# Patient Record
Sex: FEMALE | Race: WHITE | NOT HISPANIC OR LATINO | Employment: OTHER | URBAN - METROPOLITAN AREA
[De-identification: names, ages, dates, MRNs, and addresses within clinical notes are randomized per-mention and may not be internally consistent; named-entity substitution may affect disease eponyms.]

---

## 2017-02-09 ENCOUNTER — LAB CONVERSION - ENCOUNTER (OUTPATIENT)
Dept: OTHER | Facility: OTHER | Age: 77
End: 2017-02-09

## 2017-02-09 LAB — HBA1C MFR BLD HPLC: 6.5 % OF TOTAL HGB

## 2017-02-14 ENCOUNTER — GENERIC CONVERSION - ENCOUNTER (OUTPATIENT)
Dept: OTHER | Facility: OTHER | Age: 77
End: 2017-02-14

## 2017-02-15 ENCOUNTER — ALLSCRIPTS OFFICE VISIT (OUTPATIENT)
Dept: OTHER | Facility: OTHER | Age: 77
End: 2017-02-15

## 2017-02-15 DIAGNOSIS — E11.9 TYPE 2 DIABETES MELLITUS WITHOUT COMPLICATIONS (HCC): ICD-10-CM

## 2017-03-28 ENCOUNTER — ALLSCRIPTS OFFICE VISIT (OUTPATIENT)
Dept: OTHER | Facility: OTHER | Age: 77
End: 2017-03-28

## 2017-04-18 ENCOUNTER — GENERIC CONVERSION - ENCOUNTER (OUTPATIENT)
Dept: OTHER | Facility: OTHER | Age: 77
End: 2017-04-18

## 2017-04-19 ENCOUNTER — ALLSCRIPTS OFFICE VISIT (OUTPATIENT)
Dept: OTHER | Facility: OTHER | Age: 77
End: 2017-04-19

## 2017-04-19 ENCOUNTER — HOSPITAL ENCOUNTER (OUTPATIENT)
Dept: RADIOLOGY | Facility: HOSPITAL | Age: 77
Discharge: HOME | End: 2017-04-19
Payer: MEDICARE

## 2017-04-19 DIAGNOSIS — Z78.0 ASYMPTOMATIC MENOPAUSAL STATE: ICD-10-CM

## 2017-04-19 DIAGNOSIS — J20.8 ACUTE BRONCHITIS DUE TO OTHER SPECIFIED ORGANISMS: ICD-10-CM

## 2017-04-19 DIAGNOSIS — Z12.31 ENCOUNTER FOR SCREENING MAMMOGRAM FOR MALIGNANT NEOPLASM OF BREAST: ICD-10-CM

## 2017-04-19 PROCEDURE — 71020 HB CHEST X-RAY 2VW FRONTAL&LATL: CPT

## 2017-05-03 ENCOUNTER — GENERIC CONVERSION - ENCOUNTER (OUTPATIENT)
Dept: OTHER | Facility: OTHER | Age: 77
End: 2017-05-03

## 2017-05-24 ENCOUNTER — GENERIC CONVERSION - ENCOUNTER (OUTPATIENT)
Dept: OTHER | Facility: OTHER | Age: 77
End: 2017-05-24

## 2017-05-25 ENCOUNTER — GENERIC CONVERSION - ENCOUNTER (OUTPATIENT)
Dept: OTHER | Facility: OTHER | Age: 77
End: 2017-05-25

## 2017-06-09 ENCOUNTER — LAB CONVERSION - ENCOUNTER (OUTPATIENT)
Dept: OTHER | Facility: OTHER | Age: 77
End: 2017-06-09

## 2017-06-09 LAB
A/G RATIO (HISTORICAL): 1.1 (CALC) (ref 1–2.5)
ALBUMIN SERPL BCP-MCNC: 4 G/DL (ref 3.6–5.1)
ALP SERPL-CCNC: 80 U/L (ref 33–130)
ALT SERPL W P-5'-P-CCNC: 13 U/L (ref 6–29)
AST SERPL W P-5'-P-CCNC: 14 U/L (ref 10–35)
BILIRUB SERPL-MCNC: 0.4 MG/DL (ref 0.2–1.2)
BUN SERPL-MCNC: 19 MG/DL (ref 7–25)
BUN/CREA RATIO (HISTORICAL): 18 (CALC) (ref 6–22)
CALCIUM SERPL-MCNC: 9.4 MG/DL (ref 8.6–10.4)
CHLORIDE SERPL-SCNC: 98 MMOL/L (ref 98–110)
CHOLEST SERPL-MCNC: 147 MG/DL (ref 125–200)
CHOLEST/HDLC SERPL: 3.5 (CALC)
CO2 SERPL-SCNC: 27 MMOL/L (ref 20–31)
CREAT SERPL-MCNC: 1.05 MG/DL (ref 0.6–0.93)
EGFR AFRICAN AMERICAN (HISTORICAL): 61 ML/MIN/1.73M2
EGFR-AMERICAN CALC (HISTORICAL): 52 ML/MIN/1.73M2
GAMMA GLOBULIN (HISTORICAL): 3.6 G/DL (CALC) (ref 1.9–3.7)
GLUCOSE (HISTORICAL): 94 MG/DL (ref 65–99)
HBA1C MFR BLD HPLC: 6.3 % OF TOTAL HGB
HDLC SERPL-MCNC: 42 MG/DL
LDL CHOLESTEROL (HISTORICAL): 56 MG/DL (CALC)
NON-HDL-CHOL (CHOL-HDL) (HISTORICAL): 105 MG/DL (CALC)
POTASSIUM SERPL-SCNC: 4.6 MMOL/L (ref 3.5–5.3)
SODIUM SERPL-SCNC: 135 MMOL/L (ref 135–146)
TOTAL PROTEIN (HISTORICAL): 7.6 G/DL (ref 6.1–8.1)
TRIGL SERPL-MCNC: 246 MG/DL
TSH SERPL DL<=0.05 MIU/L-ACNC: 2.85 MIU/L (ref 0.4–4.5)

## 2017-06-14 ENCOUNTER — ALLSCRIPTS OFFICE VISIT (OUTPATIENT)
Dept: OTHER | Facility: OTHER | Age: 77
End: 2017-06-14

## 2017-06-14 DIAGNOSIS — E11.9 TYPE 2 DIABETES MELLITUS WITHOUT COMPLICATIONS (HCC): ICD-10-CM

## 2017-06-15 ENCOUNTER — HOSPITAL ENCOUNTER (OUTPATIENT)
Dept: BONE DENSITY | Facility: MEDICAL CENTER | Age: 77
Discharge: HOME | End: 2017-06-15
Payer: MEDICARE

## 2017-06-15 ENCOUNTER — HOSPITAL ENCOUNTER (OUTPATIENT)
Dept: MAMMOGRAPHY | Facility: MEDICAL CENTER | Age: 77
Discharge: HOME | End: 2017-06-15
Payer: MEDICARE

## 2017-06-15 DIAGNOSIS — Z78.0 ASYMPTOMATIC MENOPAUSAL STATE: ICD-10-CM

## 2017-06-15 DIAGNOSIS — Z12.31 ENCOUNTER FOR SCREENING MAMMOGRAM FOR MALIGNANT NEOPLASM OF BREAST: ICD-10-CM

## 2017-06-15 PROCEDURE — 77080 DXA BONE DENSITY AXIAL: CPT

## 2017-06-15 PROCEDURE — G0202 SCR MAMMO BI INCL CAD: HCPCS

## 2017-06-21 ENCOUNTER — GENERIC CONVERSION - ENCOUNTER (OUTPATIENT)
Dept: OTHER | Facility: OTHER | Age: 77
End: 2017-06-21

## 2017-07-29 ENCOUNTER — OFFICE VISIT (OUTPATIENT)
Dept: URGENT CARE | Facility: MEDICAL CENTER | Age: 77
End: 2017-07-29
Payer: MEDICARE

## 2017-07-29 ENCOUNTER — APPOINTMENT (OUTPATIENT)
Dept: RADIOLOGY | Facility: MEDICAL CENTER | Age: 77
End: 2017-07-29
Payer: MEDICARE

## 2017-07-29 DIAGNOSIS — R05.9 COUGH: ICD-10-CM

## 2017-07-29 PROCEDURE — 99213 OFFICE O/P EST LOW 20 MIN: CPT

## 2017-07-29 PROCEDURE — 71020 HB CHEST X-RAY 2VW FRONTAL&LATL: CPT

## 2017-07-29 PROCEDURE — G0463 HOSPITAL OUTPT CLINIC VISIT: HCPCS

## 2017-08-04 ENCOUNTER — ALLSCRIPTS OFFICE VISIT (OUTPATIENT)
Dept: OTHER | Facility: OTHER | Age: 77
End: 2017-08-04

## 2017-09-26 ENCOUNTER — GENERIC CONVERSION - ENCOUNTER (OUTPATIENT)
Dept: OTHER | Facility: OTHER | Age: 77
End: 2017-09-26

## 2017-10-13 ENCOUNTER — LAB CONVERSION - ENCOUNTER (OUTPATIENT)
Dept: OTHER | Facility: OTHER | Age: 77
End: 2017-10-13

## 2017-10-13 LAB
BUN SERPL-MCNC: 17 MG/DL (ref 7–25)
BUN/CREA RATIO (HISTORICAL): 15 (CALC) (ref 6–22)
CALCIUM SERPL-MCNC: 9.4 MG/DL (ref 8.6–10.4)
CHLORIDE SERPL-SCNC: 100 MMOL/L (ref 98–110)
CO2 SERPL-SCNC: 26 MMOL/L (ref 20–31)
CREAT SERPL-MCNC: 1.17 MG/DL (ref 0.6–0.93)
EGFR AFRICAN AMERICAN (HISTORICAL): 53 ML/MIN/1.73M2
EGFR-AMERICAN CALC (HISTORICAL): 46 ML/MIN/1.73M2
GLUCOSE (HISTORICAL): 101 MG/DL (ref 65–99)
HBA1C MFR BLD HPLC: 6.4 % OF TOTAL HGB
POTASSIUM SERPL-SCNC: 4.5 MMOL/L (ref 3.5–5.3)
SODIUM SERPL-SCNC: 136 MMOL/L (ref 135–146)

## 2017-10-16 ENCOUNTER — ALLSCRIPTS OFFICE VISIT (OUTPATIENT)
Dept: OTHER | Facility: OTHER | Age: 77
End: 2017-10-16

## 2017-10-16 DIAGNOSIS — E11.9 TYPE 2 DIABETES MELLITUS WITHOUT COMPLICATIONS (HCC): ICD-10-CM

## 2018-01-10 NOTE — RESULT NOTES
Verified Results  * MAMMO SCREENING BILATERAL W CAD [id number] 09:14AM Jc Souza     Test Name Result Flag Reference   MAMMO SCREENING BILATERAL W CAD (Report)     Patient History:   Patient is postmenopausal    Family history of breast cancer in maternal grandmother under age   48  Benign WBUS breast guidance of the right breast, May 13, 2009  Patient has never smoked  Patient's BMI is 34 8  Reason for exam: screening (asymptomatic)  Mammo Screening Bilateral W CAD: May 23, 2016 - Check In #:    [de-identified]   Bilateral CC and MLO view(s) were taken  Technologist: KATHERINE Lockwood (R)(M)   Prior study comparison: May 21, 2015, bilateral AMA dig scrn mamm   w/CAD performed at [facility]  May    20, 2014, bilateral WB digitl bilat zuleyka, performed at [facility]  May 13, 2013, bilateral digital    screening mammogram, performed at [place] [address]  May 10, 2012, bilateral digital screening    mammogram, performed at [place] [address]  May 9, 2011, bilateral digital screening mammogram,    performed at [place] [address]  May 3,    2010, bilateral digital screening mammogram, performed at [place] [address]  There are scattered fibroglandular densities  No dominant soft tissue mass, architectural distortion or    suspicious calcifications are noted  The skin and nipple    contours are within normal limits  No evidence of malignancy  No significant changes   when compared with prior studies  ASSESSMENT: BiRad:1 - Negative     Recommendation:   Routine screening mammogram of both breasts in 1 year  A    reminder letter will be sent  Analyzed by CAD     8-10% of cancers will be missed on mammography  Management of a    palpable abnormality must be based on clinical grounds   Patients   will be notified of their results via letter from our facility  Accredited by [employer] and FDA  Transcription Location: KATHERINE Mckeon 98: [id number]     Risk Value(s):   Tyrer-Cuzick 10 Year: 7 344%, Tyrer-Cuzick Lifetime: 8 948%,    Myriad Table: 2 6%, FAMILIA 5 Year: 1 9%, NCI Lifetime: 4 6%   Signed by:    Michael Campuzano MD   5/23/16

## 2018-01-11 NOTE — RESULT NOTES
Verified Results  DXA FOLLOW UP (NO CHARGE) [id number] 10:11AM eLann Slaughter Order Number: [id number]    - Patient Instructions: To schedule this appointment, please contact Central Scheduling at [phone]  Test Name Result Flag Reference   DXA FOLLOW UP (NO CHARGE) (Report)     CENTRAL DXA SCAN     CLINICAL HISTORY:  70year old post-menopausal  female risk factors include estrogen deficiency  Osteopenia  Treatment with Fosamax 2001 until 2003     TECHNIQUE: Bone densitometry was performed using a [employer]'s W bone densitometer  Regions of interest appear properly placed  There are no obvious fractures or other confounding variables which could limit the study  Degenerative or    osteoarthritic changes appear to be present on the spine image falsely altering the true bone density  COMPARISON: Follow-up     RESULTS:    LUMBAR SPINE: L1-L4:   BMD 1 175 gm/cm2   T-score above normal, +1 24% higher than in 2014 and 34% higher than 1994, statistically significant but likely related to the changes noted on the spine image  Z-score +3 5     LEFT TOTAL HIP:   BMD 0 768 gm/cm2   T-score below normal,-1 4   Z-score 0 3     LEFT FEMORAL NECK:   BMD 0 543 gm/cm2   T-score below normal,osteoporosis ,-2 8 and 2% less than 2014 and unchanged from [facility]  Z-score -0 7     The forearm BMD is 0 597 and the T score is below normal, -1 6 and 3% less than 2014 and 2% less than 2012  The Z score is 0 9  A 25-hydroxy vitamin D level, an intact PTH, and a comprehensive metabolic panel are suggested in view of the femoral neck osteoporosis  Treatment is a consideration perhaps with Prolia or intravenous Reclast        IMPRESSION:   1  Based on the [facility] classification, this study identifies a diagnosis of osteoporosis, notably at the femoral neck area and the patient is considered at elevated risk for fracture         2  A daily intake of calcium of at least 1200 mg and vitamin D, 800-1000 IU, as well as weight bearing and muscle strengthening exercise, fall prevention and avoidance of tobacco and excessive alcohol intake as basic preventive measures are recommended  3  Repeat DXA scan on the same equipment in 18-24 months as clinically indicated  The 10 year risk of hip fracture is 19%, with the 10 year risk of major osteoporotic fracture being 30%, as calculated by the [facility] fracture risk assessment tool (FRAX)  The current NOF guidelines recommend treating patients with FRAX 10 year risk score    of >3% for hip fracture and >20% for major osteoporotic fracture  Fracture risks exceed treatment thresholds  WHO CLASSIFICATION:   Normal (a T-score of -1 0 or higher)   Low bone mineral density (a T-score of less than -1 0 but higher than -2 5)   Osteoporosis (a T-score of -2 5 or less)   Severe osteoporosis (a T-score of -2 5 or less with a fragility fracture)      Thank you for allowing us the opportunity to participate in your patient care  The expanded DEXA report will no longer be arriving in your mail  If you desire to view the full report please contact [address] [place] [address] or access the PACS system  Workstation performed: [id number]     Signed by:   Shubham Ibrahim MD   6/16/17     * DXA BONE DENSITY SPINE HIP AND PELVIS [id number] 09:17AM [facility] Order Number: [id number]    - Patient Instructions: To schedule this appointment, please contact Central Scheduling at [phone]  Test Name Result Flag Reference   DXA BONE DENSITY SPINE HIP AND PELVIS (Report)     CENTRAL DXA SCAN     CLINICAL HISTORY:  70year old post-menopausal  female risk factors include estrogen deficiency  Osteopenia  Treatment with Fosamax 2001 until 2003     TECHNIQUE: Bone densitometry was performed using a [employer]'s W bone densitometer  Regions of interest appear properly placed  There are no obvious fractures or other confounding variables which could limit the study  Degenerative or    osteoarthritic changes appear to be present on the spine image falsely altering the true bone density  COMPARISON: Follow-up     RESULTS:    LUMBAR SPINE: L1-L4:   BMD 1 175 gm/cm2   T-score above normal, +1 24% higher than in 2014 and 34% higher than 1994, statistically significant but likely related to the changes noted on the spine image  Z-score +3 5     LEFT TOTAL HIP:   BMD 0 768 gm/cm2   T-score below normal,-1 4   Z-score 0 3     LEFT FEMORAL NECK:   BMD 0 543 gm/cm2   T-score below normal,osteoporosis ,-2 8 and 2% less than 2014 and unchanged from [facility]  Z-score -0 7     The forearm BMD is 0 597 and the T score is below normal, -1 6 and 3% less than 2014 and 2% less than 2012  The Z score is 0 9  A 25-hydroxy vitamin D level, an intact PTH, and a comprehensive metabolic panel are suggested in view of the femoral neck osteoporosis  Treatment is a consideration perhaps with Prolia or intravenous Reclast        IMPRESSION:   1  Based on the [facility] classification, this study identifies a diagnosis of osteoporosis, notably at the femoral neck area and the patient is considered at elevated risk for fracture  2  A daily intake of calcium of at least 1200 mg and vitamin D, 800-1000 IU, as well as weight bearing and muscle strengthening exercise, fall prevention and avoidance of tobacco and excessive alcohol intake as basic preventive measures are recommended  3  Repeat DXA scan on the same equipment in 18-24 months as clinically indicated  The 10 year risk of hip fracture is 19%, with the 10 year risk of major osteoporotic fracture being 30%, as calculated by the [facility] fracture risk assessment tool (FRAX)  The current NOF guidelines recommend treating patients with FRAX 10 year risk score    of >3% for hip fracture and >20% for major osteoporotic fracture  Fracture risks exceed treatment thresholds       WHO CLASSIFICATION:   Normal (a T-score of -1 0 or higher)   Low bone mineral density (a T-score of less than -1 0 but higher than -2 5)   Osteoporosis (a T-score of -2 5 or less)   Severe osteoporosis (a T-score of -2 5 or less with a fragility fracture)      Thank you for allowing us the opportunity to participate in your patient care  The expanded DEXA report will no longer be arriving in your mail  If you desire to view the full report please contact [address] [place] [address] or access the PACS system  Workstation performed: [id number]     Signed by:   Gideon Francisco MD   6/16/17     * MAMMO SCREENING BILATERAL W CAD [id number] 09:15AM Brittjonasne Stephanie Order Number: [id number]    - Patient Instructions: To schedule this appointment, please contact Central Scheduling at [phone]  Do not wear any perfume, powder, lotion or deodorant on breast or underarm area  Please bring your doctors order, referral (if needed) and insurance information with you on the day of the test  Failure to bring this information may result in this test being rescheduled  Arrive 15 minutes prior to your appointment time to register  On the day of your test, please bring any prior mammogram or breast studies with you that were not performed at a [facility]  Failure to bring prior exams may result in your test needing to be rescheduled  Test Name Result Flag Reference   MAMMO SCREENING BILATERAL W CAD (Report)     Patient History:   Patient is postmenopausal    Family history of breast cancer under age 48 in maternal    grandmother, unknown cancer at age 76 in mother  Benign WBUS breast guidance of the right breast, May 13, 2009  Patient has never smoked  Patient's BMI is 34 8  Reason for exam: screening, asymptomatic  Mammo Screening Bilateral W CAD: Ayesha 15, 2017 - Check In #:    [de-identified]   Bilateral CC and MLO view(s) were taken       Technologist: KATHERINE Harrington (KATHERINE)(M)   Prior study comparison: May 23, 2016, mammo screening bilateral W   CAD, performed at [place] [address]  May    21, 2015, bilateral AMA dig scrn mamm w/CAD, performed at [place] [address]  May 20, 2014, bilateral WB    digitl bilat zuleyka, performed at [address] [place] [address]  May 13, 2013, bilateral digital screening mammogram performed    at [address] [place] [address]  May 10, 2012,    bilateral digital screening mammogram performed at [address] [place] [address]  There are scattered fibroglandular densities  The parenchymal pattern appears stable  No dominant soft tissue    mass or suspicious calcifications are noted  Radiopaque post core   biopsy clips are seen in the right breast from prior benign    biopsies  Stable nodular densities are seen in the left breast     The skin and nipple contours are within normal limits  No mammographic evidence of malignancy  No    significant changes when compared with prior studies  ACR BI-RADSï¾® Assessments: BiRad:2 - Benign     Recommendation:   Routine screening mammogram in 1 year  A reminder letter will be   scheduled  Analyzed by CAD     8-10% of cancers will be missed on mammography  Management of a    palpable abnormality must be based on clinical grounds  Patients   will be notified of their results via letter from our facility  Accredited by [employer] and FDA       Transcription Location:  Mike 98: [id number]     Risk Value(s):   Tyrer-Cuzick 10 Year: 3 600%, Tyrer-Cuzick Lifetime: 4 000%,    Myriad Table: 2 6%, FAMILIA 5 Year: 2 4%, NCI Lifetime: 5 5%   Signed by:   Michele Swenson MD   6/15/17

## 2018-01-11 NOTE — RESULT NOTES
Verified Results  * XR HIP 2+ VIEW LEFT 17Jun2016 01:14PM [facility] Order Number: [id number]     Test Name Result Flag Reference   * XR HIP/PELV 2-3 VWS LEFT (Report)     LEFT HIP     INDICATION: Left hip pain for 3 weeks  COMPARISON: None     VIEWS: AP pelvis and 2 coned down views; 3 images     FINDINGS:     There is no fracture or dislocation  Hip joint appears symmetric when compared to contralateral side with mild symmetric joint space narrowing  There are advanced degenerative changes seen within the left SI joint  Visualized low back also shows degenerative changes at L4-L5 and L5-S1    levels  No lytic or blastic lesions are seen  Soft tissues are unremarkable  IMPRESSION:     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

## 2018-01-11 NOTE — PROGRESS NOTES
Assessment    1  GERD (gastroesophageal reflux disease) (530 81) (K21 9)   2  Hyperlipidemia (272 4) (E78 5)   3  Hypothyroidism (244 9) (E03 9)   4  Type 2 diabetes mellitus (250 00) (E11 9)   5  Encounter for preventive health examination (V70 0) (Z00 00)    Plan   Health Maintenance    · We encourage all of our patients to exercise regularly  30 minutes of exercise or physical  activity five or more days a week is recommended for children and adults ;  Status:Complete;   Done: 04RCA8975 03:55PM  Type 2 diabetes mellitus    · (1) COMPREHENSIVE METABOLIC PANEL; Status:Active; Requested for:16Oct2017;    · (1) HEMOGLOBIN A1C; Status:Active; Requested for:16Oct2017;    · (1) LIPID PANEL, FASTING; Status:Active; Requested for:16Oct2017;     Fluzone High-Dose 0 5 ML Intramuscular Suspension Prefilled Syringe; INJECT 0 5  ML Intramuscular; Dose: 0 5; Route: Intramuscular; Site: Right Deltoid; Done: 93QYP3923 10:03AM; Status: Complete - Retrospective By Protocol Authorization;  Ordered  For: Flu vaccine need; Ordered By:Monica Mora; Effective Date:16Oct2017; Administered by: Charlie Cruz: 10/16/2017 10:03:00 AM; Last Updated By: Charlie Cruz; 10/16/2017 2:15:37 PM     Discussion/Summary  Impression: Initial Annual Wellness Visit  Cardiovascular screening and counseling: screening is current, counseling was given on maintaining a healthy diet and counseling was given on ways to improve exercise tolerance  Diabetes screening and counseling: screening not indicated  Colorectal cancer screening and counseling: screening is current  Breast cancer screening and counseling: screening is current  Cervical cancer screening and counseling: screening not indicated  Osteoporosis screening and counseling: screening not indicated  Abdominal aortic aneurysm screening and counseling: screening not indicated  Glaucoma screening and counseling: screening is current and aug 2017 Dr Shawn Christianson     HIV screening and counseling: screening not indicated  Immunizations: influenza vaccine is up to date this year, the lifetime pneumococcal vaccine has been completed, hepatitis B vaccination series is not indicated at this time due to the patient's low risk of roosevelt the disease, Zostavax vaccination up to date and the patient declines the Tdap vaccine  Advance Directive Planning: complete and up to date  Patient Discussion: plan discussed with the patient, follow-up visit needed in one year  Chief Complaint  pt is here for AWV      History of Present Illness  Welcome to Medicare and Wellness Visits: The patient is being seen for the initial annual wellness visit  Medicare Screening and Risk Factors   Once per lifetime medicare screening tests: AAA screening [place] has not yet been done  Medicare Screening Tests Risk Questions   Abdominal aortic aneurysm risk assessment: none indicated  Osteoporosis risk assessment: , female gender and over 48years of age  HIV risk assessment: none indicated  Drug and Alcohol Use: The patient has never smoked cigarettes  The patient reports never drinking alcohol  Alcohol concern:   The patient has no concerns about alcohol abuse  She has never used illicit drugs  Diet and Physical Activity: Current diet includes well balanced meals, 2 servings of fruit per day, 1 servings of vegetables per day, 1 servings of meat per day, 1 servings of whole grains per day, 1 servings of simple carbohydrates per day, 2 servings of dairy products per day, 2 cups of coffee per day and 1 cups of tea per day  She exercises infrequently  Exercise: walking  Mood Disorder and Cognitive Impairment Screening: PHQ-9 Depression Scale   Depression screening  no significant symptoms  Functional Ability/Level of Safety: Hearing is normal bilaterally  She does not use a hearing aid   The patient is currently able to drive with limitations, but able to do activities of daily living without limitations and able to do instrumental activities of daily living without limitations  Advance Directives: Advance directives: living will, durable power of  for health care directives and advance directives  Co-Managers and Medical Equipment/Suppliers: See Patient Care Team      Patient Care Team    Care Team Member Role Specialty Office Number   Tapan Perez MD  Obstetrics/Gynecology [phone]   Shelly Baca Specialist Orthopedic Surgery [phone]   Yesi Summers MD Specialist Orthopedic Surgery [phone]     Active Problems    1  Acute bronchitis (466 0) (J20 9)   2  Acute bronchitis due to other specified organisms (466 0) (J20 8)   3  Acute URI (465 9) (J06 9)   4  Bursitis of left hip (726 5) (M70 72)   5  Colon cancer screening (V76 51) (Z12 11)   6  Cough (786 2) (R05)   7  Encounter for screening mammogram for malignant neoplasm of breast (V76 12)   (Z12 31)   8  Flu vaccine need (V04 81) (Z23)   9  GERD (gastroesophageal reflux disease) (530 81) (K21 9)   10  Gout (274 9) (M10 9)   11  Greater trochanteric bursitis of left hip (726 5) (M70 62)   12  Hip pain, left (719 45) (M25 552)   13  History of ovarian cyst (V13 29) (Z87 42)   14  Hyperlipidemia (272 4) (E78 5)   15  Hypothyroidism (244 9) (E03 9)   16  History of Inflamed skin tag (701 9,686 9) (L91 8)   17  Knee pain, right (719 46) (M25 561)   18  Lower abdominal pain (789 09) (R10 30)   19  Need for Tdap vaccination (V06 1) (Z23)   20  History of Pelvic pain in female (625 9) (R10 2)   21  Post menopausal problems (V49 81) (Z78 0)   22  Primary osteoarthritis of right knee (715 16) (M17 11)   23  Screening for genitourinary condition (V81 6) (Z13 89)   24  Situational anxiety (300 09) (F41 8)   25   Type 2 diabetes mellitus (250 00) (E11 9)    Past Medical History    · History of  2 (V22 2) (Z33 1)   · Denied: History of alcohol abuse   · Denied: History of mental disorder   · History of ovarian cyst (V13 29) (Z87 42)   · Denied: History of substance abuse   · History of Inflamed skin tag (701 9,686 9) (L91 8)   · History of Pelvic pain in female (625 9) (R10 2)   · History of Postoperative visit (V58 49) (Z48 89)   · History of Urinary frequency (788 41) (R35 0)    Surgical History    · History of Breast Surgery   · History of Cataract Surgery   · History of Oophorectomy    Family History  Mother    · Family history of malignant neoplasm (V16 9) (Z80 9)  Son    · Family history of paranoid schizophrenia (V17 0) (Z81 8)   · FHx: mental illness (V17 0) (Z81 8)  Maternal Grandmother    · Family history of malignant neoplasm of breast (V16 3) (Z80 3)  Family History    · Denied: Family history of alcohol abuse   · Family history of malignant neoplasm (V16 9) (Z80 9)   · Denied: Family history of substance abuse    Social History    · Active advance directive (V49 89) (Z78 9)   · Always uses seat belt   · House   · Lives with spouse   ·    · Never smoker   · No alcohol use   · Patient has living will (V49 89) (Z78 9)   · Power of  in existence   · Primary language is English   · Lutheran   · Retired   · Supportive and safe   · Two children    Current Meds   1  Aspirin 81 MG TABS; Take 1 tablet daily Recorded   2  Benzonatate 100 MG Oral Capsule; TAKE 1 CAPSULE 3 TIMES DAILY AS NEEDED; Therapy: 41FSI9062 to (Evaluate:76Toa6753)  Requested for: 95SLY5601; Last   Rx:54Abk5487 Ordered   3  BL Calcium 600 + D TABS; Take as directed Recorded   4  BL Glucosamine-Chondroitin TABS; Take as directed Recorded   5  Colace 100 MG Oral Capsule; 1 tablet every other day as needed; Therapy: (Recorded:79Qip5689) to Recorded   6  [employer] Fish Oil 1200 MG Oral Capsule; Take as directed Recorded   7  Daily Multiple Vitamin TABS; Take as directed Recorded   8  Levothyroxine Sodium 50 MCG Oral Tablet; take one tablet by mouth every day;    Therapy: 43CGM2969 to (Brenna Collado)  Requested for: 17OHH9836; Last Rx: 26VML3309 Ordered   9  Lisinopril-Hydrochlorothiazide 10-12 5 MG Oral Tablet; Take 1 tablet daily  Requested   for: 65MVB0164; Last YX:62VMV1749 Ordered   10  Omeprazole 20 MG Oral Capsule Delayed Release; TAKE ONE CAPSULE BY MOUTH    EVERY DAY; Therapy: 30Mrj6291 to (Marta White)  Requested for: 74Adi0912; Last    Rx:72Rsj1656 Ordered   11  PredniSONE 10 MG Oral Tablet; TAKE 1 TABLET EVERY 12 HOURS DAILY; Therapy: 83SGT3357 to (Evaluate:31Htn0141)  Requested for: 72Fqs7990; Last    Rx:23Yeb9474 Ordered   12  Simvastatin 40 MG Oral Tablet; TAKE 1 TABLET DAILY; Therapy: 08PJB6793 to (Evaluate:22Vmu4449)  Requested for: 73ICV8963; Last    Rx:61Zvu7024 Ordered    Allergies    1  No Known Drug Allergies    Immunizations   ** Printed in Appendix #1 below  Vitals  Signs    Heart Rate: 88  Systolic: 294  Diastolic: 70  Height: 4 ft 9 in  Weight: 164 lb   BMI Calculated: 35 49  BSA Calculated: 1 65    Results/Data  Falls Risk Assessment (Dx Z13 89 Screen for Neurologic Disorder) 2017 10:33AM User, Ahs     Test Name Result Flag Reference   Falls Risk      No falls in the past year     PHQ-2 Adult Depression Screening 71RNG2699 10:32AM User, [facility]     Test Name Result Flag Reference   PHQ-2 Adult Depression Score 0     Over the last two weeks, how often have you been bothered by any of the following problems?   Little interest or pleasure in doing things: Not at all - 0  Feeling down, depressed, or hopeless: Not at all - 0   PHQ-2 Adult Depression Screening Negative         Future Appointments    Date/Time Provider Specialty Site   2018 09:45 AM Gabi Menjivar DO Family Medicine [address] PRACTICE     Signatures   Electronically signed by : Michelle Peña DO; Oct 16 2017  3:55PM EST                       (Author)    Appendix #1     Patient: Evelyn Rico ; : 1940; MRN: [id number]      1 2 3 4 5    Influenza  2011 24-Sep-2012 12-Sep-2014 19-Oct-2015 17-Oct-2016    Pneumo Other May 2010        Tdap  Temporarily Deferred: Pt requests deferral, INS DOESN'T COVER, As of: 16Jun2016, Defer for 3 Years        Zoster  16-Jan-2008

## 2018-01-11 NOTE — RESULT NOTES
Verified Results  * XR CHEST PA & LATERAL 19Apr2017 01:52PM Nino Almeida Order Number: [id number]     Test Name Result Flag Reference   XR CHEST PA & LATERAL (Report)     CHEST 2 View     INDICATION: Productive cough and shortness of breath for 4 days  COMPARISON: 5/21/2015     VIEWS: PA and lateral projections; 3 images     FINDINGS:        Cardiomediastinal silhouette appears unremarkable  The lungs are clear  No pneumothorax or pleural effusion  Visualized osseous structures appear within normal limits for the patient's age  IMPRESSION:     No active pulmonary disease  Workstation performed: [id number]     Signed by:    Cherylene Maiers, MD   4/21/17

## 2018-01-12 VITALS
SYSTOLIC BLOOD PRESSURE: 122 MMHG | BODY MASS INDEX: 34.43 KG/M2 | TEMPERATURE: 98.9 F | HEIGHT: 58 IN | WEIGHT: 164 LBS | DIASTOLIC BLOOD PRESSURE: 60 MMHG

## 2018-01-12 VITALS
TEMPERATURE: 99.5 F | HEART RATE: 88 BPM | SYSTOLIC BLOOD PRESSURE: 110 MMHG | HEIGHT: 58 IN | BODY MASS INDEX: 34.22 KG/M2 | WEIGHT: 163 LBS | DIASTOLIC BLOOD PRESSURE: 72 MMHG

## 2018-01-13 VITALS
RESPIRATION RATE: 18 BRPM | DIASTOLIC BLOOD PRESSURE: 70 MMHG | HEART RATE: 84 BPM | HEIGHT: 58 IN | BODY MASS INDEX: 34.55 KG/M2 | WEIGHT: 164.6 LBS | SYSTOLIC BLOOD PRESSURE: 110 MMHG

## 2018-01-14 ENCOUNTER — OFFICE VISIT (OUTPATIENT)
Dept: URGENT CARE | Facility: MEDICAL CENTER | Age: 78
End: 2018-01-14
Payer: MEDICARE

## 2018-01-14 ENCOUNTER — APPOINTMENT (OUTPATIENT)
Dept: RADIOLOGY | Facility: MEDICAL CENTER | Age: 78
End: 2018-01-14
Payer: MEDICARE

## 2018-01-14 VITALS
BODY MASS INDEX: 35.38 KG/M2 | WEIGHT: 164 LBS | HEART RATE: 88 BPM | SYSTOLIC BLOOD PRESSURE: 112 MMHG | DIASTOLIC BLOOD PRESSURE: 70 MMHG | HEIGHT: 57 IN

## 2018-01-14 VITALS
WEIGHT: 165 LBS | BODY MASS INDEX: 35.6 KG/M2 | RESPIRATION RATE: 18 BRPM | HEIGHT: 57 IN | DIASTOLIC BLOOD PRESSURE: 78 MMHG | SYSTOLIC BLOOD PRESSURE: 118 MMHG | OXYGEN SATURATION: 98 % | TEMPERATURE: 99.7 F | HEART RATE: 100 BPM

## 2018-01-14 VITALS
HEART RATE: 88 BPM | HEIGHT: 58 IN | BODY MASS INDEX: 34.43 KG/M2 | WEIGHT: 164 LBS | SYSTOLIC BLOOD PRESSURE: 110 MMHG | DIASTOLIC BLOOD PRESSURE: 70 MMHG

## 2018-01-14 DIAGNOSIS — R05.9 COUGH: ICD-10-CM

## 2018-01-14 PROCEDURE — 99213 OFFICE O/P EST LOW 20 MIN: CPT

## 2018-01-14 PROCEDURE — G0463 HOSPITAL OUTPT CLINIC VISIT: HCPCS

## 2018-01-14 PROCEDURE — 71046 X-RAY EXAM CHEST 2 VIEWS: CPT

## 2018-01-18 NOTE — RESULT NOTES
Verified Results  MRI PELVIS BONY WO CONTRAST [id number] 11:23AM Keyonna Phi     Test Name Result Flag Reference   MRI PELVIS BONY WO CONTRAST (Report)     MRI BONY PELVIS     INDICATION: Left hip pain  Difficulty walking and bearing weight on the left side  COMPARISON: Left hip plain films from 6/17/2016, pelvic MR from 11/25/2014     TECHNIQUE: MR sequences were obtained of the pelvis to evaluate for sacral and coccygeal pathology  Localizers, sagittal STIR or sagittal T2 fat sat, axial T1, axial T2 fat sat, coronal T1, coronal STIR were obtained  Axial and coronal images were    obliqued relative to the sacrum and coccyx  1 5 Kassidy unit  Gadolinium was not used  FINDINGS:     SUBCUTANEOUS TISSUES: Normal     SI JOINTS AND SYMPHYSIS PUBIS:  There are sacral ala insufficiency fractures bilaterally , which are chronic since they are in retrospect visible on the 11/25/2014 pelvic MR, and there is no significant marrow edema currently  (Series 4 images 7    through 9 ) There are also chronic, bilateral iliac insufficiency fractures adjacent to the sacroiliac joints  Symphysis pubis is unremarkable  VISUALIZED LUMBAR SPINE: Please see concurrent lumbar spine MRI report for evaluation of the lumbar spine  OVERALL MARROW SIGNAL: There are no additional bony abnormalities  MUSCULATURE: Unremarkable  PELVIC SOFT TISSUES:  Normal        IMPRESSION:     There are chronic bilateral sacral ala insufficiency fractures, and also insufficiencies fractures of the iliac bones adjacent to the sacroiliac joints  There is no evidence of acute fracture         Workstation performed: [id number]     Signed by:   Vel Shaffer MD   7/1/16     * MRI LUMBAR SPINE WO CONTRAST [id number] 11:23AM Keyonna Phi     Test Name Result Flag Reference   MRI LUMBAR SPINE [address] (Report)     MRI LUMBAR SPINE WITHOUT CONTRAST     INDICATION: Left hip pain, difficulty walking and bearing weight on left foot, one month     COMPARISON: None  TECHNIQUE: Sagittal T1, sagittal T2, sagittal inversion recovery, axial T1 and axial T2, coronal T2       IMAGE QUALITY: Diagnostic     FINDINGS:     ALIGNMENT: Prominence of lumbar lordosis  Leftward translation of L4, rightward translation of the L2-L3 level  MARROW SIGNAL: Normal marrow signal is identified within the visualized bony structures  No discrete marrow lesion  DISTAL CORD AND CONUS: Normal size and signal within the distal cord and conus  The conus ends at the T12-L1 level  PARASPINAL SOFT TISSUES: Paraspinal soft tissues are unremarkable  SACRUM: Bilateral lateral sacral alar fractures are noted without edema, consistent with chronic fracture  LOWER THORACIC DISC SPACES: Normal disc height and signal  No disc herniation, canal stenosis or foraminal narrowing  LUMBAR DISC SPACES:        L1-L2: Normal      L2-L3: Circumferential bulge, moderate bilateral facet arthrosis, marginal osteophytes, asymmetric to the right  L3-L4: Minor degenerative anterolisthesis  Circumferential bulging of the discs  L4-L5: Moderate facet arthrosis, circumferential bulge  Asymmetric loss of disc height to the right  L5-S1: Bilateral facet arthrosis  Disc extends asymmetrically to the right foramen however, L5 root exits without compression  IMPRESSION:     Multifocal spondylosis and osteoarthritis without focal root compression  Chronic bilateral sacral alar fractures  Workstation performed: [id number]     Signed by:    Wayne Bowen MD   7/1/16

## 2018-01-19 NOTE — PROGRESS NOTES
Assessment   1  History of acute bronchitis (V12 69) (Z87 09)   2  History of acute bronchitis (V12 69) (Z87 09)   3  Acute bronchitis (466 0) (J20 9)    Plan   Acute bronchitis    · Doxycycline Hyclate 100 MG Oral Capsule; TAKE 1 CAPSULE EVERY 12 HOURS    UNTIL GONE   · PredniSONE 50 MG Oral Tablet; TAKE 1 TABLET DAILY AS DIRECTED  Cough    · * XR CHEST PA & LATERAL; Status:Resulted - Requires Verification,Retrospective By    Protocol Authorization;   Done: [id number] 11:37AM    Discussion/Summary   Discussion Summary:    1  Bronchitis x-rays reviewed by myself shows no acute abnormality  If the radiology read differs, I will call you doxycycline as directed with food prednisone as directed with food fluids up with PCP within 5 days if no improvement   to emergency room with worsening symptoms, fever, chest pain, shortness of breath or any signs of distress  Medication Side Effects Reviewed: Possible side effects of new medications were reviewed with the patient/guardian today  Understands and agrees with treatment plan: The treatment plan was reviewed with the patient/guardian  The patient/guardian understands and agrees with the treatment plan      Chief Complaint   1  Cough  Chief Complaint Free Text Note Form: Pt c/o an occasionally productive cough x3d, also states she is tired all the time  Denies any other symptoms  Tried mucinex, cough med w codeine without relief  History of Present Illness   HPI: The patient presents today for an evaluation of a cough that started on Thursday night  The cough is wet however she is not bringing up any mucous  The patient has tried taking mucinex-DM and promethazine with codeine with some relief  The patient denies any sick contacts  Patient states that she has had bronchitis previously and this is similar  Hospital Based Practices Required Assessment:      Pain Assessment      the patient states they do not have pain  Abuse And Domestic Violence Screen   Domestic violence screen not done today  Reason DV Screen not done:  in room       Depression And Suicide Screen  Suicide screen not done today  -- Reason suicide screen not done:  in room  Prefered Language is  english  Primary Language is  english  Review of Systems   Focused-Female:      Constitutional: no fever-- and-- no chills  ENT: no earache,-- no sore throat-- and-- no nasal discharge  Cardiovascular: no chest pain  Respiratory: cough, but-- no shortness of breath  Gastrointestinal: no abdominal pain  Neurological: no headache  ROS Reviewed:    ROS reviewed  Active Problems   1  Acute bronchitis due to other specified organisms (466 0) (J20 8)   2  Acute URI (465 9) (J06 9)   3  Bursitis of left hip (726 5) (M70 72)   4  Colon cancer screening (V76 51) (Z12 11)   5  Cough (786 2) (R05)   6  Encounter for screening mammogram for malignant neoplasm of breast (V76 12)     (Z12 31)   7  Flu vaccine need (V04 81) (Z23)   8  GERD (gastroesophageal reflux disease) (530 81) (K21 9)   9  Gout (274 9) (M10 9)   10  Greater trochanteric bursitis of left hip (726 5) (M70 62)   11  Hip pain, left (719 45) (M25 552)   12  History of ovarian cyst (V13 29) (Z87 42)   13  Hyperlipidemia (272 4) (E78 5)   14  Hypothyroidism (244 9) (E03 9)   15  History of Inflamed skin tag (701 9,686 9) (L91 8)   16  Knee pain, right (719 46) (M25 561)   17  Lower abdominal pain (789 09) (R10 30)   18  Need for Tdap vaccination (V06 1) (Z23)   19  History of Pelvic pain in female (625 9) (R10 2)   20  Post menopausal problems (V49 81) (Z78 0)   21  Primary osteoarthritis of right knee (715 16) (M17 11)   22  Screening for genitourinary condition (V81 6) (Z13 89)   23  Situational anxiety (300 09) (F41 8)   24  Type 2 diabetes mellitus (250 00) (E11 9)    Past Medical History   1  History of  2 (V22 2) (Z33 1)   2  History of acute bronchitis (V12 69) (Z87 09)   3  History of acute bronchitis (V12 69) (Z87 09)   4  Denied: History of alcohol abuse   5  Denied: History of mental disorder   6  History of ovarian cyst (V13 29) (Z87 42)   7  Denied: History of substance abuse   8  History of Inflamed skin tag (701 9,686 9) (L91 8)   9  History of Pelvic pain in female (625 9) (R10 2)   10  History of Postoperative visit (V58 49) (Z48 89)   11  History of Urinary frequency (788 41) (R35 0)  Active Problems And Past Medical History Reviewed: The active problems and past medical history were reviewed and updated today  Family History   Mother    1  Family history of malignant neoplasm (V16 9) (Z80 9)  Son    2  Family history of paranoid schizophrenia (V17 0) (Z81 8)   3  FHx: mental illness (V17 0) (Z81 8)  Maternal Grandmother    4  Family history of malignant neoplasm of breast (V16 3) (Z80 3)  Family History    5  Denied: Family history of alcohol abuse   6  Family history of malignant neoplasm (V16 9) (Z80 9)   7  Denied: Family history of substance abuse  Family History Reviewed: The family history was reviewed and updated today  Social History    · Active advance directive (V49 89) (Z78 9)   · Always uses seat belt   · House   · Lives with spouse   ·    · Never smoker   · No alcohol use   · Patient has living will (V49 89) (Z78 9)   · Power of  in existence   · Primary language is English   · Holiness   · Retired   · Supportive and safe   · Two children  Social History Reviewed: The social history was reviewed and updated today  The social history was reviewed and is unchanged  Surgical History   1  History of Breast Surgery   2  History of Cataract Surgery   3  History of Oophorectomy  Surgical History Reviewed: The surgical history was reviewed and updated today  Current Meds    1  Aspirin 81 MG TABS; Take 1 tablet daily Recorded   2  BL Calcium 600 + D TABS; Take as directed Recorded   3   BL Glucosamine-Chondroitin TABS; Take as directed Recorded   4  Colace 100 MG Oral Capsule; 1 tablet every other day as needed; Therapy: (Recorded:44Nqh7811) to Recorded   5  [facility] Fish Oil 1200 MG Oral Capsule; Take as directed Recorded   6  Daily Multiple Vitamin TABS; Take as directed Recorded   7  Levothyroxine Sodium 50 MCG Oral Tablet; take one tablet by mouth every day; Therapy: 10TBA9316 to ([facility])  Requested for: 74RQE2035; Last     Rx:84Rlj7461 Ordered   8  Lisinopril-Hydrochlorothiazide 10-12 5 MG Oral Tablet; Take 1 tablet daily  Requested for:     19OTA4371; Last EN:03MHO6221 Ordered   9  Omeprazole 20 MG Oral Capsule Delayed Release; TAKE ONE CAPSULE BY MOUTH     EVERY DAY; Therapy: 30Jll6027 to ([facility])  Requested for: 20Ifp8893; Last     Rx:46Zte7617 Ordered   10  Simvastatin 40 MG Oral Tablet; TAKE 1 TABLET DAILY; Therapy: 17TOH2361 to (Evaluate:91Gur5022)  Requested for: 34AXE5042; Last      Rx:23Fjm0001 Ordered  Medication List Reviewed: The medication list was reviewed and updated today  Allergies   1  No Known Drug Allergies    Vitals   Signs   Recorded: 23YNK1251 11:10AM   Temperature: 99 9 F, Tympanic  Heart Rate: 100  Respiration: 20  Systolic: 136, RUE, Sitting  Diastolic: 74, RUE, Sitting  O2 Saturation: 97  Pain Scale: 0    Physical Exam        Constitutional      General appearance: No acute distress, well appearing and well nourished  Eyes      Conjunctiva and lids: No swelling, erythema or discharge  Pupils and irises: Equal, round and reactive to light  Ears, Nose, Mouth, and Throat      External inspection of ears and nose: Normal        Otoscopic examination: Tympanic membranes translucent with normal light reflex  Canals patent without erythema  Nasal mucosa, septum, and turbinates: Normal without edema or erythema  Oropharynx: Normal with no erythema, edema, exudate or lesions         Pulmonary      Respiratory effort: No increased work of breathing or signs of respiratory distress  Auscultation of lungs: Abnormal   rhonchi over both midlung fields  Cardiovascular      Auscultation of heart: Normal rate and rhythm, normal S1 and S2, without murmurs  Lymphatic      Palpation of lymph nodes in neck: No lymphadenopathy  Musculoskeletal      Gait and station: Normal        Skin      Skin and subcutaneous tissue: Normal without rashes or lesions  Results/Data   * XR CHEST PA & LATERAL 15GUE0427 11:37AM [facility] Order Number: [id number]      Performing Comments: room 5      Test Name Result Flag Reference   XR CHEST PA & LATERAL (Report)     CHEST 2 View           INDICATION: Trauma cough  COMPARISON: 7/29/2017           VIEWS: PA and lateral projections; 2 images           FINDINGS:              Cardiomediastinal silhouette appears unremarkable  The lungs are clear  No pneumothorax or pleural effusion  Visualized osseous structures appear stable with kyphosis of the thoracic spine  IMPRESSION:           No active pulmonary disease  Workstation performed: [id number]           Signed by:       Cornelio Patten MD      1/14/18                                Future Appointments      Date/Time Provider Specialty Site   02/19/2018 09:45 AM Karissa Mclean DO Family Medicine [place] [address] FAMILY PRACTICE     Signatures    Electronically signed by : Mellisa Spencer, [facility]; Jan 14 2018  6:44PM EST                       (Author)     Electronically signed by : KEYLA Vaughn ; Jan 18 2018  3:24PM EST                       (Co-author)

## 2018-01-23 VITALS
HEART RATE: 100 BPM | DIASTOLIC BLOOD PRESSURE: 74 MMHG | TEMPERATURE: 99.9 F | OXYGEN SATURATION: 97 % | RESPIRATION RATE: 20 BRPM | SYSTOLIC BLOOD PRESSURE: 122 MMHG

## 2018-02-16 LAB
ALBUMIN SERPL-MCNC: 4 G/DL (ref 3.6–5.1)
ALBUMIN/GLOB SERPL: 1.1 (CALC) (ref 1–2.5)
ALP SERPL-CCNC: 87 U/L (ref 33–130)
ALT SERPL-CCNC: 14 U/L (ref 6–29)
AST SERPL-CCNC: 17 U/L (ref 10–35)
BILIRUB SERPL-MCNC: 0.4 MG/DL (ref 0.2–1.2)
BUN SERPL-MCNC: 19 MG/DL (ref 7–25)
BUN/CREAT SERPL: 17 (CALC) (ref 6–22)
CALCIUM SERPL-MCNC: 9.4 MG/DL (ref 8.6–10.4)
CHLORIDE SERPL-SCNC: 99 MMOL/L (ref 98–110)
CHOLEST SERPL-MCNC: 140 MG/DL
CHOLEST/HDLC SERPL: 3 (CALC)
CO2 SERPL-SCNC: 27 MMOL/L (ref 20–31)
CREAT SERPL-MCNC: 1.1 MG/DL (ref 0.6–0.93)
GLOBULIN SER CALC-MCNC: 3.6 G/DL (CALC) (ref 1.9–3.7)
GLUCOSE SERPL-MCNC: 106 MG/DL (ref 65–99)
HBA1C MFR BLD: 6.7 % OF TOTAL HGB
HDLC SERPL-MCNC: 47 MG/DL
LDLC SERPL CALC-MCNC: 68 MG/DL (CALC)
NONHDLC SERPL-MCNC: 93 MG/DL (CALC)
POTASSIUM SERPL-SCNC: 4.3 MMOL/L (ref 3.5–5.3)
PROT SERPL-MCNC: 7.6 G/DL (ref 6.1–8.1)
SL AMB EGFR AFRICAN AMERICAN: 57 ML/MIN/1.73M2
SL AMB EGFR NON AFRICAN AMERICAN: 49 ML/MIN/1.73M2
SODIUM SERPL-SCNC: 136 MMOL/L (ref 135–146)
TRIGL SERPL-MCNC: 175 MG/DL

## 2018-02-19 ENCOUNTER — OFFICE VISIT (OUTPATIENT)
Dept: FAMILY MEDICINE CLINIC | Facility: CLINIC | Age: 78
End: 2018-02-19
Payer: MEDICARE

## 2018-02-19 ENCOUNTER — TELEPHONE (OUTPATIENT)
Dept: FAMILY MEDICINE CLINIC | Facility: CLINIC | Age: 78
End: 2018-02-19

## 2018-02-19 VITALS
DIASTOLIC BLOOD PRESSURE: 80 MMHG | HEART RATE: 110 BPM | SYSTOLIC BLOOD PRESSURE: 120 MMHG | BODY MASS INDEX: 34.91 KG/M2 | WEIGHT: 161.8 LBS | HEIGHT: 57 IN | TEMPERATURE: 98.4 F | RESPIRATION RATE: 18 BRPM

## 2018-02-19 DIAGNOSIS — E03.9 ACQUIRED HYPOTHYROIDISM: ICD-10-CM

## 2018-02-19 DIAGNOSIS — N39.498 OTHER URINARY INCONTINENCE: ICD-10-CM

## 2018-02-19 DIAGNOSIS — E11.9 TYPE 2 DIABETES MELLITUS WITHOUT COMPLICATION, WITHOUT LONG-TERM CURRENT USE OF INSULIN (HCC): Primary | ICD-10-CM

## 2018-02-19 PROCEDURE — 99214 OFFICE O/P EST MOD 30 MIN: CPT | Performed by: FAMILY MEDICINE

## 2018-02-19 RX ORDER — SIMVASTATIN 40 MG
1 TABLET ORAL EVERY EVENING
COMMUNITY
Start: 2016-09-20 | End: 2018-09-20 | Stop reason: SDUPTHER

## 2018-02-19 RX ORDER — DOCUSATE SODIUM 100 MG/1
CAPSULE, LIQUID FILLED ORAL
COMMUNITY

## 2018-02-19 RX ORDER — NICOTINE POLACRILEX 4 MG/1
1 GUM, CHEWING ORAL DAILY
COMMUNITY
Start: 2014-09-15 | End: 2018-10-17 | Stop reason: SDUPTHER

## 2018-02-19 RX ORDER — B-COMPLEX WITH VITAMIN C
1 TABLET ORAL EVERY OTHER DAY
COMMUNITY

## 2018-02-19 RX ORDER — LISINOPRIL AND HYDROCHLOROTHIAZIDE 12.5; 1 MG/1; MG/1
1 TABLET ORAL DAILY
COMMUNITY
End: 2018-05-06 | Stop reason: HOSPADM

## 2018-02-19 RX ORDER — MINOXIDIL 2 %
1 SOLUTION, NON-ORAL TOPICAL 2 TIMES DAILY
COMMUNITY

## 2018-02-19 RX ORDER — LEVOTHYROXINE SODIUM 0.05 MG/1
50 TABLET ORAL DAILY
COMMUNITY
Start: 2017-10-06 | End: 2018-10-05 | Stop reason: SDUPTHER

## 2018-02-19 NOTE — PROGRESS NOTES
Assessment/Plan:    No problem-specific Assessment & Plan notes found for this encounter  Diagnoses and all orders for this visit:    Type 2 diabetes mellitus without complication, without long-term current use of insulin (HCC)  -     Microalbumin / creatinine urine ratio  -     Comprehensive metabolic panel; Future  -     Hemoglobin A1c; Future  -     Lipid panel; Future    Acquired hypothyroidism  -     TSH, 3rd generation; Future    Other orders  -     aspirin 81 MG tablet; Take 1 tablet by mouth daily  -     Calcium Carbonate-Vitamin D 600-200 MG-UNIT TABS; Take by mouth  -     docusate sodium (COLACE) 100 mg capsule; Take by mouth  -     Omega-3 Fatty Acids ([employer] FISH OIL) 1200 MG CAPS; Take by mouth  -     Glucosamine-Chondroitin 250-200 MG TABS; Take by mouth  -     Multiple Vitamins-Iron (DAILY MULTIPLE VITAMIN/IRON) TABS; Take by mouth  -     levothyroxine 50 mcg tablet; Take 1 tablet by mouth daily  -     lisinopril-hydrochlorothiazide (PRINZIDE,ZESTORETIC) 10-12 5 MG per tablet; Take 1 tablet by mouth daily  -     Omeprazole 20 MG TBEC; Take 1 capsule by mouth daily  -     simvastatin (ZOCOR) 40 mg tablet; Take 1 tablet by mouth daily          Subjective:      Patient ID: Andrew Herrera is a 78 y o  female  HPI    The following portions of the patient's history were reviewed and updated as appropriate: allergies, current medications, past family history, past medical history, past social history, past surgical history and problem list     Review of Systems      Objective:      /80 (BP Location: Left arm, Patient Position: Sitting, Cuff Size: Adult)   Pulse (!) 110   Temp 98 4 °F (36 9 °C) (Temporal)   Resp 18   Ht 4' 9" (1 448 m)   Wt 73 4 kg (161 lb 12 8 oz)   Breastfeeding? No   BMI 35 01 kg/m²          Physical Exam   Musculoskeletal:        Feet:              Patient's shoes and socks removed  Right Foot/Ankle   Right Foot Inspection      Sensory       Monofilament testing: diminished      Left Foot/Ankle  Left Foot Inspection                                           Sensory       Monofilament: diminished

## 2018-02-19 NOTE — PROGRESS NOTES
Assessment/Plan:    Type 2 diabetes mellitus ([facility] [address] )  Sugars reviewed and well controlled  Continue current meds  And try to get more exercise    Hyperlipidemia  Well controled       Diagnoses and all orders for this visit:    Type 2 diabetes mellitus without complication, without long-term current use of insulin (HCC)  -     Microalbumin / creatinine urine ratio  -     Comprehensive metabolic panel; Future  -     Hemoglobin A1c; Future  -     Lipid panel; Future    Acquired hypothyroidism  -     TSH, 3rd generation; Future    Other urinary incontinence  -     Ambulatory referral to Urogynecology; Future    Other orders  -     aspirin 81 MG tablet; Take 1 tablet by mouth daily  -     Calcium Carbonate-Vitamin D 600-200 MG-UNIT TABS; Take by mouth  -     docusate sodium (COLACE) 100 mg capsule; Take by mouth  -     Omega-3 Fatty Acids ([employer]S FISH OIL) 1200 MG CAPS; Take by mouth  -     Glucosamine-Chondroitin 250-200 MG TABS; Take by mouth  -     Multiple Vitamins-Iron (DAILY MULTIPLE VITAMIN/IRON) TABS; Take by mouth  -     levothyroxine 50 mcg tablet; Take 1 tablet by mouth daily  -     lisinopril-hydrochlorothiazide (PRINZIDE,ZESTORETIC) 10-12 5 MG per tablet; Take 1 tablet by mouth daily  -     Omeprazole 20 MG TBEC; Take 1 capsule by mouth daily  -     simvastatin (ZOCOR) 40 mg tablet; Take 1 tablet by mouth daily          Subjective:      Patient ID: Chantale Wyman is a 78 y o  female  HPI    The following portions of the patient's history were reviewed and updated as appropriate: current medications, past family history, past medical history, past social history, past surgical history and problem list     Review of Systems   Genitourinary:        Betty Givens incontinence and bladder dropping   Musculoskeletal: Positive for arthralgias and myalgias  All other systems reviewed and are negative          Objective:      /80 (BP Location: Left arm, Patient Position: Sitting, Cuff Size: Adult)   Pulse (!) 110 Temp 98 4 °F (36 9 °C) (Temporal)   Resp 18   Ht 4' 9" (1 448 m)   Wt 73 4 kg (161 lb 12 8 oz)   Breastfeeding? No   BMI 35 01 kg/m²          Physical Exam   Constitutional: She is oriented to person, place, and time  She appears well-developed and well-nourished  HENT:   Head: Normocephalic  Eyes: EOM are normal  Pupils are equal, round, and reactive to light  Neck: No thyromegaly present  Cardiovascular: Normal rate and regular rhythm  No murmur heard  Pulses:       Dorsalis pedis pulses are 1+ on the right side, and 1+ on the left side  Posterior tibial pulses are 1+ on the right side, and 1+ on the left side  Pulmonary/Chest: Effort normal and breath sounds normal  No respiratory distress  She has no wheezes  She has no rales  Abdominal: She exhibits no distension  Musculoskeletal: She exhibits no edema  Feet:   Right Foot:   Skin Integrity: Negative for ulcer, skin breakdown, erythema, warmth, callus or dry skin  Left Foot:   Skin Integrity: Negative for ulcer, skin breakdown, erythema, warmth, callus or dry skin  Lymphadenopathy:     She has no cervical adenopathy  Neurological: She is alert and oriented to person, place, and time  Skin: Skin is warm  Psychiatric: She has a normal mood and affect  Her behavior is normal              Patient's shoes and socks removed  Right Foot/Ankle   Right Foot Inspection  Skin Exam: skin normal and skin intact no dry skin, no warmth, no callus, no erythema, no maceration, no abnormal color, no pre-ulcer, no ulcer and no callus                          Toe Exam: ROM and strength within normal limitsno swelling  Sensory   Vibration: intact  Proprioception: intact   Monofilament testing: diminished  Vascular  Capillary refills: < 3 seconds  The right DP pulse is 1+  The right PT pulse is 1+       Left Foot/Ankle  Left Foot Inspection  Skin Exam: skin normal and skin intactno dry skin, no warmth, no erythema, no maceration, normal color, no pre-ulcer, no ulcer and no callus                         Toe Exam: ROM and strength within normal limits                   Sensory   Vibration: intact  Proprioception: intact  Monofilament: diminished  Vascular  Capillary refills: < 3 seconds  The left DP pulse is 1+  The left PT pulse is 1+  Assign Risk Category:  ; ;        Risk: 0        Physical Exam   Musculoskeletal:        Feet:                 Patient's shoes and socks removed  Right Foot/Ankle   Right Foot Inspection        Sensory         Monofilament testing: diminished        Left Foot/Ankle  Left Foot Inspection                                             Sensory         Monofilament: diminished

## 2018-04-10 ENCOUNTER — OFFICE VISIT (OUTPATIENT)
Dept: FAMILY MEDICINE CLINIC | Facility: CLINIC | Age: 78
End: 2018-04-10
Payer: MEDICARE

## 2018-04-10 VITALS
HEART RATE: 82 BPM | WEIGHT: 164 LBS | BODY MASS INDEX: 35.38 KG/M2 | HEIGHT: 57 IN | TEMPERATURE: 98.1 F | RESPIRATION RATE: 16 BRPM | SYSTOLIC BLOOD PRESSURE: 120 MMHG | DIASTOLIC BLOOD PRESSURE: 70 MMHG

## 2018-04-10 DIAGNOSIS — J32.9 OTHER SINUSITIS, UNSPECIFIED CHRONICITY: Primary | ICD-10-CM

## 2018-04-10 PROCEDURE — 99214 OFFICE O/P EST MOD 30 MIN: CPT | Performed by: FAMILY MEDICINE

## 2018-04-10 RX ORDER — AZITHROMYCIN 250 MG/1
TABLET, FILM COATED ORAL
Qty: 6 TABLET | Refills: 0 | Status: SHIPPED | OUTPATIENT
Start: 2018-04-10 | End: 2018-04-14

## 2018-04-10 NOTE — PROGRESS NOTES
Assessment/Plan:    No problem-specific Assessment & Plan notes found for this encounter  Diagnoses and all orders for this visit:    Other sinusitis, unspecified chronicity  -     azithromycin (ZITHROMAX) 250 mg tablet; Take 2 tablets today then 1 tablet daily x 4 days          Subjective:      Patient ID: Kwabena Perera is a 78 y o  female  Throat hurts since last night had sick exposure      Sore Throat          The following portions of the patient's history were reviewed and updated as appropriate: allergies, current medications, past family history, past medical history, past social history, past surgical history and problem list     Review of Systems   HENT: Positive for sore throat  Objective:      /70 (BP Location: Left arm, Patient Position: Sitting, Cuff Size: Standard)   Pulse 82   Temp 98 1 °F (36 7 °C) (Temporal)   Resp 16   Ht 4' 9" (1 448 m)   Wt 74 4 kg (164 lb)   BMI 35 49 kg/m²          Physical Exam   Constitutional: She is oriented to person, place, and time  She appears well-developed and well-nourished  HENT:   Head: Normocephalic  Eyes: EOM are normal  Pupils are equal, round, and reactive to light  Neck: No thyromegaly present  Cardiovascular: Normal rate and regular rhythm  No murmur heard  Pulmonary/Chest: Effort normal and breath sounds normal  No respiratory distress  She has no wheezes  She has no rales  Abdominal: She exhibits no distension  Musculoskeletal: She exhibits no edema  Lymphadenopathy:     She has no cervical adenopathy  Neurological: She is alert and oriented to person, place, and time  Skin: Skin is warm  Psychiatric: She has a normal mood and affect   Her behavior is normal

## 2018-05-02 ENCOUNTER — TELEPHONE (OUTPATIENT)
Dept: FAMILY MEDICINE CLINIC | Facility: CLINIC | Age: 78
End: 2018-05-02

## 2018-05-02 ENCOUNTER — APPOINTMENT (EMERGENCY)
Dept: CT IMAGING | Facility: HOSPITAL | Age: 78
DRG: 394 | End: 2018-05-02
Payer: MEDICARE

## 2018-05-02 ENCOUNTER — HOSPITAL ENCOUNTER (INPATIENT)
Facility: HOSPITAL | Age: 78
LOS: 4 days | Discharge: HOME | DRG: 394 | End: 2018-05-06
Attending: EMERGENCY MEDICINE | Admitting: INTERNAL MEDICINE
Payer: MEDICARE

## 2018-05-02 DIAGNOSIS — K62.5 RECTAL BLEEDING: ICD-10-CM

## 2018-05-02 DIAGNOSIS — K52.9 COLITIS: Primary | ICD-10-CM

## 2018-05-02 DIAGNOSIS — N28.9 RENAL INSUFFICIENCY: ICD-10-CM

## 2018-05-02 DIAGNOSIS — K86.2 PANCREATIC CYST: ICD-10-CM

## 2018-05-02 PROBLEM — E86.0 DEHYDRATION: Status: ACTIVE | Noted: 2018-05-02

## 2018-05-02 PROBLEM — N17.9 ACUTE KIDNEY INJURY (HCC): Status: ACTIVE | Noted: 2018-05-02

## 2018-05-02 PROBLEM — R10.9 ABDOMINAL PAIN: Status: ACTIVE | Noted: 2018-05-02

## 2018-05-02 LAB
ABO GROUP BLD: NORMAL
ALBUMIN SERPL BCP-MCNC: 3.4 G/DL (ref 3.5–5)
ALP SERPL-CCNC: 82 U/L (ref 46–116)
ALT SERPL W P-5'-P-CCNC: 22 U/L (ref 12–78)
ANION GAP SERPL CALCULATED.3IONS-SCNC: 12 MMOL/L (ref 4–13)
APTT PPP: 32 SECONDS (ref 23–35)
AST SERPL W P-5'-P-CCNC: 20 U/L (ref 5–45)
BASOPHILS # BLD AUTO: 0.02 THOUSANDS/ΜL (ref 0–0.1)
BASOPHILS NFR BLD AUTO: 0 % (ref 0–1)
BILIRUB SERPL-MCNC: 0.41 MG/DL (ref 0.2–1)
BLD GP AB SCN SERPL QL: NEGATIVE
BUN SERPL-MCNC: 21 MG/DL (ref 5–25)
CALCIUM SERPL-MCNC: 9.2 MG/DL (ref 8.3–10.1)
CHLORIDE SERPL-SCNC: 99 MMOL/L (ref 100–108)
CO2 SERPL-SCNC: 25 MMOL/L (ref 21–32)
CREAT SERPL-MCNC: 1.32 MG/DL (ref 0.6–1.3)
EOSINOPHIL # BLD AUTO: 0.01 THOUSAND/ΜL (ref 0–0.61)
EOSINOPHIL NFR BLD AUTO: 0 % (ref 0–6)
ERYTHROCYTE [DISTWIDTH] IN BLOOD BY AUTOMATED COUNT: 15.2 % (ref 11.6–15.1)
EST. AVERAGE GLUCOSE BLD GHB EST-MCNC: 148 MG/DL
GFR SERPL CREATININE-BSD FRML MDRD: 39 ML/MIN/1.73SQ M
GLUCOSE SERPL-MCNC: 114 MG/DL (ref 65–140)
GLUCOSE SERPL-MCNC: 125 MG/DL (ref 65–140)
GLUCOSE SERPL-MCNC: 128 MG/DL (ref 65–140)
HBA1C MFR BLD: 6.8 % (ref 4.2–6.3)
HCT VFR BLD AUTO: 36.7 % (ref 34.8–46.1)
HGB BLD-MCNC: 12.4 G/DL (ref 11.5–15.4)
INR PPP: 1.02 (ref 0.86–1.16)
LACTATE SERPL-SCNC: 1 MMOL/L (ref 0.5–2)
LIPASE SERPL-CCNC: 72 U/L (ref 73–393)
LYMPHOCYTES # BLD AUTO: 2.42 THOUSANDS/ΜL (ref 0.6–4.47)
LYMPHOCYTES NFR BLD AUTO: 13 % (ref 14–44)
MCH RBC QN AUTO: 28.6 PG (ref 26.8–34.3)
MCHC RBC AUTO-ENTMCNC: 33.8 G/DL (ref 31.4–37.4)
MCV RBC AUTO: 85 FL (ref 82–98)
MONOCYTES # BLD AUTO: 1.08 THOUSAND/ΜL (ref 0.17–1.22)
MONOCYTES NFR BLD AUTO: 6 % (ref 4–12)
NEUTROPHILS # BLD AUTO: 14.48 THOUSANDS/ΜL (ref 1.85–7.62)
NEUTS SEG NFR BLD AUTO: 81 % (ref 43–75)
NRBC BLD AUTO-RTO: 0 /100 WBCS
PLATELET # BLD AUTO: 349 THOUSANDS/UL (ref 149–390)
PMV BLD AUTO: 10 FL (ref 8.9–12.7)
POTASSIUM SERPL-SCNC: 3.6 MMOL/L (ref 3.5–5.3)
PROT SERPL-MCNC: 8.6 G/DL (ref 6.4–8.2)
PROTHROMBIN TIME: 13.4 SECONDS (ref 12.1–14.4)
RBC # BLD AUTO: 4.33 MILLION/UL (ref 3.81–5.12)
RH BLD: POSITIVE
SODIUM SERPL-SCNC: 136 MMOL/L (ref 136–145)
SPECIMEN EXPIRATION DATE: NORMAL
TSH SERPL DL<=0.05 MIU/L-ACNC: 1.23 UIU/ML (ref 0.36–3.74)
WBC # BLD AUTO: 18.01 THOUSAND/UL (ref 4.31–10.16)

## 2018-05-02 PROCEDURE — 96361 HYDRATE IV INFUSION ADD-ON: CPT

## 2018-05-02 PROCEDURE — 83690 ASSAY OF LIPASE: CPT | Performed by: EMERGENCY MEDICINE

## 2018-05-02 PROCEDURE — 85025 COMPLETE CBC W/AUTO DIFF WBC: CPT | Performed by: EMERGENCY MEDICINE

## 2018-05-02 PROCEDURE — 82948 REAGENT STRIP/BLOOD GLUCOSE: CPT

## 2018-05-02 PROCEDURE — 84443 ASSAY THYROID STIM HORMONE: CPT | Performed by: INTERNAL MEDICINE

## 2018-05-02 PROCEDURE — 87493 C DIFF AMPLIFIED PROBE: CPT | Performed by: PHYSICIAN ASSISTANT

## 2018-05-02 PROCEDURE — 80053 COMPREHEN METABOLIC PANEL: CPT | Performed by: EMERGENCY MEDICINE

## 2018-05-02 PROCEDURE — 99285 EMERGENCY DEPT VISIT HI MDM: CPT

## 2018-05-02 PROCEDURE — 96360 HYDRATION IV INFUSION INIT: CPT

## 2018-05-02 PROCEDURE — 82272 OCCULT BLD FECES 1-3 TESTS: CPT

## 2018-05-02 PROCEDURE — 86900 BLOOD TYPING SEROLOGIC ABO: CPT | Performed by: EMERGENCY MEDICINE

## 2018-05-02 PROCEDURE — 83036 HEMOGLOBIN GLYCOSYLATED A1C: CPT | Performed by: INTERNAL MEDICINE

## 2018-05-02 PROCEDURE — 86850 RBC ANTIBODY SCREEN: CPT | Performed by: EMERGENCY MEDICINE

## 2018-05-02 PROCEDURE — 85730 THROMBOPLASTIN TIME PARTIAL: CPT | Performed by: EMERGENCY MEDICINE

## 2018-05-02 PROCEDURE — 36415 COLL VENOUS BLD VENIPUNCTURE: CPT | Performed by: EMERGENCY MEDICINE

## 2018-05-02 PROCEDURE — 83605 ASSAY OF LACTIC ACID: CPT | Performed by: PHYSICIAN ASSISTANT

## 2018-05-02 PROCEDURE — 86901 BLOOD TYPING SEROLOGIC RH(D): CPT | Performed by: EMERGENCY MEDICINE

## 2018-05-02 PROCEDURE — 74177 CT ABD & PELVIS W/CONTRAST: CPT

## 2018-05-02 PROCEDURE — 85610 PROTHROMBIN TIME: CPT | Performed by: EMERGENCY MEDICINE

## 2018-05-02 PROCEDURE — 99222 1ST HOSP IP/OBS MODERATE 55: CPT | Performed by: INTERNAL MEDICINE

## 2018-05-02 RX ORDER — PRAVASTATIN SODIUM 40 MG
40 TABLET ORAL
Status: DISCONTINUED | OUTPATIENT
Start: 2018-05-02 | End: 2018-05-06 | Stop reason: HOSPADM

## 2018-05-02 RX ORDER — ONDANSETRON 2 MG/ML
4 INJECTION INTRAMUSCULAR; INTRAVENOUS EVERY 6 HOURS PRN
Status: DISCONTINUED | OUTPATIENT
Start: 2018-05-02 | End: 2018-05-06 | Stop reason: HOSPADM

## 2018-05-02 RX ORDER — SODIUM PHOSPHATE,MONO-DIBASIC 19G-7G/118
500 ENEMA (ML) RECTAL DAILY
Status: DISCONTINUED | OUTPATIENT
Start: 2018-05-02 | End: 2018-05-06 | Stop reason: HOSPADM

## 2018-05-02 RX ORDER — ACETAMINOPHEN 325 MG/1
650 TABLET ORAL EVERY 6 HOURS PRN
Status: DISCONTINUED | OUTPATIENT
Start: 2018-05-02 | End: 2018-05-06 | Stop reason: HOSPADM

## 2018-05-02 RX ORDER — PANTOPRAZOLE SODIUM 40 MG/1
40 TABLET, DELAYED RELEASE ORAL
Status: DISCONTINUED | OUTPATIENT
Start: 2018-05-03 | End: 2018-05-06 | Stop reason: HOSPADM

## 2018-05-02 RX ORDER — MORPHINE SULFATE 2 MG/ML
2 INJECTION, SOLUTION INTRAMUSCULAR; INTRAVENOUS EVERY 6 HOURS PRN
Status: DISCONTINUED | OUTPATIENT
Start: 2018-05-02 | End: 2018-05-06 | Stop reason: HOSPADM

## 2018-05-02 RX ORDER — SODIUM CHLORIDE 9 MG/ML
100 INJECTION, SOLUTION INTRAVENOUS CONTINUOUS
Status: DISCONTINUED | OUTPATIENT
Start: 2018-05-02 | End: 2018-05-02

## 2018-05-02 RX ORDER — LEVOTHYROXINE SODIUM 0.05 MG/1
50 TABLET ORAL
Status: DISCONTINUED | OUTPATIENT
Start: 2018-05-03 | End: 2018-05-06 | Stop reason: HOSPADM

## 2018-05-02 RX ORDER — SODIUM CHLORIDE 9 MG/ML
50 INJECTION, SOLUTION INTRAVENOUS CONTINUOUS
Status: DISCONTINUED | OUTPATIENT
Start: 2018-05-02 | End: 2018-05-05

## 2018-05-02 RX ADMIN — Medication 500 MG: at 17:15

## 2018-05-02 RX ADMIN — SODIUM CHLORIDE 100 ML/HR: 0.9 INJECTION, SOLUTION INTRAVENOUS at 12:17

## 2018-05-02 RX ADMIN — SODIUM CHLORIDE 1000 ML: 0.9 INJECTION, SOLUTION INTRAVENOUS at 12:16

## 2018-05-02 RX ADMIN — SODIUM CHLORIDE 125 ML/HR: 0.9 INJECTION, SOLUTION INTRAVENOUS at 23:56

## 2018-05-02 RX ADMIN — IODIXANOL 80 ML: 320 INJECTION, SOLUTION INTRAVASCULAR at 13:39

## 2018-05-02 RX ADMIN — SODIUM CHLORIDE 125 ML/HR: 0.9 INJECTION, SOLUTION INTRAVENOUS at 16:00

## 2018-05-02 RX ADMIN — PRAVASTATIN SODIUM 40 MG: 40 TABLET ORAL at 17:16

## 2018-05-02 NOTE — SEPSIS NOTE
Sepsis Note   Ruth Dudley 78 y o  female MRN: [id number]  Unit/Bed#: ED 28 Encounter: [id number]            Initial Sepsis Screening     [address] Name 05/02/18 1235                Is the patient's history suggestive of a new or worsening infection? No  -CS        Suspected source of infection  --        Are two or more of the following signs & symptoms of infection both present and new to the patient?  --        Indicate SIRS criteria  --        If the answer is yes to both questions, suspicion of sepsis is present  --        If severe sepsis is present AND tissue hypoperfusion perists in the hour after fluid resuscitation or lactate > 4, the patient meets criteria for SEPTIC SHOCK  --        Are any of the following organ dysfunction criteria present within 6 hours of suspected infection and SIRS criteria that are NOT considered to be chronic conditions?   --        Organ dysfunction  --        Date of presentation of severe sepsis  --        Time of presentation of severe sepsis  --        Tissue hypoperfusion persists in the hour after crystalloid fluid administration, evidenced, by either:  --        Was hypotension present within one hour of the conclusion of crystalloid fluid administration?  --        Date of presentation of septic shock  --        Time of presentation of septic shock  --          User Key  (r) = Recorded By, (t) = Taken By, (c) = Cosigned By    [address] Name Provider Type    CS Marianela Reyes MD Physician

## 2018-05-02 NOTE — ED PROVIDER NOTES
History  Chief Complaint   Patient presents with    Abdominal Pain     Diarrhea and cramping since after dinner last night  During the night was noting red blood with BMs  Describes as small amount  History provided by:  Patient   used: No    Abdominal Pain   Pain location:  LLQ and suprapubic  Pain quality: aching and cramping    Pain radiates to:  Does not radiate  Pain severity:  Moderate  Onset quality:  Sudden  Duration:  1 day  Timing:  Intermittent  Progression:  Waxing and waning  Chronicity:  New  Context: not previous surgeries, not suspicious food intake and not trauma    Relieved by:  Nothing  Worsened by:  Nothing  Ineffective treatments:  None tried  Associated symptoms: anorexia, diarrhea, hematochezia, nausea and vomiting    Associated symptoms: no chest pain, no chills, no constipation, no cough, no dysuria, no fever, no shortness of breath and no sore throat     Patient had 4 episodes of grossly bloody stool at home after the 1st episode of soft stool  She continues with lower abdominal cramping  She is not on blood thinners but does take some aspirin  Prior to Admission Medications   Prescriptions Last Dose Informant Patient Reported? Taking?    Calcium Carbonate-Vitamin D 600-200 MG-UNIT TABS   Yes Yes   Sig: Take 1 tablet by mouth daily     Glucosamine-Chondroitin 250-200 MG TABS   Yes Yes   Sig: Take 1 tablet by mouth daily     Multiple Vitamins-Iron (DAILY MULTIPLE VITAMIN/IRON) TABS   Yes Yes   Sig: Take 1 tablet by mouth daily     Omega-3 Fatty Acids ([employer] FISH OIL) 1200 MG CAPS   Yes Yes   Sig: Take 1 capsule by mouth daily     Omeprazole 20 MG TBEC   Yes Yes   Sig: Take 1 capsule by mouth daily   aspirin 81 MG tablet   Yes Yes   Sig: Take 1 tablet by mouth daily   docusate sodium (COLACE) 100 mg capsule   Yes Yes   Sig: Take by mouth   levothyroxine 50 mcg tablet   Yes Yes   Sig: Take 1 tablet by mouth daily   lisinopril-hydrochlorothiazide (PRINZIDE,ZESTORETIC) 10-12 5 MG per tablet   Yes Yes   Sig: Take 1 tablet by mouth daily   simvastatin (ZOCOR) 40 mg tablet   Yes Yes   Sig: Take 1 tablet by mouth daily      Facility-Administered Medications: None       Past Medical History:   Diagnosis Date    Ovarian cyst        Past Surgical History:   Procedure Laterality Date    BREAST SURGERY      CATARACT EXTRACTION      OOPHORECTOMY Bilateral        Family History   Problem Relation Age of Onset    Cancer Mother     Breast cancer Maternal Grandmother     Mental illness Son     Schizophrenia Son     Cancer Family      I have reviewed and agree with the history as documented  Social History   Substance Use Topics    Smoking status: Never Smoker    Smokeless tobacco: Never Used    Alcohol use No        Review of Systems   Constitutional: Positive for appetite change  Negative for chills and fever  HENT: Negative for congestion and sore throat  Respiratory: Negative for cough, chest tightness and shortness of breath  Cardiovascular: Negative for chest pain and leg swelling  Gastrointestinal: Positive for abdominal pain, anorexia, blood in stool, diarrhea, hematochezia, nausea and vomiting  Negative for constipation  Genitourinary: Negative for difficulty urinating, dysuria and flank pain  Musculoskeletal: Negative for neck pain  Skin: Negative for rash  Neurological: Negative for weakness and headaches  All other systems reviewed and are negative        Physical Exam  ED Triage Vitals   Temperature Pulse Respirations Blood Pressure SpO2   05/02/18 1137 05/02/18 1137 05/02/18 1137 05/02/18 1137 05/02/18 1137   98 3 °F (36 8 °C) (!) 120 16 144/93 96 %      Temp Source Heart Rate Source Patient Position - Orthostatic VS BP Location FiO2 (%)   05/02/18 1137 -- 05/02/18 1340 05/02/18 1340 --   Oral  Lying Right arm       Pain Score       05/02/18 1137       4           Orthostatic Vital Signs  Vitals:    05/02/18 1137 05/02/18 1217 05/02/18 1340   BP: 144/93  144/70   Pulse: (!) 120 103 99   Patient Position - Orthostatic VS:   Lying       Physical Exam   Constitutional: She appears well-developed and well-nourished  She is cooperative  Non-toxic appearance  She does not have a sickly appearance  She does not appear ill  No distress  HENT:   Head: Normocephalic and atraumatic  Right Ear: Hearing normal  No drainage or swelling  Left Ear: Hearing and tympanic membrane normal  No drainage or swelling  Mouth/Throat: Mucous membranes are dry  Eyes: Conjunctivae and lids are normal  Right eye exhibits no discharge  Left eye exhibits no discharge  Neck: Trachea normal and normal range of motion  No JVD present  Cardiovascular: Normal rate, regular rhythm, normal heart sounds, intact distal pulses and normal pulses  Exam reveals no gallop and no friction rub  No murmur heard  Pulmonary/Chest: Effort normal  No stridor  No respiratory distress  She has no wheezes  She has no rales  Abdominal: Soft  Normal appearance  She exhibits distension  She exhibits no ascites and no mass  There is no hepatosplenomegaly  There is tenderness in the right lower quadrant, suprapubic area and left lower quadrant  There is no rebound, no guarding and no CVA tenderness  Genitourinary: Rectal exam shows guaiac positive stool  Genitourinary Comments: + gross blood on rectal    Musculoskeletal: Normal range of motion  She exhibits no edema  Lymphadenopathy:        Right: No inguinal adenopathy present  Left: No inguinal adenopathy present  Neurological: She is alert  She has normal strength  She exhibits normal muscle tone  Gait normal  GCS eye subscore is 4  GCS verbal subscore is 5  GCS motor subscore is 6  Skin: Skin is warm, dry and intact  No rash noted  She is not diaphoretic  No pallor  Psychiatric: She has a normal mood and affect   Her speech is normal  Cognition and memory are normal    Nursing note and vitals reviewed  ED Medications  Medications   sodium chloride 0 9 % infusion (100 mL/hr Intravenous New Bag 5/2/18 1217)   sodium chloride 0 9 % bolus 1,000 mL (0 mL Intravenous Stopped 5/2/18 1340)   iodixanol (VISIPAQUE) 320 MG/ML injection 80 mL (80 mL Intravenous Given 5/2/18 1339)       Diagnostic Studies  Results Reviewed     Procedure Component Value Units Date/Time    Comprehensive metabolic panel [[id number]]  (Abnormal) Collected:  05/02/18 1216    Lab Status:  Final result Specimen:  Blood from Arm, Left Updated:  05/02/18 1254     Sodium 136 mmol/L      Potassium 3 6 mmol/L      Chloride 99 (L) mmol/L      CO2 25 mmol/L      Anion Gap 12 mmol/L      BUN 21 mg/dL      Creatinine 1 32 (H) mg/dL      Glucose 128 mg/dL      Calcium 9 2 mg/dL      AST 20 U/L      ALT 22 U/L      Alkaline Phosphatase 82 U/L      Total Protein 8 6 (H) g/dL      Albumin 3 4 (L) g/dL      Total Bilirubin 0 41 mg/dL      eGFR 39 ml/min/1 73sq m     Narrative:         National Kidney Disease Education Program recommendations are as follows:  GFR calculation is accurate only with a steady state creatinine  Chronic Kidney disease less than 60 ml/min/1 73 sq  meters  Kidney failure less than 15 ml/min/1 73 sq  meters      Lipase [[id number]]  (Abnormal) Collected:  05/02/18 1216    Lab Status:  Final result Specimen:  Blood from Arm, Left Updated:  05/02/18 1254     Lipase 72 (L) u/L     Protime-INR [[id number]]  (Normal) Collected:  05/02/18 1216    Lab Status:  Final result Specimen:  Blood from Arm, Left Updated:  05/02/18 1238     Protime 13 4 seconds      INR 1 02    APTT [[id number]]  (Normal) Collected:  05/02/18 1216    Lab Status:  Final result Specimen:  Blood from Arm, Left Updated:  05/02/18 1238     PTT 32 seconds     CBC and differential [[id number]]  (Abnormal) Collected:  05/02/18 1216    Lab Status:  Final result Specimen:  Blood from Arm, Left Updated:  05/02/18 1223     WBC 18 01 (H) Thousand/uL      RBC 4 33 Million/uL Hemoglobin 12 4 g/dL      Hematocrit 36 7 %      MCV 85 fL      MCH 28 6 pg      MCHC 33 8 g/dL      RDW 15 2 (H) %      MPV 10 0 fL      Platelets 548 Thousands/uL      nRBC 0 /100 WBCs      Neutrophils Relative 81 (H) %      Lymphocytes Relative 13 (L) %      Monocytes Relative 6 %      Eosinophils Relative 0 %      Basophils Relative 0 %      Neutrophils Absolute 14 48 (H) Thousands/µL      Lymphocytes Absolute 2 42 Thousands/µL      Monocytes Absolute 1 08 Thousand/µL      Eosinophils Absolute 0 01 Thousand/µL      Basophils Absolute 0 02 Thousands/µL                  CT abdomen pelvis with contrast   Final Result by Avtar Enciso MD (05/02 4206)      1  Diffuse thickening of the descending colon with stranding stranding consistent with colitis  2   Cyst in the pancreatic head measuring greater than 2 cm  This is adjacent to the common bile duct and could represent a choledochocyst versus a true pancreatic cyst such as IPMN  For simple cyst(s) 2 cm or recommend gastroenterology and/or surgical    oncology consult  EUS is likely now warranted  3   Gas in urinary bladder  Correlation with recent instrumentation recommended  Workstation performed: [id number]                    Procedures  Procedures       Phone Contacts  ED Phone Contact    ED Course      patient was informed of the CT results as well as the blood work  I discussed the case with Internal Medicine regarding admission  Initial Sepsis Screening     Row Name 05/02/18 1023                Is the patient's history suggestive of a new or worsening infection?  No  -CS        Suspected source of infection  --        Are two or more of the following signs & symptoms of infection both present and new to the patient?  --        Indicate SIRS criteria  --        If the answer is yes to both questions, suspicion of sepsis is present  --        If severe sepsis is present AND tissue hypoperfusion perists in the hour after fluid resuscitation or lactate > 4, the patient meets criteria for SEPTIC SHOCK  --        Are any of the following organ dysfunction criteria present within 6 hours of suspected infection and SIRS criteria that are NOT considered to be chronic conditions? --        Organ dysfunction  --        Date of presentation of severe sepsis  --        Time of presentation of severe sepsis  --        Tissue hypoperfusion persists in the hour after crystalloid fluid administration, evidenced, by either:  --        Was hypotension present within one hour of the conclusion of crystalloid fluid administration?  --        Date of presentation of septic shock  --        Time of presentation of septic shock  --          User Key  (r) = Recorded By, (t) = Taken By, (c) = Cosigned By    [address] Name Provider Type    Teresa Priest MD Physician                  MDM  Number of Diagnoses or Management Options  Diagnosis management comments: Patient with lower abdominal discomfort and rectal bleeding, multiple episodes  Will need admission to the hospital given her age and comorbidities  White blood cell count was elevated at 056[address]  Heart rate was also elevated at 120, does not have a fever  With fluids her heart rate has come down to a more reasonable level  She is not on any anticoagulation  CT scan pending         Amount and/or Complexity of Data Reviewed  Clinical lab tests: reviewed and ordered  Tests in the radiology section of CPT®: ordered and reviewed  Discuss the patient with other providers: yes    Patient Progress  Patient progress: stable    CritCare Time    Disposition  Final diagnoses:   Colitis   Rectal bleeding   Renal insufficiency   Pancreatic cyst     Time reflects when diagnosis was documented in both MDM as applicable and the Disposition within this note     Time User Action Codes Description Comment    5/2/2018  2:27 PM Keisha Walsh [K52 9] Colitis     5/2/2018  2:27 PM Lilli Weston Taran Add [K62 5] Rectal bleeding     5/2/2018  2:44 PM Nica Lea Add [N28 9] Renal insufficiency     5/2/2018  2:46 PM Nica eLa Add [K86 2] Pancreatic cyst       ED Disposition     ED Disposition Condition Comment    Admit  Case was discussed with Oli Alvarez and the patient's admission status was agreed to be Admission Status: inpatient status to the service of Dr Oli Alvarez   Follow-up Information    None       Patient's Medications   Discharge Prescriptions    No medications on file     No discharge procedures on file      ED Provider  Electronically Signed by           Jada Hyde MD  05/02/18 8694

## 2018-05-02 NOTE — H&P
History and Physical - [facility]    Patient Information: Mabel Baires 78 y o  female MRN: [id number]  Unit/Bed#: [facility] [address] 227-01 Encounter: [id number]  Admitting Physician: Gurmeet Diego MD  PCP: Dasha Forman DO  Date of Admission:  05/02/18    Assessment/Plan:    Hospital Problem List:     Principal Problem:    Rectal bleeding  Active Problems:    Hyperlipidemia    Hypothyroidism    Type 2 diabetes mellitus ([facility] [address] )    Colitis    Abdominal pain    Dehydration    Acute kidney injury ([facility] [address] )      Plan for the Primary Problem(s):    1  Colitis  Ischemic versus infectious colitis  From the looks of it it appears to be more ischemic in nature  We will obtain repeat lactic acid  Continue with supportive measures :   IV hydration  Pain control  Nothing by mouth for now  Gastroenterology evaluation for ischemic colitis and pancreatic cyst for possible EUS    #2 acute kidney injury  IV hydration initiated  Hold hydrochlorothiazide hold lisinopril    #3DM Type II:    Begin no concentrated carbohydrate diet  Cover with Aspart SSI as needed   Will order HgbA1C to assess status of recent glycemic control  Well initiate home medications once med rec is completed and confirmed by pharmacy  #4 dehydration  Aggressive IV hydration with normal saline    #5SIRS due to colitis, likely ischemic evidenced by leukocytosis (wbc 18 01; 14 29) tachycardia (> 103> 99> 103) treated with GI work-up, NSS bolus and gtt  VTE Prophylaxis: Heparin  / sequential compression device   Code Status: Full code  POLST: There is no POLST form on file for this patient (pre-hospital)    Anticipated Length of Stay:  Patient will be admitted on an Inpatient basis with an anticipated length of stay of  Greater than 2 midnights  Justification for Hospital Stay: Ischemic colitis acute kidney injury    Total Time for Visit, including Counseling / Coordination of Care: 45 minutes    Greater than 50% of this total time spent on direct patient counseling and coordination of care  Chief Complaint:   Abdominal pain bloody stools ×2 days    History of Present Illness:    Gabriel Estrada is a 78 y o  female who presents with Chief complaint of nausea vomiting, abdominal pain ×2 days  Patient has previous history of pancreatic cyst hypertension up of edema currently on hydrocortisone and lisinopril  She reports generalized abdominal pain primarily on the left side started approximately 1 or 2 days ago  Pain is ongoing she reports multiple other salts of nausea vomiting, "worried due to blood in stool "no previous history of GI bleed as reported although patient does not history of GERD currently on omeprazole  She is remained without stable no lactic acid done so for however treatment for ischemic colitis has been initiated  Patient reports no recent exposure to ill contacts no recent travel does report decreased appetite and anorexia no fever no chills reported  No other symptoms reported, she is remained with a likely stable, CT abdomen reveals colitis hemoglobin 12 g/dL  Review of Systems:    Review of Systems  As per HPI  Past Medical and Surgical History:     Past Medical History:   Diagnosis Date    Ovarian cyst        Past Surgical History:   Procedure Laterality Date    BREAST SURGERY      CATARACT EXTRACTION      OOPHORECTOMY Bilateral        Meds/Allergies:    Prior to Admission medications    Medication Sig Start Date End Date Taking?  Authorizing Provider   aspirin 81 MG tablet Take 1 tablet by mouth daily   Yes Historical Provider, MD   Calcium Carbonate-Vitamin D 600-200 MG-UNIT TABS Take 1 tablet by mouth daily     Yes Historical Provider, MD   docusate sodium (COLACE) 100 mg capsule Take by mouth   Yes Historical Provider, MD   Glucosamine-Chondroitin 250-200 MG TABS Take 1 tablet by mouth daily     Yes Historical Provider, MD   levothyroxine 50 mcg tablet Take 1 tablet by mouth daily 10/6/17  Yes Historical Provider, MD   lisinopril-hydrochlorothiazide (PRINZIDE,ZESTORETIC) 10-12 5 MG per tablet Take 1 tablet by mouth daily   Yes Historical Provider, MD   Multiple Vitamins-Iron (DAILY MULTIPLE VITAMIN/IRON) TABS Take 1 tablet by mouth daily     Yes Historical Provider, MD   Omega-3 Fatty Acids ([employer] FISH OIL) 1200 MG CAPS Take 1 capsule by mouth daily     Yes Historical Provider, MD   Omeprazole 20 MG TBEC Take 1 capsule by mouth daily 9/15/14  Yes Historical Provider, MD   simvastatin (ZOCOR) 40 mg tablet Take 1 tablet by mouth daily 9/20/16  Yes Historical Provider, MD     I have reviewed home medications with patient personally  Allergies: No Known Allergies    Social History:     Marital Status: /Civil Union     History   Alcohol Use No     History   Smoking Status    Never Smoker   Smokeless Tobacco    Never Used     History   Drug Use No       Family History:    non-contributory    Physical Exam:     Vitals:   Blood Pressure: 157/77 (05/02/18 1557)  Pulse: 101 (05/02/18 1557)  Temperature: 98 9 °F (37 2 °C) (05/02/18 1557)  Temp Source: Temporal (05/02/18 1557)  Respirations: 20 (05/02/18 1557)  Weight - Scale: 72 6 kg (160 lb) (05/02/18 1137)  SpO2: 94 % (05/02/18 1557)    Physical Exam    GENERAL APPEARANCE: WD/WN in NAD pleasant  SKIN: no rash, Decreased skin turgor   HEENT: NC/AT, PERRLA (B), moist MM, no epistaxis  NECK: Supple, no JVD    LUNGS: CTA (B) mildly prolonged expiratory phase,   no use of accessory muscles  HEART:          S1S 2, RRR  , PMI is not displaced  ABDOMEN: Soft, Tender on palpation throughout, hyperactive bowel sounds no guarding no rebound tenderness, nondistended, +BS  Rectal exam:  EXTREMITIES: no edema   PERIPHERAL VASCULAR: palpable pulses   NEURO:  AAO x 3, CN 2-12: non focal  MUSCLE STRENGHT: 5/5 (B), SENSATION: nonfocal  DTR: ++, CEREBELLAR: non focal  Additional Data:     Lab Results: I have personally reviewed pertinent reports          Results from last 7 days  Lab Units 05/02/18  1216   WBC Thousand/uL 18 01*   HEMOGLOBIN g/dL 12 4   HEMATOCRIT % 36 7   PLATELETS Thousands/uL 349   NEUTROS PCT % 81*   LYMPHS PCT % 13*   MONOS PCT % 6   EOS PCT % 0       Results from last 7 days  Lab Units 05/02/18  1216   SODIUM mmol/L 136   POTASSIUM mmol/L 3 6   CHLORIDE mmol/L 99*   CO2 mmol/L 25   BUN mg/dL 21   CREATININE mg/dL 1 32*   CALCIUM mg/dL 9 2   TOTAL PROTEIN g/dL 8 6*   BILIRUBIN TOTAL mg/dL 0 41   ALK PHOS U/L 82   ALT U/L 22   AST U/L 20   GLUCOSE RANDOM mg/dL 128       Results from last 7 days  Lab Units 05/02/18  1216   INR  1 02       Imaging: I have personally reviewed pertinent reports  Ct Abdomen Pelvis With Contrast    Result Date: 5/2/2018  Narrative: CT ABDOMEN AND PELVIS WITH IV CONTRAST INDICATION:   lower abd pain and bleeding  COMPARISON: None  TECHNIQUE:  CT examination of the abdomen and pelvis was performed  Axial, sagittal, and coronal 2D reformatted images were created from the source data and submitted for interpretation  Radiation dose length product (DLP) for this visit:  933 mGy-cm   This examination, like all CT scans performed in the [facility], was performed utilizing techniques to minimize radiation dose exposure, including the use of iterative reconstruction and automated exposure control  IV Contrast:  80 mL of iodixanol (VISIPAQUE)  Because of borderline renal function, standard department hydration protocol was recommended to minimize risk of contrast induced nephropathy  Enteric Contrast:  Enteric contrast was not administered  FINDINGS: ABDOMEN LOWER CHEST:  There is a small hiatal hernia  LIVER/BILIARY TREE:  There are hypodense lesions which likely represent cysts  There is a cystic lesion measuring 2 2 cm in the pancreatic head  GALLBLADDER:  No calcified gallstones  No pericholecystic inflammatory change  SPLEEN:  Unremarkable  PANCREAS:  Unremarkable  ADRENAL GLANDS:  Unremarkable  KIDNEYS/URETERS:  Unremarkable  No hydronephrosis  STOMACH AND BOWEL:  There is thickening of the descending colon with surrounding stranding consistent with colitis  APPENDIX:  No findings to suggest appendicitis  ABDOMINOPELVIC CAVITY:  No ascites or free intraperitoneal air  No lymphadenopathy  VESSELS:  Unremarkable for patient's age  PELVIS REPRODUCTIVE ORGANS:  Unremarkable for patient's age  URINARY BLADDER:  There is gas in the urinary bladder  ABDOMINAL WALL/INGUINAL REGIONS:  Unremarkable  OSSEOUS STRUCTURES:  No acute fracture or destructive osseous lesion  There are bilateral sacral insufficiency fractures as seen on prior MRI     Impression: 1  Diffuse thickening of the descending colon with stranding stranding consistent with colitis  2   Cyst in the pancreatic head measuring greater than 2 cm  This is adjacent to the common bile duct and could represent a choledochocyst versus a true pancreatic cyst such as IPMN  For simple cyst(s) 2 cm or recommend gastroenterology and/or surgical oncology consult  EUS is likely now warranted  3   Gas in urinary bladder  Correlation with recent instrumentation recommended  Workstation performed: [id number]       EKG, Pathology, and Other Studies Reviewed on Admission:   · EKG: Noncontributory    Allscripts / Epic Records Reviewed: No     ** Please Note: This note has been constructed using a voice recognition system   **

## 2018-05-02 NOTE — TELEPHONE ENCOUNTER
Patient had an appt with sathya don today  Patient stats she has rectal bleeding, weak, vomiting, sweating, chills, stomach cramping, and bright red blood since last night  Patient temp: 99 5 and weight at 160 0lbs  We sent patient to ER

## 2018-05-02 NOTE — CONSULTS
Consultation - [facility]  Saw Patinorosamary 78 y o  female MRN: [id number]  Unit/Bed#: [facility] [address] 227-01 Encounter: [id number]        Consults    ASSESSMENT/ PLAN:    74-year-old female pmh HTN, HLD, DM II who was admitted for abdominal pain, diarrhea and vomiting x 1 day  1  Colitis: reports abdominal pain, nausea, vomiting and hematochezia starting last night  CT revealed diffuse thickening of the descending colon with stranding consistent with colitis  She has taken abx in march for bronchitis  This could be secondary to ischemic colitis vs infectious   -monitor H&H  -transfuse as necessary   -check c diff and stool studies   -check lactic acid  -will need outpatient colonoscopy for further evaluation   -if large drop in Hgb, would consider inpatient colonoscopy       2  Pancreatic Cyst: CT revealed cyst in the pancreatic head measuring greater than 2 cm  This is adjacent to the common bile duct and could represent a choledochocyst versus a true pancreatic cyst such as IPMN  Will need EUS as outpatient  Fortunately her LFTs are wnl  -check CA 19-9  -will need EUS with FNA for further evaluation, this can most likely be done as outpatient     Reason for Consult / Principal Problem: colitis    HPI: Octavia Fuentes is a 79 yo female with past medical history of hypertension, hyperlipidemia, diabetes type 2, uterine prolapse who is being evaluated by pelvic reconstructive surgery was admitted for abdominal pain, nausea and vomiting and diarrhea with bright red blood per rectum  She states that she was in her normal state of health while eating dinner last night around 5 o'clock  She then experience nausea with vomiting x2 and multiple episodes of diarrhea  She states that throughout the night she noticed blood clots in her stool, which brought her to the emergency room  She admits that she had 3 episodes since presented to the emergency room with episodes of bright red blood    She does admit to recent antibiotic use in March for bronchitis as well as 1 dose of antibiotics on Monday after procedure performed by pelvic reconstructive surgeon  She had EGD with dilation and normal colonoscopy per patient may 2017  REVIEW OF SYSTEMS:     CONSTITUTIONAL: +chills  Good appetite, and no recent weight loss  HEENT: No earache or tinnitus  Denies hearing loss or visual disturbances  CARDIOVASCULAR: No chest pain or palpitations  RESPIRATORY: Denies any cough, hemoptysis, shortness of breath or dyspnea on exertion  GASTROINTESTINAL: As noted in the History of Present Illness  GENITOURINARY: No problems with urination  Denies any hematuria or dysuria  NEUROLOGIC: No dizziness or vertigo, denies headaches  MUSCULOSKELETAL: Denies any muscle or joint pain  SKIN: Denies skin rashes or itching  ENDOCRINE: Denies excessive thirst  Denies intolerance to heat or cold  PSYCHOSOCIAL: Denies depression or anxiety  Denies any recent memory loss         Historical Information   Past Medical History:   Diagnosis Date    Ovarian cyst      Past Surgical History:   Procedure Laterality Date    BREAST SURGERY      CATARACT EXTRACTION      OOPHORECTOMY Bilateral      Social History   History   Alcohol Use No     History   Drug Use No     History   Smoking Status    Never Smoker   Smokeless Tobacco    Never Used     Family History   Problem Relation Age of Onset    Cancer Mother     Breast cancer Maternal Grandmother     Mental illness Son     Schizophrenia Son     Cancer Family        Meds/Allergies     Prescriptions Prior to Admission   Medication    aspirin 81 MG tablet    Calcium Carbonate-Vitamin D 600-200 MG-UNIT TABS    docusate sodium (COLACE) 100 mg capsule    Glucosamine-Chondroitin 250-200 MG TABS    levothyroxine 50 mcg tablet    lisinopril-hydrochlorothiazide (PRINZIDE,ZESTORETIC) 10-12 5 MG per tablet    Multiple Vitamins-Iron (DAILY MULTIPLE VITAMIN/IRON) TABS    Omega-3 Fatty Acids ([employer] FISH OIL) 1200 MG CAPS    Omeprazole 20 MG TBEC    simvastatin (ZOCOR) 40 mg tablet     Current Facility-Administered Medications   Medication Dose Route Frequency    acetaminophen (TYLENOL) tablet 650 mg  650 mg Oral Q6H PRN    DAILY MULTIPLE VITAMIN/IRON TABS 1 tablet  1 tablet Oral Daily    glucosamine sulfate capsule 500 mg  500 mg Oral Daily    insulin lispro (HumaLOG) 100 units/mL subcutaneous injection 1-5 Units  1-5 Units Subcutaneous TID AC    [START ON 5/3/2018] levothyroxine tablet 50 mcg  50 mcg Oral Early Morning    morphine injection 2 mg  2 mg Intravenous Q6H PRN    ondansetron (ZOFRAN) injection 4 mg  4 mg Intravenous Q6H PRN    [START ON 5/3/2018] pantoprazole (PROTONIX) EC tablet 40 mg  40 mg Oral Daily Before Breakfast    pravastatin (PRAVACHOL) tablet 40 mg  40 mg Oral Daily With Dinner    sodium chloride 0 9 % infusion  125 mL/hr Intravenous Continuous       No Known Allergies        Objective     Blood pressure 157/77, pulse 101, temperature 98 9 °F (37 2 °C), temperature source Temporal, resp  rate 20, weight 72 6 kg (160 lb), SpO2 94 %, not currently breastfeeding  Intake/Output Summary (Last 24 hours) at 05/02/18 1622  Last data filed at 05/02/18 1545   Gross per 24 hour   Intake          1346 67 ml   Output                0 ml   Net          1346 67 ml         PHYSICAL EXAM:      General Appearance:   A&Ox3, cooperative, no distress, appears stated age    HEENT:   Normocephalic, atraumatic      Neck:  Supple, symmetrical, trachea midline   Lungs:   Clear to auscultation bilaterally; no rales, rhonchi or wheezing    Heart[de-identified]   S1 and S2 normal; regular rate and rhythm; no murmur, rub, or gallop     Abdomen:   Soft, mild lower abdominal cramping, non-distended; normal bowel sounds; no masses, no organomegaly    Genitalia:   Deferred    Rectal:   Deferred    Extremities:  No cyanosis, clubbing or edema        Skin:  Skin color, texture, turgor normal, no rashes or lesions  Lab Results:   Admission on 05/02/2018   Component Date Value    WBC 05/02/2018 18 01*    RBC 05/02/2018 4 33     Hemoglobin 05/02/2018 12 4     Hematocrit 05/02/2018 36 7     MCV 05/02/2018 85     MCH 05/02/2018 28 6     MCHC 05/02/2018 33 8     RDW 05/02/2018 15 2*    MPV 05/02/2018 10 0     Platelets 71/73/3998 349     nRBC 05/02/2018 0     Neutrophils Relative 05/02/2018 81*    Lymphocytes Relative 05/02/2018 13*    Monocytes Relative 05/02/2018 6     Eosinophils Relative 05/02/2018 0     Basophils Relative 05/02/2018 0     Neutrophils Absolute 05/02/2018 14 48*    Lymphocytes Absolute 05/02/2018 2 42     Monocytes Absolute 05/02/2018 1 08     Eosinophils Absolute 05/02/2018 0 01     Basophils Absolute 05/02/2018 0 02     Protime 05/02/2018 13 4     INR 05/02/2018 1 02     PTT 05/02/2018 32     ABO Grouping 05/02/2018 O     Rh Factor 05/02/2018 Positive     Antibody Screen 05/02/2018 Negative     Specimen Expiration Date 05/02/2018 [id number]     Sodium 05/02/2018 136     Potassium 05/02/2018 3 6     Chloride 05/02/2018 99*    CO2 05/02/2018 25     Anion Gap 05/02/2018 12     BUN 05/02/2018 21     Creatinine 05/02/2018 1 32*    Glucose 05/02/2018 128     Calcium 05/02/2018 9 2     AST 05/02/2018 20     ALT 05/02/2018 22     Alkaline Phosphatase 05/02/2018 82     Total Protein 05/02/2018 8 6*    Albumin 05/02/2018 3 4*    Total Bilirubin 05/02/2018 0 41     eGFR 05/02/2018 39     Lipase 05/02/2018 72*       Imaging Studies: I have personally reviewed pertinent imaging studies  Ct Abdomen Pelvis With Contrast    Result Date: 5/2/2018  Impression: 1  Diffuse thickening of the descending colon with stranding stranding consistent with colitis  2   Cyst in the pancreatic head measuring greater than 2 cm  This is adjacent to the common bile duct and could represent a choledochocyst versus a true pancreatic cyst such as IPMN   For simple cyst(s) 2 cm or recommend gastroenterology and/or surgical oncology consult  EUS is likely now warranted  3   Gas in urinary bladder  Correlation with recent instrumentation recommended  This patient was seen and examined by Dr Kwaku Pace  All givens medical decisions were made by Dr Kwaku Pace  Thank you for allowing us to participate in the care of this patient  We will follow up closely with you

## 2018-05-03 LAB
ALBUMIN SERPL BCP-MCNC: 2.4 G/DL (ref 3.5–5)
ALP SERPL-CCNC: 58 U/L (ref 46–116)
ALT SERPL W P-5'-P-CCNC: 16 U/L (ref 12–78)
ANION GAP SERPL CALCULATED.3IONS-SCNC: 9 MMOL/L (ref 4–13)
AST SERPL W P-5'-P-CCNC: 15 U/L (ref 5–45)
BILIRUB SERPL-MCNC: 0.34 MG/DL (ref 0.2–1)
BUN SERPL-MCNC: 11 MG/DL (ref 5–25)
C DIFF TOX GENS STL QL NAA+PROBE: NORMAL
CALCIUM SERPL-MCNC: 7.6 MG/DL (ref 8.3–10.1)
CHLORIDE SERPL-SCNC: 106 MMOL/L (ref 100–108)
CO2 SERPL-SCNC: 24 MMOL/L (ref 21–32)
CREAT SERPL-MCNC: 0.96 MG/DL (ref 0.6–1.3)
ERYTHROCYTE [DISTWIDTH] IN BLOOD BY AUTOMATED COUNT: 15.8 % (ref 11.6–15.1)
GFR SERPL CREATININE-BSD FRML MDRD: 58 ML/MIN/1.73SQ M
GLUCOSE SERPL-MCNC: 109 MG/DL (ref 65–140)
GLUCOSE SERPL-MCNC: 115 MG/DL (ref 65–140)
GLUCOSE SERPL-MCNC: 119 MG/DL (ref 65–140)
GLUCOSE SERPL-MCNC: 183 MG/DL (ref 65–140)
GLUCOSE SERPL-MCNC: 94 MG/DL (ref 65–140)
HCT VFR BLD AUTO: 30.4 % (ref 34.8–46.1)
HGB BLD-MCNC: 10.3 G/DL (ref 11.5–15.4)
HGB BLD-MCNC: 9.9 G/DL (ref 11.5–15.4)
MCH RBC QN AUTO: 28.2 PG (ref 26.8–34.3)
MCHC RBC AUTO-ENTMCNC: 32.6 G/DL (ref 31.4–37.4)
MCV RBC AUTO: 87 FL (ref 82–98)
PLATELET # BLD AUTO: 275 THOUSANDS/UL (ref 149–390)
PMV BLD AUTO: 9.8 FL (ref 8.9–12.7)
POTASSIUM SERPL-SCNC: 3.4 MMOL/L (ref 3.5–5.3)
PROT SERPL-MCNC: 6.4 G/DL (ref 6.4–8.2)
RBC # BLD AUTO: 3.51 MILLION/UL (ref 3.81–5.12)
SODIUM SERPL-SCNC: 139 MMOL/L (ref 136–145)
WBC # BLD AUTO: 14.29 THOUSAND/UL (ref 4.31–10.16)

## 2018-05-03 PROCEDURE — 80053 COMPREHEN METABOLIC PANEL: CPT | Performed by: INTERNAL MEDICINE

## 2018-05-03 PROCEDURE — 85027 COMPLETE CBC AUTOMATED: CPT | Performed by: INTERNAL MEDICINE

## 2018-05-03 PROCEDURE — 86301 IMMUNOASSAY TUMOR CA 19-9: CPT | Performed by: PHYSICIAN ASSISTANT

## 2018-05-03 PROCEDURE — G8979 MOBILITY GOAL STATUS: HCPCS

## 2018-05-03 PROCEDURE — 82948 REAGENT STRIP/BLOOD GLUCOSE: CPT

## 2018-05-03 PROCEDURE — G8980 MOBILITY D/C STATUS: HCPCS

## 2018-05-03 PROCEDURE — 85018 HEMOGLOBIN: CPT | Performed by: PHYSICIAN ASSISTANT

## 2018-05-03 PROCEDURE — G8978 MOBILITY CURRENT STATUS: HCPCS

## 2018-05-03 PROCEDURE — 97162 PT EVAL MOD COMPLEX 30 MIN: CPT

## 2018-05-03 PROCEDURE — 99223 1ST HOSP IP/OBS HIGH 75: CPT | Performed by: INTERNAL MEDICINE

## 2018-05-03 RX ADMIN — Medication 500 MG: at 09:31

## 2018-05-03 RX ADMIN — PRAVASTATIN SODIUM 40 MG: 40 TABLET ORAL at 16:36

## 2018-05-03 RX ADMIN — SODIUM CHLORIDE 125 ML/HR: 0.9 INJECTION, SOLUTION INTRAVENOUS at 08:13

## 2018-05-03 RX ADMIN — Medication 1 TABLET: at 09:31

## 2018-05-03 RX ADMIN — SODIUM CHLORIDE 125 ML/HR: 0.9 INJECTION, SOLUTION INTRAVENOUS at 16:44

## 2018-05-03 RX ADMIN — PANTOPRAZOLE SODIUM 40 MG: 40 TABLET, DELAYED RELEASE ORAL at 06:03

## 2018-05-03 RX ADMIN — INSULIN LISPRO 1 UNITS: 100 INJECTION, SOLUTION INTRAVENOUS; SUBCUTANEOUS at 16:36

## 2018-05-03 RX ADMIN — LEVOTHYROXINE SODIUM 50 MCG: 50 TABLET ORAL at 06:03

## 2018-05-03 NOTE — SOCIAL WORK
CM met with patient at bedside to address discharge needs  Wrote name & number on white board upon entering room while introducing oneself and explaining role in patients care  Patient lives in a townhouse with her , Susan Armas; [address]  Patients bedroom is located on the upper level; patient denies difficulty with steps  Patient is independent with ADLs and functional mobility  Support system is   Food shopping & meal prep is done by both patient and   Patient does not use any DMEs  Patient is able to ambulate without assistance from a seated or laying position  No hx of VNA reported  Patient is retired  PCP identified as Kaleigh Al,   POA identified as , as well as, patients healthcare representative, spokesperson for the family and designated caregiver if/when needed  Patient uses [facility]  for Rx needs; patient made aware of [address] to use at discharge if needed  Patient does not drive; reports  available at discharge to transport home  Help/support reported when patient is home

## 2018-05-03 NOTE — PROGRESS NOTES
Progress Note - Rusty Smith 78 y o  female MRN: [id number]    Unit/Bed#: [facility] [address] 227-01 Encounter: [id number]      ASSESSMENT/ PLAN:   72-year-old female pmh HTN, HLD, DM II who was admitted for abdominal pain, diarrhea and vomiting x 1 day  1  Colitis: CT revealed diffuse thickening of the descending colon with stranding consistent with colitis  This could be secondary to ischemic colitis vs infectious, lactic acid was wnl  She admits to slight improvement in her abdominal cramping  Her Hgb did drop today from 12 4 to 9 9  Her blood pressure remains stable, this could be secondary to dilution from IV fluids  White count decreased from 18 to 14 today   -monitor H&H  -transfuse as necessary   -recheck Hgb at noon   -f/u c diff and stool studies      2  Pancreatic Cyst: CT revealed cyst in the pancreatic head measuring greater than 2 cm   This is adjacent to the common bile duct and could represent a choledochocyst versus a true pancreatic cyst such as IPMN  Will need EUS as outpatient  Fortunately her LFTs are wnl  -f/u CA 19-9  -will need MRI/MRCP vs EUS with FNA for further evaluation, this can most likely be done as outpatient     Subjective:     Patient seen and examined     Objective:     Vitals: Blood pressure 138/65, pulse 89, temperature 98 3 °F (36 8 °C), temperature source Temporal, resp  rate 18, weight 74 4 kg (164 lb), SpO2 96 %, not currently breastfeeding  ,Body mass index is 35 49 kg/m²        Intake/Output Summary (Last 24 hours) at 05/03/18 1002  Last data filed at 05/03/18 0606   Gross per 24 hour   Intake          2336 25 ml   Output             1225 ml   Net          1111 25 ml       Physical Exam:     General Appearance: A&Ox3, appears stated age and cooperative  Lungs: Clear to auscultation bilaterally, no rales or rhonchi  Heart: Regular rate and rhythm, S1, S2 normal, no murmur, click, rub or gallop  Abdomen: Soft, non-tender, non-distended; bowel sounds normal; no masses or no organomegaly  Extremities: No cyanosis, clubbing, edema    Invasive Devices     Peripheral Intravenous Line            Peripheral IV 05/02/18 Left Antecubital less than 1 day                Lab Results:      Results from last 7 days  Lab Units 05/03/18  0544 05/02/18  1216   WBC Thousand/uL 14 29* 18 01*   HEMOGLOBIN g/dL 9 9* 12 4   HEMATOCRIT % 30 4* 36 7   PLATELETS Thousands/uL 275 349   NEUTROS PCT %  --  81*   LYMPHS PCT %  --  13*   MONOS PCT %  --  6   EOS PCT %  --  0       Results from last 7 days  Lab Units 05/03/18  0544   SODIUM mmol/L 139   POTASSIUM mmol/L 3 4*   CHLORIDE mmol/L 106   CO2 mmol/L 24   BUN mg/dL 11   CREATININE mg/dL 0 96   CALCIUM mg/dL 7 6*   TOTAL PROTEIN g/dL 6 4   BILIRUBIN TOTAL mg/dL 0 34   ALK PHOS U/L 58   ALT U/L 16   AST U/L 15   GLUCOSE RANDOM mg/dL 109       Results from last 7 days  Lab Units 05/02/18  1216   INR  1 02       Results from last 7 days  Lab Units 05/02/18  1216   LIPASE u/L 72*       Imaging Studies: I have personally reviewed pertinent imaging studies  Ct Abdomen Pelvis With Contrast    Result Date: 5/2/2018  Impression: 1  Diffuse thickening of the descending colon with stranding stranding consistent with colitis  2   Cyst in the pancreatic head measuring greater than 2 cm  This is adjacent to the common bile duct and could represent a choledochocyst versus a true pancreatic cyst such as IPMN  For simple cyst(s) 2 cm or recommend gastroenterology and/or surgical oncology consult  EUS is likely now warranted  3   Gas in urinary bladder  Correlation with recent instrumentation recommended

## 2018-05-03 NOTE — PHYSICIAN ADVISOR
Current patient class: Inpatient  The patient is currently on Hospital Day: 2      The patient was admitted to the hospital at [phone] on 5/2/18 for the following diagnosis:  Colitis [K52 9]  Pancreatic cyst [K86 2]  Rectal bleeding [K62 5]  Abdominal pain [R10 9]  Renal insufficiency [N28 9]       There is documentation in the medical record of an expected length of stay of at least 2 midnights  The patient is therefore expected to satisfy the 2 midnight benchmark and given the 2 midnight presumption is appropriate for INPATIENT ADMISSION  Given this expectation of a satisfying stay, CMS instructs us that the patient is most often appropriate for inpatient admission under part A provided medical necessity is documented in the chart  After review of the relevant documentation, labs, vital signs and test results, the patient is appropriate for INPATIENT ADMISSION  Admission to the hospital as an inpatient is a complex decision making process which requires the practitioner to consider the patients presenting complaint, history and physical examination and all relevant testing  With this in mind, in this case, the patient was deemed appropriate for INPATIENT ADMISSION  After review of the documentation and testing available at the time of the admission I concur with this clinical determination of medical necessity  Rationale is as follows: The patient is a 78 yrs old Female who presented to the ED at 5/2/2018 11:41 AM with a chief complaint of Abdominal Pain (Diarrhea and cramping since after dinner last night  During the night was noting red blood with BMs  Describes as small amount )     Patient is a 61-year-old female who presented to the emergency room with complaint of abdominal crampy pain over 2 days with episodes of nausea and vomiting with subsequent diarrhea with bright red blood  On admission she was tachycardic with evidence of leukocytosis at 18,000, dehydration with acute kidney injury  CT of the abdomen showed evidence of colitis with etiology of acute ischemic colitis versus infectious colitis with ongoing workup  There was also finding of pancreatic cyst over 2 cm  Gastroenterology specialist was consulted  Patient was also noted to have hemoglobin drop from 12 4 to 9 9 which may represent blood loss anemia  She is treated with ongoing IV fluids  Stool studies are pending  Patient is expected to remain in the hospital over 2 midnights based on patient's acute complicated presentation, advanced age and pending etiology workup      The patients vitals on arrival were ED Triage Vitals   Temperature Pulse Respirations Blood Pressure SpO2   05/02/18 1137 05/02/18 1137 05/02/18 1137 05/02/18 1137 05/02/18 1137   98 3 °F (36 8 °C) (!) 120 16 144/93 96 %      Temp Source Heart Rate Source Patient Position - Orthostatic VS BP Location FiO2 (%)   05/02/18 1137 05/02/18 1509 05/02/18 1340 05/02/18 1340 --   Oral Monitor Lying Right arm       Pain Score       05/02/18 1137       4           Past Medical History:   Diagnosis Date    Ovarian cyst      Past Surgical History:   Procedure Laterality Date    BREAST SURGERY      CATARACT EXTRACTION      OOPHORECTOMY Bilateral            Consults have been placed to:   IP CONSULT TO GASTROENTEROLOGY    Vitals:    05/02/18 2348 05/03/18 0600 05/03/18 0820 05/03/18 1534   BP: 134/65  138/65 (!) 172/77   BP Location: Right arm  Right arm    Pulse: 99  89 96   Resp: 18  18 18   Temp: 98 9 °F (37 2 °C)  98 3 °F (36 8 °C) 99 3 °F (37 4 °C)   TempSrc: Temporal  Temporal    SpO2: 94%  96% 95%   Weight:  74 4 kg (164 lb)         Most recent labs:    Recent Labs      05/02/18   1216  05/03/18   0544  05/03/18   1206   WBC  18 01*  14 29*   --    HGB  12 4  9 9*  10 3*   HCT  36 7  30 4*   --    PLT  349  275   --    K  3 6  3 4*   --    NA  136  139   --    CALCIUM  9 2  7 6*   --    BUN  21  11   --    CREATININE  1 32*  0 96   --    LIPASE  72*   --    --    INR 1 02   --    --    AST  20  15   --    ALT  22  16   --    ALKPHOS  82  58   --    BILITOT  0 41  0 34   --        Scheduled Meds:  Current Facility-Administered Medications:  acetaminophen 650 mg Oral Q6H PRN Gurmeet Diego MD    glucosamine sulfate 500 mg Oral Daily Gurmeet Diego MD    insulin lispro 1-5 Units Subcutaneous TID [facility] Gurmeet Diego MD    levothyroxine 50 mcg Oral Early Morning Gurmeet Diego MD    morphine injection 2 mg Intravenous Q6H PRN Gurmeet Diego MD    multivitamin-minerals 1 tablet Oral Daily Gurmeet Diego MD    ondansetron 4 mg Intravenous Q6H PRN Gurmeet Diego MD    pantoprazole 40 mg Oral Daily Before Breakfast Gurmeet Diego MD    pravastatin 40 mg Oral Daily With Elier Jean-Baptiste MD    sodium chloride 125 mL/hr Intravenous Continuous Guanako Solis MD Last Rate: 125 mL/hr (05/03/18 1644)     Continuous Infusions:  sodium chloride 125 mL/hr Last Rate: 125 mL/hr (05/03/18 1644)     PRN Meds:   acetaminophen    morphine injection    ondansetron    Surgical procedures (if appropriate):

## 2018-05-03 NOTE — CASE MANAGEMENT
Initial Clinical Review    Admission: Date/Time/Statement: 5/2/18 @ 1447     Orders Placed This Encounter   Procedures    Inpatient Admission (expected length of stay for this patient is greater than two midnights)     Standing Status:   Standing     Number of Occurrences:   1     Order Specific Question:   Admitting Physician     Answer:   NHI ZARAGOZA [[id number]]     Order Specific Question:   Level of Care     Answer:   Med Surg [[id number]]     Order Specific Question:   Estimated length of stay     Answer:   More than 2 Midnights     Order Specific Question:   Certification     Answer:   I certify that inpatient services are medically necessary for this patient for a duration of greater than two midnights  See H&P and MD Progress Notes for additional information about the patient's course of treatment  ED: Date/Time/Mode of Arrival:   ED Arrival Information     Expected Arrival Acuity Means of Arrival Escorted By Service Admission Type    5/2/2018 11:17 5/2/2018 11:31 Urgent Wheelchair Family Member General Medicine Urgent    Arrival Complaint    rectal bleeding, vomiting          Chief Complaint:   Chief Complaint   Patient presents with    Abdominal Pain     Diarrhea and cramping since after dinner last night  During the night was noting red blood with BMs  Describes as small amount  History of Illness: Patient had 4 episodes of grossly bloody stool at home after the 1st episode of soft stool  She continues with lower abdominal cramping  She is not on blood thinners but does take some aspirin  Mucous membranes are dry  Abdominal: Soft  Normal appearance  She exhibits distension  She exhibits no ascites and no mass  There is no hepatosplenomegaly  There is tenderness in the right lower quadrant, suprapubic area and left lower quadrant  There is no rebound, no guarding and no CVA tenderness  Genitourinary: Rectal exam shows guaiac positive stool     Genitourinary Comments: + gross blood on rectal  ED Vital Signs:   ED Triage Vitals   Temperature Pulse Respirations Blood Pressure SpO2   05/02/18 1137 05/02/18 1137 05/02/18 1137 05/02/18 1137 05/02/18 1137   98 3 °F (36 8 °C) (!) 120 16 144/93 96 %      Temp Source Heart Rate Source Patient Position - Orthostatic VS BP Location FiO2 (%)   05/02/18 1137 05/02/18 1509 05/02/18 1340 05/02/18 1340 --   Oral Monitor Lying Right arm       Pain Score       05/02/18 1137       4        Wt Readings from Last 1 Encounters:   05/03/18 74 4 kg (164 lb)       Vital Signs (abnormal):   05/02/18 1509 -- 103 16 152/76 95 % -- KR   05/02/18 1340 98 4 °F (36 9 °C) 99 16 144/70 97 % -- DMW   05/02/18 1217 -- 103 -- -- -- --          Abnormal Labs/Diagnostic Test Results:  Cl 99,   Cr 1 32,   t pro 8 6,   Alb 3 4  WBC's 18 01,   anc 14 48    CTA  A&P: 1   Diffuse thickening of the descending colon with stranding stranding consistent with colitis  2   Cyst in the pancreatic head measuring greater than 2 cm   This is adjacent to the common bile duct and could represent a choledochocyst versus a true pancreatic cyst such as IPMN  For simple cyst(s) 2 cm or recommend gastroenterology and/or surgical   oncology consult   EUS is likely now warranted       3   Gas in urinary bladder   Correlation with recent instrumentation recommended    ED Treatment:   Medication Administration from 05/02/2018 1116 to 05/02/2018 1550       Date/Time Order Dose Route Action Action by Comments     05/02/2018 1340 sodium chloride 0 9 % bolus 1,000 mL 0 mL Intravenous Stopped Corinne Hirschfeld, RN      05/02/2018 1216 sodium chloride 0 9 % bolus 1,000 mL 1,000 mL Intravenous Inocencio 37 Eleanor Bazan RN      05/02/2018 1545 sodium chloride 0 9 % infusion 0 mL/hr Intravenous Stopped Ladi Dugan RN      05/02/2018 1217 sodium chloride 0 9 % infusion 100 mL/hr Intravenous New Bag Eleanor Bazan RN      05/02/2018 1339 iodixanol (VISIPAQUE) 320 MG/ML injection 80 mL 80 mL Intravenous Given [facility] Past Medical/Surgical History: Active Ambulatory Problems     Diagnosis Date Noted    GERD (gastroesophageal reflux disease) 09/12/2014    Gout 08/11/2014    Hyperlipidemia 08/11/2014    Hypothyroidism 08/11/2014    [facility] 08/11/2014    Sinusitis 04/10/2018     Resolved Ambulatory Problems     Diagnosis Date Noted    No Resolved Ambulatory Problems     Past Medical History:   Diagnosis Date    Ovarian cyst        Admitting Diagnosis: Colitis [K52 9]  Pancreatic cyst [K86 2]  Rectal bleeding [K62 5]  Abdominal pain [R10 9]  Renal insufficiency [N28 9]    Age/Sex: 78 y o  female    Assessment/Plan:     1  Colitis  Ischemic versus infectious colitis  From the looks of it it appears to be more ischemic in nature  We will obtain repeat lactic acid  Continue with supportive measures :   IV hydration  Pain control  Nothing by mouth for now  Gastroenterology evaluation for ischemic colitis and pancreatic cyst for possible EUS     #2 acute kidney injury  IV hydration initiated  Hold hydrochlorothiazide hold lisinopril     #3DM Type II:    Begin no concentrated carbohydrate diet  Cover with Aspart SSI as needed   Will order HgbA1C to assess status of recent glycemic control  Well initiate home medications once med rec is completed and confirmed by pharmacy         #4 dehydration  Aggressive IV hydration with normal saline    Anticipated Length of Stay:  Patient will be admitted on an Inpatient basis with an anticipated length of stay of  Greater than 2 midnights     Justification for Hospital Stay: Ischemic colitis acute kidney injury       Admission Orders:  IP  TELE  NPO  To clears  Consult GI  PT / OT Eval  CMP,  CBC  SCD's  C Diff,   Stool enteric bacteria panel    Scheduled Meds:   Current Facility-Administered Medications:  acetaminophen 650 mg Oral Q6H PRN Gurmeet Diego MD    glucosamine sulfate 500 mg Oral Daily Gurmeet Diego MD    insulin lispro 1-5 Units Subcutaneous RASHIDA ALCALA [facility] Autumn Kong MD    levothyroxine 50 mcg Oral Early Morning Autumn Kong MD    morphine injection 2 mg Intravenous Q6H PRN Autumn Kong MD    multivitamin-minerals 1 tablet Oral Daily Autumn Kong MD    ondansetron 4 mg Intravenous Q6H PRN Autumn Kong MD    pantoprazole 40 mg Oral Daily Before Breakfast Autumn Kong MD    pravastatin 40 mg Oral Daily With Dinner Autumn Kong MD    sodium chloride 125 mL/hr Intravenous Continuous Josiah Junior MD Last Rate: 125 mL/hr (05/03/18 0813)     Continuous Infusions:   sodium chloride 125 mL/hr Last Rate: 125 mL/hr (05/03/18 0813)     PRN Meds:   acetaminophen    morphine injection    ondansetron  5/3:  98 3 - 89 - 18   138/65  H&H 9 9 / 30 4,   K 3 4,   Ca 7 6    Thank you,  [address] [place] [address] in the [facility] by Filipe Sandra for 2017  Network Utilization Review Department  Phone: [phone]; Fax [phone]  ATTENTION: The Network Utilization Review Department is now centralized for our 7 Facilities  Make a note that we have a new phone and fax numbers for our Department  Please call with any questions or concerns to [phone] and carefully follow the prompts so that you are directed to the right person  All voicemails are confidential  Fax any determinations, approvals, denials, and requests for initial or continue stay review clinical to [phone]  Due to HIGH CALL volume, it would be easier if you could please send faxed requests to expedite your requests and in part, help us provide discharge notifications faster

## 2018-05-03 NOTE — PLAN OF CARE
DISCHARGE PLANNING     Discharge to home or other facility with appropriate resources Progressing        GASTROINTESTINAL - ADULT     Minimal or absence of nausea and/or vomiting Progressing     Maintains or returns to baseline bowel function Progressing     Maintains adequate nutritional intake Progressing        HEMATOLOGIC - ADULT     Maintains hematologic stability Progressing        INFECTION - ADULT     Absence or prevention of progression during hospitalization Progressing     Absence of fever/infection during neutropenic period Progressing        PAIN - ADULT     Verbalizes/displays adequate comfort level or baseline comfort level Progressing        SAFETY ADULT     Patient will remain free of falls Progressing     Maintain or return to baseline ADL function Progressing     Maintain or return mobility status to optimal level Progressing

## 2018-05-03 NOTE — PLAN OF CARE
Problem: DISCHARGE PLANNING - CARE MANAGEMENT  Goal: Discharge to post-acute care or home with appropriate resources  INTERVENTIONS:  - Conduct assessment to determine patient/family and health care team treatment goals, and need for post-acute services based on payer coverage, community resources, and patient preferences, and barriers to discharge  - Address psychosocial, clinical, and financial barriers to discharge as identified in assessment in conjunction with the patient/family and health care team  - Arrange appropriate level of post-acute services according to patients   needs and preference and payer coverage in collaboration with the physician and health care team  - Communicate with and update the patient/family, physician, and health care team regarding progress on the discharge plan  - Arrange appropriate transportation to post-acute venues  Outcome: Adequate for Discharge  At this point in time, there appears to be no needs from a CM standpoint, however CM will continue to monitor for that to change

## 2018-05-03 NOTE — PHYSICAL THERAPY NOTE
PHYSICAL THERAPY EVALUATION  NAME:  Eloisa Mantle: 05/03/18    AGE:   78 y o    Mrn:   [id number]  ADMIT DX:  Colitis [K52 9]  Pancreatic cyst [K86 2]  Rectal bleeding [K62 5]  Abdominal pain [R10 9]  Renal insufficiency [N28 9]    Past Medical History:   Diagnosis Date    Ovarian cyst        Past Surgical History:   Procedure Laterality Date    BREAST SURGERY      CATARACT EXTRACTION      OOPHORECTOMY Bilateral        Length Of Stay: 1    PHYSICAL THERAPY EVALUATION:        05/03/18 1215   Note Type   Note type Eval only   Pain Assessment   Pain Assessment No/denies pain   Home Living   Type of [address] Multi-level;Stairs to enter with rails  (3 GENESIS; full flight to bed and bath)   Home Equipment Walker;Cane;Other (Comment)  (commode; pt denies using AD PTA )   Additional Comments Pt reports living with  who is able to assist pt if needed at the home    Prior Function   Level of Steen Independent with ADLs and functional mobility   Lives With Spouse   Receives Help From Family;Friend(s)   ADL Assistance Independent   Falls in the last 6 months 0   Vocational Retired   Comments At baseline pt denies the use of an AD for ambulation    Restrictions/Precautions   Weight Bearing Precautions Per Order No   Braces or Orthoses (none )   Other Precautions Contact/isolation;Multiple lines   General   Family/Caregiver Present Yes   Cognition   Overall Cognitive Status WFL   Arousal/Participation Alert   Orientation Level Oriented X4   Memory Within functional limits   Following Commands Follows all commands and directions without difficulty   RUE Assessment   RUE Assessment WFL   LUE Assessment   LUE Assessment WFL   RLE Assessment   RLE Assessment WFL   Strength RLE   RLE Overall Strength 4+/5   LLE Assessment   LLE Assessment WFL   Strength LLE   LLE Overall Strength 4+/5   Bed Mobility   Additional Comments NA, Pt OOB in chair at time of PT eval    Transfers   Sit to Stand 6 Modified independent   Stand to Sit 6  Modified independent   Ambulation/Elevation   Gait pattern Short stride   Gait Assistance 6  Modified independent   Assistive Device None   Distance 30ft x 2    Balance   Static Sitting Normal   Static Standing Good   Ambulatory Fair +   Endurance Deficit   Endurance Deficit No   Activity Tolerance   Activity Tolerance Patient tolerated treatment well   Nurse Made Aware pt appropriate to be seen per Moni Carlisle RN    Assessment   Prognosis Good   Problem List Decreased endurance   Assessment Pt is 78 y o  female seen for PT evaluation s/p admit to [facility]  on 5/2/2018  Two pt identifiers were used to confirm  Pt presented w/ increased abdominal pain  Pt was admitted with a primary dx of: colitis, rectal bleeding, ROBERTA  PT now consulted for assessment of mobility and d/c needs  Pt with Activity as tolerated orders  Pts current co morbidities effecting treatment include: ovarian cyst, oophorectomy, and personal factors including GENESIS home  Pts current clinical presentation is Evolving (medium complexity) due to Ongoing medical management for primary dx, Trending lab values    Prior to admission, pt was I with ambulation without the use of an AD as per pt  Upon evaluation, pt currently is requiring ; mod I for transfers and mod I for ambulation w/ no AD   Pt denies any lightheadedness or dizziness with ambulation  Pt presents at PT eval functioning below baseline and currently w/ overall mobility deficits 2* to: decreased endurance  At conclusion of PT session pt assisted to bathroom with call bell within reach and nsg notified   Pt denies any further questions at this time  PT is currently recommending home with family support  Pt/ family agreeable to plan and goals as stated on evaluation  DC PT at this time secondary to pt being at/ near baseline in terms of functional mobility     Barriers to Discharge None   Goals   Patient Goals " to go home"   Recommendation Recommendation Home with family support   Equipment Recommended (none at this time )   PT - OK to Discharge Yes  (when medically cleared )   Modified Deya Scale   Modified [place] Scale 1   Barthel Index   Feeding 10   Bathing 5   Grooming Score 5   Dressing Score 10   Bladder Score 10   Bowels Score 10   Toilet Use Score 10   Transfers (Bed/Chair) Score 15   Mobility (Level Surface) Score 15   Stairs Score 5   Barthel Index Score 95   Brenda Vasiliy, PT

## 2018-05-03 NOTE — OCCUPATIONAL THERAPY NOTE
Occupational Therapy Screen     Pawel Dollar  5/3/2018    OT referral received  Chart reviewed  Completed OT screen  Per PT Taz Sood, Pt is currently functioning at independent level with no acute OT needs at this time  Therefore, D/C OT at this time  Please re-consult as needed        Yuridia Ybarra OTR/L

## 2018-05-04 LAB
BASOPHILS # BLD AUTO: 0.04 THOUSANDS/ΜL (ref 0–0.1)
BASOPHILS NFR BLD AUTO: 0 % (ref 0–1)
CAMPYLOBACTER DNA SPEC NAA+PROBE: NORMAL
CANCER AG19-9 SERPL-ACNC: 4 U/ML (ref 0–35)
EOSINOPHIL # BLD AUTO: 0.39 THOUSAND/ΜL (ref 0–0.61)
EOSINOPHIL NFR BLD AUTO: 3 % (ref 0–6)
ERYTHROCYTE [DISTWIDTH] IN BLOOD BY AUTOMATED COUNT: 15.7 % (ref 11.6–15.1)
GLUCOSE SERPL-MCNC: 105 MG/DL (ref 65–140)
GLUCOSE SERPL-MCNC: 106 MG/DL (ref 65–140)
GLUCOSE SERPL-MCNC: 109 MG/DL (ref 65–140)
GLUCOSE SERPL-MCNC: 137 MG/DL (ref 65–140)
HCT VFR BLD AUTO: 31.7 % (ref 34.8–46.1)
HGB BLD-MCNC: 10.2 G/DL (ref 11.5–15.4)
LYMPHOCYTES # BLD AUTO: 1.97 THOUSANDS/ΜL (ref 0.6–4.47)
LYMPHOCYTES NFR BLD AUTO: 16 % (ref 14–44)
MCH RBC QN AUTO: 28.2 PG (ref 26.8–34.3)
MCHC RBC AUTO-ENTMCNC: 32.2 G/DL (ref 31.4–37.4)
MCV RBC AUTO: 88 FL (ref 82–98)
MONOCYTES # BLD AUTO: 0.75 THOUSAND/ΜL (ref 0.17–1.22)
MONOCYTES NFR BLD AUTO: 6 % (ref 4–12)
NEUTROPHILS # BLD AUTO: 8.96 THOUSANDS/ΜL (ref 1.85–7.62)
NEUTS SEG NFR BLD AUTO: 75 % (ref 43–75)
NRBC BLD AUTO-RTO: 0 /100 WBCS
PLATELET # BLD AUTO: 255 THOUSANDS/UL (ref 149–390)
PMV BLD AUTO: 9.7 FL (ref 8.9–12.7)
RBC # BLD AUTO: 3.62 MILLION/UL (ref 3.81–5.12)
SALMONELLA DNA SPEC QL NAA+PROBE: NORMAL
SHIGA TOXIN STX GENE SPEC NAA+PROBE: NORMAL
SHIGELLA DNA SPEC QL NAA+PROBE: NORMAL
WBC # BLD AUTO: 12.11 THOUSAND/UL (ref 4.31–10.16)

## 2018-05-04 PROCEDURE — 85025 COMPLETE CBC W/AUTO DIFF WBC: CPT | Performed by: PHYSICIAN ASSISTANT

## 2018-05-04 PROCEDURE — 99233 SBSQ HOSP IP/OBS HIGH 50: CPT | Performed by: INTERNAL MEDICINE

## 2018-05-04 PROCEDURE — 99232 SBSQ HOSP IP/OBS MODERATE 35: CPT | Performed by: INTERNAL MEDICINE

## 2018-05-04 PROCEDURE — 82948 REAGENT STRIP/BLOOD GLUCOSE: CPT

## 2018-05-04 PROCEDURE — 87505 NFCT AGENT DETECTION GI: CPT | Performed by: PHYSICIAN ASSISTANT

## 2018-05-04 RX ADMIN — LEVOTHYROXINE SODIUM 50 MCG: 50 TABLET ORAL at 06:08

## 2018-05-04 RX ADMIN — SODIUM CHLORIDE 125 ML/HR: 0.9 INJECTION, SOLUTION INTRAVENOUS at 00:14

## 2018-05-04 RX ADMIN — Medication 500 MG: at 09:06

## 2018-05-04 RX ADMIN — PRAVASTATIN SODIUM 40 MG: 40 TABLET ORAL at 17:09

## 2018-05-04 RX ADMIN — Medication 1 TABLET: at 09:06

## 2018-05-04 RX ADMIN — SODIUM CHLORIDE 125 ML/HR: 0.9 INJECTION, SOLUTION INTRAVENOUS at 08:16

## 2018-05-04 RX ADMIN — SODIUM CHLORIDE 50 ML/HR: 0.9 INJECTION, SOLUTION INTRAVENOUS at 20:01

## 2018-05-04 RX ADMIN — PANTOPRAZOLE SODIUM 40 MG: 40 TABLET, DELAYED RELEASE ORAL at 06:08

## 2018-05-04 NOTE — PLAN OF CARE
DISCHARGE PLANNING     Discharge to home or other facility with appropriate resources Progressing        DISCHARGE PLANNING - CARE MANAGEMENT     Discharge to post-acute care or home with appropriate resources Progressing        GASTROINTESTINAL - ADULT     Minimal or absence of nausea and/or vomiting Progressing     Maintains or returns to baseline bowel function Progressing     Maintains adequate nutritional intake Progressing        HEMATOLOGIC - ADULT     Maintains hematologic stability Progressing        INFECTION - ADULT     Absence or prevention of progression during hospitalization Progressing     Absence of fever/infection during neutropenic period Progressing        PAIN - ADULT     Verbalizes/displays adequate comfort level or baseline comfort level Progressing        SAFETY ADULT     Patient will remain free of falls Progressing     Maintain or return to baseline ADL function Progressing     Maintain or return mobility status to optimal level Progressing

## 2018-05-04 NOTE — PROGRESS NOTES
Progress Note - Ruth Dudley 78 y o  female MRN: [id number]    Unit/Bed#: [place] [address] 227-01 Encounter: [id number]      ASSESSMENT/ PLAN:   79-year-old female pmh HTN, HLD, DM II who was admitted for abdominal pain, diarrhea and vomiting x 1 day  1  Colitis: CT revealed diffuse thickening of the descending colon with stranding consistent with colitis  This could be secondary to ischemic colitis vs infectious, lactic acid was wnl  She admits to slight improvement in her abdominal cramping  Her Hgb remains stable around 10  She continues to pass small amounts of blood with flatus, no further episodes of diarrhea and resolution in her abdominal pain  This was most likely infectious in nature, c diff was negative  White count improved to 12, afebrile  -monitor H&H  -transfuse as necessary   -advance diet as tolerated  -f/u stool studies      2  Pancreatic Cyst: CT revealed cyst in the pancreatic head measuring greater than 2 cm   This is adjacent to the common bile duct and could represent a choledochocyst versus a true pancreatic cyst such as IPMN   is wnl    -will need MRI/MRCP vs EUS with FNA for further evaluation, this can most likely be done as outpatient     Subjective:     Patient seen and examined     Objective:     Vitals: Blood pressure 142/68, pulse 86, temperature 98 1 °F (36 7 °C), temperature source Temporal, resp  rate 20, weight 74 2 kg (163 lb 9 3 oz), SpO2 92 %, not currently breastfeeding  ,Body mass index is 35 4 kg/m²        Intake/Output Summary (Last 24 hours) at 05/04/18 1007  Last data filed at 05/04/18 0700   Gross per 24 hour   Intake             4255 ml   Output             1600 ml   Net             2655 ml       Physical Exam:    General Appearance: A&Ox3, appears stated age and cooperative  Lungs: Clear to auscultation bilaterally, no rales or rhonchi  Heart: Regular rate and rhythm, S1, S2 normal, no murmur, click, rub or gallop  Abdomen: Soft, non-tender, non-distended; bowel sounds normal; no masses or no organomegaly  Extremities: No cyanosis, clubbing, edema    Invasive Devices     Peripheral Intravenous Line            Peripheral IV 05/02/18 Left Antecubital 1 day                Lab Results:      Results from last 7 days  Lab Units 05/04/18  0914   WBC Thousand/uL 12 11*   HEMOGLOBIN g/dL 10 2*   HEMATOCRIT % 31 7*   PLATELETS Thousands/uL 255   NEUTROS PCT % 75   LYMPHS PCT % 16   MONOS PCT % 6   EOS PCT % 3       Results from last 7 days  Lab Units 05/03/18  0544   SODIUM mmol/L 139   POTASSIUM mmol/L 3 4*   CHLORIDE mmol/L 106   CO2 mmol/L 24   BUN mg/dL 11   CREATININE mg/dL 0 96   CALCIUM mg/dL 7 6*   TOTAL PROTEIN g/dL 6 4   BILIRUBIN TOTAL mg/dL 0 34   ALK PHOS U/L 58   ALT U/L 16   AST U/L 15   GLUCOSE RANDOM mg/dL 109       Results from last 7 days  Lab Units 05/02/18  1216   INR  1 02       Results from last 7 days  Lab Units 05/02/18  1216   LIPASE u/L 72*       Imaging Studies: I have personally reviewed pertinent imaging studies  Ct Abdomen Pelvis With Contrast    Result Date: 5/2/2018  Impression: 1  Diffuse thickening of the descending colon with stranding stranding consistent with colitis  2   Cyst in the pancreatic head measuring greater than 2 cm  This is adjacent to the common bile duct and could represent a choledochocyst versus a true pancreatic cyst such as IPMN  For simple cyst(s) 2 cm or recommend gastroenterology and/or surgical oncology consult  EUS is likely now warranted  3   Gas in urinary bladder  Correlation with recent instrumentation recommended

## 2018-05-04 NOTE — PROGRESS NOTES
[facility] Progress Note  Patient: Kalina Gasca 78 y o  female   MRN: [id number]  PCP: Dillan Rehman DO  Unit/Bed#: [facility] 2 [facility] [address] 227-01 Encounter: [id number]  Date Of Visit: 05/04/18    Assessment:    Principal Problem:    Rectal bleeding  Active Problems:    Hyperlipidemia    Hypothyroidism    Type 2 diabetes mellitus ([facility] [place] [address] )    Colitis    Abdominal pain    Dehydration    Acute kidney injury ([facility] [place] [facility] )      Plan:  1  Colitis  Ischemic versus infectious colitis  Likely ischemic quickly improving without any antibiotic intervention  Now able to tolerate by mouth requesting for "more real food "  From the looks of it it appears to be more ischemic in nature  We will obtain repeat lactic acid  Continue with supportive measures :   IV hydration  Pain control  Nothing by mouth for now  Gastroenterology evaluation for ischemic colitis and pancreatic cyst for possible EUS     #2 acute kidney injury  IV hydration initiated  Hold hydrochlorothiazide hold lisinopril     #3DM Type II:    Begin no concentrated carbohydrate diet  Cover with Aspart SSI as needed   Will order HgbA1C to assess status of recent glycemic control  Well initiate home medications once med rec is completed and confirmed by pharmacy      #4 dehydration  Aggressive IV hydration with normal saline     #5SIRS due to colitis, likely ischemic evidenced by leukocytosis (wbc 18 01; 14 29) tachycardia (> 103> 99> 103) treated with GI work-up, NSS bolus and gtt  VTE Pharmacologic Prophylaxis:   Pharmacologic: Heparin  Mechanical VTE Prophylaxis in Place: Yes    Patient Centered Rounds: I have performed bedside rounds with nursing staff today  Discussions with Specialists or Other Care Team Provider: as    Education and Discussions with Family / Patient: yes    Time Spent for Care: 45 minutes  More than 50% of total time spent on counseling and coordination of care as described above      Current Length of Stay: 2 day(s)    Current Patient Status: Inpatient   Certification Statement: The patient will continue to require additional inpatient hospital stay due to ischemic colitis    Discharge Plan / Estimated Discharge Date: 24-48 hours    Code Status: Level 1 - Full Code      Subjective:   "on doing better Dr Cassidy Ag    Objective:     Vitals:   Temp (24hrs), Av 9 °F (37 2 °C), Min:98 1 °F (36 7 °C), Max:99 3 °F (37 4 °C)    HR:  [86-96] 86  Resp:  [18-20] 20  BP: (138-172)/(68-77) 142/68  SpO2:  [92 %-96 %] 92 %  Body mass index is 35 4 kg/m²  Input and Output Summary (last 24 hours): Intake/Output Summary (Last 24 hours) at 18 1320  Last data filed at 18 1100   Gross per 24 hour   Intake             4015 ml   Output             2000 ml   Net             2015 ml       Physical Exam:     Physical Exam    GENERAL APPEARANCE: WD/WN in NAD pleasant  SKIN: no rash  HEENT: NC/AT, PERRLA (B), moist MM, no epistaxis  NECK: Supple, no JVD    LUNGS: CTA (B) mildly prolonged expiratory phase,   no use of accessory muscles    HEART:          S1S 2, RRR  , PMI is not displaced  ABDOMEN: Soft, nontender, nondistended, +BS  Rectal exam:  EXTREMITIES: no edema   PERIPHERAL VASCULAR: palpable pulses   NEURO:  AAO x 3, CN 2-12: non focal  MUSCLE STRENGHT: 5/5 (B), SENSATION: nonfocal  DTR: ++, CEREBELLAR: non focal  Additional Data:     Labs:      Results from last 7 days  Lab Units 18  0914   WBC Thousand/uL 12 11*   HEMOGLOBIN g/dL 10 2*   HEMATOCRIT % 31 7*   PLATELETS Thousands/uL 255   NEUTROS PCT % 75   LYMPHS PCT % 16   MONOS PCT % 6   EOS PCT % 3       Results from last 7 days  Lab Units 18  0544   SODIUM mmol/L 139   POTASSIUM mmol/L 3 4*   CHLORIDE mmol/L 106   CO2 mmol/L 24   BUN mg/dL 11   CREATININE mg/dL 0 96   CALCIUM mg/dL 7 6*   TOTAL PROTEIN g/dL 6 4   BILIRUBIN TOTAL mg/dL 0 34   ALK PHOS U/L 58   ALT U/L 16   AST U/L 15   GLUCOSE RANDOM mg/dL 109       Results from last 7 days  Lab Units 18  1216   INR  1 02 * I Have Reviewed All Lab Data Listed Above  * Additional Pertinent Lab Tests Reviewed: [facility] Admission Reviewed    Imaging:    Imaging Reports Reviewed Today Include: yes  Imaging Personally Reviewed by Myself Includes:  yes    Recent Cultures (last 7 days):       Results from last 7 days  Lab Units 05/02/18 2128   C DIFF TOXIN B  NEGATIVE for C difficle toxin by PCR  Last 24 Hours Medication List:     Current Facility-Administered Medications:  acetaminophen 650 mg Oral Q6H PRN Christina Andrade MD    glucosamine sulfate 500 mg Oral Daily Christina Andrade MD    insulin lispro 1-5 Units Subcutaneous TID [facility] Christina Andrade MD    levothyroxine 50 mcg Oral Early Morning Christina Andrade MD    morphine injection 2 mg Intravenous Q6H PRN Christina Andrade MD    multivitamin-minerals 1 tablet Oral Daily Christina Andrade MD    ondansetron 4 mg Intravenous Q6H PRN Christina Andrade MD    pantoprazole 40 mg Oral Daily Before Breakfast Christina Andrade MD    pravastatin 40 mg Oral Daily With Joseph Valentin MD    sodium chloride 50 mL/hr Intravenous Continuous Christina Andrade MD Last Rate: 125 mL/hr (05/04/18 0816)        Today, Patient Was Seen By: Christina Andrade MD    ** Please Note: This note has been constructed using a voice recognition system   **

## 2018-05-05 LAB
ANION GAP SERPL CALCULATED.3IONS-SCNC: 8 MMOL/L (ref 4–13)
BASOPHILS # BLD AUTO: 0.04 THOUSANDS/ΜL (ref 0–0.1)
BASOPHILS NFR BLD AUTO: 0 % (ref 0–1)
BUN SERPL-MCNC: 8 MG/DL (ref 5–25)
CALCIUM SERPL-MCNC: 8.2 MG/DL (ref 8.3–10.1)
CHLORIDE SERPL-SCNC: 105 MMOL/L (ref 100–108)
CO2 SERPL-SCNC: 25 MMOL/L (ref 21–32)
CREAT SERPL-MCNC: 1.03 MG/DL (ref 0.6–1.3)
EOSINOPHIL # BLD AUTO: 0.67 THOUSAND/ΜL (ref 0–0.61)
EOSINOPHIL NFR BLD AUTO: 6 % (ref 0–6)
ERYTHROCYTE [DISTWIDTH] IN BLOOD BY AUTOMATED COUNT: 15.6 % (ref 11.6–15.1)
GFR SERPL CREATININE-BSD FRML MDRD: 53 ML/MIN/1.73SQ M
GLUCOSE SERPL-MCNC: 124 MG/DL (ref 65–140)
GLUCOSE SERPL-MCNC: 84 MG/DL (ref 65–140)
GLUCOSE SERPL-MCNC: 93 MG/DL (ref 65–140)
GLUCOSE SERPL-MCNC: 95 MG/DL (ref 65–140)
HCT VFR BLD AUTO: 32.4 % (ref 34.8–46.1)
HGB BLD-MCNC: 10.5 G/DL (ref 11.5–15.4)
LYMPHOCYTES # BLD AUTO: 2.7 THOUSANDS/ΜL (ref 0.6–4.47)
LYMPHOCYTES NFR BLD AUTO: 23 % (ref 14–44)
MCH RBC QN AUTO: 28.2 PG (ref 26.8–34.3)
MCHC RBC AUTO-ENTMCNC: 32.4 G/DL (ref 31.4–37.4)
MCV RBC AUTO: 87 FL (ref 82–98)
MONOCYTES # BLD AUTO: 0.86 THOUSAND/ΜL (ref 0.17–1.22)
MONOCYTES NFR BLD AUTO: 7 % (ref 4–12)
NEUTROPHILS # BLD AUTO: 7.4 THOUSANDS/ΜL (ref 1.85–7.62)
NEUTS SEG NFR BLD AUTO: 64 % (ref 43–75)
NRBC BLD AUTO-RTO: 0 /100 WBCS
PLATELET # BLD AUTO: 308 THOUSANDS/UL (ref 149–390)
PMV BLD AUTO: 10.1 FL (ref 8.9–12.7)
POTASSIUM SERPL-SCNC: 3.5 MMOL/L (ref 3.5–5.3)
RBC # BLD AUTO: 3.73 MILLION/UL (ref 3.81–5.12)
SODIUM SERPL-SCNC: 138 MMOL/L (ref 136–145)
WBC # BLD AUTO: 11.67 THOUSAND/UL (ref 4.31–10.16)

## 2018-05-05 PROCEDURE — 82948 REAGENT STRIP/BLOOD GLUCOSE: CPT

## 2018-05-05 PROCEDURE — 99233 SBSQ HOSP IP/OBS HIGH 50: CPT | Performed by: INTERNAL MEDICINE

## 2018-05-05 PROCEDURE — 80048 BASIC METABOLIC PNL TOTAL CA: CPT | Performed by: INTERNAL MEDICINE

## 2018-05-05 PROCEDURE — 85025 COMPLETE CBC W/AUTO DIFF WBC: CPT | Performed by: INTERNAL MEDICINE

## 2018-05-05 RX ADMIN — Medication 1 TABLET: at 08:29

## 2018-05-05 RX ADMIN — Medication 500 MG: at 08:29

## 2018-05-05 RX ADMIN — PANTOPRAZOLE SODIUM 40 MG: 40 TABLET, DELAYED RELEASE ORAL at 05:16

## 2018-05-05 RX ADMIN — LEVOTHYROXINE SODIUM 50 MCG: 50 TABLET ORAL at 05:16

## 2018-05-05 RX ADMIN — PRAVASTATIN SODIUM 40 MG: 40 TABLET ORAL at 17:16

## 2018-05-05 NOTE — PROGRESS NOTES
[facility] Progress Note  Patient: David Aquino 78 y o  female   MRN: [id number]  PCP: Jinny Walsh DO  [facility] [address] [facility] Encounter: [id number]  Date Of Visit: 05/05/18    Assessment:    Principal Problem:    Rectal bleeding  Active Problems:    Hyperlipidemia    Hypothyroidism    Type 2 diabetes mellitus ([facility] [address] )    Colitis    Abdominal pain    Dehydration    Acute kidney injury ([facility] )      Plan:  1  Colitis  Ischemic versus infectious colitis  Continues to make daily improvement  Likely ischemic Colitis, quickly improving without any antibiotic intervention  Now able to tolerate by mouth requesting for "more real food "  From the looks of it it appears to be more ischemic in nature  We will obtain repeat lactic acid  For pancreatic mass gastroenterology recommends outpatient follow-up   Plan to discontinueIV fluids monitor overnight if stable plan to discharge home in a m  #2 acute kidney injury  Resolved  IV hydration initiated  Hold hydrochlorothiazide hold lisinopril     #3DM Type II:    Begin no concentrated carbohydrate diet  Cover with Aspart SSI as needed   Will order HgbA1C to assess status of recent glycemic control  Well initiate home medications once med rec is completed and confirmed by pharmacy      #4 dehydration  Aggressive IV hydration with normal saline     #5SIRS due to colitis, likely ischemic evidenced by leukocytosis (wbc 18 01; 14 29) tachycardia (> 103> 99> 103) treated with GI work-up, NSS bolus and gtt  Disposition: Discharge home at 2 PM tomorrow  VTE Pharmacologic Prophylaxis:   Pharmacologic: Heparin  Mechanical VTE Prophylaxis in Place: Yes    Patient Centered Rounds: I have performed bedside rounds with nursing staff today  Discussions with Specialists or Other Care Team Provider: as    Education and Discussions with Family / Patient: yes    Time Spent for Care: 45 minutes    More than 50% of total time spent on counseling and coordination of care as described above  Current Length of Stay: 3 day(s)    Current Patient Status: Inpatient   Certification Statement: The patient will continue to require additional inpatient hospital stay due to ischemic colitis    Discharge Plan / Estimated Discharge Date: 24-48 hours    Code Status: Level 1 - Full Code      Subjective:   No New complaints of events  Tolerating diet very well  Still having abdominal pain cramps  Objective:     Vitals:   Temp (24hrs), Av 3 °F (37 4 °C), Min:98 9 °F (37 2 °C), Max:99 7 °F (37 6 °C)    HR:  [83-89] 83  Resp:  [18-22] 18  BP: (139-169)/(60-74) 156/74  SpO2:  [94 %-96 %] 95 %  Body mass index is 35 54 kg/m²  Input and Output Summary (last 24 hours): Intake/Output Summary (Last 24 hours) at 18 1627  Last data filed at 18 1431   Gross per 24 hour   Intake          2982 92 ml   Output             2250 ml   Net           732 92 ml       Physical Exam:     Physical Exam    GENERAL APPEARANCE: WD/WN in NAD pleasant  SKIN: no rash  HEENT: NC/AT, PERRLA (B), moist MM, no epistaxis  NECK: Supple, no JVD    LUNGS: CTA (B) mildly prolonged expiratory phase,   no use of accessory muscles    HEART:          S1S 2, RRR  , PMI is not displaced  ABDOMEN: Soft, nontender, nondistended, +BS  Rectal exam:  EXTREMITIES: no edema   PERIPHERAL VASCULAR: palpable pulses   NEURO:  AAO x 3, CN 2-12: non focal  MUSCLE STRENGHT: 5/5 (B), SENSATION: nonfocal  DTR: ++, CEREBELLAR: non focal  Additional Data:     Labs:      Results from last 7 days  Lab Units 18  0513   WBC Thousand/uL 11 67*   HEMOGLOBIN g/dL 10 5*   HEMATOCRIT % 32 4*   PLATELETS Thousands/uL 308   NEUTROS PCT % 64   LYMPHS PCT % 23   MONOS PCT % 7   EOS PCT % 6       Results from last 7 days  Lab Units 18  0513 18  0544   SODIUM mmol/L 138 139   POTASSIUM mmol/L 3 5 3 4*   CHLORIDE mmol/L 105 106   CO2 mmol/L 25 24   BUN mg/dL 8 11   CREATININE mg/dL 1 03 0 96   CALCIUM mg/dL 8 2* 7 6* TOTAL PROTEIN g/dL  --  6 4   BILIRUBIN TOTAL mg/dL  --  0 34   ALK PHOS U/L  --  58   ALT U/L  --  16   AST U/L  --  15   GLUCOSE RANDOM mg/dL 95 109       Results from last 7 days  Lab Units 05/02/18  1216   INR  1 02       * I Have Reviewed All Lab Data Listed Above  * Additional Pertinent Lab Tests Reviewed: [facility] Admission Reviewed    Imaging:    Imaging Reports Reviewed Today Include: yes  Imaging Personally Reviewed by Myself Includes:  yes    Recent Cultures (last 7 days):       Results from last 7 days  Lab Units 05/02/18  2128   C DIFF TOXIN B  NEGATIVE for C difficle toxin by PCR  Last 24 Hours Medication List:     Current Facility-Administered Medications:  acetaminophen 650 mg Oral Q6H PRN Mahsa Robertson MD   glucosamine sulfate 500 mg Oral Daily Mahsa Robertson MD   insulin lispro 1-5 Units Subcutaneous TID [facility] Mahsa Robertson MD   levothyroxine 50 mcg Oral Early Morning Mahsa Robertson MD   morphine injection 2 mg Intravenous Q6H PRN Mahsa Robertson MD   multivitamin-minerals 1 tablet Oral Daily Mahsa Robertson MD   ondansetron 4 mg Intravenous Q6H PRN Mahsa Robertson MD   pantoprazole 40 mg Oral Daily Before Breakfast Mahsa Robertson MD   pravastatin 40 mg Oral Daily With Tin Pollock MD        Today, Patient Was Seen By: Mahsa Robertson MD    ** Please Note: This note has been constructed using a voice recognition system   **

## 2018-05-05 NOTE — PLAN OF CARE
DISCHARGE PLANNING     Discharge to home or other facility with appropriate resources Progressing        DISCHARGE PLANNING - CARE MANAGEMENT     Discharge to post-acute care or home with appropriate resources Progressing        GASTROINTESTINAL - ADULT     Minimal or absence of nausea and/or vomiting Progressing     Maintains or returns to baseline bowel function Progressing     Maintains adequate nutritional intake Progressing        HEMATOLOGIC - ADULT     Maintains hematologic stability Progressing        INFECTION - ADULT     Absence or prevention of progression during hospitalization Progressing     Absence of fever/infection during neutropenic period Progressing        PAIN - ADULT     Verbalizes/displays adequate comfort level or baseline comfort level Progressing        Potential for Falls     Patient will remain free of falls Progressing        SAFETY ADULT     Patient will remain free of falls Progressing     Maintain or return to baseline ADL function Progressing     Maintain or return mobility status to optimal level Progressing

## 2018-05-06 VITALS
SYSTOLIC BLOOD PRESSURE: 127 MMHG | TEMPERATURE: 98.8 F | WEIGHT: 168.43 LBS | DIASTOLIC BLOOD PRESSURE: 69 MMHG | RESPIRATION RATE: 18 BRPM | OXYGEN SATURATION: 92 % | BODY MASS INDEX: 36.45 KG/M2 | HEART RATE: 81 BPM

## 2018-05-06 LAB
ANION GAP SERPL CALCULATED.3IONS-SCNC: 5 MMOL/L (ref 4–13)
BASOPHILS # BLD AUTO: 0.04 THOUSANDS/ΜL (ref 0–0.1)
BASOPHILS NFR BLD AUTO: 1 % (ref 0–1)
BUN SERPL-MCNC: 11 MG/DL (ref 5–25)
CALCIUM SERPL-MCNC: 8.7 MG/DL (ref 8.3–10.1)
CHLORIDE SERPL-SCNC: 103 MMOL/L (ref 100–108)
CO2 SERPL-SCNC: 30 MMOL/L (ref 21–32)
CREAT SERPL-MCNC: 1.12 MG/DL (ref 0.6–1.3)
EOSINOPHIL # BLD AUTO: 0.52 THOUSAND/ΜL (ref 0–0.61)
EOSINOPHIL NFR BLD AUTO: 7 % (ref 0–6)
ERYTHROCYTE [DISTWIDTH] IN BLOOD BY AUTOMATED COUNT: 15.3 % (ref 11.6–15.1)
GFR SERPL CREATININE-BSD FRML MDRD: 48 ML/MIN/1.73SQ M
GLUCOSE SERPL-MCNC: 108 MG/DL (ref 65–140)
GLUCOSE SERPL-MCNC: 114 MG/DL (ref 65–140)
HCT VFR BLD AUTO: 32.5 % (ref 34.8–46.1)
HGB BLD-MCNC: 10.4 G/DL (ref 11.5–15.4)
LYMPHOCYTES # BLD AUTO: 2.12 THOUSANDS/ΜL (ref 0.6–4.47)
LYMPHOCYTES NFR BLD AUTO: 26 % (ref 14–44)
MCH RBC QN AUTO: 27.7 PG (ref 26.8–34.3)
MCHC RBC AUTO-ENTMCNC: 32 G/DL (ref 31.4–37.4)
MCV RBC AUTO: 87 FL (ref 82–98)
MONOCYTES # BLD AUTO: 0.59 THOUSAND/ΜL (ref 0.17–1.22)
MONOCYTES NFR BLD AUTO: 7 % (ref 4–12)
NEUTROPHILS # BLD AUTO: 4.75 THOUSANDS/ΜL (ref 1.85–7.62)
NEUTS SEG NFR BLD AUTO: 59 % (ref 43–75)
NRBC BLD AUTO-RTO: 0 /100 WBCS
PLATELET # BLD AUTO: 314 THOUSANDS/UL (ref 149–390)
PMV BLD AUTO: 10 FL (ref 8.9–12.7)
POTASSIUM SERPL-SCNC: 4 MMOL/L (ref 3.5–5.3)
RBC # BLD AUTO: 3.75 MILLION/UL (ref 3.81–5.12)
SODIUM SERPL-SCNC: 138 MMOL/L (ref 136–145)
WBC # BLD AUTO: 8.02 THOUSAND/UL (ref 4.31–10.16)

## 2018-05-06 PROCEDURE — 99239 HOSP IP/OBS DSCHRG MGMT >30: CPT | Performed by: INTERNAL MEDICINE

## 2018-05-06 PROCEDURE — 82948 REAGENT STRIP/BLOOD GLUCOSE: CPT

## 2018-05-06 PROCEDURE — 85025 COMPLETE CBC W/AUTO DIFF WBC: CPT | Performed by: INTERNAL MEDICINE

## 2018-05-06 PROCEDURE — 80048 BASIC METABOLIC PNL TOTAL CA: CPT | Performed by: INTERNAL MEDICINE

## 2018-05-06 RX ADMIN — Medication 500 MG: at 09:15

## 2018-05-06 RX ADMIN — Medication 1 TABLET: at 09:15

## 2018-05-06 RX ADMIN — PANTOPRAZOLE SODIUM 40 MG: 40 TABLET, DELAYED RELEASE ORAL at 06:19

## 2018-05-06 RX ADMIN — LEVOTHYROXINE SODIUM 50 MCG: 50 TABLET ORAL at 06:19

## 2018-05-06 NOTE — NURSING NOTE
Discharge instructions reviewed, denied any questions  Pt and spouse walked to lobby   IV previously dc'd

## 2018-05-06 NOTE — DISCHARGE SUMMARY
Discharge Summary - Colin Gordon 78 y o  female MRN: [id number]    Unit/Bed#: [facility] [address] [facility] [address] 227[address]01 Encounter: [id number]    Admission Date:   Admission Orders     Ordered        05/02/18 1447  Inpatient Admission (expected length of stay for this patient is greater than two midnights)  Once               Admitting Diagnosis: Colitis [K52 9]  Pancreatic cyst [K86 2]  Rectal bleeding [K62 5]  Abdominal pain [R10 9]  Renal insufficiency [N28 9]      Procedures Performed: No orders of the defined types were placed in this encounter  Summary of Hospital Course: Colin Gordon is a 78 y o  female who presents with Chief complaint of nausea vomiting, abdominal pain ×2 days  Patient has previous history of pancreatic cyst hypertension up of edema currently on hydrocortisone and lisinopril  She reports generalized abdominal pain primarily on the left side started approximately 1 or 2 days ago  Pain is ongoing she reports multiple other salts of nausea vomiting, "worried due to blood in stool "no previous history of GI bleed as reported although patient does not history of GERD currently on omeprazole  She is remained without stable no lactic acid done so for however treatment for ischemic colitis has been initiated  Patient reports no recent exposure to ill contacts no recent travel does report decreased appetite and anorexia no fever no chills reported  No other symptoms reported, she is remained with a likely stable, CT abdomen reveals colitis hemoglobin 12 g/dL  Patient was subsequently admitted for treatment of ischemic colitis  Infectious causes have been ruled out at this time and patient is not complaining of diarrhea   C  Difficile colitis ruled out as well  A patient did report ongoing abdominal cramps that quickly resolved after initiating IV hydration and nothing by mouth status   Patient was primary treated with supportive measures which included ,nothing by mouth, IV hydration, required inpatient care due to ongoing rectal bleeding or "bloody stools "however hemoglobin remained stable at this time she is not reporting any chest pain shortness of breath fever chills she reports bloody stools have completely stopped and she was started on initially clear liquid diet advanced as tolerated and she did well with that  Overnight we had stopped the fluids to see how patient does, she still reports excellent appetite white blood cell count has completely normalized and currently reports no diarrhea no abdominal pain and had one soft stool earlier "looked like my normal stool "  Patient was also seen in divided by gastroenterology after discussing the case with PCP appropriate arrangement will be made for colonoscopy  Patient reports she had colonoscopy 2 months ago which was reportedly "normal as it can be "treatment plan discharge planning discussed with patient and patient's  curling by bedside and she was discharged in stable condition:  Discharge exam as following:    GENERAL APPEARANCE: WD/WN in NAD pleasant  SKIN: no rash  HEENT: NC/AT, PERRLA (B), moist MM, no epistaxis  NECK: Supple, no JVD    LUNGS: CTA (B) mildly prolonged expiratory phase,   no use of accessory muscles    HEART:          S1S 2, RRR  , PMI is not displaced  ABDOMEN: Soft, nontender, nondistended, +BS  Rectal exam:  EXTREMITIES: no edema   PERIPHERAL VASCULAR: palpable pulses   NEURO:  AAO x 3, CN 2-12: non focal  MUSCLE STRENGHT: 5/5 (B), SENSATION: nonfocal  DTR: ++, CEREBELLAR: non focal    Significant Findings, Care, Treatment and Services Provided: as above    Complications: none    Discharge Diagnosis:   Colitis  Colitis [K52 9]  Pancreatic cyst [K86 2]  Rectal bleeding [K62 5]  Abdominal pain [R10 9]  Renal insufficiency [N28 9]    Resolved Problems  Date Reviewed: 4/10/2018    None          Condition at Discharge: good         Discharge instructions/Information to patient and family:   See after visit summary for information provided to patient and family  Provisions for Follow-Up Care:  See after visit summary for information related to follow-up care and any pertinent home health orders  PCP: Marquis Armenta DO    Disposition: Home    Planned Readmission: No    Discharge Statement   I spent 45 minutes discharging the patient  This time was spent on the day of discharge  I had direct contact with the patient on the day of discharge  Additional documentation is required if more than 30 minutes were spent on discharge  Discharge Medications:  See after visit summary for reconciled discharge medications provided to patient and family

## 2018-05-07 ENCOUNTER — TELEPHONE (OUTPATIENT)
Dept: FAMILY MEDICINE CLINIC | Facility: CLINIC | Age: 78
End: 2018-05-07

## 2018-05-17 RX ORDER — ESTRADIOL 0.1 MG/G
CREAM VAGINAL
COMMUNITY
Start: 2018-05-01 | End: 2018-06-27 | Stop reason: HOSPADM

## 2018-06-11 ENCOUNTER — TRANSCRIBE ORDERS (OUTPATIENT)
Dept: ADMINISTRATIVE | Facility: HOSPITAL | Age: 78
End: 2018-06-11

## 2018-06-11 DIAGNOSIS — Z12.39 BREAST SCREENING: Primary | ICD-10-CM

## 2018-06-15 ENCOUNTER — OFFICE VISIT (OUTPATIENT)
Dept: FAMILY MEDICINE CLINIC | Facility: CLINIC | Age: 78
End: 2018-06-15
Payer: MEDICARE

## 2018-06-15 VITALS
HEART RATE: 96 BPM | SYSTOLIC BLOOD PRESSURE: 124 MMHG | TEMPERATURE: 97.8 F | DIASTOLIC BLOOD PRESSURE: 100 MMHG | RESPIRATION RATE: 16 BRPM | HEIGHT: 57 IN | BODY MASS INDEX: 34.14 KG/M2 | WEIGHT: 158.25 LBS

## 2018-06-15 DIAGNOSIS — R32 URINARY INCONTINENCE, UNSPECIFIED TYPE: ICD-10-CM

## 2018-06-15 DIAGNOSIS — Z12.39 SCREENING FOR BREAST CANCER: ICD-10-CM

## 2018-06-15 DIAGNOSIS — Z01.818 PRE-OP EXAMINATION: Primary | ICD-10-CM

## 2018-06-15 PROCEDURE — 99214 OFFICE O/P EST MOD 30 MIN: CPT | Performed by: FAMILY MEDICINE

## 2018-06-15 NOTE — PROGRESS NOTES
Assessment/Plan:         Diagnoses and all orders for this visit:    Pre-op examination    Screening for breast cancer  -     Mammo screening bilateral w cad; Future    Urinary incontinence, unspecified type          Subjective:      Patient ID: Gabriel Estrada is a 78 y o  female  Here for preop medical clearance and ekg        The following portions of the patient's history were reviewed and updated as appropriate: allergies, current medications, past family history, past medical history, past social history, past surgical history and problem list     Review of Systems   Constitutional: Negative  HENT: Negative  Eyes: Negative  Respiratory: Negative  Cardiovascular: Negative  Gastrointestinal: Negative  Endocrine: Negative  Genitourinary: Positive for difficulty urinating  Musculoskeletal: Negative  Skin: Negative  Allergic/Immunologic: Negative  Neurological: Negative  Hematological: Negative  Psychiatric/Behavioral: Negative  Objective:      /100 (BP Location: Left arm, Patient Position: Sitting, Cuff Size: Adult)   Pulse 96   Temp 97 8 °F (36 6 °C) (Temporal)   Resp 16   Ht 4' 9" (1 448 m)   Wt 71 8 kg (158 lb 4 oz)   BMI 34 24 kg/m²          Physical Exam   Constitutional: She is oriented to person, place, and time  She appears well-developed and well-nourished  HENT:   Head: Normocephalic  Eyes: EOM are normal  Pupils are equal, round, and reactive to light  Neck: No thyromegaly present  Cardiovascular: Normal rate and regular rhythm  No murmur heard  Pulmonary/Chest: Effort normal and breath sounds normal  No respiratory distress  She has no wheezes  She has no rales  Abdominal: She exhibits no distension  Musculoskeletal: She exhibits no edema  Lymphadenopathy:     She has no cervical adenopathy  Neurological: She is alert and oriented to person, place, and time  Skin: Skin is warm     Psychiatric: She has a normal mood and affect   Her behavior is normal

## 2018-06-17 LAB
ALBUMIN SERPL-MCNC: 4.1 G/DL (ref 3.6–5.1)
ALBUMIN/GLOB SERPL: 1.1 (CALC) (ref 1–2.5)
ALP SERPL-CCNC: 88 U/L (ref 33–130)
ALT SERPL-CCNC: 15 U/L (ref 6–29)
AST SERPL-CCNC: 18 U/L (ref 10–35)
BILIRUB SERPL-MCNC: 0.3 MG/DL (ref 0.2–1.2)
BUN SERPL-MCNC: 16 MG/DL (ref 7–25)
BUN/CREAT SERPL: 14 (CALC) (ref 6–22)
CALCIUM SERPL-MCNC: 9.7 MG/DL (ref 8.6–10.4)
CHLORIDE SERPL-SCNC: 101 MMOL/L (ref 98–110)
CHOLEST SERPL-MCNC: 151 MG/DL
CHOLEST/HDLC SERPL: 3.2 (CALC)
CO2 SERPL-SCNC: 27 MMOL/L (ref 20–31)
CREAT SERPL-MCNC: 1.18 MG/DL (ref 0.6–0.93)
GLOBULIN SER CALC-MCNC: 3.6 G/DL (CALC) (ref 1.9–3.7)
GLUCOSE SERPL-MCNC: 98 MG/DL (ref 65–99)
HBA1C MFR BLD: 6.2 % OF TOTAL HGB
HDLC SERPL-MCNC: 47 MG/DL
LDLC SERPL CALC-MCNC: 74 MG/DL (CALC)
NONHDLC SERPL-MCNC: 104 MG/DL (CALC)
POTASSIUM SERPL-SCNC: 4.7 MMOL/L (ref 3.5–5.3)
PROT SERPL-MCNC: 7.7 G/DL (ref 6.1–8.1)
SL AMB EGFR AFRICAN AMERICAN: 52 ML/MIN/1.73M2
SL AMB EGFR NON AFRICAN AMERICAN: 45 ML/MIN/1.73M2
SODIUM SERPL-SCNC: 138 MMOL/L (ref 135–146)
TRIGL SERPL-MCNC: 206 MG/DL
TSH SERPL-ACNC: 2.93 MIU/L (ref 0.4–4.5)

## 2018-06-18 ENCOUNTER — TELEPHONE (OUTPATIENT)
Dept: FAMILY MEDICINE CLINIC | Facility: CLINIC | Age: 78
End: 2018-06-18

## 2018-06-18 DIAGNOSIS — I10 ESSENTIAL HYPERTENSION: Primary | ICD-10-CM

## 2018-06-18 NOTE — TELEPHONE ENCOUNTER
----- Message from Ramesh Riggs MD sent at 6/18/2018 10:01 AM EDT -----  Lab stable-kidney function up a little-presume she was fasting for lab, if so rec repeat bmp non fasting with LOTS of water

## 2018-06-18 NOTE — TELEPHONE ENCOUNTER
Left detailed message per consent form notifying pt  Of results  Asked for pt to c/b to let us know if she was fasting or not for bw if so need to go for additional labs if she wasn't Dr Vinh Gregory needs to be made aware

## 2018-06-20 ENCOUNTER — ANESTHESIA EVENT (OUTPATIENT)
Dept: PERIOP | Facility: HOSPITAL | Age: 78
End: 2018-06-20
Payer: MEDICARE

## 2018-06-21 NOTE — PRE-PROCEDURE INSTRUCTIONS
Pre-Surgery Instructions:   Medication Instructions    aspirin 81 MG tablet Instructed patient per Anesthesia Guidelines   Calcium Carbonate-Vitamin D 600-200 MG-UNIT TABS Instructed patient per Anesthesia Guidelines   docusate sodium (COLACE) 100 mg capsule Instructed patient per Anesthesia Guidelines   estradiol (ESTRACE) 0 1 mg/g vaginal cream Instructed patient per Anesthesia Guidelines   Glucosamine-Chondroitin 250-200 MG TABS Instructed patient per Anesthesia Guidelines   levothyroxine 50 mcg tablet Instructed patient per Anesthesia Guidelines   Multiple Vitamins-Iron (DAILY MULTIPLE VITAMIN/IRON) TABS Instructed patient per Anesthesia Guidelines   Omega-3 Fatty Acids ([employer] FISH OIL) 1200 MG CAPS Instructed patient per Anesthesia Guidelines   Omeprazole 20 MG TBEC Instructed patient per Anesthesia Guidelines   simvastatin (ZOCOR) 40 mg tablet Instructed patient per Anesthesia Guidelines  Spoke to patient via telephone  Medications reviewed and patient was instructed to take omeprazole and levothyroxine am of surgery with a sip of water  Patient reports already holding aspirin, vitamins, and supplements for surgery  Patient reminded to avoid NSAID, Aspirin, Vitamins, and supplements 7 days prior to surgery  [facility] pre-op instructions reviewed  Pre-op bathing reviewed with patient

## 2018-06-22 ENCOUNTER — HOSPITAL ENCOUNTER (OUTPATIENT)
Dept: MAMMOGRAPHY | Facility: MEDICAL CENTER | Age: 78
Discharge: HOME | End: 2018-06-22
Payer: MEDICARE

## 2018-06-22 DIAGNOSIS — Z12.39 BREAST SCREENING: ICD-10-CM

## 2018-06-22 PROCEDURE — 77067 SCR MAMMO BI INCL CAD: CPT

## 2018-06-25 ENCOUNTER — TELEPHONE (OUTPATIENT)
Dept: FAMILY MEDICINE CLINIC | Facility: CLINIC | Age: 78
End: 2018-06-25

## 2018-06-25 DIAGNOSIS — R92.8 ABNORMAL MAMMOGRAM: Primary | ICD-10-CM

## 2018-06-26 ENCOUNTER — ANESTHESIA (OUTPATIENT)
Dept: PERIOP | Facility: HOSPITAL | Age: 78
End: 2018-06-26
Payer: MEDICARE

## 2018-06-26 ENCOUNTER — HOSPITAL ENCOUNTER (OUTPATIENT)
Facility: HOSPITAL | Age: 78
Setting detail: OUTPATIENT SURGERY
Discharge: HOME | End: 2018-06-27
Attending: OBSTETRICS & GYNECOLOGY | Admitting: OBSTETRICS & GYNECOLOGY
Payer: MEDICARE

## 2018-06-26 PROCEDURE — C1771 REP DEV, URINARY, W/SLING: HCPCS | Performed by: OBSTETRICS & GYNECOLOGY

## 2018-06-26 PROCEDURE — C1781 MESH (IMPLANTABLE): HCPCS | Performed by: OBSTETRICS & GYNECOLOGY

## 2018-06-26 DEVICE — SINGLE INCISION SLING SYSTEM
Type: IMPLANTABLE DEVICE | Site: VAGINA | Status: FUNCTIONAL
Brand: ALTIS

## 2018-06-26 DEVICE — ELEVATE ANTERIOR & APICAL
Type: IMPLANTABLE DEVICE | Status: FUNCTIONAL
Brand: ELEVATE

## 2018-06-26 RX ORDER — ONDANSETRON 2 MG/ML
INJECTION INTRAMUSCULAR; INTRAVENOUS AS NEEDED
Status: DISCONTINUED | OUTPATIENT
Start: 2018-06-26 | End: 2018-06-26 | Stop reason: SURG

## 2018-06-26 RX ORDER — LEVOTHYROXINE SODIUM 0.05 MG/1
50 TABLET ORAL
Status: DISCONTINUED | OUTPATIENT
Start: 2018-06-27 | End: 2018-06-27 | Stop reason: HOSPADM

## 2018-06-26 RX ORDER — SODIUM CHLORIDE 9 MG/ML
75 INJECTION, SOLUTION INTRAVENOUS CONTINUOUS
Status: DISCONTINUED | OUTPATIENT
Start: 2018-06-26 | End: 2018-06-27

## 2018-06-26 RX ORDER — PROPOFOL 10 MG/ML
INJECTION, EMULSION INTRAVENOUS CONTINUOUS PRN
Status: DISCONTINUED | OUTPATIENT
Start: 2018-06-26 | End: 2018-06-26 | Stop reason: SURG

## 2018-06-26 RX ORDER — SODIUM CHLORIDE 9 MG/ML
125 INJECTION, SOLUTION INTRAVENOUS CONTINUOUS
Status: DISCONTINUED | OUTPATIENT
Start: 2018-06-26 | End: 2018-06-26 | Stop reason: HOSPADM

## 2018-06-26 RX ORDER — TRAMADOL HYDROCHLORIDE 50 MG/1
50 TABLET ORAL EVERY 6 HOURS PRN
Status: DISCONTINUED | OUTPATIENT
Start: 2018-06-26 | End: 2018-06-27 | Stop reason: HOSPADM

## 2018-06-26 RX ORDER — MEPERIDINE HYDROCHLORIDE 50 MG/ML
12.5 INJECTION INTRAMUSCULAR; INTRAVENOUS; SUBCUTANEOUS AS NEEDED
Status: DISCONTINUED | OUTPATIENT
Start: 2018-06-26 | End: 2018-06-26 | Stop reason: HOSPADM

## 2018-06-26 RX ORDER — MAGNESIUM HYDROXIDE 1200 MG/15ML
LIQUID ORAL AS NEEDED
Status: DISCONTINUED | OUTPATIENT
Start: 2018-06-26 | End: 2018-06-26 | Stop reason: HOSPADM

## 2018-06-26 RX ORDER — FUROSEMIDE 10 MG/ML
INJECTION INTRAMUSCULAR; INTRAVENOUS AS NEEDED
Status: DISCONTINUED | OUTPATIENT
Start: 2018-06-26 | End: 2018-06-26 | Stop reason: SURG

## 2018-06-26 RX ORDER — ACETAMINOPHEN 325 MG/1
650 TABLET ORAL EVERY 6 HOURS
Status: DISCONTINUED | OUTPATIENT
Start: 2018-06-26 | End: 2018-06-27 | Stop reason: HOSPADM

## 2018-06-26 RX ORDER — ALBUTEROL SULFATE 2.5 MG/3ML
2.5 SOLUTION RESPIRATORY (INHALATION) ONCE AS NEEDED
Status: DISCONTINUED | OUTPATIENT
Start: 2018-06-26 | End: 2018-06-26 | Stop reason: HOSPADM

## 2018-06-26 RX ORDER — FENTANYL CITRATE/PF 50 MCG/ML
25 SYRINGE (ML) INJECTION
Status: DISCONTINUED | OUTPATIENT
Start: 2018-06-26 | End: 2018-06-26 | Stop reason: HOSPADM

## 2018-06-26 RX ORDER — MIDAZOLAM HYDROCHLORIDE 1 MG/ML
INJECTION INTRAMUSCULAR; INTRAVENOUS AS NEEDED
Status: DISCONTINUED | OUTPATIENT
Start: 2018-06-26 | End: 2018-06-26 | Stop reason: SURG

## 2018-06-26 RX ORDER — LIDOCAINE HYDROCHLORIDE AND EPINEPHRINE 20; 5 MG/ML; UG/ML
INJECTION, SOLUTION EPIDURAL; INFILTRATION; INTRACAUDAL; PERINEURAL AS NEEDED
Status: DISCONTINUED | OUTPATIENT
Start: 2018-06-26 | End: 2018-06-26 | Stop reason: SURG

## 2018-06-26 RX ORDER — ONDANSETRON 2 MG/ML
4 INJECTION INTRAMUSCULAR; INTRAVENOUS EVERY 6 HOURS PRN
Status: DISCONTINUED | OUTPATIENT
Start: 2018-06-26 | End: 2018-06-27 | Stop reason: HOSPADM

## 2018-06-26 RX ORDER — PRAVASTATIN SODIUM 80 MG/1
80 TABLET ORAL
Status: DISCONTINUED | OUTPATIENT
Start: 2018-06-27 | End: 2018-06-27 | Stop reason: HOSPADM

## 2018-06-26 RX ORDER — LISINOPRIL 10 MG/1
10 TABLET ORAL DAILY
Status: DISCONTINUED | OUTPATIENT
Start: 2018-06-26 | End: 2018-06-27

## 2018-06-26 RX ORDER — PANTOPRAZOLE SODIUM 20 MG/1
20 TABLET, DELAYED RELEASE ORAL DAILY
Status: DISCONTINUED | OUTPATIENT
Start: 2018-06-27 | End: 2018-06-27 | Stop reason: HOSPADM

## 2018-06-26 RX ADMIN — MIDAZOLAM 2 MG: 1 INJECTION INTRAMUSCULAR; INTRAVENOUS at 14:24

## 2018-06-26 RX ADMIN — LIDOCAINE HYDROCHLORIDE,EPINEPHRINE BITARTRATE 5 ML: 20; .005 INJECTION, SOLUTION EPIDURAL; INFILTRATION; INTRACAUDAL; PERINEURAL at 14:24

## 2018-06-26 RX ADMIN — SODIUM CHLORIDE 125 ML/HR: 0.9 INJECTION, SOLUTION INTRAVENOUS at 14:01

## 2018-06-26 RX ADMIN — LIDOCAINE HYDROCHLORIDE,EPINEPHRINE BITARTRATE 5 ML: 20; .005 INJECTION, SOLUTION EPIDURAL; INFILTRATION; INTRACAUDAL; PERINEURAL at 17:23

## 2018-06-26 RX ADMIN — ACETAMINOPHEN 650 MG: 325 TABLET, FILM COATED ORAL at 20:48

## 2018-06-26 RX ADMIN — FENTANYL CITRATE 25 MCG: 50 INJECTION, SOLUTION INTRAMUSCULAR; INTRAVENOUS at 18:33

## 2018-06-26 RX ADMIN — FUROSEMIDE 10 MG: 10 INJECTION, SOLUTION INTRAMUSCULAR; INTRAVENOUS at 17:29

## 2018-06-26 RX ADMIN — SODIUM CHLORIDE: 0.9 INJECTION, SOLUTION INTRAVENOUS at 15:56

## 2018-06-26 RX ADMIN — ONDANSETRON HYDROCHLORIDE 4 MG: 2 INJECTION, SOLUTION INTRAVENOUS at 17:53

## 2018-06-26 RX ADMIN — FENTANYL CITRATE 25 MCG: 50 INJECTION, SOLUTION INTRAMUSCULAR; INTRAVENOUS at 18:24

## 2018-06-26 RX ADMIN — SODIUM CHLORIDE 125 ML/HR: 0.9 INJECTION, SOLUTION INTRAVENOUS at 12:06

## 2018-06-26 RX ADMIN — LIDOCAINE HYDROCHLORIDE,EPINEPHRINE BITARTRATE 5 ML: 20; .005 INJECTION, SOLUTION EPIDURAL; INFILTRATION; INTRACAUDAL; PERINEURAL at 16:33

## 2018-06-26 RX ADMIN — LISINOPRIL 10 MG: 10 TABLET ORAL at 22:12

## 2018-06-26 RX ADMIN — LIDOCAINE HYDROCHLORIDE,EPINEPHRINE BITARTRATE 5 ML: 20; .005 INJECTION, SOLUTION EPIDURAL; INFILTRATION; INTRACAUDAL; PERINEURAL at 15:25

## 2018-06-26 RX ADMIN — Medication 2000 MG: at 15:14

## 2018-06-26 RX ADMIN — PROPOFOL 80 MCG/KG/MIN: 10 INJECTION, EMULSION INTRAVENOUS at 15:30

## 2018-06-26 RX ADMIN — TRAMADOL HYDROCHLORIDE 50 MG: 50 TABLET, FILM COATED ORAL at 20:42

## 2018-06-26 RX ADMIN — LIDOCAINE HYDROCHLORIDE,EPINEPHRINE BITARTRATE 5 ML: 20; .005 INJECTION, SOLUTION EPIDURAL; INFILTRATION; INTRACAUDAL; PERINEURAL at 14:25

## 2018-06-26 RX ADMIN — SODIUM CHLORIDE 75 ML/HR: 0.9 INJECTION, SOLUTION INTRAVENOUS at 18:39

## 2018-06-26 RX ADMIN — MIDAZOLAM 1 MG: 1 INJECTION INTRAMUSCULAR; INTRAVENOUS at 15:34

## 2018-06-26 RX ADMIN — MIDAZOLAM 3 MG: 1 INJECTION INTRAMUSCULAR; INTRAVENOUS at 15:28

## 2018-06-26 NOTE — OP NOTE
OPERATIVE REPORT  PATIENT NAME: Elisabet Paz    :  1940  MRN: [id number]  Pt Location: [facility] OR ROOM 04    SURGERY DATE: 2018    Surgeon(s) and Role: * Lei Larry MD - Primary     * Ming Morgan MD - Fellow, Roque Siemens, MD - Resident, present    Preop Diagnosis:  Incomplete uterovaginal prolapse [N81 2]  Cystocele, midline [N81 11]  Rectocele [N81 6]  Pelvic muscle wasting [N81 84]  Other female genital prolapse [N81 89]  Stress incontinence [N39 3]  Hypermobility of urethra [N36 41]    Post-Op Diagnosis Codes:     * Incomplete uterovaginal prolapse [N81 2]     * Cystocele, midline [N81 11]     * Rectocele [N81 6]     * Pelvic muscle wasting [N81 84]     * Other female genital prolapse [N81 89]     * Stress incontinence [N39 3]     * Hypermobility of urethra [N36 41]    Procedure(s) (LRB):  ANTERIOR COLPOPEXY VAGINAL EXTRAPERITONEAL (VEC) (N/A)  ANTERIOR & POSTERIOR COLPORRHAPHY (N/A)  INSERTION PUBOVAGINAL SLING (FEMALE) (N/A)  CYSTOSCOPY (N/A)    Specimen(s):  * No specimens in log *    Estimated Blood Loss:   Minimal    Drains:  Urethral Catheter Latex 18 Fr  (Active)   Number of days: 0       Anesthesia Type:   Epidural    Operative Indications:  Incomplete uterovaginal prolapse [N81 2]  Cystocele, midline [N81 11]  Rectocele [N81 6]  Pelvic muscle wasting [N81 84]  Other female genital prolapse [N81 89]  Stress incontinence [N39 3]  Hypermobility of urethra [N36 41]    Operative Findings:  Cystoscopy: efflux noted from bilateral ureteral orifices  No mesh, suture material, or injury noted to bladder lumen  Complications:   None    Procedure and Technique:  Appropriate preoperative antibiotics chosen per ACOG guidelines were given  Bilateral SCDs were placed in the lower extremities for DVT prevention prior to the institution of anesthesia  No bladder, ureteral, viscus, or solid organ injury were noted at the end of the procedure      The patient was identified in the holding area by the operating room staff and attending physician  She was taken to the operating room where anesthesia was instituted without complications  She was placed in the dorsal lithotomy position with the legs in [address] [place] [address] with care taken to avoid excessive flexion or extension of her lower extremities  The patient was prepped and draped in the usual sterile fashion  A Lamar catheter was inserted  Attention was turned to the anterior vaginal extraperitoneal colpopexy and the anterior colporrhaphy  Two Allis clamps were applied vertically along the midline to grasp the dependent portion of the cystocele for traction  0 25% Marcaine with epinephrine diluted 1:1 with injectable saline was infiltrated into the true vesicovaginal space using a Tuohey epidural needle for hydrodissection  20ml were injected into the midline, 20ml injected into the left and 20ml injected into the right paravaginal spaces for a total of 60ml  Next, a midline anterior vaginal wall incision was made through the full thickness of the the vaginal epithelium, the muscularis and the pubocervical fascia to the vesicovaginal space  This incision was extended to the level of the urethrovesical junction distally and the cervix proximally  Careful lateral dissection was carried out until the paravaginal and paravesical spaces were reached, allowing palpation of the obturator internus muscle, ischial spines, and arcus tendineus, and sacrospinous ligament  The bladder was drained, draining clear urine  The fascia endopelvina was entered by using a Tuohey epidural needle to puncture the obturator membrane at the level of the superior medial notch  Next, tacking sutures were placed on pubocervical fascia: A single 2-0 PDS at the level of the urethrovesical junction, and two 2-0 Prolene sutures at the level of the cervix  Next, the anterior Elevate system was brought onto the surgical field   The self-fixating tip of the fixating arm was inserted into the slip-fit distal end of the needle and, under direct palpation with the index finger, the needle and self-fixating tip were driven into the sacrospinous ligament 1 to 2 cm medial to the ischial spine  The same procedure was repeated on the contralateral side  The graft was then brought to the operative field after it had been cut to the appropriate size and shape of the patient's anterior dimensions  The graft was secured in place using the previously placed tacking sutures on the vaginal wall  The self-fixating tips of the graft were secured to the arcus tendineus at the level of the insertion on the pubic ramus  The loose eyelets of the graft were then slid over the top of each self-fixating arm bilaterally, then gently brought towards the sacrospinous ligament  The locking eyelets were pushed down the fixating arms to meet the loose eyelets and secure the mesh in a tension-free manner  Metzenbaum scissors were  used to remove the fixating arms by incising the mesh at the distal portion of its attachment to the plastic fixating arm, leaving about 1 cm of mesh attached to the locking eyelet  This was performed bilaterally  The fixating arms were closely examined as they were removed to confirm that no portion of the polypropylene rods had been left in the operative field  The mesh was irrigated copiously with antibiotic solution  Finally the full thickness of the anterior colporrhaphy was closed with 2-0 Vicryl in a running locked fashion and was hemostatic  Next, we turned our attention to the single incision sling  The mid urethral zone was identified by reference to the Lamar catheter and urethral meatus  Local anesthetic solution was injected into the anterior vaginal wall muscularis at the mid urethral level for hydrodissection and vasoconstriction, 10 mL was injected at the midline   Next, an additional 10 mL was injected to the left and right of midline directing the infiltration laterally towards the cephalad aspect of the inferior pubic ramus bilaterally  Care was taken to ensure that the anterolateral sulcus was flattened by the infiltration to minimize chance for buttonholing or sling (tape) becoming too close to the anterolateral sulcus  After completion of hydrodissection, 2 Allis clamps were used to grasp the anterior vaginal wall for traction  A 1 5 cm incision was made into the midline through mucosa and muscularis  Two Allis clamps were then placed on each cut edge of the incision for stabilization  Tenotomy scissors were used to create a small vaginal tunnel with sharp and blunt dissection above the anterior vaginal wall directed laterally towards the cephalad aspect of the inferior pubic ramus bilaterally  Dissection was carried out to the edge of the bone itself but without dissection into the obturator internus muscle  Once the dissection was complete, the Altis sling system was placed  An index finger was placed in the vagina for guidance  The Altis trocar and fixed anchor was placed into the pre-dissected tract  The handle was held horizontally in a slight upward angle to avoid buttonholing of the sulcus  Cephalad drift was used to allow passage around the inferior pubic ramus  A thumb was placed on the heel of the introducer to allow a lateral followed by a rotation push maneuver to place a fixed anchor into the obturator internus muscle membrane complex on the patient's left side  Proper handle deviation confirmed proper anchor placement, as well as inspection of the sling itself  Once the introducer was removed, proper anchor placement was confirmed by gentle tugging on the sling  The exact same sequence of steps was repeated on the patient's right side with the adjustable anchor and trocar  Once placement of dynamic anchor was completed, the introducer was removed and gentle tugging again confirmed proper anchor placement   The Lamar catheter was then removed and a diagnostic cystoscopy was performed by instilling 300 mL of fluid into the bladder  Both ureters were effluxing normally and there were no lacerations or evidence of injury to the lower urinary tract  The tensioning suture was used to adjust the tape until there was minimal to no leakage with Crede and coughing  After appropriate tensioning, the tensioning suture was cut  The vaginal incision was closed with 2-0 Vicryl suture in a running locked stitch  Attention was then turned to the posterior compartment where two Allis clamps were placed in the posterior fourchette over the mucocutaneous border to reduce the markedly relaxed vaginal outlet  Dilute 0 20% Marcaine solution with epinephrine was injected into the perineum  A gem-shaped incision was made extending from the vaginal mucosa onto the perineum  The overlying scarred vaginal epithelium and perineal skin was removed en bloc with the Bovie device with excellent hemostasis noted throughout  With a concomitant digital rectal examination to deviate the rectum away from the dissection planes, the rectovaginal space was entered sharply and the incision was extended to the level of the cuff  The vaginal full-thickness incision was extended cephalad with Bovie vaporization  The rectal muscularis layer was plicated with a 2-0 Vicryl in a side-side fashion  The posterior colporrhaphy was closed with a 2-0 Vicryl suture in a running locked stitch  We reapproximated the perineal body with a 0-Vicryl interrupted suture  The remainder of the colporrhaphy was closed to the level of the hymen  The perineal skin was closed with with 2-0 Vicryl in a subcuticular fashion  The Lamar catheter was reinserted  Vaginal packing was placed in the vagina  The sponge, needle and instrument count were correct x 2  The patient tolerated the procedure well  She was awakened from anesthesia and transferred to the recovery room in stable condition      A qualified resident physician was not available  Dr Emilee Nielsen was present for the entire procedure      Patient Disposition:  PACU     SIGNATURE: Camila Garcia MD  DATE: June 26, 2018  TIME: 5:52 PM

## 2018-06-26 NOTE — DISCHARGE INSTRUCTIONS
Anterior Vaginal Repair with mesh   WHAT YOU NEED TO KNOW:   An anterior vaginal repair is a procedure to lift or tighten the front vaginal wall  We used mesh to support the top of the vagina (apex) and to reduce the risk of recurrence of your prolapse  DISCHARGE INSTRUCTIONS:   Medicines:   · Pain medicine: You may need medicine to take away or decrease pain  ¨ Learn how to take your medicine  Ask what medicine and how much you should take  Be sure you know how, when, and how often to take it  ¨ Do not wait until the pain is severe before you take your medicine  Tell caregivers if your pain does not decrease  ¨ Pain medicine can make you dizzy or sleepy  Prevent falls by calling someone when you get out of bed or if you need help  Follow up with your healthcare provider as directed:  Write down your questions so you remember to ask them during your visits  Self-care:   · Sex:  Do not have sex until your healthcare provider says it is okay  · Kegel exercises: To do kegel exercises, squeeze your pelvic floor muscles for 5 to 10 seconds, then release  Regular kegel exercises will help your pelvic floor muscles become stronger  This will help prevent you from leaking urine  Ask your healthcare provider when to start these exercises and how often to do them  · Sanitary pad:  Change your sanitary pad regularly  Keep track of how often you change the pad  · Lamar catheter:  Keep the bag below your waist  This will help prevent infection and other problems caused by urine flowing back into your bladder  Do not pull on the catheter because this can cause pain and bleeding, and the catheter could come out  Keep the catheter tubing free of kinks so your urine will flow into the bag  Your healthcare provider will remove the catheter as soon as possible, to help prevent infection  · Wound care:  When you are allowed to bathe or shower, carefully wash your vaginal area with soap and water  · Do not put pressure on your abdomen: This will help prevent damage to your surgery area  Do not strain, lift heavy objects, or stand for a very long time  Do not perform strenuous exercises, such as running and weight lifting  · Activity:  You may need to start walking within a few days after your procedure  Ask your healthcare provider when to start and how long you should walk  Ask about any other exercises that may be right for you  · Support socks: You may need to wear support socks  These are tight socks that help increase the circulation in your legs until you are more active  This helps prevent blood clots  Contact your healthcare provider if:   · You soak a sanitary pad with blood every hour for 4 hours  · You have vaginal pain that does not go away even after you take pain medicine  · You have pus or a foul-smelling discharge from your genital area  · You see blood in your urine  · You have pain during sex  · You have a fever, chills, a cough, or feel weak and achy  · You have nausea and vomiting  · You have questions or concerns about your condition or care  Seek care immediately or call 911 if:   · You feel something is bulging out into your vagina or rectum and not going back in     · You cannot urinate  · Your arm or leg feels warm, tender, and painful  It may look swollen and red  · You suddenly feel lightheaded and short of breath  · You have chest pain  You may have more pain when you take a deep breath or cough  You may cough up blood  © 2017 [address] Information is for End User's use only and may not be sold, redistributed or otherwise used for commercial purposes  All illustrations and images included in CareNotes® are the copyrighted property of [employer]  or [employer]  The above information is an  only  It is not intended as medical advice for individual conditions or treatments   Talk to your doctor, nurse or pharmacist before following any medical regimen to see if it is safe and effective for you  Bladder Sling Procedure   WHAT YOU NEED TO KNOW:   A bladder sling procedure is surgery to treat urinary incontinence in women  The sling acts as a hammock to keep your urethra in place and hold it closed when your bladder is full  You may have vaginal bleeding or discharge for up to a week after your surgery  Use sanitary pads  Do not use tampons  You may have some pelvic discomfort or trouble urinating  DISCHARGE INSTRUCTIONS:   Call 911 for any of the following:   · You have sudden trouble breathing  Seek care immediately if:   · Your bleeding gets worse  · You have yellow or foul smelling discharge from your vagina  · You cannot urinate, or you are urinating less than what is normal for you  · You feel confused  Contact your healthcare provider if:   · You have a fever  · You do not feel like you are able to empty your bladder completely when you urinate  · You feel the need to urinate very suddenly  · You have burning or stinging when you urinate  · You have blood in your urine  · Your skin is itchy, swollen, or you have a rash  · You have questions or concerns about your condition or care  Medicines:   · Prescription pain medicine  may be given  Ask your how to take this medicine safely  · Take your medicine as directed  Contact your healthcare provider if you think your medicine is not helping or if you have side effects  Tell him or her if you are allergic to any medicine  Keep a list of the medicines, vitamins, and herbs you take  Include the amounts, and when and why you take them  Bring the list or the pill bottles to follow-up visits  Carry your medicine list with you in case of an emergency  Self-catheterization:  You may need to put a catheter into your bladder after you urinate to empty any remaining urine   A catheter is a small rubber tube used to drain urine  Healthcare providers will teach you how to put the catheter in safely  This may be needed until you are completely emptying your bladder  Lamar catheter: You may have a Lamar catheter for a short period of time  The Lamar is a tube put into your bladder to drain urine into a bag  Keep the bag below your waist  This will prevent urine from flowing back into your bladder and causing an infection or other problems  Also, keep the tube free of kinks so the urine will drain properly  Do not pull on the catheter  This can cause pain and bleeding, and may cause the catheter to come out  Activity:  Do not lift heavy objects for 6 weeks after your procedure  Do not have intercourse for 4 to 6 weeks  Do not use a tampon for 4 weeks  Ask your healthcare provider when you can return to work or your usual activities  Do pelvic muscle exercises: These are also called Kegel exercises  These exercises help strengthen your pelvic muscles and help prevent urine leakage  Tighten the muscles of your pelvis and hold them tight for 5 seconds, then relax for 5 seconds  Gradually work up to tightening them for 10 seconds and relaxing for 10 seconds  Do this 3 times each day  Keep a record:  Keep a record of when you urinate and if you leak any urine  Write down what you were doing when you leaked urine, such as coughing or sneezing  Bring the log to your follow-up visits  Prevent constipation:  Drink liquids as directed  You may need to drink more water than usual to soften your bowel movements  Eat a variety of healthy foods, especially fruit and foods high in fiber  You may need to use an over-the-counter bowel movement softener  Follow up with your healthcare provider as directed: You may need a test to check how much urine remains in your bladder after you urinate  This will help show how the sling is working  Write down your questions so you remember to ask them during your visits    © 2017 [facility] Information is for End User's use only and may not be sold, redistributed or otherwise used for commercial purposes  All illustrations and images included in CareNotes® are the copyrighted property of [employer]  or [employer]  The above information is an  only  It is not intended as medical advice for individual conditions or treatments  Talk to your doctor, nurse or pharmacist before following any medical regimen to see if it is safe and effective for you  Posterior Vaginal Repair   WHAT YOU NEED TO KNOW:   A posterior vaginal repair is surgery to fix a rectocele or vaginal hernia  DISCHARGE INSTRUCTIONS:   Medicines:   · Pain medicine  will help take away or decrease pain  Do not wait until the pain is severe before you take your medicine  · NSAIDs , such as ibuprofen, help decrease swelling, pain, and fever  This medicine is available with or without a doctor's order  NSAIDs can cause stomach bleeding or kidney problems in certain people  If you take blood thinner medicine, always ask your healthcare provider if NSAIDs are safe for you  Always read the medicine label and follow directions  · Bowel movement softeners  make it easier for you to have a bowel movement  You may need this medicine to treat or prevent constipation  · Take your medicine as directed  Contact your healthcare provider if you think your medicine is not helping or if you have side effects  Tell him or her if you are allergic to any medicine  Keep a list of the medicines, vitamins, and herbs you take  Include the amounts, and when and why you take them  Bring the list or the pill bottles to follow-up visits  Carry your medicine list with you in case of an emergency  Follow up with your gynecologist in 2 weeks: You will need to return to have your incision checked  Write down your questions so you remember to ask them during your visits     Self-care:   · Do not have sex  until your healthcare provider says it is okay  · Do not put anything in your vagina  for 6 weeks after the surgery  This allows time for the wound to heal      · Do not lift more than 10 pounds  for at least 6 weeks  Heavy lifting puts pressure on the surgery area and slows healing  · Avoid heavy exercise  the first few weeks after the surgery  You may try light activity, such as short walks, 3 to 4 weeks after the surgery  · Try not to cough or strain to have a bowel movement  This may cause damage to the surgery area  Ask your healthcare provider about ways to make bowel movements easier so you do not have to strain  · Eat healthy foods and drink liquids as directed  This will help prevent constipation  Healthy foods include fruits, vegetables, whole-grain breads, low-fat dairy products, beans, lean meats, and fish  Contact your healthcare provider or gynecologist if:   · You have vaginal pain that does not go away, even after you take pain medicine  · You have pus or a foul-smelling discharge from your vagina  · You have pain during sex  · You have a fever or chills  · Your wound is red, swollen, or draining pus  · You have questions or concerns about your condition or care  Seek care immediately or call 911 if:   · You soak a sanitary pad with blood every hour for 4 hours  · You feel something is bulging out into your vagina or rectum and not going back in     · You cannot urinate  © 2017 [address] Information is for End User's use only and may not be sold, redistributed or otherwise used for commercial purposes  All illustrations and images included in CareNotes® are the copyrighted property of [employer]  or [employer]  The above information is an  only  It is not intended as medical advice for individual conditions or treatments   Talk to your doctor, nurse or pharmacist before following any medical regimen to see if it is safe and effective for you

## 2018-06-26 NOTE — ANESTHESIA PREPROCEDURE EVALUATION
Review of Systems/Medical History  Patient summary reviewed  Chart reviewed  No history of anesthetic complications     Cardiovascular  Hyperlipidemia, Hypertension on > 1 medication,    Pulmonary  Negative pulmonary ROS        GI/Hepatic    GERD well controlled,             Endo/Other  Diabetes Diet controlled, History of thyroid disease , hypothyroidism,      GYN  Negative gynecology ROS          Hematology  Negative hematology ROS      Musculoskeletal    Arthritis     Neurology  Negative neurology ROS      Psychology   Anxiety,              Physical Exam    Airway    Mallampati score: III  TM Distance: >3 FB  Neck ROM: full     Dental   No notable dental hx     Cardiovascular  Rhythm: regular, Rate: normal, Cardiovascular exam normal    Pulmonary  Pulmonary exam normal Breath sounds clear to auscultation,     Other Findings        Anesthesia Plan  ASA Score- 2     Anesthesia Type- epidural with ASA Monitors  Additional Monitors:   Airway Plan:         Plan Factors-Patient not instructed to abstain from smoking on day of procedure  Patient did not smoke on day of surgery  Induction- intravenous  Postoperative Plan-     Informed Consent- Anesthetic plan and risks discussed with patient and spouse

## 2018-06-26 NOTE — ANESTHESIA PROCEDURE NOTES
Epidural Block    Patient location during procedure: holding area  Start time: 6/26/2018 2:24 PM  Reason for block: at surgeon's request and primary anesthetic  Staffing  Anesthesiologist: Star Post  Performed: anesthesiologist   Preanesthetic Checklist  Completed: patient identified, site marked, surgical consent, pre-op evaluation, timeout performed, IV checked, risks and benefits discussed and monitors and equipment checked  Epidural  Patient position: sitting  Prep: Betadine  Patient monitoring: heart rate, cardiac monitor, continuous pulse ox and frequent blood pressure checks  Approach: midline  Location: lumbar (1-5)  Injection technique: ASHA air  Needle  Needle type: Tuohy   Needle gauge: 18 G  Assessment  Sensory level: [id number] aspiration for CSF, negative aspiration for heme and no paresthesia on injection  patient tolerated the procedure well with no immediate complications

## 2018-06-27 VITALS
BODY MASS INDEX: 34.09 KG/M2 | OXYGEN SATURATION: 91 % | TEMPERATURE: 98.1 F | RESPIRATION RATE: 16 BRPM | HEART RATE: 85 BPM | WEIGHT: 158 LBS | HEIGHT: 57 IN | SYSTOLIC BLOOD PRESSURE: 126 MMHG | DIASTOLIC BLOOD PRESSURE: 70 MMHG

## 2018-06-27 RX ADMIN — LEVOTHYROXINE SODIUM 50 MCG: 50 TABLET ORAL at 06:35

## 2018-06-27 RX ADMIN — ACETAMINOPHEN 650 MG: 325 TABLET, FILM COATED ORAL at 08:59

## 2018-06-27 RX ADMIN — PANTOPRAZOLE SODIUM 20 MG: 20 TABLET, DELAYED RELEASE ORAL at 06:35

## 2018-06-27 RX ADMIN — ACETAMINOPHEN 650 MG: 325 TABLET, FILM COATED ORAL at 03:08

## 2018-06-27 NOTE — MEDICAL STUDENT
MEDICAL STUDENT  Inpatient Progress Note for TRAINING ONLY  Not Part of Legal Medical Record       Progress Note - OB/GYN   Yaw Churchill [address] y o  female MRN: [id number]  Unit/Bed#: E5 -01 Encounter: [id number]    Assessment:  [address] y o   with POP POD#1 s/p anterior VEC, single-incision midurethral sling, AP colporrhaphy, cystoscopy (, Dr Yanet Hughes)  Plan:  1  POP: s/p surgery  2  Postoperative:  1  Pain: well-controlled and appropriate for condition  continue tylenol 650mg q6h, tramadol 50mg q6h PRN for moderate pain  2  Wound: vaginal packing to stay in vagina for 3 days  Patient was counseled  3  Ambulation: up and oob as tolerated  Patient was counseled  4  FEN: D/C NS @75mL/hr as patient is tolerating PO hydration  Continue House diet  Plan to remove pacheco at 6am  Nursing aware  3  HTN: patient was not taking any home medication  Lisinopril 10mg PO started in hospital by hospitalist overnight  Patient counseled to f/u with PCP  4  Hypothyroidism: continue synthroid 50mcg daily   5  GERD: continue home pantoprazole 20mg  6  HLD and ASCVD risk reduction: continue home pravastatin 80mg daily  7  DM: not managed pharmacologically  8  DVT Ppx: SCDs on and in place  Dispo: d/c to home today pending progress    Subjective/Objective     Subjective:     Pain: no, feels like pressure  Tolerating PO: yes  Voiding: yes, pacheco catheter  Flatus: no  BM: no  Ambulating: no, but reports she will when pacheco removed  Chest pain: no  Shortness of breath: no  Leg pain: no    Ms Becky Michaud is doing well today  She reports no pain, but does have pressure in her pelvic region  She is tolerating PO diet and ate a sandwich last night  Denies N/V  Denies flatus/BM since the surgery  Has not been up and OOB yet but was counseled to do so when her pacheco is removed  Patient had one elevated blood pressure overnight, 178/79 at    Nurse contacted medicine team to give patient a dose of lisinopril 10mg, which is a home medication that she had been taking previously but her PCP discontinued prior to surgery in the past  Pressures have since dropped back down to 140-160s/ 60s-80s, which is where her pressures have been previously  Patient was asymptomatic during episode of elevated pressure, denying headache, blurry vision, chest pain, SOB  Objective:     Vitals:    Vitals:    06/26/18 2053 06/26/18 2210 06/26/18 2257 06/27/18 0042   BP: (!) 178/79 154/80 142/75 136/63   BP Location: Left arm Left arm Right arm Right arm   Pulse: 95 97 93 89   Resp: 18 16 16 16   Temp: 97 5 °F (36 4 °C) 98 4 °F (36 9 °C) 98 2 °F (36 8 °C) 98 3 °F (36 8 °C)   TempSrc: Temporal Tympanic Tympanic Tympanic   SpO2: 96% 92% 92% 93%   Weight:       Height:           Intake/Output Summary (Last 24 hours) at 06/27/18 0536  Last data filed at 06/26/18 2300   Gross per 24 hour   Intake             3000 ml   Output             1280 ml   Net             1720 ml   UOP: 1 29cc/kg/hr, adequate    Physical Exam:     Physical Exam   Constitutional: She is oriented to person, place, and time  She appears well-developed and well-nourished  No distress  Cardiovascular: Normal rate, regular rhythm and intact distal pulses  Exam reveals no gallop and no friction rub  No murmur heard  Pulmonary/Chest: Effort normal and breath sounds normal  No respiratory distress  She has no wheezes  She has no rales  Abdominal: Soft  Bowel sounds are normal  She exhibits no distension  There is no tenderness  There is no rebound and no guarding  Musculoskeletal: Normal range of motion  She exhibits no edema  Neurological: She is alert and oriented to person, place, and time  Skin: Skin is warm and dry  She is not diaphoretic  Nursing note and vitals reviewed                  Robin Martin  06/27/18

## 2018-06-27 NOTE — PROGRESS NOTES
Gynecology Progress Note   Pawel Jasmine 78 y o  female MRN: [id number]  Unit/Bed#: E5 -01 Encounter: [id number]    A/P: 78 y o  female with pelvic organ prolapse, POD#1 s/p anterior VEC, anterior and posterior colporrhaphy, single incision sling, and cysto  1  POP  - s/p surgery  2  Post-op care  - Motrin/Tylenol ATC  - Lamar out, voiding trial  - Vaginal packing x3d  3  HTN: hypertensive episode overnight  GYN team not notified, but started on lisinopril by hospitalist PENG with resolution of hypertension  - Continue lisinopril   - Advised to see PCP as outpatient   4  HLD  - Continue home statin  5  Hypothyroidism  - Continue home synthroid  6  FEN  - Regular  - IVF  7  VTE ppx  - SCDs  8  Dispo: anticipate discharge today pending void trial          Subjective:  Hypertensive episode overnight to max of 178/79  Patient reports she was not feeling any different at the time  Hospitalist PENG notified by nursing, who started lisinopril on patient with resolution of hypertension  Remainder of her blood pressures 130s-140s/70s, does not take medications at home but reports previously taking medication  Pain is well controlled  Pt is tolerating PO  Pt is not passing flatus  Pt is not ambulating  Denies fevers/chills, cp, sob, n/v/d, or leg pain  Objective:  Temp:  [97 5 °F (36 4 °C)-98 5 °F (36 9 °C)] 98 3 °F (36 8 °C)  HR:  [] 89  Resp:  [12-22] 16  BP: (125-187)/(59-87) 136/63     I/O last 3 completed shifts:   In: 3000 [I V :3000]  Out: 260 [Urine:230; Blood:30]  I/O this shift:  In: -   Out: 1020 [Urine:1020]    Lab Results   Component Value Date    WBC 8 02 05/06/2018    HGB 10 4 (L) 05/06/2018    HCT 32 5 (L) 05/06/2018    MCV 87 05/06/2018     05/06/2018       Lab Results   Component Value Date    GLUCOSE 108 05/06/2018    CALCIUM 9 7 06/16/2018     05/06/2018    K 4 0 05/06/2018    CO2 30 05/06/2018     05/06/2018    BUN 16 06/16/2018    CREATININE 1 18 (H) 06/16/2018 Lab Results   Component Value Date/Time    POCGLU 114 05/06/2018 07:43 AM    POCGLU 124 05/05/2018 03:55 PM    POCGLU 84 05/05/2018 11:31 AM    POCGLU 93 05/05/2018 07:26 AM    POCGLU 109 05/04/2018 09:24 PM       Physical Exam  Physical Exam   Constitutional: She is oriented to person, place, and time  She appears well-developed and well-nourished  No distress  HENT:   Head: Normocephalic and atraumatic  Eyes: No scleral icterus  Cardiovascular: Normal rate  Pulmonary/Chest: Effort normal  No accessory muscle usage  No respiratory distress  Abdominal: Soft  She exhibits no distension  There is no tenderness  There is no rebound and no guarding  Musculoskeletal: She exhibits no edema or tenderness  Neurological: She is alert and oriented to person, place, and time  Skin: Skin is warm and dry  No rash noted  No erythema  Psychiatric: She has a normal mood and affect   Her behavior is normal            Chanel Stevens MD   OB/GYN PGY-3  6/27/2018 6:17 AM

## 2018-06-27 NOTE — PLAN OF CARE
Problem: PAIN - ADULT  Goal: Verbalizes/displays adequate comfort level or baseline comfort level  Interventions:  - Encourage patient to monitor pain and request assistance  - Assess pain using appropriate pain scale  - Administer analgesics based on type and severity of pain and evaluate response  - Implement non-pharmacological measures as appropriate and evaluate response  - Consider cultural and social influences on pain and pain management  - Notify physician/advanced practitioner if interventions unsuccessful or patient reports new pain   Outcome: Completed Date Met: 06/27/18      Problem: INFECTION - ADULT  Goal: Absence or prevention of progression during hospitalization  INTERVENTIONS:  - Assess and monitor for signs and symptoms of infection  - Monitor lab/diagnostic results  - Monitor all insertion sites, i e  indwelling lines, tubes, and drains  - Monitor endotracheal (as able) and nasal secretions for changes in amount and color  - [place] appropriate cooling/warming therapies per order  - Administer medications as ordered  - Instruct and encourage patient and family to use good hand hygiene technique  - Identify and instruct in appropriate isolation precautions for identified infection/condition   Outcome: Completed Date Met: 06/27/18      Problem: SAFETY ADULT  Goal: Patient will remain free of falls  INTERVENTIONS:  - Assess patient frequently for physical needs  -  Identify cognitive and physical deficits and behaviors that affect risk of falls    -  [place] fall precautions as indicated by assessment   - Educate patient/family on patient safety including physical limitations  - Instruct patient to call for assistance with activity based on assessment  - Modify environment to reduce risk of injury  - Consider OT/PT consult to assist with strengthening/mobility   Outcome: Completed Date Met: 06/27/18    Goal: Maintain or return to baseline ADL function  INTERVENTIONS:  -  Assess patient's ability to carry out ADLs; assess patient's baseline for ADL function and identify physical deficits which impact ability to perform ADLs (bathing, care of mouth/teeth, toileting, grooming, dressing, etc )  - Assess/evaluate cause of self-care deficits   - Assess range of motion  - Assess patient's mobility; develop plan if impaired  - Assess patient's need for assistive devices and provide as appropriate  - Encourage maximum independence but intervene and supervise when necessary  ¯ Involve family in performance of ADLs  ¯ Assess for home care needs following discharge   ¯ Request OT consult to assist with ADL evaluation and planning for discharge  ¯ Provide patient education as appropriate   Outcome: Completed Date Met: 06/27/18    Goal: Maintain or return mobility status to optimal level  INTERVENTIONS:  - Assess patient's baseline mobility status (ambulation, transfers, stairs, etc )    - Identify cognitive and physical deficits and behaviors that affect mobility  - Identify mobility aids required to assist with transfers and/or ambulation (gait belt, sit-to-stand, lift, walker, cane, etc )  - [place] fall precautions as indicated by assessment  - Record patient progress and toleration of activity level on Mobility SBAR; progress patient to next Phase/Stage  - Instruct patient to call for assistance with activity based on assessment  - Request Rehabilitation consult to assist with strengthening/weightbearing, etc    Outcome: Completed Date Met: 06/27/18      Problem: DISCHARGE PLANNING  Goal: Discharge to home or other facility with appropriate resources  INTERVENTIONS:  - Identify barriers to discharge w/patient and caregiver  - Arrange for needed discharge resources and transportation as appropriate  - Identify discharge learning needs (meds, wound care, etc )  - Arrange for interpretive services to assist at discharge as needed  - Refer to Case Management Department for coordinating discharge planning if the patient needs post-hospital services based on physician/advanced practitioner order or complex needs related to functional status, cognitive ability, or social support system   Outcome: Completed Date Met: 06/27/18      Problem: Knowledge Deficit  Goal: Patient/family/caregiver demonstrates understanding of disease process, treatment plan, medications, and discharge instructions  Complete learning assessment and assess knowledge base    Interventions:  - Provide teaching at level of understanding  - Provide teaching via preferred learning methods   Outcome: Completed Date Met: 06/27/18      Problem: Prexisting or High Potential for Compromised Skin Integrity  Goal: Skin integrity is maintained or improved  INTERVENTIONS:  - Identify patients at risk for skin breakdown  - Assess and monitor skin integrity  - Assess and monitor nutrition and hydration status  - Monitor labs (i e  albumin)  - Assess for incontinence   - Turn and reposition patient  - Assist with mobility/ambulation  - Relieve pressure over bony prominences  - Avoid friction and shearing  - Provide appropriate hygiene as needed including keeping skin clean and dry  - Evaluate need for skin moisturizer/barrier cream  - Collaborate with interdisciplinary team (i e  Nutrition, Rehabilitation, etc )   - Patient/family teaching   Outcome: Completed Date Met: 06/27/18

## 2018-06-27 NOTE — PLAN OF CARE
Problem: PAIN - ADULT  Goal: Verbalizes/displays adequate comfort level or baseline comfort level  Interventions:  - Encourage patient to monitor pain and request assistance  - Assess pain using appropriate pain scale  - Administer analgesics based on type and severity of pain and evaluate response  - Implement non-pharmacological measures as appropriate and evaluate response  - Consider cultural and social influences on pain and pain management  - Notify physician/advanced practitioner if interventions unsuccessful or patient reports new pain  Outcome: Progressing      Problem: INFECTION - ADULT  Goal: Absence or prevention of progression during hospitalization  INTERVENTIONS:  - Assess and monitor for signs and symptoms of infection  - Monitor lab/diagnostic results  - Monitor all insertion sites, i e  indwelling lines, tubes, and drains  - Monitor endotracheal (as able) and nasal secretions for changes in amount and color  - [place] appropriate cooling/warming therapies per order  - Administer medications as ordered  - Instruct and encourage patient and family to use good hand hygiene technique  - Identify and instruct in appropriate isolation precautions for identified infection/condition  Outcome: Progressing    Goal: Absence of fever/infection during neutropenic period  INTERVENTIONS:  - Monitor WBC  - Implement neutropenic guidelines  Outcome: Progressing      Problem: SAFETY ADULT  Goal: Patient will remain free of falls  INTERVENTIONS:  - Assess patient frequently for physical needs  -  Identify cognitive and physical deficits and behaviors that affect risk of falls    -  [place] fall precautions as indicated by assessment   - Educate patient/family on patient safety including physical limitations  - Instruct patient to call for assistance with activity based on assessment  - Modify environment to reduce risk of injury  - Consider OT/PT consult to assist with strengthening/mobility  Outcome: Progressing    Goal: Maintain or return to baseline ADL function  INTERVENTIONS:  -  Assess patient's ability to carry out ADLs; assess patient's baseline for ADL function and identify physical deficits which impact ability to perform ADLs (bathing, care of mouth/teeth, toileting, grooming, dressing, etc )  - Assess/evaluate cause of self-care deficits   - Assess range of motion  - Assess patient's mobility; develop plan if impaired  - Assess patient's need for assistive devices and provide as appropriate  - Encourage maximum independence but intervene and supervise when necessary  ¯ Involve family in performance of ADLs  ¯ Assess for home care needs following discharge   ¯ Request OT consult to assist with ADL evaluation and planning for discharge  ¯ Provide patient education as appropriate  Outcome: Progressing    Goal: Maintain or return mobility status to optimal level  INTERVENTIONS:  - Assess patient's baseline mobility status (ambulation, transfers, stairs, etc )    - Identify cognitive and physical deficits and behaviors that affect mobility  - Identify mobility aids required to assist with transfers and/or ambulation (gait belt, sit-to-stand, lift, walker, cane, etc )  - [place] fall precautions as indicated by assessment  - Record patient progress and toleration of activity level on Mobility SBAR; progress patient to next Phase/Stage  - Instruct patient to call for assistance with activity based on assessment  - Request Rehabilitation consult to assist with strengthening/weightbearing, etc   Outcome: Progressing      Problem: DISCHARGE PLANNING  Goal: Discharge to home or other facility with appropriate resources  INTERVENTIONS:  - Identify barriers to discharge w/patient and caregiver  - Arrange for needed discharge resources and transportation as appropriate  - Identify discharge learning needs (meds, wound care, etc )  - Arrange for interpretive services to assist at discharge as needed  - Refer to Case Management Department for coordinating discharge planning if the patient needs post-hospital services based on physician/advanced practitioner order or complex needs related to functional status, cognitive ability, or social support system  Outcome: Progressing      Problem: Knowledge Deficit  Goal: Patient/family/caregiver demonstrates understanding of disease process, treatment plan, medications, and discharge instructions  Complete learning assessment and assess knowledge base    Interventions:  - Provide teaching at level of understanding  - Provide teaching via preferred learning methods  Outcome: Progressing

## 2018-06-28 ENCOUNTER — TELEPHONE (OUTPATIENT)
Dept: FAMILY MEDICINE CLINIC | Facility: CLINIC | Age: 78
End: 2018-06-28

## 2018-07-18 ENCOUNTER — HOSPITAL ENCOUNTER (OUTPATIENT)
Dept: ULTRASOUND IMAGING | Facility: CLINIC | Age: 78
Discharge: HOME | End: 2018-07-18
Payer: MEDICARE

## 2018-07-18 DIAGNOSIS — R92.8 ABNORMAL MAMMOGRAM: ICD-10-CM

## 2018-07-18 PROCEDURE — 76642 ULTRASOUND BREAST LIMITED: CPT

## 2018-08-09 LAB
BUN SERPL-MCNC: 17 MG/DL (ref 7–25)
BUN/CREAT SERPL: 16 (CALC) (ref 6–22)
CALCIUM SERPL-MCNC: 9.4 MG/DL (ref 8.6–10.4)
CHLORIDE SERPL-SCNC: 99 MMOL/L (ref 98–110)
CO2 SERPL-SCNC: 28 MMOL/L (ref 20–32)
CREAT SERPL-MCNC: 1.04 MG/DL (ref 0.6–0.93)
GLUCOSE SERPL-MCNC: 99 MG/DL (ref 65–99)
POTASSIUM SERPL-SCNC: 4.4 MMOL/L (ref 3.5–5.3)
SL AMB EGFR AFRICAN AMERICAN: 61 ML/MIN/1.73M2
SL AMB EGFR NON AFRICAN AMERICAN: 53 ML/MIN/1.73M2
SODIUM SERPL-SCNC: 135 MMOL/L (ref 135–146)

## 2018-08-10 ENCOUNTER — TELEPHONE (OUTPATIENT)
Dept: FAMILY MEDICINE CLINIC | Facility: CLINIC | Age: 78
End: 2018-08-10

## 2018-08-10 NOTE — TELEPHONE ENCOUNTER
----- Message from Garrett Patel MD sent at 8/10/2018  9:45 AM EDT -----  On fasting kidney function improved-rec repeat BMP  3  months

## 2018-09-14 ENCOUNTER — TELEPHONE (OUTPATIENT)
Dept: FAMILY MEDICINE CLINIC | Facility: CLINIC | Age: 78
End: 2018-09-14

## 2018-09-20 DIAGNOSIS — E78.5 HYPERLIPIDEMIA, UNSPECIFIED HYPERLIPIDEMIA TYPE: Primary | ICD-10-CM

## 2018-09-20 RX ORDER — SIMVASTATIN 40 MG
40 TABLET ORAL EVERY EVENING
Qty: 90 TABLET | Refills: 1 | Status: SHIPPED | OUTPATIENT
Start: 2018-09-20 | End: 2019-02-14

## 2018-10-05 DIAGNOSIS — E03.9 HYPOTHYROIDISM, UNSPECIFIED TYPE: Primary | ICD-10-CM

## 2018-10-05 RX ORDER — LEVOTHYROXINE SODIUM 0.05 MG/1
50 TABLET ORAL DAILY
Qty: 90 TABLET | Refills: 1 | Status: SHIPPED | OUTPATIENT
Start: 2018-10-05 | End: 2019-04-08 | Stop reason: SDUPTHER

## 2018-10-05 NOTE — TELEPHONE ENCOUNTER
Patient called in for refill on   levothyroxine 50 mcg tablet  90 days 90 qty  [facility]   last ov was 06/15/18

## 2018-10-17 ENCOUNTER — OFFICE VISIT (OUTPATIENT)
Dept: FAMILY MEDICINE CLINIC | Facility: CLINIC | Age: 78
End: 2018-10-17
Payer: MEDICARE

## 2018-10-17 VITALS
SYSTOLIC BLOOD PRESSURE: 130 MMHG | BODY MASS INDEX: 34.95 KG/M2 | HEIGHT: 57 IN | DIASTOLIC BLOOD PRESSURE: 82 MMHG | WEIGHT: 162 LBS

## 2018-10-17 DIAGNOSIS — K21.9 GASTROESOPHAGEAL REFLUX DISEASE WITHOUT ESOPHAGITIS: ICD-10-CM

## 2018-10-17 DIAGNOSIS — E11.8 TYPE 2 DIABETES MELLITUS WITH COMPLICATION, WITHOUT LONG-TERM CURRENT USE OF INSULIN (HCC): ICD-10-CM

## 2018-10-17 DIAGNOSIS — Z23 NEEDS FLU SHOT: Primary | ICD-10-CM

## 2018-10-17 DIAGNOSIS — E03.9 HYPOTHYROIDISM, UNSPECIFIED TYPE: ICD-10-CM

## 2018-10-17 PROBLEM — J32.9 SINUSITIS: Status: RESOLVED | Noted: 2018-04-10 | Resolved: 2018-10-17

## 2018-10-17 PROCEDURE — 90662 IIV NO PRSV INCREASED AG IM: CPT | Performed by: FAMILY MEDICINE

## 2018-10-17 PROCEDURE — G0008 ADMIN INFLUENZA VIRUS VAC: HCPCS | Performed by: FAMILY MEDICINE

## 2018-10-17 PROCEDURE — 99213 OFFICE O/P EST LOW 20 MIN: CPT | Performed by: FAMILY MEDICINE

## 2018-10-17 RX ORDER — NICOTINE POLACRILEX 4 MG/1
20 GUM, CHEWING ORAL DAILY
Qty: 90 TABLET | Refills: 1 | Status: SHIPPED | OUTPATIENT
Start: 2018-10-17 | End: 2019-04-17 | Stop reason: SDUPTHER

## 2018-10-17 NOTE — ASSESSMENT & PLAN NOTE
Lab Results   Component Value Date    HGBA1C 6 2 (H) 06/16/2018     bs stable  No results for input(s): POCGLU in the last 72 hours      Blood Sugar Average: Last 72 hrs:

## 2018-10-17 NOTE — PROGRESS NOTES
Assessment/Plan:    No problem-specific Assessment & Plan notes found for this encounter  Diagnoses and all orders for this visit:    Needs flu shot  -     influenza vaccine, 3925-2858, high-dose, PF 0 5 mL, for patients 65 yr+ (FLUZONE HIGH-DOSE)    Gastroesophageal reflux disease without esophagitis  -     Omeprazole 20 MG TBEC; Take 1 tablet (20 mg total) by mouth daily    Type 2 diabetes mellitus with complication, without long-term current use of insulin (HCC)    Other orders  -     Cancel: Microalbumin / creatinine urine ratio          Subjective:      Patient ID: Renee Martinez is a 70 y o  female  Diabetes   She presents for her follow-up diabetic visit  She has type 2 diabetes mellitus  No MedicAlert identification noted  Her disease course has been stable  There are no hypoglycemic associated symptoms  Pertinent negatives for hypoglycemia include no headaches  Pertinent negatives for diabetes include no blurred vision, no chest pain, no fatigue, no foot paresthesias and no weight loss  There are no hypoglycemic complications  Symptoms are stable  Risk factors for coronary artery disease include dyslipidemia, diabetes mellitus, obesity and post-menopausal  Current diabetic treatment includes oral agent (monotherapy)  She is compliant with treatment all of the time  Her weight is stable  She is following a generally healthy diet  Meal planning includes ADA exchanges  She has not had a previous visit with a dietitian  She participates in exercise three times a week  There is no change in her home blood glucose trend  She sees a podiatrist Eye exam is current  The following portions of the patient's history were reviewed and updated as appropriate: allergies, current medications, past family history, past medical history, past social history, past surgical history and problem list       Review of Systems   Constitutional: Negative for activity change, appetite change, fatigue and weight loss  Eyes: Negative for blurred vision and visual disturbance  Respiratory: Negative for shortness of breath  Cardiovascular: Negative for chest pain  Neurological: Negative for headaches  Objective:      /82   Ht 4' 9" (1 448 m)   Wt 73 5 kg (162 lb)   BMI 35 06 kg/m²          Physical Exam   Constitutional: She appears well-developed and well-nourished  Neck: No thyromegaly present  Cardiovascular: Normal rate, regular rhythm and normal heart sounds  Pulmonary/Chest: Breath sounds normal    Musculoskeletal: She exhibits no edema  Lymphadenopathy:     She has no cervical adenopathy  Vitals reviewed

## 2018-11-12 ENCOUNTER — TELEPHONE (OUTPATIENT)
Dept: FAMILY MEDICINE CLINIC | Facility: CLINIC | Age: 78
End: 2018-11-12

## 2018-11-12 DIAGNOSIS — N18.30 CKD (CHRONIC KIDNEY DISEASE) STAGE 3, GFR 30-59 ML/MIN (HCC): Primary | ICD-10-CM

## 2018-11-12 LAB
ALBUMIN SERPL-MCNC: 4 G/DL (ref 3.6–5.1)
ALBUMIN/CREAT UR: 25 MCG/MG CREAT
ALBUMIN/GLOB SERPL: 1.2 (CALC) (ref 1–2.5)
ALP SERPL-CCNC: 90 U/L (ref 33–130)
ALT SERPL-CCNC: 12 U/L (ref 6–29)
AST SERPL-CCNC: 15 U/L (ref 10–35)
BILIRUB SERPL-MCNC: 0.4 MG/DL (ref 0.2–1.2)
BUN SERPL-MCNC: 16 MG/DL (ref 7–25)
BUN/CREAT SERPL: 14 (CALC) (ref 6–22)
CALCIUM SERPL-MCNC: 9.3 MG/DL (ref 8.6–10.4)
CHLORIDE SERPL-SCNC: 100 MMOL/L (ref 98–110)
CHOLEST SERPL-MCNC: 157 MG/DL
CHOLEST/HDLC SERPL: 3.7 (CALC)
CO2 SERPL-SCNC: 25 MMOL/L (ref 20–32)
CREAT SERPL-MCNC: 1.11 MG/DL (ref 0.6–0.93)
CREAT UR-MCNC: 44 MG/DL (ref 20–275)
GLOBULIN SER CALC-MCNC: 3.4 G/DL (CALC) (ref 1.9–3.7)
GLUCOSE SERPL-MCNC: 95 MG/DL (ref 65–99)
HBA1C MFR BLD: 6.4 % OF TOTAL HGB
HDLC SERPL-MCNC: 42 MG/DL
LDLC SERPL CALC-MCNC: 82 MG/DL (CALC)
MICROALBUMIN UR-MCNC: 1.1 MG/DL
NONHDLC SERPL-MCNC: 115 MG/DL (CALC)
POTASSIUM SERPL-SCNC: 4.4 MMOL/L (ref 3.5–5.3)
PROT SERPL-MCNC: 7.4 G/DL (ref 6.1–8.1)
SL AMB EGFR AFRICAN AMERICAN: 56 ML/MIN/1.73M2
SL AMB EGFR NON AFRICAN AMERICAN: 48 ML/MIN/1.73M2
SODIUM SERPL-SCNC: 136 MMOL/L (ref 135–146)
TRIGL SERPL-MCNC: 238 MG/DL
TSH SERPL-ACNC: 3.49 MIU/L (ref 0.4–4.5)

## 2018-11-12 NOTE — TELEPHONE ENCOUNTER
----- Message from Iveth Fierro MD sent at 11/10/2018  7:03 PM EST -----  Sugar stable, triglycerides up a little, slight decline in kidney function-rec nephrology clair

## 2018-11-13 NOTE — TELEPHONE ENCOUNTER
Patient notify ,she did have questions regarding her simvastatin medication and asked if you can give her a  Call back since she has had a lot of joint pain please advise

## 2018-11-14 DIAGNOSIS — N18.30 CKD (CHRONIC KIDNEY DISEASE) STAGE 3, GFR 30-59 ML/MIN (HCC): Primary | ICD-10-CM

## 2018-11-14 NOTE — TELEPHONE ENCOUNTER
Last simvastatin was different generic and legs hurt-would like to stop me (as well as ) and repeat lab 1 week

## 2018-11-21 ENCOUNTER — TELEPHONE (OUTPATIENT)
Dept: FAMILY MEDICINE CLINIC | Facility: CLINIC | Age: 78
End: 2018-11-21

## 2018-11-26 ENCOUNTER — LAB (OUTPATIENT)
Dept: LAB | Facility: CLINIC | Age: 78
End: 2018-11-26
Payer: MEDICARE

## 2018-11-26 DIAGNOSIS — N18.30 CKD (CHRONIC KIDNEY DISEASE) STAGE 3, GFR 30-59 ML/MIN (HCC): ICD-10-CM

## 2018-11-26 LAB
ANION GAP SERPL CALCULATED.3IONS-SCNC: 5 MMOL/L (ref 4–13)
BUN SERPL-MCNC: 16 MG/DL (ref 5–25)
CALCIUM SERPL-MCNC: 9.8 MG/DL (ref 8.3–10.1)
CHLORIDE SERPL-SCNC: 101 MMOL/L (ref 100–108)
CO2 SERPL-SCNC: 27 MMOL/L (ref 21–32)
CREAT SERPL-MCNC: 1.13 MG/DL (ref 0.6–1.3)
GFR SERPL CREATININE-BSD FRML MDRD: 47 ML/MIN/1.73SQ M
GLUCOSE P FAST SERPL-MCNC: 99 MG/DL (ref 65–99)
POTASSIUM SERPL-SCNC: 3.9 MMOL/L (ref 3.5–5.3)
SODIUM SERPL-SCNC: 133 MMOL/L (ref 136–145)

## 2018-11-26 PROCEDURE — 80048 BASIC METABOLIC PNL TOTAL CA: CPT

## 2018-11-26 PROCEDURE — 36415 COLL VENOUS BLD VENIPUNCTURE: CPT

## 2018-11-27 ENCOUNTER — TELEPHONE (OUTPATIENT)
Dept: FAMILY MEDICINE CLINIC | Facility: CLINIC | Age: 78
End: 2018-11-27

## 2018-11-27 NOTE — TELEPHONE ENCOUNTER
Pt was notified of lab results, will be following up with nephrologist for abn kidney functions with her

## 2018-11-29 ENCOUNTER — OFFICE VISIT (OUTPATIENT)
Dept: NEPHROLOGY | Facility: CLINIC | Age: 78
End: 2018-11-29
Payer: MEDICARE

## 2018-11-29 VITALS
BODY MASS INDEX: 35.6 KG/M2 | WEIGHT: 165 LBS | DIASTOLIC BLOOD PRESSURE: 84 MMHG | HEART RATE: 86 BPM | SYSTOLIC BLOOD PRESSURE: 160 MMHG | HEIGHT: 57 IN

## 2018-11-29 DIAGNOSIS — D64.9 ANEMIA, UNSPECIFIED TYPE: ICD-10-CM

## 2018-11-29 DIAGNOSIS — I10 ESSENTIAL HYPERTENSION: ICD-10-CM

## 2018-11-29 DIAGNOSIS — R82.90 ABNORMAL URINE FINDING: ICD-10-CM

## 2018-11-29 DIAGNOSIS — N18.30 CKD (CHRONIC KIDNEY DISEASE) STAGE 3, GFR 30-59 ML/MIN (HCC): Primary | ICD-10-CM

## 2018-11-29 DIAGNOSIS — E87.1 HYPONATREMIA: ICD-10-CM

## 2018-11-29 LAB
SL AMB  POCT GLUCOSE, UA: NEGATIVE
SL AMB LEUKOCYTE ESTERASE,UA: ABNORMAL
SL AMB POCT BILIRUBIN,UA: NEGATIVE
SL AMB POCT BLOOD,UA: ABNORMAL
SL AMB POCT CLARITY,UA: CLEAR
SL AMB POCT COLOR,UA: YELLOW
SL AMB POCT KETONES,UA: NEGATIVE
SL AMB POCT NITRITE,UA: NEGATIVE
SL AMB POCT PH,UA: 6.5
SL AMB POCT SPECIFIC GRAVITY,UA: 1.01
SL AMB POCT URINE PROTEIN: NEGATIVE
SL AMB POCT UROBILINOGEN: 0.2

## 2018-11-29 PROCEDURE — 99204 OFFICE O/P NEW MOD 45 MIN: CPT | Performed by: INTERNAL MEDICINE

## 2018-11-29 PROCEDURE — 81002 URINALYSIS NONAUTO W/O SCOPE: CPT | Performed by: INTERNAL MEDICINE

## 2018-11-29 NOTE — PATIENT INSTRUCTIONS
You have chronic kidney disease stage 3  Blood pressure is elevated, recommend home monitoring of blood pressure  Continue 2 g sodium diet  Please let me know if you blood pressure is persistently more than 505 systolic or if it is more than 85 diastolic  Blood work as well as urine test now  Follow-up in 3 months with repeat blood work and urine test   Avoid nephrotoxins like ibuprofen, Aleve, Motrin  Avoid IV contrast     Chronic Kidney Disease   AMBULATORY CARE:   Chronic kidney disease (CKD)  is the gradual and permanent loss of kidney function  Normally, the kidneys remove fluid, chemicals, and waste from your blood  These wastes are turned into urine by your kidneys  CKD may worsen over time and lead to kidney failure  Common symptoms include the following:   · Changes in how often you need to urinate    · Swelling in your arms, legs, or feet    · Shortness of breath    · Fatigue or weakness    · Bad or bitter taste in your mouth    · Nausea, vomiting, or loss of appetite  Seek care immediately if:   · You are confused and very drowsy  · You have a seizure  · You have shortness of breath  Contact your healthcare provider if:   · You suddenly gain or lose more weight than your healthcare provider has told you is okay  · You have itchy skin or a rash  · You urinate more or less than you normally do  · You have blood in your urine  · You have nausea and repeated vomiting  · You have fatigue or muscle weakness  · You have hiccups that will not stop  · You have questions or concerns about your condition or care  Treatment for CKD:  Medicines may be given to decrease blood pressure and get rid of extra fluid  You may also receive medicine to manage health conditions that may occur with CKD  Dialysis is a treatment to remove chemicals and waste from your blood when your kidneys can no longer do this   Surgery may be needed to create an arteriovenous fistula (AVF) in your arm or insert a catheter into your abdomen so that you can receive dialysis  A kidney transplant may be done if your CKD becomes severe  Manage CKD:   · Maintain a healthy weight  Ask your healthcare provider how much you should weigh  Ask him to help you create a weight loss plan if you are overweight  · Exercise 30 to 60 minutes a day, 4 to 7 times a week, or as directed  Ask about the best exercise plan for you  Regular exercise can help you manage CKD, high blood pressure, and diabetes  · Follow your healthcare provider's advice about what to eat and drink  He may tell you to eat food low in sodium (salt), potassium, phosphorus, or protein  You may need to see a dietitian if you need help planning meals  Ask how much liquid to drink each day and which liquids are best for you  · Limit alcohol  Ask how much alcohol is safe for you to drink  A drink of alcohol is 12 ounces of beer, 5 ounces of wine, or 1½ ounces of liquor  · Do not smoke  Nicotine and other chemicals in cigarettes and cigars can cause lung and kidney damage  Ask your healthcare provider for information if you currently smoke and need help to quit  E-cigarettes or smokeless tobacco still contain nicotine  Talk to your healthcare provider before you use these products  · Ask your healthcare provider if you need vaccines  Infections such as pneumonia, influenza, and hepatitis can be more harmful or more likely to occur in a person who has CKD  Vaccines reduce your risk of infection with these viruses  Follow up with your healthcare provider as directed:  Write down your questions so you remember to ask them during your visits  © 2017 [employer] Information is for End User's use only and may not be sold, redistributed or otherwise used for commercial purposes  All illustrations and images included in CareNotes® are the copyrighted property of [employer]  or [employer]    The above information is an  only  It is not intended as medical advice for individual conditions or treatments  Talk to your doctor, nurse or pharmacist before following any medical regimen to see if it is safe and effective for you

## 2018-11-29 NOTE — LETTER
November 29, 2018     Valarie Cramer, [address] [facility] [address]    Patient: Feliz Frankel   YOB: 1940   Date of Visit: 11/29/2018       Dear Dr Rose Negron:    Thank you for referring Topher Marie to me for evaluation  Below are my notes for this consultation  If you have questions, please do not hesitate to call me  I look forward to following your patient along with you  Sincerely,        Ibis Pretty MD        CC: No Recipients  Ibis Pretty MD  11/29/2018  9:54 AM  Sign at close encounter  [address] 70 y o  female MRN: [id number]  Date: 11/29/2018  Reason for consultation:   Chief Complaint   Patient presents with    Consult    Abnormal Lab       ASSESSMENT AND PLAN:   Chronic kidney disease stage III  - baseline creatinine 1 0-1 1  Renal function is at baseline  Last creatinine was 1 13  - ua positive for leukocytes, no RBC but had blood in urine, patient complaining of muscle soreness due to change in supplier of statin, will check CPK level to rule out possibility of rhabdomyolysis  On urine microscopy she did not have any RBC but had numerous WBC as well as squamous epithelial cells  She denies any symptoms suggestive of UTI   -discussed about avoiding nephrotoxins like NSAIDs and IV contrast   -repeat renal panel in 1 month, follow-up in 3 months  Also check PTH level  Primary HTN  -blood pressure slightly elevated today  Patient does not want to be started on any new medicine at this time  She mentioned that blood pressure has been stable recently  I have stressed on home monitoring of blood pressure with a goal blood pressure 130/80  She was told about 2 g sodium diet and to call me if blood pressure is more than 140/85  If blood pressure is found to be elevated, would consider starting on low-dose of ACE inhibitor or ARB      Mild Hyponatremia  -likely due to free water intake in the setting of chronic kidney disease  Would continue to monitor for now  Would consider further workup if found to be persistently low  Anemia  - hemoglobin 10 4, will check CBC as well as iron panel before the next office visit  Patient prefers not to be on iron tablet due to possibility of constipation  Diabetes mellitus type 2, currently she is off all medication  She is on diet control  Return in about 3 months (around 2/28/2019) for Recheck  Diagnoses and all orders for this visit:    CKD (chronic kidney disease) stage 3, GFR 30-59 ml/min ([facility] [address] )  -     Ambulatory referral to Nephrology  -     POCT urine dip  -     Urine Eosinophils; Future  -     Urinalysis with microscopic; Future  -     Protein / creatinine ratio, urine; Future  -     CK; Future  -     Basic metabolic panel; Future  -     Basic metabolic panel; Future  -     Phosphorus; Future  -     Protein / creatinine ratio, urine; Future  -     PTH, intact; Future  -     Urinalysis with microscopic; Future  -     Microalbumin / creatinine urine ratio; Future  -     Magnesium; Future  -     CBC; Future  -     Iron Panel; Future    Essential hypertension    Hyponatremia  -     Basic metabolic panel; Future    Anemia, unspecified type  -     CBC; Future  -     Iron Panel; Future    Abnormal urine finding  -     Protein / creatinine ratio, urine; Future  -     Urinalysis with microscopic; Future  -     Microalbumin / creatinine urine ratio; Future        HISTORY OF PRESENT ILLNESS:  Ava Mcmanus is a 70y o  year old female with medical issues of Type 2 diabetes mellitus- on diet control, GERD who presents for initial consultation for  Elevated creatinine  Patient has creatinine of 1 0-1 1 since 2016  Creatinine increased to 1 32 in May 2018 the but improved on its own to creatinine 1 1  On 6th May 2018  Since June 2018 to November 2018 renal function has been stable at creatinine 1 0-1 1    Last blood work from 11/26 showed creatinine 1 13, slight drop in sodium to 133  Urine test from 11/09 did not show any proteinuria  She used to be on lisinopril- hctz in may but that was stopped in May due to ROBERTA with elevated cr to 1 3  BP was stable in October during PCP visit  Stopped simvastatin since nov 15th  Was on it for two years  Patient complaining of muscle soreness, attributed to statin from a different supplier then she was taking in the past   Take Aleeve 1-2 times a month for left knee pain  REVIEW OF SYSTEMS:    Review of Systems   Constitutional: Negative for activity change, appetite change, chills, diaphoresis, fatigue and fever  HENT: Negative for congestion, facial swelling and nosebleeds  Eyes: Negative for pain and visual disturbance  Respiratory: Negative for cough, chest tightness and shortness of breath  Cardiovascular: Negative for chest pain and palpitations  Gastrointestinal: Negative for abdominal distention, abdominal pain, diarrhea, nausea and vomiting  Genitourinary: Negative for difficulty urinating, dysuria, flank pain, frequency and hematuria  Musculoskeletal: Positive for arthralgias  Negative for back pain and joint swelling  Skin: Negative for rash  Neurological: Negative for dizziness, seizures, syncope, weakness and headaches  Psychiatric/Behavioral: Negative for agitation and confusion  The patient is not nervous/anxious  More than 10 point review of systems were obtained and discussed in length with the patient       PAST MEDICAL HISTORY:  Past Medical History:   Diagnosis Date    Diabetes mellitus ([address] )     Disease of thyroid gland     Diverticulosis     GERD (gastroesophageal reflux disease)     Hypertension     Ovarian cyst     Rectal bleeding     Wears reading eyeglasses        PAST SURGICAL HISTORY:  Past Surgical History:   Procedure Laterality Date    BREAST SURGERY      CATARACT EXTRACTION Bilateral     COLONOSCOPY      ESOPHAGOGASTRODUODENOSCOPY      OOPHORECTOMY Bilateral     NH CMBND ANTERPOST COLPORRAPHY W/CYSTO N/A 6/26/2018    Procedure: ANTERIOR & POSTERIOR COLPORRHAPHY;  Surgeon: Genny Kinsey MD;  [facility];  Service: UroGynecology           FL CYSTOURETHROSCOPY N/A 6/26/2018    Procedure: Deshawn Deblaze;  Surgeon: Genny Kinsey MD;  [facility];  Service: UroGynecology           FL Veronique Nissen LIG N/A 6/26/2018    Procedure: ANTERIOR COLPOPEXY VAGINAL EXTRAPERITONEAL (VEC); Surgeon: Genny Kinsey MD;  [facility];  Service: UroGynecology           FL SLING OPER STRES INCONTINENCE N/A 6/26/2018    Procedure: INSERTION PUBOVAGINAL SLING (FEMALE);   Surgeon: Genny Kinsey MD;  [facility];  Service: UroGynecology           TONSILLECTOMY         ALLERGIES:  No Known Allergies    SOCIAL HISTORY:  History   Alcohol Use No     History   Drug Use No     History   Smoking Status    Never Smoker   Smokeless Tobacco    Never Used       FAMILY HISTORY:  Family History   Problem Relation Age of Onset    Cancer Mother     Breast cancer Maternal Grandmother     Mental illness Son     Schizophrenia Son     Cancer Family        MEDICATIONS:    Current Outpatient Prescriptions:     aspirin 81 MG tablet, Take 81 mg by mouth daily  , Disp: , Rfl:     Calcium Carbonate-Vitamin D 600-200 MG-UNIT TABS, Take 1 tablet by mouth daily  , Disp: , Rfl:     docusate sodium (COLACE) 100 mg capsule, Take by mouth, Disp: , Rfl:     Glucosamine-Chondroitin 250-200 MG TABS, Take 1 tablet by mouth daily  , Disp: , Rfl:     levothyroxine 50 mcg tablet, Take 1 tablet (50 mcg total) by mouth daily for 90 days, Disp: 90 tablet, Rfl: 1    Multiple Vitamins-Iron (DAILY MULTIPLE VITAMIN/IRON) TABS, Take 1 tablet by mouth daily  , Disp: , Rfl:     Omega-3 Fatty Acids ([employer] FISH OIL) 1200 MG CAPS, Take 1 capsule by mouth daily  , Disp: , Rfl:     Omeprazole 20 MG TBEC, Take 1 tablet (20 mg total) by mouth daily, Disp: 90 tablet, Rfl: 1    simvastatin (ZOCOR) 40 mg tablet, Take 1 tablet (40 mg total) by mouth every evening (Patient not taking: Reported on 11/29/2018 ), Disp: 90 tablet, Rfl: 1      PHYSICAL EXAM:  Vitals:    11/29/18 0758 11/29/18 0833   BP: 160/84 160/84   BP Location: Left arm    Patient Position: Sitting    Cuff Size: Large    Pulse:  86   Weight: 74 8 kg (165 lb)    Height: 4' 9" (1 448 m)      Body mass index is 35 71 kg/m²  Physical Exam   Constitutional: She is oriented to person, place, and time  She appears well-developed  No distress  HENT:   Head: Normocephalic and atraumatic  Mouth/Throat: Oropharynx is clear and moist    Eyes: Pupils are equal, round, and reactive to light  Conjunctivae are normal  No scleral icterus  Neck: Neck supple  No thyromegaly present  Cardiovascular: Normal rate, regular rhythm and normal heart sounds  Exam reveals no friction rub  No murmur heard  Pulmonary/Chest: Effort normal and breath sounds normal  No respiratory distress  She has no wheezes  She has no rales  Abdominal: Soft  Bowel sounds are normal  She exhibits no distension  There is no tenderness  Musculoskeletal: She exhibits no edema or deformity  Lymphadenopathy:     She has no cervical adenopathy  Neurological: She is alert and oriented to person, place, and time  She is not disoriented  Skin: She is not diaphoretic  No cyanosis  No pallor  Nails show no clubbing  Psychiatric: She has a normal mood and affect  Her mood appears not anxious  Her affect is not inappropriate           Lab Results:   Results for orders placed or performed in visit on 11/29/18   POCT urine dip   Result Value Ref Range    LEUKOCYTE ESTERASE,UA Moderate     NITRITE,UA Negative     SL AMB POCT UROBILINOGEN 0 2     POCT URINE PROTEIN Negative      PH,UA 6 5     BLOOD,UA Trace     SPECIFIC GRAVITY,UA 1 010     KETONES,UA Negative     BILIRUBIN,UA Negative     GLUCOSE, UA Negative      COLOR,UA Yellow     CLARITY,UA Clear      Lab Results: Results from last 7 days  Lab Units 11/26/18  0841   POTASSIUM mmol/L 3 9   CHLORIDE mmol/L 101   CO2 mmol/L 27   BUN mg/dL 16   CREATININE mg/dL 1 13   CALCIUM mg/dL 9 8       Laboratory Results:  Lab Results   Component Value Date    WBC 8 02 05/06/2018    HGB 10 4 (L) 05/06/2018    HCT 32 5 (L) 05/06/2018    MCV 87 05/06/2018     05/06/2018     Lab Results   Component Value Date    GLUCOSE 100 05/21/2015    CALCIUM 9 8 11/26/2018     10/12/2017    K 3 9 11/26/2018    CO2 27 11/26/2018     11/26/2018    BUN 16 11/26/2018    CREATININE 1 13 11/26/2018     Lab Results   Component Value Date    CALCIUM 9 8 11/26/2018     No results found for: LABPROT  [unfilled]  Lab Results   Component Value Date    CALCIUM 9 8 11/26/2018     [unfilled]      Portions of the record may have been created with voice recognition software  Occasional wrong word or "sound a like" substitutions may have occurred due to the inherent limitations of voice recognition software  Read the chart carefully and recognize, using context, where substitutions have occurred

## 2018-11-29 NOTE — PROGRESS NOTES
NEPHROLOGY OUTPATIENT CONSULTATION   Josie Renteria 70 y o  female MRN: [id number]  Date: 11/29/2018  Reason for consultation:   Chief Complaint   Patient presents with    Consult    Abnormal Lab       ASSESSMENT AND PLAN:   Chronic kidney disease stage III  - baseline creatinine 1 0-1 1  Renal function is at baseline  Last creatinine was 1 13  - ua positive for leukocytes, no RBC but had blood in urine, patient complaining of muscle soreness due to change in supplier of statin, will check CPK level to rule out possibility of rhabdomyolysis  On urine microscopy she did not have any RBC but had numerous WBC as well as squamous epithelial cells  She denies any symptoms suggestive of UTI   -discussed about avoiding nephrotoxins like NSAIDs and IV contrast   -repeat renal panel in 1 month, follow-up in 3 months  Also check PTH level  Primary HTN  -blood pressure slightly elevated today  Patient does not want to be started on any new medicine at this time  She mentioned that blood pressure has been stable recently  I have stressed on home monitoring of blood pressure with a goal blood pressure 130/80  She was told about 2 g sodium diet and to call me if blood pressure is more than 140/85  If blood pressure is found to be elevated, would consider starting on low-dose of ACE inhibitor or ARB  Mild Hyponatremia  -likely due to free water intake in the setting of chronic kidney disease  Would continue to monitor for now  Would consider further workup if found to be persistently low  Anemia  - hemoglobin 10 4, will check CBC as well as iron panel before the next office visit  Patient prefers not to be on iron tablet due to possibility of constipation  Diabetes mellitus type 2, currently she is off all medication  She is on diet control  Return in about 3 months (around 2/28/2019) for Recheck      Diagnoses and all orders for this visit:    CKD (chronic kidney disease) stage 3, GFR 30-59 ml/min [facility])  -     Ambulatory referral to Nephrology  -     POCT urine dip  -     Urine Eosinophils; Future  -     Urinalysis with microscopic; Future  -     Protein / creatinine ratio, urine; Future  -     CK; Future  -     Basic metabolic panel; Future  -     Basic metabolic panel; Future  -     Phosphorus; Future  -     Protein / creatinine ratio, urine; Future  -     PTH, intact; Future  -     Urinalysis with microscopic; Future  -     Microalbumin / creatinine urine ratio; Future  -     Magnesium; Future  -     CBC; Future  -     Iron Panel; Future    Essential hypertension    Hyponatremia  -     Basic metabolic panel; Future    Anemia, unspecified type  -     CBC; Future  -     Iron Panel; Future    Abnormal urine finding  -     Protein / creatinine ratio, urine; Future  -     Urinalysis with microscopic; Future  -     Microalbumin / creatinine urine ratio; Future        HISTORY OF PRESENT ILLNESS:  Kim Li is a 70y o  year old female with medical issues of Type 2 diabetes mellitus- on diet control, GERD who presents for initial consultation for  Elevated creatinine  Patient has creatinine of 1 0-1 1 since 2016  Creatinine increased to 1 32 in May 2018 the but improved on its own to creatinine 1 1  On 6th May 2018  Since June 2018 to November 2018 renal function has been stable at creatinine 1 0-1 1  Last blood work from 11/26 showed creatinine 1 13, slight drop in sodium to 133  Urine test from 11/09 did not show any proteinuria  She used to be on lisinopril- hctz in may but that was stopped in May due to ROBERTA with elevated cr to 1 3  BP was stable in October during PCP visit  Stopped simvastatin since nov 15th  Was on it for two years  Patient complaining of muscle soreness, attributed to statin from a different supplier then she was taking in the past   Take Aleeve 1-2 times a month for left knee pain       REVIEW OF SYSTEMS:    Review of Systems   Constitutional: Negative for activity change, appetite change, chills, diaphoresis, fatigue and fever  HENT: Negative for congestion, facial swelling and nosebleeds  Eyes: Negative for pain and visual disturbance  Respiratory: Negative for cough, chest tightness and shortness of breath  Cardiovascular: Negative for chest pain and palpitations  Gastrointestinal: Negative for abdominal distention, abdominal pain, diarrhea, nausea and vomiting  Genitourinary: Negative for difficulty urinating, dysuria, flank pain, frequency and hematuria  Musculoskeletal: Positive for arthralgias  Negative for back pain and joint swelling  Skin: Negative for rash  Neurological: Negative for dizziness, seizures, syncope, weakness and headaches  Psychiatric/Behavioral: Negative for agitation and confusion  The patient is not nervous/anxious  More than 10 point review of systems were obtained and discussed in length with the patient  PAST MEDICAL HISTORY:  Past Medical History:   Diagnosis Date    Diabetes mellitus ([facility] [address] )     Disease of thyroid gland     Diverticulosis     GERD (gastroesophageal reflux disease)     Hypertension     Ovarian cyst     Rectal bleeding     Wears reading eyeglasses        PAST SURGICAL HISTORY:  Past Surgical History:   Procedure Laterality Date    BREAST SURGERY      CATARACT EXTRACTION Bilateral     COLONOSCOPY      ESOPHAGOGASTRODUODENOSCOPY      OOPHORECTOMY Bilateral     VT CMBND ANTERPOST COLPORRAPHY W/CYSTO N/A 6/26/2018    Procedure: ANTERIOR & POSTERIOR COLPORRHAPHY;  Surgeon: Manuel Cassidy MD;  [facility];  Service: UroGynecology           VT CYSTOURETHROSCOPY N/A 6/26/2018    Procedure: Margot Jennings;  Surgeon: Manuel Cassidy MD;  [facility];  Service: UroGynecology           VT Warren LEA N/A 6/26/2018    Procedure: ANTERIOR COLPOPEXY VAGINAL EXTRAPERITONEAL (VEC);   Surgeon: Manuel Cassidy MD;  [facility];  Service: UroGynecology           [facility] OPER STRES INCONTINENCE N/A 6/26/2018    Procedure: INSERTION PUBOVAGINAL SLING (FEMALE); Surgeon: Colton Acevedo MD;  [facility];  Service: UroGynecology           TONSILLECTOMY         ALLERGIES:  No Known Allergies    SOCIAL HISTORY:  History   Alcohol Use No     History   Drug Use No     History   Smoking Status    Never Smoker   Smokeless Tobacco    Never Used       FAMILY HISTORY:  Family History   Problem Relation Age of Onset    Cancer Mother     Breast cancer Maternal Grandmother     Mental illness Son     Schizophrenia Son     Cancer Family        MEDICATIONS:    Current Outpatient Prescriptions:     aspirin 81 MG tablet, Take 81 mg by mouth daily  , Disp: , Rfl:     Calcium Carbonate-Vitamin D 600-200 MG-UNIT TABS, Take 1 tablet by mouth daily  , Disp: , Rfl:     docusate sodium (COLACE) 100 mg capsule, Take by mouth, Disp: , Rfl:     Glucosamine-Chondroitin 250-200 MG TABS, Take 1 tablet by mouth daily  , Disp: , Rfl:     levothyroxine 50 mcg tablet, Take 1 tablet (50 mcg total) by mouth daily for 90 days, Disp: 90 tablet, Rfl: 1    Multiple Vitamins-Iron (DAILY MULTIPLE VITAMIN/IRON) TABS, Take 1 tablet by mouth daily  , Disp: , Rfl:     Omega-3 Fatty Acids ([employer] FISH OIL) 1200 MG CAPS, Take 1 capsule by mouth daily  , Disp: , Rfl:     Omeprazole 20 MG TBEC, Take 1 tablet (20 mg total) by mouth daily, Disp: 90 tablet, Rfl: 1    simvastatin (ZOCOR) 40 mg tablet, Take 1 tablet (40 mg total) by mouth every evening (Patient not taking: Reported on 11/29/2018 ), Disp: 90 tablet, Rfl: 1      PHYSICAL EXAM:  Vitals:    11/29/18 0758 11/29/18 0833   BP: 160/84 160/84   BP Location: Left arm    Patient Position: Sitting    Cuff Size: Large    Pulse:  86   Weight: 74 8 kg (165 lb)    Height: 4' 9" (1 448 m)      Body mass index is 35 71 kg/m²  Physical Exam   Constitutional: She is oriented to person, place, and time  She appears well-developed  No distress     HENT:   Head: Normocephalic and atraumatic  Mouth/Throat: Oropharynx is clear and moist    Eyes: Pupils are equal, round, and reactive to light  Conjunctivae are normal  No scleral icterus  Neck: Neck supple  No thyromegaly present  Cardiovascular: Normal rate, regular rhythm and normal heart sounds  Exam reveals no friction rub  No murmur heard  Pulmonary/Chest: Effort normal and breath sounds normal  No respiratory distress  She has no wheezes  She has no rales  Abdominal: Soft  Bowel sounds are normal  She exhibits no distension  There is no tenderness  Musculoskeletal: She exhibits no edema or deformity  Lymphadenopathy:     She has no cervical adenopathy  Neurological: She is alert and oriented to person, place, and time  She is not disoriented  Skin: She is not diaphoretic  No cyanosis  No pallor  Nails show no clubbing  Psychiatric: She has a normal mood and affect  Her mood appears not anxious  Her affect is not inappropriate           Lab Results:   Results for orders placed or performed in visit on 11/29/18   POCT urine dip   Result Value Ref Range    LEUKOCYTE ESTERASE,UA Moderate     NITRITE,UA Negative     SL AMB POCT UROBILINOGEN 0 2     POCT URINE PROTEIN Negative      PH,UA 6 5     BLOOD,UA Trace     SPECIFIC GRAVITY,UA 1 010     KETONES,UA Negative     BILIRUBIN,UA Negative     GLUCOSE, UA Negative      COLOR,UA Yellow     CLARITY,UA Clear      Lab Results:     Results from last 7 days  Lab Units 11/26/18  0841   POTASSIUM mmol/L 3 9   CHLORIDE mmol/L 101   CO2 mmol/L 27   BUN mg/dL 16   CREATININE mg/dL 1 13   CALCIUM mg/dL 9 8       Laboratory Results:  Lab Results   Component Value Date    WBC 8 02 05/06/2018    HGB 10 4 (L) 05/06/2018    HCT 32 5 (L) 05/06/2018    MCV 87 05/06/2018     05/06/2018     Lab Results   Component Value Date    GLUCOSE 100 05/21/2015    CALCIUM 9 8 11/26/2018     10/12/2017    K 3 9 11/26/2018    CO2 27 11/26/2018     11/26/2018    BUN 16 11/26/2018    CREATININE 1 13 11/26/2018     Lab Results   Component Value Date    CALCIUM 9 8 11/26/2018     No results found for: LABPROT  [unfilled]  Lab Results   Component Value Date    CALCIUM 9 8 11/26/2018     [unfilled]      Portions of the record may have been created with voice recognition software  Occasional wrong word or "sound a like" substitutions may have occurred due to the inherent limitations of voice recognition software  Read the chart carefully and recognize, using context, where substitutions have occurred

## 2018-11-30 ENCOUNTER — TELEPHONE (OUTPATIENT)
Dept: NEPHROLOGY | Facility: CLINIC | Age: 78
End: 2018-11-30

## 2018-11-30 ENCOUNTER — APPOINTMENT (OUTPATIENT)
Dept: LAB | Facility: HOSPITAL | Age: 78
End: 2018-11-30
Attending: INTERNAL MEDICINE
Payer: MEDICARE

## 2018-11-30 ENCOUNTER — TELEPHONE (OUTPATIENT)
Dept: FAMILY MEDICINE CLINIC | Facility: CLINIC | Age: 78
End: 2018-11-30

## 2018-11-30 DIAGNOSIS — N18.30 CKD (CHRONIC KIDNEY DISEASE) STAGE 3, GFR 30-59 ML/MIN (HCC): ICD-10-CM

## 2018-11-30 DIAGNOSIS — R80.1 PERSISTENT PROTEINURIA: Primary | ICD-10-CM

## 2018-11-30 DIAGNOSIS — I10 ESSENTIAL HYPERTENSION: ICD-10-CM

## 2018-11-30 LAB
ANION GAP SERPL CALCULATED.3IONS-SCNC: 6 MMOL/L (ref 4–13)
BACTERIA UR QL AUTO: ABNORMAL /HPF
BILIRUB UR QL STRIP: NEGATIVE
BUN SERPL-MCNC: 17 MG/DL (ref 5–25)
CALCIUM SERPL-MCNC: 9.2 MG/DL (ref 8.3–10.1)
CHLORIDE SERPL-SCNC: 101 MMOL/L (ref 100–108)
CK SERPL-CCNC: 127 U/L (ref 26–192)
CLARITY UR: CLEAR
CO2 SERPL-SCNC: 28 MMOL/L (ref 21–32)
COLOR UR: ABNORMAL
CREAT SERPL-MCNC: 1.14 MG/DL (ref 0.6–1.3)
CREAT UR-MCNC: 27.7 MG/DL
CREAT UR-MCNC: 28.7 MG/DL
GFR SERPL CREATININE-BSD FRML MDRD: 47 ML/MIN/1.73SQ M
GLUCOSE P FAST SERPL-MCNC: 98 MG/DL (ref 65–99)
GLUCOSE UR STRIP-MCNC: NEGATIVE MG/DL
HGB UR QL STRIP.AUTO: NEGATIVE
KETONES UR STRIP-MCNC: NEGATIVE MG/DL
LEUKOCYTE ESTERASE UR QL STRIP: ABNORMAL
MICROALBUMIN UR-MCNC: 24.6 MG/L (ref 0–20)
MICROALBUMIN/CREAT 24H UR: 86 MG/G CREATININE (ref 0–30)
NITRITE UR QL STRIP: NEGATIVE
NON-SQ EPI CELLS URNS QL MICRO: ABNORMAL /HPF
PH UR STRIP.AUTO: 6.5 [PH] (ref 4.5–8)
POTASSIUM SERPL-SCNC: 4.1 MMOL/L (ref 3.5–5.3)
PROT UR STRIP-MCNC: NEGATIVE MG/DL
PROT UR-MCNC: 7 MG/DL
PROT/CREAT UR: 0.25 MG/G{CREAT} (ref 0–0.1)
RBC #/AREA URNS AUTO: ABNORMAL /HPF
SODIUM SERPL-SCNC: 135 MMOL/L (ref 136–145)
SP GR UR STRIP.AUTO: 1.02 (ref 1–1.03)
UROBILINOGEN UR QL STRIP.AUTO: 0.2 E.U./DL
WBC #/AREA URNS AUTO: ABNORMAL /HPF

## 2018-11-30 PROCEDURE — 87205 SMEAR GRAM STAIN: CPT

## 2018-11-30 PROCEDURE — 80048 BASIC METABOLIC PNL TOTAL CA: CPT

## 2018-11-30 PROCEDURE — 84156 ASSAY OF PROTEIN URINE: CPT

## 2018-11-30 PROCEDURE — 82570 ASSAY OF URINE CREATININE: CPT

## 2018-11-30 PROCEDURE — 36415 COLL VENOUS BLD VENIPUNCTURE: CPT

## 2018-11-30 PROCEDURE — 82043 UR ALBUMIN QUANTITATIVE: CPT | Performed by: FAMILY MEDICINE

## 2018-11-30 PROCEDURE — 82550 ASSAY OF CK (CPK): CPT

## 2018-11-30 PROCEDURE — 82570 ASSAY OF URINE CREATININE: CPT | Performed by: FAMILY MEDICINE

## 2018-11-30 PROCEDURE — 81001 URINALYSIS AUTO W/SCOPE: CPT

## 2018-11-30 RX ORDER — LISINOPRIL 2.5 MG/1
2.5 TABLET ORAL DAILY
Qty: 30 TABLET | Refills: 3 | Status: SHIPPED | OUTPATIENT
Start: 2018-11-30 | End: 2019-03-14 | Stop reason: SDUPTHER

## 2018-11-30 NOTE — TELEPHONE ENCOUNTER
Pt notified of results and recommendations   Pt advises urine is being repeated 12/2018 as per kidney specialist  Terry Maradiaga MD placed the order

## 2018-11-30 NOTE — TELEPHONE ENCOUNTER
----- Message from Shayy Rawls MD sent at 11/30/2018  2:14 PM EST -----  Too much protein in urine-Did kidney doc order urine test or us?  Looks like just started lisinopril so that is good and would repeat urine 4  months

## 2018-11-30 NOTE — TELEPHONE ENCOUNTER
Renal function has been stable  Has proteinuria  ua with no rbc  CPK wnl      /84  Will monitor BP  Will started on lisinopril 2 5 mg daily for proteinuria   Check BMP in one month

## 2018-12-01 LAB — EOSINOPHIL NFR URNS MANUAL: 0 %

## 2018-12-12 ENCOUNTER — TELEPHONE (OUTPATIENT)
Dept: NEPHROLOGY | Facility: CLINIC | Age: 78
End: 2018-12-12

## 2018-12-12 NOTE — TELEPHONE ENCOUNTER
Pt called and her pelvic doctor Dr Elona Osler would like her to start miralax every other day but he wants to run it by you first

## 2018-12-28 ENCOUNTER — APPOINTMENT (OUTPATIENT)
Dept: LAB | Facility: CLINIC | Age: 78
End: 2018-12-28
Payer: MEDICARE

## 2018-12-28 ENCOUNTER — TRANSCRIBE ORDERS (OUTPATIENT)
Dept: LAB | Facility: CLINIC | Age: 78
End: 2018-12-28

## 2018-12-28 ENCOUNTER — TELEPHONE (OUTPATIENT)
Dept: NEPHROLOGY | Facility: CLINIC | Age: 78
End: 2018-12-28

## 2018-12-28 DIAGNOSIS — E87.1 HYPONATREMIA: ICD-10-CM

## 2018-12-28 DIAGNOSIS — N18.30 CKD (CHRONIC KIDNEY DISEASE) STAGE 3, GFR 30-59 ML/MIN (HCC): ICD-10-CM

## 2018-12-28 LAB
ANION GAP SERPL CALCULATED.3IONS-SCNC: 8 MMOL/L (ref 4–13)
BUN SERPL-MCNC: 18 MG/DL (ref 5–25)
CALCIUM SERPL-MCNC: 9.2 MG/DL (ref 8.3–10.1)
CHLORIDE SERPL-SCNC: 101 MMOL/L (ref 100–108)
CO2 SERPL-SCNC: 27 MMOL/L (ref 21–32)
CREAT SERPL-MCNC: 1.1 MG/DL (ref 0.6–1.3)
GFR SERPL CREATININE-BSD FRML MDRD: 49 ML/MIN/1.73SQ M
GLUCOSE P FAST SERPL-MCNC: 100 MG/DL (ref 65–99)
POTASSIUM SERPL-SCNC: 4 MMOL/L (ref 3.5–5.3)
SODIUM SERPL-SCNC: 136 MMOL/L (ref 136–145)

## 2018-12-28 PROCEDURE — 80048 BASIC METABOLIC PNL TOTAL CA: CPT

## 2018-12-28 PROCEDURE — 36415 COLL VENOUS BLD VENIPUNCTURE: CPT

## 2018-12-28 NOTE — TELEPHONE ENCOUNTER
I spoke with Lachelle Hoffmann, she is aware of her blood work results and says her blood pressure has been about 140/70

## 2018-12-28 NOTE — TELEPHONE ENCOUNTER
----- Message from Michelle Campuzano MD sent at 12/28/2018  2:11 PM EST -----  Please inform patient that renal function is stable at creatinine 1 1  Potassium is stable and sodium is within normal limits    Please ask about blood pressure control

## 2019-01-17 ENCOUNTER — OFFICE VISIT (OUTPATIENT)
Dept: FAMILY MEDICINE CLINIC | Facility: CLINIC | Age: 79
End: 2019-01-17
Payer: MEDICARE

## 2019-01-17 VITALS
SYSTOLIC BLOOD PRESSURE: 142 MMHG | WEIGHT: 161 LBS | HEIGHT: 58 IN | TEMPERATURE: 98 F | BODY MASS INDEX: 33.8 KG/M2 | HEART RATE: 96 BPM | RESPIRATION RATE: 18 BRPM | DIASTOLIC BLOOD PRESSURE: 80 MMHG

## 2019-01-17 DIAGNOSIS — N18.30 TYPE 2 DIABETES MELLITUS WITH STAGE 3 CHRONIC KIDNEY DISEASE, WITHOUT LONG-TERM CURRENT USE OF INSULIN (HCC): ICD-10-CM

## 2019-01-17 DIAGNOSIS — I10 ESSENTIAL HYPERTENSION: Primary | ICD-10-CM

## 2019-01-17 DIAGNOSIS — E11.22 TYPE 2 DIABETES MELLITUS WITH STAGE 3 CHRONIC KIDNEY DISEASE, WITHOUT LONG-TERM CURRENT USE OF INSULIN (HCC): ICD-10-CM

## 2019-01-17 DIAGNOSIS — M71.22 SYNOVIAL CYST OF LEFT POPLITEAL SPACE: ICD-10-CM

## 2019-01-17 PROBLEM — E87.1 HYPONATREMIA: Status: RESOLVED | Noted: 2018-11-29 | Resolved: 2019-01-17

## 2019-01-17 PROBLEM — K62.5 RECTAL BLEEDING: Status: RESOLVED | Noted: 2018-05-02 | Resolved: 2019-01-17

## 2019-01-17 PROBLEM — E86.0 DEHYDRATION: Status: RESOLVED | Noted: 2018-05-02 | Resolved: 2019-01-17

## 2019-01-17 PROBLEM — N17.9 ACUTE KIDNEY INJURY (HCC): Status: RESOLVED | Noted: 2018-05-02 | Resolved: 2019-01-17

## 2019-01-17 PROCEDURE — 99213 OFFICE O/P EST LOW 20 MIN: CPT | Performed by: FAMILY MEDICINE

## 2019-01-17 NOTE — ASSESSMENT & PLAN NOTE
Lab Results   Component Value Date    HGBA1C 6 4 (H) 11/09/2018   bs stable and kidney function improved    No results for input(s): POCGLU in the last 72 hours      Blood Sugar Average: Last 72 hrs:

## 2019-01-17 NOTE — PROGRESS NOTES
Assessment/Plan:    Type 2 diabetes mellitus with stage 3 chronic kidney disease, without long-term current use of insulin ([facility])  Lab Results   Component Value Date    HGBA1C 6 4 (H) 11/09/2018   bs stable and kidney function improved    No results for input(s): POCGLU in the last 72 hours  Blood Sugar Average: Last 72 hrs:         Diagnoses and all orders for this visit:    Essential hypertension    Type 2 diabetes mellitus with stage 3 chronic kidney disease, without long-term current use of insulin ([facility])  -     Lipid Panel with Direct LDL reflex; Future  -     Hemoglobin A1C; Future    Synovial cyst of left popliteal space  -     VAS lower limb venous duplex study, unilateral/limited; Future    Other orders  -     magnesium citrate solution; Take 296 mL by mouth once          Subjective:      Patient ID: Ye Rodriguez is a 70 y o  female  Leg Pain    The incident occurred more than 1 week ago  There was no injury mechanism  The pain is present in the left knee (behind l knee)  The quality of the pain is described as aching  The pain is at a severity of 4/10  The pain is mild  Pertinent negatives include no inability to bear weight, numbness or tingling  She reports no foreign bodies present  The symptoms are aggravated by movement and weight bearing  She has tried acetaminophen for the symptoms  The treatment provided mild relief  The following portions of the patient's history were reviewed and updated as appropriate: allergies, current medications, past family history, past medical history, past social history, past surgical history and problem list     Review of Systems   Constitutional: Negative for activity change, appetite change and unexpected weight change  Respiratory: Negative for shortness of breath  Cardiovascular: Negative for chest pain  Musculoskeletal: Positive for arthralgias  Leg pain   Neurological: Negative for tingling and numbness           Objective:      /80 (BP Location: Right arm, Patient Position: Sitting, Cuff Size: Standard)   Pulse 96   Temp 98 °F (36 7 °C) (Temporal)   Resp 18   Ht 4' 10" (1 473 m)   Wt 73 kg (161 lb)   BMI 33 65 kg/m²          Physical Exam

## 2019-01-21 ENCOUNTER — TRANSCRIBE ORDERS (OUTPATIENT)
Dept: ADMINISTRATIVE | Facility: HOSPITAL | Age: 79
End: 2019-01-21

## 2019-01-21 DIAGNOSIS — M79.605 LEFT LEG PAIN: ICD-10-CM

## 2019-01-21 DIAGNOSIS — M71.22 SYNOVIAL CYST OF LEFT POPLITEAL SPACE: Primary | ICD-10-CM

## 2019-01-28 ENCOUNTER — HOSPITAL ENCOUNTER (OUTPATIENT)
Dept: NON INVASIVE DIAGNOSTICS | Facility: HOSPITAL | Age: 79
Discharge: HOME | End: 2019-01-28
Payer: MEDICARE

## 2019-01-28 DIAGNOSIS — M79.605 LEFT LEG PAIN: ICD-10-CM

## 2019-01-28 DIAGNOSIS — M71.22 SYNOVIAL CYST OF LEFT POPLITEAL SPACE: ICD-10-CM

## 2019-01-28 PROCEDURE — 93971 EXTREMITY STUDY: CPT | Performed by: SURGERY

## 2019-01-28 PROCEDURE — 93971 EXTREMITY STUDY: CPT

## 2019-01-29 ENCOUNTER — TELEPHONE (OUTPATIENT)
Dept: FAMILY MEDICINE CLINIC | Facility: CLINIC | Age: 79
End: 2019-01-29

## 2019-01-29 NOTE — TELEPHONE ENCOUNTER
Called pt detailed message given and she stated that she will call ortho for appt with Dr Cj Kahlil for the knee

## 2019-01-29 NOTE — TELEPHONE ENCOUNTER
----- Message from Shayy Rawls MD sent at 1/28/2019  2:10 PM EST -----  Nl doppler, if sx persisting rec MRI knee

## 2019-02-14 ENCOUNTER — OFFICE VISIT (OUTPATIENT)
Dept: OBGYN CLINIC | Facility: HOSPITAL | Age: 79
End: 2019-02-14
Payer: MEDICARE

## 2019-02-14 ENCOUNTER — HOSPITAL ENCOUNTER (OUTPATIENT)
Dept: RADIOLOGY | Facility: HOSPITAL | Age: 79
Discharge: HOME | End: 2019-02-14
Attending: ORTHOPAEDIC SURGERY
Payer: MEDICARE

## 2019-02-14 VITALS
HEIGHT: 58 IN | SYSTOLIC BLOOD PRESSURE: 131 MMHG | DIASTOLIC BLOOD PRESSURE: 80 MMHG | BODY MASS INDEX: 33.8 KG/M2 | HEART RATE: 96 BPM | WEIGHT: 161 LBS

## 2019-02-14 DIAGNOSIS — M17.12 PRIMARY OSTEOARTHRITIS OF LEFT KNEE: ICD-10-CM

## 2019-02-14 DIAGNOSIS — M25.562 LEFT KNEE PAIN, UNSPECIFIED CHRONICITY: ICD-10-CM

## 2019-02-14 DIAGNOSIS — R52 PAIN: Primary | ICD-10-CM

## 2019-02-14 PROCEDURE — 99214 OFFICE O/P EST MOD 30 MIN: CPT | Performed by: ORTHOPAEDIC SURGERY

## 2019-02-14 PROCEDURE — 73562 X-RAY EXAM OF KNEE 3: CPT

## 2019-02-14 PROCEDURE — 20610 DRAIN/INJ JOINT/BURSA W/O US: CPT | Performed by: ORTHOPAEDIC SURGERY

## 2019-02-14 RX ORDER — OMEPRAZOLE 20 MG/1
CAPSULE, DELAYED RELEASE ORAL
COMMUNITY
Start: 2019-01-22 | End: 2019-04-17 | Stop reason: SDUPTHER

## 2019-02-14 RX ORDER — LIDOCAINE HYDROCHLORIDE 20 MG/ML
2 INJECTION, SOLUTION EPIDURAL; INFILTRATION; INTRACAUDAL; PERINEURAL
Status: COMPLETED | OUTPATIENT
Start: 2019-02-14 | End: 2019-02-14

## 2019-02-14 RX ORDER — BUPIVACAINE HYDROCHLORIDE 2.5 MG/ML
2 INJECTION, SOLUTION INFILTRATION; PERINEURAL
Status: COMPLETED | OUTPATIENT
Start: 2019-02-14 | End: 2019-02-14

## 2019-02-14 RX ORDER — BETAMETHASONE SODIUM PHOSPHATE AND BETAMETHASONE ACETATE 3; 3 MG/ML; MG/ML
12 INJECTION, SUSPENSION INTRA-ARTICULAR; INTRALESIONAL; INTRAMUSCULAR; SOFT TISSUE
Status: COMPLETED | OUTPATIENT
Start: 2019-02-14 | End: 2019-02-14

## 2019-02-14 RX ADMIN — BUPIVACAINE HYDROCHLORIDE 2 ML: 2.5 INJECTION, SOLUTION INFILTRATION; PERINEURAL at 15:12

## 2019-02-14 RX ADMIN — LIDOCAINE HYDROCHLORIDE 2 ML: 20 INJECTION, SOLUTION EPIDURAL; INFILTRATION; INTRACAUDAL; PERINEURAL at 15:12

## 2019-02-14 RX ADMIN — BETAMETHASONE SODIUM PHOSPHATE AND BETAMETHASONE ACETATE 12 MG: 3; 3 INJECTION, SUSPENSION INTRA-ARTICULAR; INTRALESIONAL; INTRAMUSCULAR; SOFT TISSUE at 15:12

## 2019-02-14 NOTE — PROGRESS NOTES
Assessment:  1  Pain     2  Left knee pain, unspecified chronicity  XR knee 3 vw left non injury       Plan:  The patient returns for left knee pain  She had last been seen 3 years ago for contralateral knee pain alleviated with steroid injection  Today she complains of posterior left knee pain  She has valgus alignment with lateral joint line tenderness  She is provided with a left knee steroid injection  She follow up as needed  To do next visit:  No follow-ups on file  The above stated was discussed in layman's terms and the patient expressed understanding  All questions were answered to the patient's satisfaction  Scribe Attestation    I,:   Dalphine Hashimoto am acting as a scribe while in the presence of the attending physician :        I,:   Vikas Reyna MD personally performed the services described in this documentation    as scribed in my presence :              Subjective:   Ava Mcmanus is a 70 y o  female who presents for left knee pain  She had last been seen for right knee pain including right knee injection with benefit  Currently she has had symptoms for several months worse over the past month  She was cleared of DVT with ultrasound  Today she complains of posterior left knee pain  She rates her pain at 2/10 and 7/10 at its worse  Weight bearing aggravates  Elevating the left leg alleviates  She does use some ice with minimal benefit  She does wear a knee sleeve with benefit  She will use Aleve or tylenol for her pain with some benefit    She denies left knee injections or surgery in the past         Review of systems negative unless otherwise specified in HPI    Past Medical History:   Diagnosis Date    Diabetes mellitus ([address] )     Disease of thyroid gland     Diverticulosis     GERD (gastroesophageal reflux disease)     Hypertension     Ovarian cyst     Rectal bleeding     Wears reading eyeglasses        Past Surgical History:   Procedure Laterality Date    BREAST SURGERY      CATARACT EXTRACTION Bilateral     COLONOSCOPY      ESOPHAGOGASTRODUODENOSCOPY      OOPHORECTOMY Bilateral     [facility] CMBND ANTERPOST COLPORRAPHY W/CYSTO N/A 6/26/2018    Procedure: ANTERIOR & POSTERIOR COLPORRHAPHY;  Surgeon: Lester Savage MD;  [facility];  Service: UroGynecology           [facility] CYSTOURETHROSCOPY N/A 6/26/2018    Procedure: [employer];  Surgeon: Lester Savage MD;  [facility];  Service: UroGynecology           VA Caterina Brady LIG N/A 6/26/2018    Procedure: ANTERIOR COLPOPEXY VAGINAL EXTRAPERITONEAL (VEC); Surgeon: Lester Savage MD;  [facility];  Service: UroGynecology           VA SLING OPER STRES INCONTINENCE N/A 6/26/2018    Procedure: INSERTION PUBOVAGINAL SLING (FEMALE);   Surgeon: Lester Savage MD;  [facility];  Service: UroGynecology           TONSILLECTOMY         Family History   Problem Relation Age of Onset    Cancer Mother     Breast cancer Maternal Grandmother     Mental illness Son    Lo Sharif Schizophrenia Son     Cancer Family        Social History     Occupational History    Occupation: Retired    Tobacco Use    Smoking status: Never Smoker    Smokeless tobacco: Never Used   Substance and Sexual Activity    Alcohol use: No    Drug use: No    Sexual activity: Never     Partners: Male     Birth control/protection: None         Current Outpatient Medications:     aspirin 81 MG tablet, Take 81 mg by mouth daily  , Disp: , Rfl:     Calcium Carbonate-Vitamin D 600-200 MG-UNIT TABS, Take 1 tablet by mouth every other day  , Disp: , Rfl:     docusate sodium (COLACE) 100 mg capsule, Take by mouth, Disp: , Rfl:     Glucosamine-Chondroitin 250-200 MG TABS, Take 1 tablet by mouth daily  , Disp: , Rfl:     levothyroxine 50 mcg tablet, Take 1 tablet (50 mcg total) by mouth daily for 90 days, Disp: 90 tablet, Rfl: 1    lisinopril (ZESTRIL) 2 5 mg tablet, Take 1 tablet (2 5 mg total) by mouth daily, Disp: 30 tablet, Rfl: 3    magnesium citrate solution, Take 296 mL by mouth once, Disp: , Rfl:     Multiple Vitamins-Iron (DAILY MULTIPLE VITAMIN/IRON) TABS, Take 1 tablet by mouth daily  , Disp: , Rfl:     Omega-3 Fatty Acids ([employer] FISH OIL) 1200 MG CAPS, Take 1 capsule by mouth daily  , Disp: , Rfl:     omeprazole (PriLOSEC) 20 mg delayed release capsule, , Disp: , Rfl:     Omeprazole 20 MG TBEC, Take 1 tablet (20 mg total) by mouth daily, Disp: 90 tablet, Rfl: 1    No Known Allergies         Vitals:    02/14/19 1419   BP: 131/80   Pulse: 96       Objective:  Physical exam  · General: Awake, Alert, Oriented  · Eyes: Pupils equal, round and reactive to light  · Heart: regular rate and rhythm  · Lungs: No audible wheezing  · Abdomen: soft                    Ortho Exam   Left knee:  Valgus alignment  No palpable collection of fluid posterior to knee  Crepitus with ROM  Tender lateral joint line    Diagnostics, reviewed and taken today if performed as documented: The attending physician has personally reviewed the pertinent films in PACS and interpretation is as follows:  Left knee:  Moderate arthritis, decreased patellofemoral joint space    Procedures, if performed today:    Large joint arthrocentesis: L knee  Date/Time: 2/14/2019 3:12 PM  Consent given by: patient  Site marked: site marked  Supporting Documentation  Indications: pain   Procedure Details  Location: knee - L knee  Preparation: Patient was prepped and draped in the usual sterile fashion  Needle size: 22 G  Ultrasound guidance: no  Approach: anteromedial  Medications administered: 12 mg betamethasone acetate-betamethasone sodium phosphate 6 (3-3) mg/mL; 2 mL bupivacaine 0 25 %; 2 mL lidocaine (PF) 2 % (2ml 2% lidocaine)    Patient tolerance: patient tolerated the procedure well with no immediate complications  Dressing:  Sterile dressing applied             Portions of the record may have been created with voice recognition software   Occasional wrong word or "sound a like" substitutions may have occurred due to the inherent limitations of voice recognition software   Read the chart carefully and recognize, using context, where substitutions have occurred

## 2019-02-28 ENCOUNTER — APPOINTMENT (OUTPATIENT)
Dept: LAB | Facility: HOSPITAL | Age: 79
End: 2019-02-28
Attending: INTERNAL MEDICINE
Payer: MEDICARE

## 2019-02-28 ENCOUNTER — TRANSCRIBE ORDERS (OUTPATIENT)
Dept: ADMINISTRATIVE | Facility: HOSPITAL | Age: 79
End: 2019-02-28

## 2019-02-28 ENCOUNTER — TELEPHONE (OUTPATIENT)
Dept: NEPHROLOGY | Facility: CLINIC | Age: 79
End: 2019-02-28

## 2019-02-28 DIAGNOSIS — N18.30 CKD (CHRONIC KIDNEY DISEASE) STAGE 3, GFR 30-59 ML/MIN (HCC): ICD-10-CM

## 2019-02-28 DIAGNOSIS — N18.30 CKD (CHRONIC KIDNEY DISEASE) STAGE 3, GFR 30-59 ML/MIN (HCC): Primary | ICD-10-CM

## 2019-02-28 DIAGNOSIS — D64.9 ANEMIA, UNSPECIFIED TYPE: ICD-10-CM

## 2019-02-28 DIAGNOSIS — R82.90 ABNORMAL URINE FINDING: ICD-10-CM

## 2019-02-28 LAB
ANION GAP SERPL CALCULATED.3IONS-SCNC: 13 MMOL/L (ref 4–13)
BACTERIA UR QL AUTO: ABNORMAL /HPF
BILIRUB UR QL STRIP: NEGATIVE
BUN SERPL-MCNC: 16 MG/DL (ref 5–25)
CALCIUM SERPL-MCNC: 9 MG/DL (ref 8.3–10.1)
CHLORIDE SERPL-SCNC: 99 MMOL/L (ref 100–108)
CLARITY UR: CLEAR
CO2 SERPL-SCNC: 23 MMOL/L (ref 21–32)
COLOR UR: YELLOW
CREAT SERPL-MCNC: 1.13 MG/DL (ref 0.6–1.3)
CREAT UR-MCNC: 73.3 MG/DL
CREAT UR-MCNC: 74.3 MG/DL
ERYTHROCYTE [DISTWIDTH] IN BLOOD BY AUTOMATED COUNT: 15.7 % (ref 11.6–15.1)
FERRITIN SERPL-MCNC: 22 NG/ML (ref 8–388)
GFR SERPL CREATININE-BSD FRML MDRD: 47 ML/MIN/1.73SQ M
GLUCOSE SERPL-MCNC: 89 MG/DL (ref 65–140)
GLUCOSE UR STRIP-MCNC: NEGATIVE MG/DL
HCT VFR BLD AUTO: 39.4 % (ref 34.8–46.1)
HGB BLD-MCNC: 11.9 G/DL (ref 11.5–15.4)
HGB UR QL STRIP.AUTO: NEGATIVE
IRON SATN MFR SERPL: 14 %
IRON SERPL-MCNC: 45 UG/DL (ref 50–170)
KETONES UR STRIP-MCNC: NEGATIVE MG/DL
LEUKOCYTE ESTERASE UR QL STRIP: ABNORMAL
MAGNESIUM SERPL-MCNC: 2 MG/DL (ref 1.6–2.6)
MCH RBC QN AUTO: 27.2 PG (ref 26.8–34.3)
MCHC RBC AUTO-ENTMCNC: 30.2 G/DL (ref 31.4–37.4)
MCV RBC AUTO: 90 FL (ref 82–98)
MICROALBUMIN UR-MCNC: 6.1 MG/L (ref 0–20)
MICROALBUMIN/CREAT 24H UR: 8 MG/G CREATININE (ref 0–30)
NITRITE UR QL STRIP: NEGATIVE
NON-SQ EPI CELLS URNS QL MICRO: ABNORMAL /HPF
PH UR STRIP.AUTO: 7.5 [PH] (ref 4.5–8)
PHOSPHATE SERPL-MCNC: 3.4 MG/DL (ref 2.3–4.1)
PLATELET # BLD AUTO: 380 THOUSANDS/UL (ref 149–390)
PMV BLD AUTO: 9.9 FL (ref 8.9–12.7)
POTASSIUM SERPL-SCNC: 4.3 MMOL/L (ref 3.5–5.3)
PROT UR STRIP-MCNC: NEGATIVE MG/DL
PROT UR-MCNC: 8 MG/DL
PROT/CREAT UR: 0.11 MG/G{CREAT} (ref 0–0.1)
PTH-INTACT SERPL-MCNC: 37.3 PG/ML (ref 18.4–80.1)
RBC # BLD AUTO: 4.38 MILLION/UL (ref 3.81–5.12)
RBC #/AREA URNS AUTO: ABNORMAL /HPF
SODIUM SERPL-SCNC: 135 MMOL/L (ref 136–145)
SP GR UR STRIP.AUTO: 1.01 (ref 1–1.03)
TIBC SERPL-MCNC: 320 UG/DL (ref 250–450)
UROBILINOGEN UR QL STRIP.AUTO: 0.2 E.U./DL
WBC # BLD AUTO: 8.49 THOUSAND/UL (ref 4.31–10.16)
WBC #/AREA URNS AUTO: ABNORMAL /HPF

## 2019-02-28 PROCEDURE — 82570 ASSAY OF URINE CREATININE: CPT

## 2019-02-28 PROCEDURE — 83735 ASSAY OF MAGNESIUM: CPT

## 2019-02-28 PROCEDURE — 83540 ASSAY OF IRON: CPT

## 2019-02-28 PROCEDURE — 85027 COMPLETE CBC AUTOMATED: CPT

## 2019-02-28 PROCEDURE — 82043 UR ALBUMIN QUANTITATIVE: CPT

## 2019-02-28 PROCEDURE — 81001 URINALYSIS AUTO W/SCOPE: CPT

## 2019-02-28 PROCEDURE — 84156 ASSAY OF PROTEIN URINE: CPT

## 2019-02-28 PROCEDURE — 36415 COLL VENOUS BLD VENIPUNCTURE: CPT

## 2019-02-28 PROCEDURE — 82728 ASSAY OF FERRITIN: CPT

## 2019-02-28 PROCEDURE — 83550 IRON BINDING TEST: CPT

## 2019-02-28 PROCEDURE — 80048 BASIC METABOLIC PNL TOTAL CA: CPT

## 2019-02-28 PROCEDURE — 83970 ASSAY OF PARATHORMONE: CPT

## 2019-02-28 PROCEDURE — 84100 ASSAY OF PHOSPHORUS: CPT

## 2019-02-28 NOTE — TELEPHONE ENCOUNTER
Patient called and I gave her Dr Metz message  Renal function was stable, creatine at 1 1 and potasium is 4 3  He will like for her to continue the same and medication and when she comes in for March they will discuss further  Patient agreed and understood the information I gave her

## 2019-02-28 NOTE — TELEPHONE ENCOUNTER
----- Message from Teri Reyes MD sent at 2/28/2019  3:36 PM EST -----  Please inform patient that renal function was overall stable at creatinine 1 1  Potassium was 4 3  Continue same medication  Will discuss further during office visit in March

## 2019-03-11 ENCOUNTER — TELEPHONE (OUTPATIENT)
Dept: NEPHROLOGY | Facility: CLINIC | Age: 79
End: 2019-03-11

## 2019-03-12 ENCOUNTER — LAB (OUTPATIENT)
Dept: LAB | Facility: CLINIC | Age: 79
End: 2019-03-12
Payer: MEDICARE

## 2019-03-12 ENCOUNTER — OFFICE VISIT (OUTPATIENT)
Dept: NEPHROLOGY | Facility: CLINIC | Age: 79
End: 2019-03-12
Payer: MEDICARE

## 2019-03-12 VITALS
WEIGHT: 160 LBS | HEART RATE: 94 BPM | SYSTOLIC BLOOD PRESSURE: 136 MMHG | BODY MASS INDEX: 34.52 KG/M2 | DIASTOLIC BLOOD PRESSURE: 64 MMHG | HEIGHT: 57 IN

## 2019-03-12 DIAGNOSIS — E11.22 TYPE 2 DIABETES MELLITUS WITH STAGE 3 CHRONIC KIDNEY DISEASE, WITHOUT LONG-TERM CURRENT USE OF INSULIN (HCC): ICD-10-CM

## 2019-03-12 DIAGNOSIS — N18.30 TYPE 2 DIABETES MELLITUS WITH STAGE 3 CHRONIC KIDNEY DISEASE, WITHOUT LONG-TERM CURRENT USE OF INSULIN (HCC): ICD-10-CM

## 2019-03-12 DIAGNOSIS — E87.1 HYPONATREMIA: ICD-10-CM

## 2019-03-12 DIAGNOSIS — N18.30 CKD (CHRONIC KIDNEY DISEASE) STAGE 3, GFR 30-59 ML/MIN (HCC): Primary | ICD-10-CM

## 2019-03-12 DIAGNOSIS — D50.9 IRON DEFICIENCY ANEMIA, UNSPECIFIED IRON DEFICIENCY ANEMIA TYPE: ICD-10-CM

## 2019-03-12 DIAGNOSIS — I12.9 BENIGN HYPERTENSION WITH CHRONIC KIDNEY DISEASE, STAGE III (HCC): ICD-10-CM

## 2019-03-12 DIAGNOSIS — N18.30 BENIGN HYPERTENSION WITH CHRONIC KIDNEY DISEASE, STAGE III (HCC): ICD-10-CM

## 2019-03-12 LAB
CHOLEST SERPL-MCNC: 231 MG/DL (ref 50–200)
EST. AVERAGE GLUCOSE BLD GHB EST-MCNC: 146 MG/DL
HBA1C MFR BLD: 6.7 % (ref 4.2–6.3)
HDLC SERPL-MCNC: 40 MG/DL (ref 40–60)
LDLC SERPL CALC-MCNC: 138 MG/DL (ref 0–100)
TRIGL SERPL-MCNC: 263 MG/DL

## 2019-03-12 PROCEDURE — 83036 HEMOGLOBIN GLYCOSYLATED A1C: CPT

## 2019-03-12 PROCEDURE — 99214 OFFICE O/P EST MOD 30 MIN: CPT | Performed by: INTERNAL MEDICINE

## 2019-03-12 PROCEDURE — 80061 LIPID PANEL: CPT

## 2019-03-12 PROCEDURE — 36415 COLL VENOUS BLD VENIPUNCTURE: CPT

## 2019-03-12 NOTE — PROGRESS NOTES
NEPHROLOGY OUTPATIENT PROGRESS NOTE   Serafin Bun 70 y o  female MRN: [id number]  DATE: 3/12/2019  Reason for visit:   Chief Complaint   Patient presents with    Follow-up    Chronic Kidney Disease       ASSESSMENT and PLAN:   Chronic kidney disease stage III  - baseline creatinine 1 0-1 1  Renal function is at baseline  Last creatinine was 1 13  - last UA was positive for WBC but she denies any UTI symptoms  No proteinuria  Upc ratio was only 0 11  No microalbuminuria  CPK check in the past was within normal limit     -discussed about avoiding nephrotoxins like NSAIDs and IV contrast   -PTH level was within normal in the past   Follow-up in 3-4 months  Continue monitoring renal function  Patient would like to come back to see me at the same time with her  who is also my patient        Primary HTN  -BP well controlled and at goal    continue lisinopril at current dose of 2 5 mg daily   -continue 2 g sodium diet and home monitoring of blood pressure  Stressed on home monitoring of blood pressure and calling me if systolic blood pressure more than 232 or diastolic more than 85        Mild Hyponatremia  -likely due to free water intake in the setting of chronic kidney disease  Sodium level was 135 which is acceptable  Would continue to monitor for now  Would consider further workup if sodium level drops       Anemia, iron deficiency anemia  - hemoglobin 11 9  Found to have low iron saturation  Patient prefers not to be on iron tablet due to possibility of constipation  She does take oral vitamin with iron supplementation in it      Diabetes mellitus type 2, currently she is off all medication  She is on diet control  Diagnoses and all orders for this visit:    CKD (chronic kidney disease) stage 3, GFR 30-59 ml/min ([facility])  -     Basic metabolic panel; Future  -     Phosphorus; Future  -     Protein / creatinine ratio, urine; Future  -     CBC;  Future    Benign hypertension with chronic kidney disease, stage III (HCC)    Hyponatremia    Iron deficiency anemia, unspecified iron deficiency anemia type  -     CBC; Future        Patient Instructions   Renal function stable, blood pressure well controlled  Continue same medication  Follow-up in 3 months with repeat labs      SUBJECTIVE / HPI:  Navya Coates is a 70 y o   female  with medical issues of Type 2 diabetes mellitus- on diet control, GERD who presents for follow-up of chronic kidney disease  Patient has creatinine of 1 0-1 1 since 2016  Creatinine increased to 1 32 in May 2018  but improved on its own to creatinine 1 1  On 6th May 2018  Since June 2018 to November 2018 renal function has been stable at creatinine 1 0-1 1  She used to be on lisinopril- hctz in may but that was stopped in May due to ROBERTA with elevated cr to 1 3  Stopped simvastatin since nov 15th  Was on it for two years  Patient complaining of muscle soreness, attributed to statin from a different supplier then she was taking in the past   Was taking Aleve in the past but now off it  Last labs from 02/28/19 showed creatinine 1 13, sodium 135, potassium 4 3, microalbumin creatinine ratio of 8, iron saturation 14 with serum iron level of 45, with hemoglobin 11 9  PTH 37 3  BP not checking at home  No new complaints at this time  REVIEW OF SYSTEMS:    Review of Systems   Constitutional: Negative for activity change, appetite change, chills, diaphoresis, fatigue and fever  HENT: Negative for congestion, facial swelling and nosebleeds  Eyes: Negative for pain and visual disturbance  Respiratory: Negative for cough, chest tightness and shortness of breath  Cardiovascular: Negative for chest pain and palpitations  Gastrointestinal: Negative for abdominal distention, abdominal pain, diarrhea, nausea and vomiting  Genitourinary: Negative for difficulty urinating, dysuria, flank pain, frequency and hematuria     Musculoskeletal: Negative for arthralgias, back pain and joint swelling  Skin: Negative for rash  Neurological: Negative for dizziness, seizures, syncope, weakness and headaches  Psychiatric/Behavioral: Negative for agitation and confusion  The patient is not nervous/anxious  More than 10 point review of systems were obtained and discussed in length with the patient  Complete review of systems were negative / unremarkable except mentioned above  PAST MEDICAL HISTORY:  Past Medical History:   Diagnosis Date    CKD (chronic kidney disease)     Diabetes mellitus ([address] )     Disease of thyroid gland     Diverticulosis     GERD (gastroesophageal reflux disease)     Hypertension     Hyponatremia     Ovarian cyst     Rectal bleeding     Wears reading eyeglasses        PAST SURGICAL HISTORY:  Past Surgical History:   Procedure Laterality Date    BREAST SURGERY      CATARACT EXTRACTION Bilateral     COLONOSCOPY      ESOPHAGOGASTRODUODENOSCOPY      OOPHORECTOMY Bilateral     FL CMBND ANTERPOST COLPORRAPHY W/CYSTO N/A 6/26/2018    Procedure: ANTERIOR & POSTERIOR COLPORRHAPHY;  Surgeon: Carlos Paige MD;  [facility];  Service: UroGynecology           FL CYSTOURETHROSCOPY N/A 6/26/2018    Procedure: Magalis Overcast;  Surgeon: Carlos Paige MD;  [facility];  Service: UroGynecology           FL REVAGINAL PROLAPSE,SACROSP LIG N/A 6/26/2018    Procedure: ANTERIOR COLPOPEXY VAGINAL EXTRAPERITONEAL (VEC); Surgeon: Carlos Paige MD;  [facility];  Service: UroGynecology           FL SLING OPER STRES INCONTINENCE N/A 6/26/2018    Procedure: INSERTION PUBOVAGINAL SLING (FEMALE);   Surgeon: Carlos Paige MD;  [facility];  Service: UroGynecology           TONSILLECTOMY         SOCIAL HISTORY:  Social History     Substance and Sexual Activity   Alcohol Use No     Social History     Substance and Sexual Activity   Drug Use No     Social History     Tobacco Use   Smoking Status Never Smoker   Smokeless Tobacco Never Used       FAMILY HISTORY:  Family History   Problem Relation Age of Onset    Cancer Mother     Breast cancer Maternal Grandmother     Mental illness Son     Schizophrenia Son     Cancer Family        MEDICATIONS:    Current Outpatient Medications:     aspirin 81 MG tablet, Take 81 mg by mouth daily  , Disp: , Rfl:     Calcium Carbonate-Vitamin D 600-200 MG-UNIT TABS, Take 1 tablet by mouth every other day  , Disp: , Rfl:     docusate sodium (COLACE) 100 mg capsule, Take by mouth, Disp: , Rfl:     Glucosamine-Chondroitin 250-200 MG TABS, Take 1 tablet by mouth daily  , Disp: , Rfl:     levothyroxine 50 mcg tablet, Take 1 tablet (50 mcg total) by mouth daily for 90 days, Disp: 90 tablet, Rfl: 1    lisinopril (ZESTRIL) 2 5 mg tablet, Take 1 tablet (2 5 mg total) by mouth daily, Disp: 30 tablet, Rfl: 3    magnesium citrate solution, Take 296 mL by mouth once, Disp: , Rfl:     Multiple Vitamins-Iron (DAILY MULTIPLE VITAMIN/IRON) TABS, Take 1 tablet by mouth daily  , Disp: , Rfl:     Omega-3 Fatty Acids ([employer] FISH OIL) 1200 MG CAPS, Take 1 capsule by mouth daily  , Disp: , Rfl:     omeprazole (PriLOSEC) 20 mg delayed release capsule, , Disp: , Rfl:     Omeprazole 20 MG TBEC, Take 1 tablet (20 mg total) by mouth daily, Disp: 90 tablet, Rfl: 1      PHYSICAL EXAM:  Vitals:    03/12/19 1051   BP: 136/64   BP Location: Left arm   Patient Position: Sitting   Cuff Size: Large   Pulse: 94   Weight: 72 6 kg (160 lb)   Height: 4' 9" (1 448 m)     Body mass index is 34 62 kg/m²  Physical Exam   Constitutional: She is oriented to person, place, and time  She appears well-developed  No distress  HENT:   Head: Normocephalic and atraumatic  Nose: Nose normal    Eyes: Pupils are equal, round, and reactive to light  Conjunctivae are normal    Neck: Neck supple  No JVD present  No thyromegaly present  Cardiovascular: Normal rate, regular rhythm and normal heart sounds  Exam reveals no friction rub  No murmur heard  Pulmonary/Chest: Effort normal and breath sounds normal  She has no wheezes  She has no rales  Abdominal: Soft  Bowel sounds are normal  She exhibits no distension  There is no tenderness  Musculoskeletal: She exhibits no edema or deformity  Neurological: She is alert and oriented to person, place, and time  Skin: Skin is warm and dry  Psychiatric: She has a normal mood and affect  Lab Results:   Results for orders placed or performed in visit on 02/28/19   Phosphorus   Result Value Ref Range    Phosphorus 3 4 2 3 - 4 1 mg/dL   Protein / creatinine ratio, urine   Result Value Ref Range    Creatinine, Ur 74 3 mg/dL    Protein Urine Random 8 mg/dL    Prot/Creat Ratio, Ur 0 11 (H) 0 00 - 0 10   PTH, intact   Result Value Ref Range    PTH 37 3 18 4 - 80 1 pg/mL   Urinalysis with microscopic   Result Value Ref Range    Clarity, UA Clear     Color, UA Yellow     Specific [place], UA 1 010 1 003 - 1 030    pH, UA 7 5 4 5 - 8 0    Glucose, UA Negative Negative mg/dl    Ketones, UA Negative Negative mg/dl    Blood, UA Negative Negative    Protein, UA Negative Negative mg/dl    Nitrite, UA Negative Negative    Bilirubin, UA Negative Negative    Urobilinogen, UA 0 2 0 2, 1 0 E U /dl E U /dl    Leukocytes, UA Moderate (A) Negative    WBC, UA 20-30 (A) None Seen, 0-5, 5-55, 5-65 /hpf    RBC, UA 0-1 (A) None Seen, 0-5 /hpf    Bacteria, UA Occasional None Seen, Occasional /hpf    Epithelial Cells Moderate (A) None Seen, Occasional /hpf   Microalbumin / creatinine urine ratio   Result Value Ref Range    Creatinine, Ur 73 3 mg/dL    Microalbum  ,U,Random 6 1 0 0 - 20 0 mg/L    Microalb Creat Ratio 8 0 - 30 mg/g creatinine   Magnesium   Result Value Ref Range    Magnesium 2 0 1 6 - 2 6 mg/dL   CBC   Result Value Ref Range    WBC 8 49 4 31 - 10 16 Thousand/uL    RBC 4 38 3 81 - 5 12 Million/uL    Hemoglobin 11 9 11 5 - 15 4 g/dL    Hematocrit 39 4 34 8 - 46 1 %    MCV 90 82 - 98 fL    MCH 27 2 26 8 - 34 3 pg    MCHC 30 2 (L) 31 4 - 37 4 g/dL    RDW 15 7 (H) 11 6 - 15 1 %    Platelets 496 085 - 953 Thousands/uL    MPV 9 9 8 9 - 12 7 fL   Iron Saturation %   Result Value Ref Range    Iron Saturation 14 %    TIBC 320 250 - 450 ug/dL    Iron 45 (L) 50 - 170 ug/dL   Ferritin   Result Value Ref Range    Ferritin 22 8 - 388 ng/mL   Basic metabolic panel   Result Value Ref Range    Sodium 135 (L) 136 - 145 mmol/L    Potassium 4 3 3 5 - 5 3 mmol/L    Chloride 99 (L) 100 - 108 mmol/L    CO2 23 21 - 32 mmol/L    ANION GAP 13 4 - 13 mmol/L    BUN 16 5 - 25 mg/dL    Creatinine 1 13 0 60 - 1 30 mg/dL    Glucose 89 65 - 140 mg/dL    Calcium 9 0 8 3 - 10 1 mg/dL    eGFR 47 ml/min/1 73sq m

## 2019-03-12 NOTE — PATIENT INSTRUCTIONS
Renal function stable, blood pressure well controlled    Continue same medication  Follow-up in 3 months with repeat labs

## 2019-03-13 ENCOUNTER — TELEPHONE (OUTPATIENT)
Dept: FAMILY MEDICINE CLINIC | Facility: CLINIC | Age: 79
End: 2019-03-13

## 2019-03-13 DIAGNOSIS — E78.2 MIXED HYPERLIPIDEMIA: Primary | ICD-10-CM

## 2019-03-13 RX ORDER — EZETIMIBE 10 MG/1
10 TABLET ORAL DAILY
Qty: 90 TABLET | Refills: 1 | Status: SHIPPED | OUTPATIENT
Start: 2019-03-13 | End: 2019-09-03 | Stop reason: SDUPTHER

## 2019-03-13 NOTE — TELEPHONE ENCOUNTER
Left detailed message advising pt of results and recommendations   Pt was advised to call back to confirm qty 30 or 90 for Zetia (non statin)

## 2019-03-13 NOTE — TELEPHONE ENCOUNTER
----- Message from Barclay Osler, MD sent at 3/12/2019  6:22 PM EDT -----  Sugar up a tad but lipids definitely up=-can pt take Zetia ( not a statin)?

## 2019-03-14 ENCOUNTER — TELEPHONE (OUTPATIENT)
Dept: NEPHROLOGY | Facility: CLINIC | Age: 79
End: 2019-03-14

## 2019-03-14 DIAGNOSIS — I10 ESSENTIAL HYPERTENSION: ICD-10-CM

## 2019-03-14 DIAGNOSIS — R80.1 PERSISTENT PROTEINURIA: ICD-10-CM

## 2019-03-14 RX ORDER — LISINOPRIL 2.5 MG/1
2.5 TABLET ORAL DAILY
Qty: 90 TABLET | Refills: 3 | Status: SHIPPED | OUTPATIENT
Start: 2019-03-14 | End: 2020-02-29

## 2019-03-14 NOTE — TELEPHONE ENCOUNTER
Patient is calling the office this morning asking if we can refill her Lisinopril 2 5 90 day supply to [facility]

## 2019-04-03 LAB
LEFT EYE DIABETIC RETINOPATHY: NORMAL
RIGHT EYE DIABETIC RETINOPATHY: NORMAL
SEVERITY (EYE EXAM): NORMAL

## 2019-04-08 DIAGNOSIS — E03.9 HYPOTHYROIDISM, UNSPECIFIED TYPE: ICD-10-CM

## 2019-04-08 RX ORDER — LEVOTHYROXINE SODIUM 0.05 MG/1
50 TABLET ORAL DAILY
Qty: 90 TABLET | Refills: 1 | Status: SHIPPED | OUTPATIENT
Start: 2019-04-08 | End: 2019-10-07 | Stop reason: SDUPTHER

## 2019-04-17 ENCOUNTER — OFFICE VISIT (OUTPATIENT)
Dept: FAMILY MEDICINE CLINIC | Facility: CLINIC | Age: 79
End: 2019-04-17
Payer: MEDICARE

## 2019-04-17 VITALS
BODY MASS INDEX: 35.38 KG/M2 | HEART RATE: 94 BPM | RESPIRATION RATE: 18 BRPM | DIASTOLIC BLOOD PRESSURE: 86 MMHG | SYSTOLIC BLOOD PRESSURE: 132 MMHG | WEIGHT: 164 LBS | TEMPERATURE: 98.3 F | HEIGHT: 57 IN

## 2019-04-17 DIAGNOSIS — Z78.0 POST-MENOPAUSAL: ICD-10-CM

## 2019-04-17 DIAGNOSIS — K21.9 GASTROESOPHAGEAL REFLUX DISEASE WITHOUT ESOPHAGITIS: ICD-10-CM

## 2019-04-17 DIAGNOSIS — Z00.00 MEDICARE ANNUAL WELLNESS VISIT, SUBSEQUENT: ICD-10-CM

## 2019-04-17 DIAGNOSIS — E11.22 TYPE 2 DIABETES MELLITUS WITH STAGE 3 CHRONIC KIDNEY DISEASE, WITHOUT LONG-TERM CURRENT USE OF INSULIN (HCC): Primary | ICD-10-CM

## 2019-04-17 DIAGNOSIS — Z12.39 SCREENING FOR BREAST CANCER: ICD-10-CM

## 2019-04-17 DIAGNOSIS — I10 ESSENTIAL HYPERTENSION: ICD-10-CM

## 2019-04-17 DIAGNOSIS — N18.30 TYPE 2 DIABETES MELLITUS WITH STAGE 3 CHRONIC KIDNEY DISEASE, WITHOUT LONG-TERM CURRENT USE OF INSULIN (HCC): Primary | ICD-10-CM

## 2019-04-17 DIAGNOSIS — Z23 NEED FOR PNEUMOCOCCAL VACCINATION: ICD-10-CM

## 2019-04-17 PROBLEM — Z01.818 PRE-OP EXAMINATION: Status: RESOLVED | Noted: 2018-06-15 | Resolved: 2019-04-17

## 2019-04-17 PROCEDURE — G0009 ADMIN PNEUMOCOCCAL VACCINE: HCPCS | Performed by: FAMILY MEDICINE

## 2019-04-17 PROCEDURE — 99214 OFFICE O/P EST MOD 30 MIN: CPT | Performed by: FAMILY MEDICINE

## 2019-04-17 PROCEDURE — 90670 PCV13 VACCINE IM: CPT | Performed by: FAMILY MEDICINE

## 2019-04-17 PROCEDURE — G0439 PPPS, SUBSEQ VISIT: HCPCS | Performed by: FAMILY MEDICINE

## 2019-04-17 RX ORDER — OMEPRAZOLE 20 MG/1
20 CAPSULE, DELAYED RELEASE ORAL DAILY
Qty: 90 CAPSULE | Refills: 1 | Status: SHIPPED | OUTPATIENT
Start: 2019-04-17 | End: 2019-07-17 | Stop reason: ALTCHOICE

## 2019-04-23 ENCOUNTER — TELEPHONE (OUTPATIENT)
Dept: FAMILY MEDICINE CLINIC | Facility: CLINIC | Age: 79
End: 2019-04-23

## 2019-04-30 ENCOUNTER — TELEPHONE (OUTPATIENT)
Dept: FAMILY MEDICINE CLINIC | Facility: CLINIC | Age: 79
End: 2019-04-30

## 2019-04-30 DIAGNOSIS — E11.22 TYPE 2 DIABETES MELLITUS WITH STAGE 3 CHRONIC KIDNEY DISEASE, WITHOUT LONG-TERM CURRENT USE OF INSULIN (HCC): Primary | ICD-10-CM

## 2019-04-30 DIAGNOSIS — N18.30 TYPE 2 DIABETES MELLITUS WITH STAGE 3 CHRONIC KIDNEY DISEASE, WITHOUT LONG-TERM CURRENT USE OF INSULIN (HCC): Primary | ICD-10-CM

## 2019-06-03 ENCOUNTER — LAB (OUTPATIENT)
Dept: LAB | Facility: HOSPITAL | Age: 79
End: 2019-06-03
Attending: INTERNAL MEDICINE
Payer: MEDICARE

## 2019-06-03 DIAGNOSIS — D50.9 IRON DEFICIENCY ANEMIA, UNSPECIFIED IRON DEFICIENCY ANEMIA TYPE: ICD-10-CM

## 2019-06-03 DIAGNOSIS — N18.30 CKD (CHRONIC KIDNEY DISEASE) STAGE 3, GFR 30-59 ML/MIN (HCC): ICD-10-CM

## 2019-06-03 LAB
ANION GAP SERPL CALCULATED.3IONS-SCNC: 9 MMOL/L (ref 4–13)
BUN SERPL-MCNC: 15 MG/DL (ref 5–25)
CALCIUM SERPL-MCNC: 9.6 MG/DL (ref 8.3–10.1)
CHLORIDE SERPL-SCNC: 99 MMOL/L (ref 100–108)
CO2 SERPL-SCNC: 27 MMOL/L (ref 21–32)
CREAT SERPL-MCNC: 1.12 MG/DL (ref 0.6–1.3)
CREAT UR-MCNC: 56.8 MG/DL
ERYTHROCYTE [DISTWIDTH] IN BLOOD BY AUTOMATED COUNT: 15.3 % (ref 11.6–15.1)
GFR SERPL CREATININE-BSD FRML MDRD: 48 ML/MIN/1.73SQ M
GLUCOSE P FAST SERPL-MCNC: 106 MG/DL (ref 65–99)
HCT VFR BLD AUTO: 39.1 % (ref 34.8–46.1)
HGB BLD-MCNC: 12.1 G/DL (ref 11.5–15.4)
MCH RBC QN AUTO: 27.5 PG (ref 26.8–34.3)
MCHC RBC AUTO-ENTMCNC: 30.9 G/DL (ref 31.4–37.4)
MCV RBC AUTO: 89 FL (ref 82–98)
PHOSPHATE SERPL-MCNC: 3.6 MG/DL (ref 2.3–4.1)
PLATELET # BLD AUTO: 390 THOUSANDS/UL (ref 149–390)
PMV BLD AUTO: 9.5 FL (ref 8.9–12.7)
POTASSIUM SERPL-SCNC: 4.2 MMOL/L (ref 3.5–5.3)
PROT UR-MCNC: <6 MG/DL
PROT/CREAT UR: <0.11 MG/G{CREAT} (ref 0–0.1)
RBC # BLD AUTO: 4.4 MILLION/UL (ref 3.81–5.12)
SODIUM SERPL-SCNC: 135 MMOL/L (ref 136–145)
WBC # BLD AUTO: 7.34 THOUSAND/UL (ref 4.31–10.16)

## 2019-06-03 PROCEDURE — 80048 BASIC METABOLIC PNL TOTAL CA: CPT

## 2019-06-03 PROCEDURE — 85027 COMPLETE CBC AUTOMATED: CPT

## 2019-06-03 PROCEDURE — 36415 COLL VENOUS BLD VENIPUNCTURE: CPT

## 2019-06-03 PROCEDURE — 84100 ASSAY OF PHOSPHORUS: CPT

## 2019-06-03 PROCEDURE — 84156 ASSAY OF PROTEIN URINE: CPT

## 2019-06-03 PROCEDURE — 82570 ASSAY OF URINE CREATININE: CPT

## 2019-06-04 ENCOUNTER — OFFICE VISIT (OUTPATIENT)
Dept: NEPHROLOGY | Facility: CLINIC | Age: 79
End: 2019-06-04
Payer: MEDICARE

## 2019-06-04 VITALS
HEIGHT: 59 IN | BODY MASS INDEX: 32.25 KG/M2 | DIASTOLIC BLOOD PRESSURE: 80 MMHG | RESPIRATION RATE: 16 BRPM | WEIGHT: 160 LBS | HEART RATE: 98 BPM | SYSTOLIC BLOOD PRESSURE: 118 MMHG

## 2019-06-04 DIAGNOSIS — N18.30 CKD (CHRONIC KIDNEY DISEASE) STAGE 3, GFR 30-59 ML/MIN (HCC): Primary | ICD-10-CM

## 2019-06-04 DIAGNOSIS — N18.30 BENIGN HYPERTENSION WITH CHRONIC KIDNEY DISEASE, STAGE III (HCC): ICD-10-CM

## 2019-06-04 DIAGNOSIS — I12.9 BENIGN HYPERTENSION WITH CHRONIC KIDNEY DISEASE, STAGE III (HCC): ICD-10-CM

## 2019-06-04 DIAGNOSIS — D50.9 IRON DEFICIENCY ANEMIA, UNSPECIFIED IRON DEFICIENCY ANEMIA TYPE: ICD-10-CM

## 2019-06-04 PROCEDURE — 99214 OFFICE O/P EST MOD 30 MIN: CPT | Performed by: INTERNAL MEDICINE

## 2019-07-16 ENCOUNTER — LAB (OUTPATIENT)
Dept: LAB | Facility: HOSPITAL | Age: 79
End: 2019-07-16
Payer: MEDICARE

## 2019-07-16 DIAGNOSIS — E11.22 TYPE 2 DIABETES MELLITUS WITH STAGE 3 CHRONIC KIDNEY DISEASE, WITHOUT LONG-TERM CURRENT USE OF INSULIN (HCC): ICD-10-CM

## 2019-07-16 DIAGNOSIS — N18.30 TYPE 2 DIABETES MELLITUS WITH STAGE 3 CHRONIC KIDNEY DISEASE, WITHOUT LONG-TERM CURRENT USE OF INSULIN (HCC): ICD-10-CM

## 2019-07-16 LAB
ALBUMIN SERPL BCP-MCNC: 3.4 G/DL (ref 3.5–5)
ALP SERPL-CCNC: 95 U/L (ref 46–116)
ALT SERPL W P-5'-P-CCNC: 21 U/L (ref 12–78)
ANION GAP SERPL CALCULATED.3IONS-SCNC: 7 MMOL/L (ref 4–13)
AST SERPL W P-5'-P-CCNC: 16 U/L (ref 5–45)
BILIRUB SERPL-MCNC: 0.26 MG/DL (ref 0.2–1)
BUN SERPL-MCNC: 15 MG/DL (ref 5–25)
CALCIUM SERPL-MCNC: 9.6 MG/DL (ref 8.3–10.1)
CHLORIDE SERPL-SCNC: 101 MMOL/L (ref 100–108)
CHOLEST SERPL-MCNC: 190 MG/DL (ref 50–200)
CO2 SERPL-SCNC: 28 MMOL/L (ref 21–32)
CREAT SERPL-MCNC: 1.17 MG/DL (ref 0.6–1.3)
EST. AVERAGE GLUCOSE BLD GHB EST-MCNC: 140 MG/DL
GFR SERPL CREATININE-BSD FRML MDRD: 45 ML/MIN/1.73SQ M
GLUCOSE P FAST SERPL-MCNC: 115 MG/DL (ref 65–99)
HBA1C MFR BLD: 6.5 % (ref 4.2–6.3)
HDLC SERPL-MCNC: 39 MG/DL (ref 40–60)
LDLC SERPL CALC-MCNC: 109 MG/DL (ref 0–100)
POTASSIUM SERPL-SCNC: 4.3 MMOL/L (ref 3.5–5.3)
PROT SERPL-MCNC: 8.5 G/DL (ref 6.4–8.2)
SODIUM SERPL-SCNC: 136 MMOL/L (ref 136–145)
TRIGL SERPL-MCNC: 212 MG/DL

## 2019-07-16 PROCEDURE — 83036 HEMOGLOBIN GLYCOSYLATED A1C: CPT

## 2019-07-16 PROCEDURE — 80053 COMPREHEN METABOLIC PANEL: CPT

## 2019-07-16 PROCEDURE — 80061 LIPID PANEL: CPT

## 2019-07-16 PROCEDURE — 36415 COLL VENOUS BLD VENIPUNCTURE: CPT

## 2019-07-17 ENCOUNTER — TELEPHONE (OUTPATIENT)
Dept: FAMILY MEDICINE CLINIC | Facility: CLINIC | Age: 79
End: 2019-07-17

## 2019-07-17 ENCOUNTER — OFFICE VISIT (OUTPATIENT)
Dept: FAMILY MEDICINE CLINIC | Facility: CLINIC | Age: 79
End: 2019-07-17
Payer: MEDICARE

## 2019-07-17 VITALS
TEMPERATURE: 99 F | HEIGHT: 58 IN | WEIGHT: 158 LBS | DIASTOLIC BLOOD PRESSURE: 86 MMHG | BODY MASS INDEX: 33.17 KG/M2 | SYSTOLIC BLOOD PRESSURE: 126 MMHG | RESPIRATION RATE: 16 BRPM | HEART RATE: 88 BPM

## 2019-07-17 DIAGNOSIS — E03.9 HYPOTHYROIDISM, UNSPECIFIED TYPE: ICD-10-CM

## 2019-07-17 DIAGNOSIS — I10 ESSENTIAL HYPERTENSION: ICD-10-CM

## 2019-07-17 DIAGNOSIS — E11.22 TYPE 2 DIABETES MELLITUS WITH STAGE 3 CHRONIC KIDNEY DISEASE, WITHOUT LONG-TERM CURRENT USE OF INSULIN (HCC): Primary | ICD-10-CM

## 2019-07-17 DIAGNOSIS — N18.30 TYPE 2 DIABETES MELLITUS WITH STAGE 3 CHRONIC KIDNEY DISEASE, WITHOUT LONG-TERM CURRENT USE OF INSULIN (HCC): Primary | ICD-10-CM

## 2019-07-17 PROCEDURE — 99213 OFFICE O/P EST LOW 20 MIN: CPT | Performed by: FAMILY MEDICINE

## 2019-07-17 RX ORDER — FAMOTIDINE 40 MG/1
TABLET, FILM COATED ORAL
COMMUNITY
Start: 2019-06-06 | End: 2021-05-28 | Stop reason: SDUPTHER

## 2019-07-17 NOTE — PROGRESS NOTES
Assessment/Plan:    Type 2 diabetes mellitus with stage 3 chronic kidney disease, without long-term current use of insulin ([facility])  Lab Results   Component Value Date    HGBA1C 6 5 (H) 07/16/2019     Lab improving  No results for input(s): POCGLU in the last 72 hours  Blood Sugar Average: Last 72 hrs:      Essential hypertension  BP stable    Hyperlipidemia  Improved lab       Diagnoses and all orders for this visit:    Type 2 diabetes mellitus with stage 3 chronic kidney disease, without long-term current use of insulin ([facility])  -     Comprehensive metabolic panel; Future  -     Lipid Panel with Direct LDL reflex; Future  -     Hemoglobin A1C; Future    Hypothyroidism, unspecified type  -     TSH, 3rd generation; Future    Essential hypertension  -     TSH, 3rd generation; Future  -     CBC and differential; Future    Other orders  -     famotidine (PEPCID) 40 MG tablet          Subjective:      Patient ID: Eden Belle is a 70 y o  female  F/u htn, dm and lipids      The following portions of the patient's history were reviewed and updated as appropriate: allergies, current medications, past family history, past medical history, past social history, past surgical history and problem list       Review of Systems   Constitutional: Positive for fatigue  Negative for activity change, appetite change and unexpected weight change  Respiratory: Negative for shortness of breath  Cardiovascular: Negative for chest pain  Gastrointestinal: Positive for constipation  Musculoskeletal: Negative for back pain  Objective:      /86 (BP Location: Right arm, Patient Position: Sitting, Cuff Size: Adult)   Pulse 88   Temp 99 °F (37 2 °C) (Temporal)   Resp 16   Ht 4' 10" (1 473 m)   Wt 71 7 kg (158 lb)   BMI 33 02 kg/m²          Physical Exam   Constitutional: She appears well-developed and well-nourished  Neck: No thyromegaly present     Cardiovascular: Normal rate, regular rhythm and normal heart sounds  Pulmonary/Chest: Effort normal and breath sounds normal    Musculoskeletal: She exhibits no edema  Lymphadenopathy:     She has no cervical adenopathy  Vitals reviewed

## 2019-07-17 NOTE — TELEPHONE ENCOUNTER
----- Message from Regina Gregory MD sent at 7/16/2019  5:14 PM EDT -----  Lab improving, no changes meds

## 2019-07-17 NOTE — ASSESSMENT & PLAN NOTE
Lab Results   Component Value Date    HGBA1C 6 5 (H) 07/16/2019     Lab improving  No results for input(s): POCGLU in the last 72 hours      Blood Sugar Average: Last 72 hrs:

## 2019-07-24 ENCOUNTER — HOSPITAL ENCOUNTER (OUTPATIENT)
Dept: BONE DENSITY | Facility: MEDICAL CENTER | Age: 79
Discharge: HOME | End: 2019-07-24
Payer: MEDICARE

## 2019-07-24 ENCOUNTER — HOSPITAL ENCOUNTER (OUTPATIENT)
Dept: MAMMOGRAPHY | Facility: MEDICAL CENTER | Age: 79
Discharge: HOME | End: 2019-07-24
Payer: MEDICARE

## 2019-07-24 ENCOUNTER — TELEPHONE (OUTPATIENT)
Dept: FAMILY MEDICINE CLINIC | Facility: CLINIC | Age: 79
End: 2019-07-24

## 2019-07-24 VITALS — BODY MASS INDEX: 33.17 KG/M2 | WEIGHT: 158 LBS | HEIGHT: 58 IN

## 2019-07-24 DIAGNOSIS — Z12.39 SCREENING FOR BREAST CANCER: ICD-10-CM

## 2019-07-24 DIAGNOSIS — Z78.0 POST-MENOPAUSAL: ICD-10-CM

## 2019-07-24 PROCEDURE — 77067 SCR MAMMO BI INCL CAD: CPT

## 2019-07-24 PROCEDURE — 77063 BREAST TOMOSYNTHESIS BI: CPT

## 2019-07-24 PROCEDURE — 77080 DXA BONE DENSITY AXIAL: CPT

## 2019-07-25 ENCOUNTER — TELEPHONE (OUTPATIENT)
Dept: FAMILY MEDICINE CLINIC | Facility: CLINIC | Age: 79
End: 2019-07-25

## 2019-07-25 DIAGNOSIS — M81.0 AGE-RELATED OSTEOPOROSIS WITHOUT CURRENT PATHOLOGICAL FRACTURE: Primary | ICD-10-CM

## 2019-07-25 NOTE — TELEPHONE ENCOUNTER
----- Message from Elliot Anderson MD sent at 7/25/2019  9:46 AM EDT -----  dexa shows osteoporosis-rec lab to check for underlying causes and tnen rec ov to discuss options when lab back

## 2019-07-26 ENCOUNTER — TELEPHONE (OUTPATIENT)
Dept: FAMILY MEDICINE CLINIC | Facility: CLINIC | Age: 79
End: 2019-07-26

## 2019-07-26 ENCOUNTER — LAB (OUTPATIENT)
Dept: LAB | Facility: CLINIC | Age: 79
End: 2019-07-26
Payer: MEDICARE

## 2019-07-26 DIAGNOSIS — M81.0 AGE-RELATED OSTEOPOROSIS WITHOUT CURRENT PATHOLOGICAL FRACTURE: ICD-10-CM

## 2019-07-26 LAB
25(OH)D3 SERPL-MCNC: 27.7 NG/ML (ref 30–100)
ALBUMIN SERPL BCP-MCNC: 3.5 G/DL (ref 3.5–5)
ALP SERPL-CCNC: 98 U/L (ref 46–116)
ALT SERPL W P-5'-P-CCNC: 20 U/L (ref 12–78)
ANION GAP SERPL CALCULATED.3IONS-SCNC: 4 MMOL/L (ref 4–13)
AST SERPL W P-5'-P-CCNC: 12 U/L (ref 5–45)
BILIRUB SERPL-MCNC: 0.24 MG/DL (ref 0.2–1)
BUN SERPL-MCNC: 16 MG/DL (ref 5–25)
CALCIUM SERPL-MCNC: 9.4 MG/DL (ref 8.3–10.1)
CHLORIDE SERPL-SCNC: 100 MMOL/L (ref 100–108)
CO2 SERPL-SCNC: 29 MMOL/L (ref 21–32)
CREAT SERPL-MCNC: 1.16 MG/DL (ref 0.6–1.3)
GFR SERPL CREATININE-BSD FRML MDRD: 46 ML/MIN/1.73SQ M
GLUCOSE P FAST SERPL-MCNC: 102 MG/DL (ref 65–99)
POTASSIUM SERPL-SCNC: 4.3 MMOL/L (ref 3.5–5.3)
PROT SERPL-MCNC: 8.4 G/DL (ref 6.4–8.2)
PTH-INTACT SERPL-MCNC: 29.4 PG/ML (ref 18.4–80.1)
SODIUM SERPL-SCNC: 133 MMOL/L (ref 136–145)

## 2019-07-26 PROCEDURE — 36415 COLL VENOUS BLD VENIPUNCTURE: CPT

## 2019-07-26 PROCEDURE — 80053 COMPREHEN METABOLIC PANEL: CPT

## 2019-07-26 PROCEDURE — 82306 VITAMIN D 25 HYDROXY: CPT

## 2019-07-26 PROCEDURE — 83970 ASSAY OF PARATHORMONE: CPT

## 2019-07-26 NOTE — TELEPHONE ENCOUNTER
----- Message from Mane Gusman MD sent at 7/26/2019  3:24 PM EDT -----  No underlying cause for osteoporosis seen

## 2019-09-03 DIAGNOSIS — E78.2 MIXED HYPERLIPIDEMIA: ICD-10-CM

## 2019-09-03 RX ORDER — EZETIMIBE 10 MG/1
10 TABLET ORAL DAILY
Qty: 90 TABLET | Refills: 1 | Status: SHIPPED | OUTPATIENT
Start: 2019-09-03 | End: 2020-03-01

## 2019-09-06 ENCOUNTER — OFFICE VISIT (OUTPATIENT)
Dept: OBGYN CLINIC | Facility: HOSPITAL | Age: 79
End: 2019-09-06
Payer: MEDICARE

## 2019-09-06 ENCOUNTER — HOSPITAL ENCOUNTER (OUTPATIENT)
Dept: RADIOLOGY | Facility: HOSPITAL | Age: 79
Discharge: HOME | End: 2019-09-06
Attending: ORTHOPAEDIC SURGERY
Payer: MEDICARE

## 2019-09-06 VITALS
DIASTOLIC BLOOD PRESSURE: 91 MMHG | WEIGHT: 159.6 LBS | SYSTOLIC BLOOD PRESSURE: 134 MMHG | HEIGHT: 57 IN | HEART RATE: 103 BPM | BODY MASS INDEX: 34.43 KG/M2

## 2019-09-06 DIAGNOSIS — Z01.89 ENCOUNTER FOR LOWER EXTREMITY COMPARISON IMAGING STUDY: ICD-10-CM

## 2019-09-06 DIAGNOSIS — M25.561 ACUTE PAIN OF RIGHT KNEE: ICD-10-CM

## 2019-09-06 DIAGNOSIS — M25.561 ACUTE PAIN OF RIGHT KNEE: Primary | ICD-10-CM

## 2019-09-06 DIAGNOSIS — M17.11 PRIMARY OSTEOARTHRITIS OF RIGHT KNEE: ICD-10-CM

## 2019-09-06 PROCEDURE — 99213 OFFICE O/P EST LOW 20 MIN: CPT | Performed by: ORTHOPAEDIC SURGERY

## 2019-09-06 PROCEDURE — 73562 X-RAY EXAM OF KNEE 3: CPT

## 2019-09-06 PROCEDURE — 20610 DRAIN/INJ JOINT/BURSA W/O US: CPT | Performed by: ORTHOPAEDIC SURGERY

## 2019-09-06 RX ORDER — LIDOCAINE HYDROCHLORIDE 10 MG/ML
4 INJECTION, SOLUTION INFILTRATION; PERINEURAL
Status: COMPLETED | OUTPATIENT
Start: 2019-09-06 | End: 2019-09-06

## 2019-09-06 RX ORDER — TRIAMCINOLONE ACETONIDE 40 MG/ML
40 INJECTION, SUSPENSION INTRA-ARTICULAR; INTRAMUSCULAR
Status: COMPLETED | OUTPATIENT
Start: 2019-09-06 | End: 2019-09-06

## 2019-09-06 RX ADMIN — LIDOCAINE HYDROCHLORIDE 4 ML: 10 INJECTION, SOLUTION INFILTRATION; PERINEURAL at 12:08

## 2019-09-06 RX ADMIN — TRIAMCINOLONE ACETONIDE 40 MG: 40 INJECTION, SUSPENSION INTRA-ARTICULAR; INTRAMUSCULAR at 12:08

## 2019-09-06 NOTE — PROGRESS NOTES
Chief Complaint   Patient presents with    Right Knee - Follow-up         Assessment:  Right knee osteoarthritis    Plan:  1  I explained the natural history of the  problem to the  patient - you only have mild arthritis in the knee  There is no surgical intervention necessary and should improve with nonoperative treatment  2  Activity modification - avoid hyperflexion like bending, kneeling, squatting,  stairs as  much as humanly possible  Change position frequently to straighten your leg if you are sitting for a long period of time  3  Pain should be the warning guide to your activities - both during and after exercises  Stop doing your sport or activity if you are getting significant pain  4  Ice in large trash bag or bag of frozen peas for 15 min 4x a day, if needed, for pain or swelling  Heat is not indicated  5  Take Advil, Aleve, or Tylenol on an as-needed basis for pain  6  Do the isometric quad and hamstring exercises that you were shown diligently at  home  7  Gliding activities (skiing machine, elliptical, swimming) are allowed, but do not do pounding activities  ( treadmill, aerobics, distance walking )  8  I injected her right knee with Novocain and cortisone today to decrease pain and swelling    HPI:  This is a 70-year-old white female presenting today for orthopedic evaluation regarding her right knee pain  She is a previous Dr Kristan Phoenix patient but was unable to schedule as soon as she would like with him  She was last seen by him for her right knee pain about 3 years ago were she received a cortisone injection in this knee  She states that this provided her with great relief until recently  She had severe pain for 2 days and was unable to walk  She used a cane during this time of increased pain  She has also been taking Tylenol regularly for pain control  She localizes her pain to the anterior knee, this is not radiate not associated numbness or tingling      Past medical history, family history,  social history, medication history, and allergies are reviewed  These include GERD, colitis, hypothyroidism, diabetes, hypertension, and anxiety  Please see HPI for pertinent review of systems  All other systems reviewed are negative  Exam: /91   Pulse 103   Ht 4' 9" (1 448 m)   Wt 72 4 kg (159 lb 9 6 oz)   BMI 34 54 kg/m²    On today's exam of the right knee, there was no swelling, ecchymosis, redness or signs of infection  The skin was warm dry and well perfused  She was not using any external walking aids today  She was tender palpation over the anterior knee  There was some crepitus with flexion and extension  Range of motion of the right knee went from 0 to 115°  The knee was stable to varus/valgus and AP stress  She normal sensation to light touch with brisk capillary refill in all toes  2+ DP and PT pulses  There is no calf tenderness, popliteal adenopathy or cellulitis noted  Studies Reviewed:  I personally reviewed bilateral knee x-rays on 09/06/2019  These showed minimal medial compartment narrowing of the right knee  The left knee is relatively normal as is the patellofemoral joint  There are no loose bodies, lytic areas, or soft tissue calcification      Large joint arthrocentesis: R knee  Date/Time: 9/6/2019 12:08 PM  Consent given by: patient  Site marked: site marked  Supporting Documentation  Indications: pain   Procedure Details  Location: knee - R knee  Preparation: Patient was prepped and draped in the usual sterile fashion  Needle size: 22 G  Ultrasound guidance: no  Approach: Inferior lateral   Medications administered: 4 mL lidocaine 1 %; 40 mg triamcinolone acetonide 40 mg/mL    Patient tolerance: patient tolerated the procedure well with no immediate complications  Dressing:  Sterile dressing applied            Scribe Attestation    I,:   Tam Mckoy MA am acting as a scribe while in the presence of the attending physician :        I,:   Eliseo Lau Fernando Gill MD personally performed the services described in this documentation    as scribed in my presence :

## 2019-09-06 NOTE — PATIENT INSTRUCTIONS
Plan:  1  I explained the natural history of the  problem to the  patient - you only have mild arthritis in the knee  There is no surgical intervention necessary and should improve with nonoperative treatment  2  Activity modification - avoid hyperflexion like bending, kneeling, squatting,  stairs as  much as humanly possible  Change position frequently to straighten your leg if you are sitting for a long period of time  3  Pain should be the warning guide to your activities - both during and after exercises  Stop doing your sport or activity if you are getting significant pain  4  Ice in large trash bag or bag of frozen peas for 15 min 4x a day, if needed, for pain or swelling  Heat is not indicated  5  Take Advil, Aleve, or Tylenol on an as-needed basis for pain  6  Do the isometric quad and hamstring exercises that you were shown diligently at  home  7  Gliding activities (skiing machine, elliptical, swimming) are allowed, but do not do pounding activities  ( treadmill, aerobics, distance walking )     8  I injected her right knee with Novocain and cortisone today to decrease pain and swelling

## 2019-10-07 DIAGNOSIS — E03.9 HYPOTHYROIDISM, UNSPECIFIED TYPE: ICD-10-CM

## 2019-10-07 RX ORDER — LEVOTHYROXINE SODIUM 0.05 MG/1
50 TABLET ORAL DAILY
Qty: 90 TABLET | Refills: 1 | Status: SHIPPED | OUTPATIENT
Start: 2019-10-07 | End: 2020-03-30 | Stop reason: SDUPTHER

## 2019-11-15 ENCOUNTER — LAB (OUTPATIENT)
Dept: LAB | Facility: CLINIC | Age: 79
End: 2019-11-15
Payer: MEDICARE

## 2019-11-15 DIAGNOSIS — N18.30 TYPE 2 DIABETES MELLITUS WITH STAGE 3 CHRONIC KIDNEY DISEASE, WITHOUT LONG-TERM CURRENT USE OF INSULIN (HCC): ICD-10-CM

## 2019-11-15 DIAGNOSIS — E11.22 TYPE 2 DIABETES MELLITUS WITH STAGE 3 CHRONIC KIDNEY DISEASE, WITHOUT LONG-TERM CURRENT USE OF INSULIN (HCC): ICD-10-CM

## 2019-11-15 DIAGNOSIS — I10 ESSENTIAL HYPERTENSION: ICD-10-CM

## 2019-11-15 DIAGNOSIS — E03.9 HYPOTHYROIDISM, UNSPECIFIED TYPE: ICD-10-CM

## 2019-11-15 LAB
ALBUMIN SERPL BCP-MCNC: 3.6 G/DL (ref 3.5–5)
ALP SERPL-CCNC: 82 U/L (ref 46–116)
ALT SERPL W P-5'-P-CCNC: 17 U/L (ref 12–78)
ANION GAP SERPL CALCULATED.3IONS-SCNC: 9 MMOL/L (ref 4–13)
AST SERPL W P-5'-P-CCNC: 10 U/L (ref 5–45)
BASOPHILS # BLD AUTO: 0.12 THOUSANDS/ΜL (ref 0–0.1)
BASOPHILS NFR BLD AUTO: 2 % (ref 0–1)
BILIRUB SERPL-MCNC: 0.33 MG/DL (ref 0.2–1)
BUN SERPL-MCNC: 16 MG/DL (ref 5–25)
CALCIUM SERPL-MCNC: 9 MG/DL (ref 8.3–10.1)
CHLORIDE SERPL-SCNC: 102 MMOL/L (ref 100–108)
CHOLEST SERPL-MCNC: 179 MG/DL (ref 50–200)
CO2 SERPL-SCNC: 25 MMOL/L (ref 21–32)
CREAT SERPL-MCNC: 1.09 MG/DL (ref 0.6–1.3)
EOSINOPHIL # BLD AUTO: 0.24 THOUSAND/ΜL (ref 0–0.61)
EOSINOPHIL NFR BLD AUTO: 3 % (ref 0–6)
ERYTHROCYTE [DISTWIDTH] IN BLOOD BY AUTOMATED COUNT: 15.2 % (ref 11.6–15.1)
EST. AVERAGE GLUCOSE BLD GHB EST-MCNC: 131 MG/DL
GFR SERPL CREATININE-BSD FRML MDRD: 49 ML/MIN/1.73SQ M
GLUCOSE P FAST SERPL-MCNC: 97 MG/DL (ref 65–99)
HBA1C MFR BLD: 6.2 % (ref 4.2–6.3)
HCT VFR BLD AUTO: 38.6 % (ref 34.8–46.1)
HDLC SERPL-MCNC: 35 MG/DL
HGB BLD-MCNC: 12.1 G/DL (ref 11.5–15.4)
IMM GRANULOCYTES # BLD AUTO: 0.02 THOUSAND/UL (ref 0–0.2)
IMM GRANULOCYTES NFR BLD AUTO: 0 % (ref 0–2)
LDLC SERPL CALC-MCNC: 98 MG/DL (ref 0–100)
LYMPHOCYTES # BLD AUTO: 3.32 THOUSANDS/ΜL (ref 0.6–4.47)
LYMPHOCYTES NFR BLD AUTO: 43 % (ref 14–44)
MCH RBC QN AUTO: 27.4 PG (ref 26.8–34.3)
MCHC RBC AUTO-ENTMCNC: 31.3 G/DL (ref 31.4–37.4)
MCV RBC AUTO: 87 FL (ref 82–98)
MONOCYTES # BLD AUTO: 0.55 THOUSAND/ΜL (ref 0.17–1.22)
MONOCYTES NFR BLD AUTO: 7 % (ref 4–12)
NEUTROPHILS # BLD AUTO: 3.47 THOUSANDS/ΜL (ref 1.85–7.62)
NEUTS SEG NFR BLD AUTO: 45 % (ref 43–75)
NRBC BLD AUTO-RTO: 0 /100 WBCS
PLATELET # BLD AUTO: 448 THOUSANDS/UL (ref 149–390)
PMV BLD AUTO: 9.7 FL (ref 8.9–12.7)
POTASSIUM SERPL-SCNC: 4 MMOL/L (ref 3.5–5.3)
PROT SERPL-MCNC: 7.8 G/DL (ref 6.4–8.2)
RBC # BLD AUTO: 4.42 MILLION/UL (ref 3.81–5.12)
SODIUM SERPL-SCNC: 136 MMOL/L (ref 136–145)
TRIGL SERPL-MCNC: 231 MG/DL
TSH SERPL DL<=0.05 MIU/L-ACNC: 3.41 UIU/ML (ref 0.36–3.74)
WBC # BLD AUTO: 7.72 THOUSAND/UL (ref 4.31–10.16)

## 2019-11-15 PROCEDURE — 85025 COMPLETE CBC W/AUTO DIFF WBC: CPT

## 2019-11-15 PROCEDURE — 84443 ASSAY THYROID STIM HORMONE: CPT

## 2019-11-15 PROCEDURE — 80061 LIPID PANEL: CPT

## 2019-11-15 PROCEDURE — 83036 HEMOGLOBIN GLYCOSYLATED A1C: CPT

## 2019-11-15 PROCEDURE — 80053 COMPREHEN METABOLIC PANEL: CPT

## 2019-11-15 PROCEDURE — 36415 COLL VENOUS BLD VENIPUNCTURE: CPT

## 2019-11-22 ENCOUNTER — OFFICE VISIT (OUTPATIENT)
Dept: FAMILY MEDICINE CLINIC | Facility: CLINIC | Age: 79
End: 2019-11-22
Payer: MEDICARE

## 2019-11-22 VITALS
SYSTOLIC BLOOD PRESSURE: 132 MMHG | HEART RATE: 72 BPM | DIASTOLIC BLOOD PRESSURE: 78 MMHG | WEIGHT: 156.8 LBS | BODY MASS INDEX: 33.83 KG/M2 | HEIGHT: 57 IN

## 2019-11-22 DIAGNOSIS — I10 ESSENTIAL HYPERTENSION: ICD-10-CM

## 2019-11-22 DIAGNOSIS — E03.9 HYPOTHYROIDISM, UNSPECIFIED TYPE: Primary | ICD-10-CM

## 2019-11-22 DIAGNOSIS — E11.22 TYPE 2 DIABETES MELLITUS WITH STAGE 3 CHRONIC KIDNEY DISEASE, WITHOUT LONG-TERM CURRENT USE OF INSULIN (HCC): ICD-10-CM

## 2019-11-22 DIAGNOSIS — N18.30 TYPE 2 DIABETES MELLITUS WITH STAGE 3 CHRONIC KIDNEY DISEASE, WITHOUT LONG-TERM CURRENT USE OF INSULIN (HCC): ICD-10-CM

## 2019-11-22 DIAGNOSIS — E78.5 HYPERLIPIDEMIA, UNSPECIFIED HYPERLIPIDEMIA TYPE: ICD-10-CM

## 2019-11-22 DIAGNOSIS — E78.2 MIXED HYPERLIPIDEMIA: Primary | ICD-10-CM

## 2019-11-22 PROBLEM — R10.9 ABDOMINAL PAIN: Status: RESOLVED | Noted: 2018-05-02 | Resolved: 2019-11-22

## 2019-11-22 PROBLEM — D64.9 ANEMIA: Status: RESOLVED | Noted: 2018-11-29 | Resolved: 2019-11-22

## 2019-11-22 PROBLEM — R82.90 ABNORMAL URINE FINDING: Status: RESOLVED | Noted: 2018-11-29 | Resolved: 2019-11-22

## 2019-11-22 PROCEDURE — 99214 OFFICE O/P EST MOD 30 MIN: CPT | Performed by: FAMILY MEDICINE

## 2019-11-22 NOTE — PATIENT INSTRUCTIONS
[facility]    Obesity   AMBULATORY CARE:   Obesity  is when your body mass index (BMI) is greater than 30  Your healthcare provider will use your height and weight to measure your BMI  The risks of obesity include  many health problems, such as injuries or physical disability  You may need tests to check for the following:  · Diabetes     · High blood pressure or high cholesterol     · Heart disease     · Gallbladder or liver disease     · Cancer of the colon, breast, prostate, liver, or kidney     · Sleep apnea     · Arthritis or gout  Seek care immediately if:   · You have a severe headache, confusion, or difficulty speaking  · You have weakness on one side of your body  · You have chest pain, sweating, or shortness of breath  Contact your healthcare provider if:   · You have symptoms of gallbladder or liver disease, such as pain in your upper abdomen  · You have knee or hip pain and discomfort while walking  · You have symptoms of diabetes, such as intense hunger and thirst, and frequent urination  · You have symptoms of sleep apnea, such as snoring or daytime sleepiness  · You have questions or concerns about your condition or care  Treatment for obesity  focuses on helping you lose weight to improve your health  Even a small decrease in BMI can reduce the risk for many health problems  Your healthcare provider will help you set a weight-loss goal   · Lifestyle changes  are the first step in treating obesity  These include making healthy food choices and getting regular physical activity  Your healthcare provider may suggest a weight-loss program that involves coaching, education, and therapy  · Medicine  may help you lose weight when it is used with a healthy diet and physical activity  · Surgery  can help you lose weight if you are very obese and have other health problems  There are several types of weight-loss surgery   Ask your healthcare provider for more information  Be successful losing weight:   · Set small, realistic goals  An example of a small goal is to walk for 20 minutes 5 days a week  Anther goal is to lose 5% of your body weight  · Tell friends, family members, and coworkers about your goals  and ask for their support  Ask a friend to lose weight with you, or join a weight-loss support group  · Identify foods or triggers that may cause you to overeat , and find ways to avoid them  Remove tempting high-calorie foods from your home and workplace  Place a bowl of fresh fruit on your kitchen counter  If stress causes you to eat, then find other ways to cope with stress  · Keep a diary to track what you eat and drink  Also write down how many minutes of physical activity you do each day  Weigh yourself once a week and record it in your diary  Eating changes: You will need to eat 500 to 1,000 fewer calories each day than you currently eat to lose 1 to 2 pounds a week  The following changes will help you cut calories:  · Eat smaller portions  Use small plates, no larger than 9 inches in diameter  Fill your plate half full of fruits and vegetables  Measure your food using measuring cups until you know what a serving size looks like  · Eat 3 meals and 1 or 2 snacks each day  Plan your meals in advance  Janeth Beaver and eat at home most of the time  Eat slowly  · Eat fruits and vegetables at every meal   They are low in calories and high in fiber, which makes you feel full  Do not add butter, margarine, or cream sauce to vegetables  Use herbs to season steamed vegetables  · Eat less fat and fewer fried foods  Eat more baked or grilled chicken and fish  These protein sources are lower in calories and fat than red meat  Limit fast food  Dress your salads with olive oil and vinegar instead of bottled dressing  · Limit the amount of sugar you eat  Do not drink sugary beverages  Limit alcohol    Activity changes:  Physical activity is good for your body in many ways  It helps you burn calories and build strong muscles  It decreases stress and depression, and improves your mood  It can also help you sleep better  Talk to your healthcare provider before you begin an exercise program   · Exercise for at least 30 minutes 5 days a week  Start slowly  Set aside time each day for physical activity that you enjoy and that is convenient for you  It is best to do both weight training and an activity that increases your heart rate, such as walking, bicycling, or swimming  · Find ways to be more active  Do yard work and housecleaning  Walk up the stairs instead of using elevators  Spend your leisure time going to events that require walking, such as outdoor festivals or fairs  This extra physical activity can help you lose weight and keep it off  Follow up with your healthcare provider as directed: You may need to meet with a dietitian  Write down your questions so you remember to ask them during your visits  © 2017 [address] Information is for End User's use only and may not be sold, redistributed or otherwise used for commercial purposes  All illustrations and images included in CareNotes® are the copyrighted property of [employer]  or [employer]  The above information is an  only  It is not intended as medical advice for individual conditions or treatments  Talk to your doctor, nurse or pharmacist before following any medical regimen to see if it is safe and effective for you  Weight Management   AMBULATORY CARE:   Why it is important to manage your weight:  Being overweight increases your risk of health conditions such as heart disease, high blood pressure, type 2 diabetes, and certain types of cancer  It can also increase your risk for osteoarthritis, sleep apnea, and other respiratory problems  Aim for a slow, steady weight loss   Even a small amount of weight loss can lower your risk of health problems  How to lose weight safely:  A safe and healthy way to lose weight is to eat fewer calories and get regular exercise  You can lose up about 1 pound a week by decreasing the number of calories you eat by 500 calories each day  You can decrease calories by eating smaller portion sizes or by cutting out high-calorie foods  Read labels to find out how many calories are in the foods you eat  You can also burn calories with exercise such as walking, swimming, or biking  You will be more likely to keep weight off if you make these changes part of your lifestyle  Healthy meal plan for weight management:  A healthy meal plan includes a variety of foods, contains fewer calories, and helps you stay healthy  A healthy meal plan includes the following:  · Eat whole-grain foods more often  A healthy meal plan should contain fiber  Fiber is the part of grains, fruits, and vegetables that is not broken down by your body  Whole-grain foods are healthy and provide extra fiber in your diet  Some examples of whole-grain foods are whole-wheat breads and pastas, oatmeal, brown rice, and bulgur  · Eat a variety of vegetables every day  Include dark, leafy greens such as spinach, kale, chelsea greens, and mustard greens  Eat yellow and orange vegetables such as carrots, sweet potatoes, and winter squash  · Eat a variety of fruits every day  Choose fresh or canned fruit (canned in its own juice or light syrup) instead of juice  Fruit juice has very little or no fiber  · Eat low-fat dairy foods  Drink fat-free (skim) milk or 1% milk  Eat fat-free yogurt and low-fat cottage cheese  Try low-fat cheeses such as mozzarella and other reduced-fat cheeses  · Choose meat and other protein foods that are low in fat  Choose beans or other legumes such as split peas or lentils  Choose fish, skinless poultry (chicken or turkey), or lean cuts of red meat (beef or pork)   Before you cook meat or poultry, cut off any visible fat      · Use less fat and oil  Try baking foods instead of frying them  Add less fat, such as margarine, sour cream, regular salad dressing and mayonnaise to foods  Eat fewer high-fat foods  Some examples of high-fat foods include french fries, doughnuts, ice cream, and cakes  · Eat fewer sweets  Limit foods and drinks that are high in sugar  This includes candy, cookies, regular soda, and sweetened drinks  Ways to decrease calories:   · Eat smaller portions  ¨ Use a small plate with smaller servings  ¨ Do not eat second helpings  ¨ When you eat at a restaurant, ask for a box and place half of your meal in the box before you eat  ¨ Share an entrée with someone else  · Replace high-calorie snacks with healthy, low-calorie snacks  ¨ Choose fresh fruit, vegetables, fat-free rice cakes, or air-popped popcorn instead of potato chips, nuts, or chocolate  ¨ Choose water or calorie-free drinks instead of soda or sweetened drinks  · Eat regular meals  Skipping meals can lead to overeating later in the day  Eat a healthy snack in place of a meal if you do not have time to eat a regular meal      · Do not shop for groceries when you are hungry  You may be more likely to make unhealthy food choices  Take a grocery list of healthy foods and shop after you have eaten  Exercise:  Exercise at least 30 minutes per day on most days of the week  Some examples of exercise include walking, biking, dancing, and swimming  You can also fit in more physical activity by taking the stairs instead of the elevator or parking farther away from stores  Ask your healthcare provider about the best exercise plan for you  Other things to consider as you try to lose weight:   · Be aware of situations that may give you the urge to overeat, such as eating while watching television  Find ways to avoid these situations  For example, read a book, go for a walk, or do crafts      · Meet with a weight loss support group or friends who are also trying to lose weight  This may help you stay motivated to continue working on your weight loss goals  © 2017 [address] Information is for End User's use only and may not be sold, redistributed or otherwise used for commercial purposes  All illustrations and images included in CareNotes® are the copyrighted property of [employer]  or [employer]  The above information is an  only  It is not intended as medical advice for individual conditions or treatments  Talk to your doctor, nurse or pharmacist before following any medical regimen to see if it is safe and effective for you

## 2019-11-22 NOTE — PROGRESS NOTES
Assessment and Plan:    Problem List Items Addressed This Visit        Endocrine    Hypothyroidism - Primary     Thyroid stable         Type 2 diabetes mellitus with stage 3 chronic kidney disease, without long-term current use of insulin ([facility])       Lab Results   Component Value Date    HGBA1C 6 2 11/15/2019   sugar doing well, no changes meds            Cardiovascular and Mediastinum    Essential hypertension     BP stable, no changes meds            Other    Hyperlipidemia     Lipids stable, tg went up a little-needs to increase exercise                      Diagnoses and all orders for this visit:    Hypothyroidism, unspecified type    Type 2 diabetes mellitus with stage 3 chronic kidney disease, without long-term current use of insulin ([facility] [address] )    Essential hypertension    Hyperlipidemia, unspecified hyperlipidemia type              Subjective:      Patient ID: Savannah Durant is a 70 y o  female  CC:    Chief Complaint   Patient presents with    Follow-up     Pt here for f/u to chronic conditions,lab results  HPI:    F/u lipids, diabetes, lab improving except TG up a tad-not exercising as much      The following portions of the patient's history were reviewed and updated as appropriate: allergies, current medications, past family history, past medical history, past social history, past surgical history and problem list         Review of Systems   Constitutional: Negative for activity change, appetite change and unexpected weight change  HENT:        Had scratchy throat   Respiratory: Negative for shortness of breath  Cardiovascular: Negative for chest pain  Musculoskeletal: Positive for arthralgias  Neurological: Negative for dizziness and headaches  Data to review:       Objective:    Vitals:    11/22/19 0811   BP: 132/78   Pulse: 72   Weight: 71 1 kg (156 lb 12 8 oz)   Height: 4' 9" (1 448 m)        Physical Exam   Constitutional: She appears well-developed and well-nourished     HENT: Mouth/Throat: No oropharyngeal exudate, posterior oropharyngeal edema or posterior oropharyngeal erythema  Neck: No thyromegaly present  Cardiovascular: Normal rate, regular rhythm and normal heart sounds  Pulmonary/Chest: Effort normal and breath sounds normal    Musculoskeletal: She exhibits no edema  Lymphadenopathy:     She has no cervical adenopathy  Vitals reviewed  BMI Counseling: Body mass index is 33 93 kg/m²  The BMI is above normal  Nutrition recommendations include reducing portion sizes, decreasing overall calorie intake, 3-5 servings of fruits/vegetables daily, reducing fast food intake and consuming healthier snacks  Exercise recommendations include exercising 3-5 times per week

## 2019-12-06 ENCOUNTER — LAB (OUTPATIENT)
Dept: LAB | Facility: CLINIC | Age: 79
End: 2019-12-06
Payer: MEDICARE

## 2019-12-06 DIAGNOSIS — D50.9 IRON DEFICIENCY ANEMIA, UNSPECIFIED IRON DEFICIENCY ANEMIA TYPE: ICD-10-CM

## 2019-12-06 DIAGNOSIS — N18.30 CKD (CHRONIC KIDNEY DISEASE) STAGE 3, GFR 30-59 ML/MIN (HCC): ICD-10-CM

## 2019-12-06 LAB
ANION GAP SERPL CALCULATED.3IONS-SCNC: 7 MMOL/L (ref 4–13)
BACTERIA UR QL AUTO: NORMAL /HPF
BILIRUB UR QL STRIP: NEGATIVE
BUN SERPL-MCNC: 17 MG/DL (ref 5–25)
CALCIUM SERPL-MCNC: 9.5 MG/DL (ref 8.3–10.1)
CHLORIDE SERPL-SCNC: 105 MMOL/L (ref 100–108)
CLARITY UR: CLEAR
CO2 SERPL-SCNC: 24 MMOL/L (ref 21–32)
COLOR UR: YELLOW
CREAT SERPL-MCNC: 1.11 MG/DL (ref 0.6–1.3)
CREAT UR-MCNC: 51.5 MG/DL
ERYTHROCYTE [DISTWIDTH] IN BLOOD BY AUTOMATED COUNT: 15.3 % (ref 11.6–15.1)
FERRITIN SERPL-MCNC: 31 NG/ML (ref 8–388)
GFR SERPL CREATININE-BSD FRML MDRD: 48 ML/MIN/1.73SQ M
GLUCOSE P FAST SERPL-MCNC: 97 MG/DL (ref 65–99)
GLUCOSE UR STRIP-MCNC: NEGATIVE MG/DL
HCT VFR BLD AUTO: 38.3 % (ref 34.8–46.1)
HGB BLD-MCNC: 12 G/DL (ref 11.5–15.4)
HGB UR QL STRIP.AUTO: NEGATIVE
HYALINE CASTS #/AREA URNS LPF: NORMAL /LPF
IRON SATN MFR SERPL: 15 %
IRON SERPL-MCNC: 49 UG/DL (ref 50–170)
KETONES UR STRIP-MCNC: NEGATIVE MG/DL
LEUKOCYTE ESTERASE UR QL STRIP: NEGATIVE
MAGNESIUM SERPL-MCNC: 2.3 MG/DL (ref 1.6–2.6)
MCH RBC QN AUTO: 27.1 PG (ref 26.8–34.3)
MCHC RBC AUTO-ENTMCNC: 31.3 G/DL (ref 31.4–37.4)
MCV RBC AUTO: 87 FL (ref 82–98)
NITRITE UR QL STRIP: NEGATIVE
NON-SQ EPI CELLS URNS QL MICRO: NORMAL /HPF
PH UR STRIP.AUTO: 6.5 [PH]
PHOSPHATE SERPL-MCNC: 3.6 MG/DL (ref 2.3–4.1)
PLATELET # BLD AUTO: 438 THOUSANDS/UL (ref 149–390)
PMV BLD AUTO: 9.9 FL (ref 8.9–12.7)
POTASSIUM SERPL-SCNC: 4.4 MMOL/L (ref 3.5–5.3)
PROT UR STRIP-MCNC: NEGATIVE MG/DL
PROT UR-MCNC: <6 MG/DL
PROT/CREAT UR: <0.12 MG/G{CREAT} (ref 0–0.1)
PTH-INTACT SERPL-MCNC: 33.7 PG/ML (ref 18.4–80.1)
RBC # BLD AUTO: 4.42 MILLION/UL (ref 3.81–5.12)
RBC #/AREA URNS AUTO: NORMAL /HPF
SODIUM SERPL-SCNC: 136 MMOL/L (ref 136–145)
SP GR UR STRIP.AUTO: 1.01 (ref 1–1.03)
TIBC SERPL-MCNC: 326 UG/DL (ref 250–450)
UROBILINOGEN UR QL STRIP.AUTO: 0.2 E.U./DL
WBC # BLD AUTO: 7.75 THOUSAND/UL (ref 4.31–10.16)
WBC #/AREA URNS AUTO: NORMAL /HPF

## 2019-12-06 PROCEDURE — 83970 ASSAY OF PARATHORMONE: CPT

## 2019-12-06 PROCEDURE — 83550 IRON BINDING TEST: CPT

## 2019-12-06 PROCEDURE — 36415 COLL VENOUS BLD VENIPUNCTURE: CPT

## 2019-12-06 PROCEDURE — 84100 ASSAY OF PHOSPHORUS: CPT

## 2019-12-06 PROCEDURE — 81001 URINALYSIS AUTO W/SCOPE: CPT

## 2019-12-06 PROCEDURE — 80048 BASIC METABOLIC PNL TOTAL CA: CPT

## 2019-12-06 PROCEDURE — 83540 ASSAY OF IRON: CPT

## 2019-12-06 PROCEDURE — 84156 ASSAY OF PROTEIN URINE: CPT

## 2019-12-06 PROCEDURE — 85027 COMPLETE CBC AUTOMATED: CPT

## 2019-12-06 PROCEDURE — 83735 ASSAY OF MAGNESIUM: CPT

## 2019-12-06 PROCEDURE — 82728 ASSAY OF FERRITIN: CPT

## 2019-12-06 PROCEDURE — 82570 ASSAY OF URINE CREATININE: CPT

## 2019-12-16 ENCOUNTER — OFFICE VISIT (OUTPATIENT)
Dept: NEPHROLOGY | Facility: CLINIC | Age: 79
End: 2019-12-16
Payer: MEDICARE

## 2019-12-16 VITALS
HEART RATE: 104 BPM | SYSTOLIC BLOOD PRESSURE: 126 MMHG | WEIGHT: 154 LBS | HEIGHT: 57 IN | DIASTOLIC BLOOD PRESSURE: 80 MMHG | RESPIRATION RATE: 16 BRPM | BODY MASS INDEX: 33.22 KG/M2

## 2019-12-16 DIAGNOSIS — I12.9 BENIGN HYPERTENSION WITH CHRONIC KIDNEY DISEASE, STAGE III (HCC): ICD-10-CM

## 2019-12-16 DIAGNOSIS — N18.30 CKD (CHRONIC KIDNEY DISEASE) STAGE 3, GFR 30-59 ML/MIN (HCC): Primary | ICD-10-CM

## 2019-12-16 DIAGNOSIS — D50.9 IRON DEFICIENCY ANEMIA, UNSPECIFIED IRON DEFICIENCY ANEMIA TYPE: ICD-10-CM

## 2019-12-16 DIAGNOSIS — N18.30 BENIGN HYPERTENSION WITH CHRONIC KIDNEY DISEASE, STAGE III (HCC): ICD-10-CM

## 2019-12-16 PROCEDURE — 99213 OFFICE O/P EST LOW 20 MIN: CPT | Performed by: INTERNAL MEDICINE

## 2019-12-16 NOTE — PROGRESS NOTES
NEPHROLOGY OUTPATIENT PROGRESS NOTE   Nito Ny 70 y o  female MRN: [id number]  DATE: 12/16/2019  Reason for visit:   Chief Complaint   Patient presents with    Follow-up    Chronic Kidney Disease       ASSESSMENT and PLAN:     Chronic kidney disease stage III  - baseline creatinine 1 0-1 1     -Renal function is at baseline   Last creatinine was 1 11  -discussed about avoiding nephrotoxins like NSAIDs and IV contrast   -PTH level was within normal limit  Upc ratio stable at less than 0 12   -Continue monitoring renal function  Repeat labs in 6 months      Primary HTN chronic kidney disease stage 3  -BP well controlled and at goal      -continue lisinopril at current dose of 2 5 mg daily   -continue 2 g sodium diet and home monitoring of blood pressure  Stressed again on home monitoring of blood pressure and calling me if systolic blood pressure more than 030 or diastolic more than 85         Anemia, iron deficiency anemia  - hemoglobin  12  0  improving   Found to have low iron saturation, last iron panel showed iron saturation 15% with ferritin 31      -Patient prefers not to be on iron tablet due to possibility of constipation  She does take oral vitamin with iron supplementation in it     -no indication for IV iron at this time with hemoglobin 12 0      Diabetes mellitus type 2, currently she is off all medication   She is on diet control  A1c 6 2 which is improving  Hyperlipidemia:  Lipid panel improving with LDL now at 98 from previous level of 109  Triglyceride is elevated at 231 and trending up  Currently on Zetia and fish oil  Goal LDL should be less than 100 for CKD patients and LDL currently at goal   Recommend increasing Omega 3 fatty acid to 1200 milligram twice daily for better control of triglycerides, she takes it over-the-counter  Defer to PCP for monitoring of lipid panel and further management    Recommend lifestyle modification      Diagnoses and all orders for this visit:    CKD (chronic kidney disease) stage 3, GFR 30-59 ml/min ([facility])  -     Basic metabolic panel; Future  -     Phosphorus; Future  -     PTH, intact; Future  -     Protein / creatinine ratio, urine; Future  -     Urinalysis with microscopic; Future  -     Magnesium; Future  -     CBC; Future    Benign hypertension with chronic kidney disease, stage III ([facility])    Iron deficiency anemia, unspecified iron deficiency anemia type  -     CBC; Future        Patient Instructions   Renal function is stable, you have chronic kidney disease stage 3  Blood pressure stable  Continue same medication  Avoid nephrotoxins  Will follow up in 6 months with repeat labs  SUBJECTIVE / HPI:  Krishan Collins is a 70 y o   female with medical issues of Type 2 diabetes mellitus- on diet control, Alli Fischer presents for follow-up of chronic kidney disease  Was taking aleeve in the past but now off it  She used to be on lisinopril- hctz in may but that was stopped in May 2018 due to ROBERTA with elevated cr to 1 3  Stopped simvastatin since nov 15th  Was on it for two years   Patient complaining of muscle soreness, attributed to statin from a different supplier then she was taking in the past  Was taking Aleve in the past but now off it  Patient has creatinine of 1 0-1 1 since 2016  Creatinine increased to 1 32 in May 2018  but improved  With stopping lisinopril hctz to creatinine 1 1  On 6th May 2018  Since June 2018 to November 2018 renal function has been stable at creatinine 1 0-1 1       Labs from 06/03/19 showed creatinine 1 12, stable, sodium slightly low at 135  Last A1c from March was 6 7  Upc ratio less than 0 11  PTH was normal in February 2019  Labs from 12/06/2019 showed creatinine stable at 1 1, was 1 0 in November and 1 1 in July 2019  Electrolytes are stable  Normal phosphorus and magnesium  Hemoglobin 12 0  Iron saturation 15% with ferritin 31  PTH 33 7  Upc ratio less than 0 12 and UA was bland  Not checking BP at home  No urinary symptoms  REVIEW OF SYSTEMS:    Review of Systems   Constitutional: Negative for activity change, appetite change, chills, diaphoresis, fatigue and fever  HENT: Negative for congestion, facial swelling and nosebleeds  Eyes: Negative for pain and visual disturbance  Respiratory: Negative for cough, chest tightness and shortness of breath  Cardiovascular: Negative for chest pain and palpitations  Gastrointestinal: Negative for abdominal distention, abdominal pain, diarrhea, nausea and vomiting  Genitourinary: Negative for difficulty urinating, dysuria, flank pain, frequency and hematuria  Musculoskeletal: Negative for arthralgias, back pain and joint swelling  Skin: Negative for rash  Neurological: Negative for dizziness, seizures, syncope, weakness and headaches  Psychiatric/Behavioral: Negative for agitation and confusion  The patient is not nervous/anxious  More than 10 point review of systems were obtained and discussed in length with the patient  Complete review of systems were negative / unremarkable except mentioned above  PAST MEDICAL HISTORY:  Past Medical History:   Diagnosis Date    CKD (chronic kidney disease)     Diabetes mellitus ([address] )     Disease of thyroid gland     Diverticulosis     GERD (gastroesophageal reflux disease)     Hypertension     Hyponatremia     Ovarian cyst     Rectal bleeding     Wears reading eyeglasses        PAST SURGICAL HISTORY:  Past Surgical History:   Procedure Laterality Date    BREAST BIOPSY Right 05/13/2009    Ultrasound Bx   Benign    BREAST SURGERY      CATARACT EXTRACTION Bilateral     COLONOSCOPY      ESOPHAGOGASTRODUODENOSCOPY      OOPHORECTOMY Bilateral 2015    NE CMBND ANTERPOST COLPORRAPHY W/CYSTO N/A 6/26/2018    Procedure: ANTERIOR & POSTERIOR COLPORRHAPHY;  Surgeon: Chelle Taylor MD;  Location[facility];  Service: UroGynecology           NE CYSTOURETHROSCOPY N/A 6/26/2018    Procedure: CYSTOSCOPY;  Surgeon: Alecia Savage MD;  [facility];  Service: UroGynecology           OR REVAGINAL PROLAPSE,SACROSP LIG N/A 6/26/2018    Procedure: ANTERIOR COLPOPEXY VAGINAL EXTRAPERITONEAL (VEC); Surgeon: Alecia Savage MD;  [facility];  Service: UroGynecology           OR SLING OPER STRES INCONTINENCE N/A 6/26/2018    Procedure: INSERTION PUBOVAGINAL SLING (FEMALE);   Surgeon: Alecia Savage MD;  [facility];  Service: UroGynecology           TONSILLECTOMY         SOCIAL HISTORY:  Social History     Substance and Sexual Activity   Alcohol Use No     Social History     Substance and Sexual Activity   Drug Use No     Social History     Tobacco Use   Smoking Status Never Smoker   Smokeless Tobacco Never Used       FAMILY HISTORY:  Family History   Problem Relation Age of Onset    Cervical cancer Mother 76    Breast cancer Maternal Grandmother         Under 48    Mental illness Son     Schizophrenia Son     Cancer Family     No Known Problems Father     No Known Problems Maternal Aunt     No Known Problems Maternal Aunt     Brain cancer Maternal Aunt     Cancer Cousin        MEDICATIONS:    Current Outpatient Medications:     aspirin 81 MG tablet, Take 81 mg by mouth daily  , Disp: , Rfl:     Calcium Carbonate-Vitamin D 600-200 MG-UNIT TABS, Take 1 tablet by mouth every other day  , Disp: , Rfl:     docusate sodium (COLACE) 100 mg capsule, Take by mouth, Disp: , Rfl:     ezetimibe (ZETIA) 10 mg tablet, Take 1 tablet (10 mg total) by mouth daily, Disp: 90 tablet, Rfl: 1    famotidine (PEPCID) 40 MG tablet, , Disp: , Rfl:     Glucosamine-Chondroitin 250-200 MG TABS, Take 1 tablet by mouth daily  , Disp: , Rfl:     levothyroxine 50 mcg tablet, Take 1 tablet (50 mcg total) by mouth daily, Disp: 90 tablet, Rfl: 1    lisinopril (ZESTRIL) 2 5 mg tablet, Take 1 tablet (2 5 mg total) by mouth daily, Disp: 90 tablet, Rfl: 3    magnesium citrate solution, Take 296 mL by mouth once, Disp: , Rfl:     Multiple Vitamins-Iron (DAILY MULTIPLE VITAMIN/IRON) TABS, Take 1 tablet by mouth daily  , Disp: , Rfl:     Omega-3 Fatty Acids ([employer] FISH OIL) 1200 MG CAPS, Take 1 capsule by mouth 2 (two) times a day, Disp: , Rfl:       PHYSICAL EXAM:  Vitals:    12/16/19 1018   BP: 126/80   BP Location: Left arm   Patient Position: Sitting   Cuff Size: Standard   Pulse: 104   Resp: 16   Weight: 69 9 kg (154 lb)   Height: 4' 9" (1 448 m)     Body mass index is 33 33 kg/m²  Physical Exam   Constitutional: She is oriented to person, place, and time  Vital signs are normal  She appears well-developed  No distress  HENT:   Head: Normocephalic and atraumatic  Mouth/Throat: Oropharynx is clear and moist    Eyes: Pupils are equal, round, and reactive to light  Conjunctivae are normal  No scleral icterus  Neck: Neck supple  No thyromegaly present  Cardiovascular: Normal rate, regular rhythm and normal heart sounds  Exam reveals no friction rub  No murmur heard  Pulmonary/Chest: Effort normal and breath sounds normal  No respiratory distress  She has no wheezes  She has no rales  Abdominal: Soft  Bowel sounds are normal  She exhibits no distension  There is no tenderness  Musculoskeletal: She exhibits no edema or deformity  Lymphadenopathy:     She has no cervical adenopathy  Neurological: She is alert and oriented to person, place, and time  She is not disoriented  Skin: She is not diaphoretic  No cyanosis  No pallor  Nails show no clubbing  Psychiatric: She has a normal mood and affect  Her mood appears not anxious  Her affect is not inappropriate         Lab Results:   Results for orders placed or performed in visit on 97/79/36   Basic metabolic panel   Result Value Ref Range    Sodium 136 136 - 145 mmol/L    Potassium 4 4 3 5 - 5 3 mmol/L    Chloride 105 100 - 108 mmol/L    CO2 24 21 - 32 mmol/L    ANION GAP 7 4 - 13 mmol/L    BUN 17 5 - 25 mg/dL    Creatinine 1 11 0 60 - 1 30 mg/dL    Glucose, Fasting 97 65 - 99 mg/dL    Calcium 9 5 8 3 - 10 1 mg/dL    eGFR 48 ml/min/1 73sq m   Phosphorus   Result Value Ref Range    Phosphorus 3 6 2 3 - 4 1 mg/dL   Protein / creatinine ratio, urine   Result Value Ref Range    Creatinine, Ur 51 5 mg/dL    Protein Urine Random <6 mg/dL    Prot/Creat Ratio, Ur <0 12 (H) 0 00 - 0 10   CBC   Result Value Ref Range    WBC 7 75 4 31 - 10 16 Thousand/uL    RBC 4 42 3 81 - 5 12 Million/uL    Hemoglobin 12 0 11 5 - 15 4 g/dL    Hematocrit 38 3 34 8 - 46 1 %    MCV 87 82 - 98 fL    MCH 27 1 26 8 - 34 3 pg    MCHC 31 3 (L) 31 4 - 37 4 g/dL    RDW 15 3 (H) 11 6 - 15 1 %    Platelets 660 (H) 510 - 390 Thousands/uL    MPV 9 9 8 9 - 12 7 fL   PTH, intact   Result Value Ref Range    PTH 33 7 18 4 - 80 1 pg/mL   Urinalysis with microscopic   Result Value Ref Range    Clarity, UA Clear     Color, UA Yellow     Specific [place], UA 1 012 1 003 - 1 030    pH, UA 6 5 4 5, 5 0, 5 5, 6 0, 6 5, 7 0, 7 5, 8 0    Glucose, UA Negative Negative mg/dl    Ketones, UA Negative Negative mg/dl    Blood, UA Negative Negative    Protein, UA Negative Negative mg/dl    Nitrite, UA Negative Negative    Bilirubin, UA Negative Negative    Urobilinogen, UA 0 2 0 2, 1 0 E U /dl E U /dl    Leukocytes, UA Negative Negative    WBC, UA None Seen None Seen, 0-5, 5-55, 5-65 /hpf    RBC, UA None Seen None Seen, 0-5 /hpf    Hyaline Casts, UA None Seen None Seen /lpf    Bacteria, UA None Seen None Seen, Occasional /hpf    Epithelial Cells None Seen None Seen, Occasional /hpf   Magnesium   Result Value Ref Range    Magnesium 2 3 1 6 - 2 6 mg/dL   Iron Saturation %   Result Value Ref Range    Iron Saturation 15 %    TIBC 326 250 - 450 ug/dL    Iron 49 (L) 50 - 170 ug/dL   Ferritin   Result Value Ref Range    Ferritin 31 8 - 388 ng/mL

## 2019-12-16 NOTE — PATIENT INSTRUCTIONS
Renal function is stable, you have chronic kidney disease stage 3  Blood pressure stable  Continue same medication  Avoid nephrotoxins  Will follow up in 6 months with repeat labs

## 2020-02-29 DIAGNOSIS — R80.1 PERSISTENT PROTEINURIA: ICD-10-CM

## 2020-02-29 DIAGNOSIS — I10 ESSENTIAL HYPERTENSION: ICD-10-CM

## 2020-02-29 DIAGNOSIS — E78.2 MIXED HYPERLIPIDEMIA: ICD-10-CM

## 2020-02-29 RX ORDER — LISINOPRIL 2.5 MG/1
TABLET ORAL
Qty: 90 TABLET | Refills: 3 | Status: SHIPPED | OUTPATIENT
Start: 2020-02-29 | End: 2021-02-22 | Stop reason: SDUPTHER

## 2020-03-01 RX ORDER — EZETIMIBE 10 MG/1
TABLET ORAL
Qty: 90 TABLET | Refills: 1 | Status: SHIPPED | OUTPATIENT
Start: 2020-03-01 | End: 2020-09-08 | Stop reason: SDUPTHER

## 2020-03-16 ENCOUNTER — LAB (OUTPATIENT)
Dept: LAB | Facility: CLINIC | Age: 80
End: 2020-03-16
Payer: MEDICARE

## 2020-03-16 DIAGNOSIS — N18.30 TYPE 2 DIABETES MELLITUS WITH STAGE 3 CHRONIC KIDNEY DISEASE, WITHOUT LONG-TERM CURRENT USE OF INSULIN (HCC): ICD-10-CM

## 2020-03-16 DIAGNOSIS — E11.22 TYPE 2 DIABETES MELLITUS WITH STAGE 3 CHRONIC KIDNEY DISEASE, WITHOUT LONG-TERM CURRENT USE OF INSULIN (HCC): ICD-10-CM

## 2020-03-16 DIAGNOSIS — E78.2 MIXED HYPERLIPIDEMIA: ICD-10-CM

## 2020-03-16 LAB
ALBUMIN SERPL BCP-MCNC: 3.3 G/DL (ref 3.5–5)
ALP SERPL-CCNC: 90 U/L (ref 46–116)
ALT SERPL W P-5'-P-CCNC: 19 U/L (ref 12–78)
ANION GAP SERPL CALCULATED.3IONS-SCNC: 6 MMOL/L (ref 4–13)
AST SERPL W P-5'-P-CCNC: 11 U/L (ref 5–45)
BILIRUB SERPL-MCNC: 0.27 MG/DL (ref 0.2–1)
BUN SERPL-MCNC: 15 MG/DL (ref 5–25)
CALCIUM SERPL-MCNC: 9.3 MG/DL (ref 8.3–10.1)
CHLORIDE SERPL-SCNC: 104 MMOL/L (ref 100–108)
CHOLEST SERPL-MCNC: 178 MG/DL (ref 50–200)
CO2 SERPL-SCNC: 28 MMOL/L (ref 21–32)
CREAT SERPL-MCNC: 1.11 MG/DL (ref 0.6–1.3)
EST. AVERAGE GLUCOSE BLD GHB EST-MCNC: 134 MG/DL
GFR SERPL CREATININE-BSD FRML MDRD: 48 ML/MIN/1.73SQ M
GLUCOSE P FAST SERPL-MCNC: 98 MG/DL (ref 65–99)
HBA1C MFR BLD: 6.3 %
HDLC SERPL-MCNC: 36 MG/DL
LDLC SERPL CALC-MCNC: 94 MG/DL (ref 0–100)
NONHDLC SERPL-MCNC: 142 MG/DL
POTASSIUM SERPL-SCNC: 4.3 MMOL/L (ref 3.5–5.3)
PROT SERPL-MCNC: 8.3 G/DL (ref 6.4–8.2)
SODIUM SERPL-SCNC: 138 MMOL/L (ref 136–145)
TRIGL SERPL-MCNC: 239 MG/DL

## 2020-03-16 PROCEDURE — 83036 HEMOGLOBIN GLYCOSYLATED A1C: CPT

## 2020-03-16 PROCEDURE — 36415 COLL VENOUS BLD VENIPUNCTURE: CPT

## 2020-03-16 PROCEDURE — 80061 LIPID PANEL: CPT

## 2020-03-16 PROCEDURE — 80053 COMPREHEN METABOLIC PANEL: CPT

## 2020-03-30 DIAGNOSIS — E03.9 HYPOTHYROIDISM, UNSPECIFIED TYPE: ICD-10-CM

## 2020-03-30 RX ORDER — LEVOTHYROXINE SODIUM 0.05 MG/1
50 TABLET ORAL DAILY
Qty: 90 TABLET | Refills: 1 | Status: SHIPPED | OUTPATIENT
Start: 2020-03-30 | End: 2020-09-01 | Stop reason: SDUPTHER

## 2020-05-15 ENCOUNTER — OFFICE VISIT (OUTPATIENT)
Dept: FAMILY MEDICINE CLINIC | Facility: CLINIC | Age: 80
End: 2020-05-15
Payer: MEDICARE

## 2020-05-15 VITALS
SYSTOLIC BLOOD PRESSURE: 118 MMHG | RESPIRATION RATE: 16 BRPM | WEIGHT: 154.8 LBS | HEIGHT: 57 IN | BODY MASS INDEX: 33.4 KG/M2 | DIASTOLIC BLOOD PRESSURE: 66 MMHG | TEMPERATURE: 97.6 F | HEART RATE: 96 BPM

## 2020-05-15 DIAGNOSIS — I10 ESSENTIAL HYPERTENSION: ICD-10-CM

## 2020-05-15 DIAGNOSIS — N18.30 TYPE 2 DIABETES MELLITUS WITH STAGE 3 CHRONIC KIDNEY DISEASE, WITHOUT LONG-TERM CURRENT USE OF INSULIN (HCC): Primary | ICD-10-CM

## 2020-05-15 DIAGNOSIS — Z00.00 MEDICARE ANNUAL WELLNESS VISIT, SUBSEQUENT: ICD-10-CM

## 2020-05-15 DIAGNOSIS — E03.9 HYPOTHYROIDISM, UNSPECIFIED TYPE: ICD-10-CM

## 2020-05-15 DIAGNOSIS — E78.5 HYPERLIPIDEMIA, UNSPECIFIED HYPERLIPIDEMIA TYPE: ICD-10-CM

## 2020-05-15 DIAGNOSIS — E11.22 TYPE 2 DIABETES MELLITUS WITH STAGE 3 CHRONIC KIDNEY DISEASE, WITHOUT LONG-TERM CURRENT USE OF INSULIN (HCC): Primary | ICD-10-CM

## 2020-05-15 PROBLEM — K52.9 COLITIS: Status: RESOLVED | Noted: 2018-05-02 | Resolved: 2020-05-15

## 2020-05-15 PROCEDURE — 2022F DILAT RTA XM EVC RTNOPTHY: CPT | Performed by: FAMILY MEDICINE

## 2020-05-15 PROCEDURE — 3066F NEPHROPATHY DOC TX: CPT | Performed by: FAMILY MEDICINE

## 2020-05-15 PROCEDURE — 1170F FXNL STATUS ASSESSED: CPT | Performed by: FAMILY MEDICINE

## 2020-05-15 PROCEDURE — 3008F BODY MASS INDEX DOCD: CPT | Performed by: FAMILY MEDICINE

## 2020-05-15 PROCEDURE — 3074F SYST BP LT 130 MM HG: CPT | Performed by: FAMILY MEDICINE

## 2020-05-15 PROCEDURE — 1125F AMNT PAIN NOTED PAIN PRSNT: CPT | Performed by: FAMILY MEDICINE

## 2020-05-15 PROCEDURE — G0439 PPPS, SUBSEQ VISIT: HCPCS | Performed by: FAMILY MEDICINE

## 2020-05-15 PROCEDURE — 3078F DIAST BP <80 MM HG: CPT | Performed by: FAMILY MEDICINE

## 2020-05-15 PROCEDURE — 4040F PNEUMOC VAC/ADMIN/RCVD: CPT | Performed by: FAMILY MEDICINE

## 2020-05-15 PROCEDURE — 3044F HG A1C LEVEL LT 7.0%: CPT | Performed by: FAMILY MEDICINE

## 2020-05-15 PROCEDURE — 99214 OFFICE O/P EST MOD 30 MIN: CPT | Performed by: FAMILY MEDICINE

## 2020-05-15 PROCEDURE — 1160F RVW MEDS BY RX/DR IN RCRD: CPT | Performed by: FAMILY MEDICINE

## 2020-05-15 PROCEDURE — 1036F TOBACCO NON-USER: CPT | Performed by: FAMILY MEDICINE

## 2020-06-12 LAB
LEFT EYE DIABETIC RETINOPATHY: NORMAL
RIGHT EYE DIABETIC RETINOPATHY: NORMAL

## 2020-07-14 DIAGNOSIS — Z12.31 ENCOUNTER FOR SCREENING MAMMOGRAM FOR BREAST CANCER: Primary | ICD-10-CM

## 2020-07-16 ENCOUNTER — LAB (OUTPATIENT)
Dept: LAB | Facility: CLINIC | Age: 80
End: 2020-07-16
Payer: MEDICARE

## 2020-07-16 DIAGNOSIS — E78.5 HYPERLIPIDEMIA, UNSPECIFIED HYPERLIPIDEMIA TYPE: ICD-10-CM

## 2020-07-16 DIAGNOSIS — D50.9 IRON DEFICIENCY ANEMIA, UNSPECIFIED IRON DEFICIENCY ANEMIA TYPE: ICD-10-CM

## 2020-07-16 DIAGNOSIS — E11.22 TYPE 2 DIABETES MELLITUS WITH STAGE 3 CHRONIC KIDNEY DISEASE, WITHOUT LONG-TERM CURRENT USE OF INSULIN (HCC): ICD-10-CM

## 2020-07-16 DIAGNOSIS — N18.30 TYPE 2 DIABETES MELLITUS WITH STAGE 3 CHRONIC KIDNEY DISEASE, WITHOUT LONG-TERM CURRENT USE OF INSULIN (HCC): ICD-10-CM

## 2020-07-16 DIAGNOSIS — N18.30 CKD (CHRONIC KIDNEY DISEASE) STAGE 3, GFR 30-59 ML/MIN (HCC): ICD-10-CM

## 2020-07-16 LAB
ALBUMIN SERPL BCP-MCNC: 3.2 G/DL (ref 3.5–5)
ALP SERPL-CCNC: 87 U/L (ref 46–116)
ALT SERPL W P-5'-P-CCNC: 20 U/L (ref 12–78)
ANION GAP SERPL CALCULATED.3IONS-SCNC: 6 MMOL/L (ref 4–13)
AST SERPL W P-5'-P-CCNC: 11 U/L (ref 5–45)
BACTERIA UR QL AUTO: ABNORMAL /HPF
BILIRUB SERPL-MCNC: 0.38 MG/DL (ref 0.2–1)
BILIRUB UR QL STRIP: NEGATIVE
BUN SERPL-MCNC: 16 MG/DL (ref 5–25)
CALCIUM SERPL-MCNC: 9.6 MG/DL (ref 8.3–10.1)
CHLORIDE SERPL-SCNC: 103 MMOL/L (ref 100–108)
CHOLEST SERPL-MCNC: 166 MG/DL (ref 50–200)
CLARITY UR: ABNORMAL
CO2 SERPL-SCNC: 25 MMOL/L (ref 21–32)
COLOR UR: YELLOW
CREAT SERPL-MCNC: 1.19 MG/DL (ref 0.6–1.3)
CREAT UR-MCNC: 53.6 MG/DL
CREAT UR-MCNC: 53.6 MG/DL
ERYTHROCYTE [DISTWIDTH] IN BLOOD BY AUTOMATED COUNT: 15.7 % (ref 11.6–15.1)
EST. AVERAGE GLUCOSE BLD GHB EST-MCNC: 137 MG/DL
GFR SERPL CREATININE-BSD FRML MDRD: 44 ML/MIN/1.73SQ M
GLUCOSE P FAST SERPL-MCNC: 85 MG/DL (ref 65–99)
GLUCOSE UR STRIP-MCNC: NEGATIVE MG/DL
HBA1C MFR BLD: 6.4 %
HCT VFR BLD AUTO: 40.1 % (ref 34.8–46.1)
HDLC SERPL-MCNC: 36 MG/DL
HGB BLD-MCNC: 12.4 G/DL (ref 11.5–15.4)
HGB UR QL STRIP.AUTO: NEGATIVE
KETONES UR STRIP-MCNC: NEGATIVE MG/DL
LDLC SERPL CALC-MCNC: 90 MG/DL (ref 0–100)
LEUKOCYTE ESTERASE UR QL STRIP: ABNORMAL
MAGNESIUM SERPL-MCNC: 2.2 MG/DL (ref 1.6–2.6)
MCH RBC QN AUTO: 27.3 PG (ref 26.8–34.3)
MCHC RBC AUTO-ENTMCNC: 30.9 G/DL (ref 31.4–37.4)
MCV RBC AUTO: 88 FL (ref 82–98)
MICROALBUMIN UR-MCNC: 7.2 MG/L (ref 0–20)
MICROALBUMIN/CREAT 24H UR: 13 MG/G CREATININE (ref 0–30)
MUCOUS THREADS UR QL AUTO: ABNORMAL
NITRITE UR QL STRIP: NEGATIVE
NON-SQ EPI CELLS URNS QL MICRO: ABNORMAL /HPF
NONHDLC SERPL-MCNC: 130 MG/DL
PH UR STRIP.AUTO: 6.5 [PH]
PHOSPHATE SERPL-MCNC: 3.6 MG/DL (ref 2.3–4.1)
PLATELET # BLD AUTO: 516 THOUSANDS/UL (ref 149–390)
PMV BLD AUTO: 9.8 FL (ref 8.9–12.7)
POTASSIUM SERPL-SCNC: 4.2 MMOL/L (ref 3.5–5.3)
PROT SERPL-MCNC: 8.1 G/DL (ref 6.4–8.2)
PROT UR STRIP-MCNC: NEGATIVE MG/DL
PROT UR-MCNC: <6 MG/DL
PROT/CREAT UR: <0.11 MG/G{CREAT} (ref 0–0.1)
PTH-INTACT SERPL-MCNC: 21.1 PG/ML (ref 18.4–80.1)
RBC # BLD AUTO: 4.55 MILLION/UL (ref 3.81–5.12)
RBC #/AREA URNS AUTO: ABNORMAL /HPF
SODIUM SERPL-SCNC: 134 MMOL/L (ref 136–145)
SP GR UR STRIP.AUTO: 1.01 (ref 1–1.03)
TRIGL SERPL-MCNC: 199 MG/DL
UROBILINOGEN UR QL STRIP.AUTO: 0.2 E.U./DL
WBC # BLD AUTO: 7.28 THOUSAND/UL (ref 4.31–10.16)
WBC #/AREA URNS AUTO: ABNORMAL /HPF

## 2020-07-16 PROCEDURE — 82570 ASSAY OF URINE CREATININE: CPT

## 2020-07-16 PROCEDURE — 80061 LIPID PANEL: CPT

## 2020-07-16 PROCEDURE — 83036 HEMOGLOBIN GLYCOSYLATED A1C: CPT

## 2020-07-16 PROCEDURE — 83970 ASSAY OF PARATHORMONE: CPT

## 2020-07-16 PROCEDURE — 84100 ASSAY OF PHOSPHORUS: CPT

## 2020-07-16 PROCEDURE — 80053 COMPREHEN METABOLIC PANEL: CPT

## 2020-07-16 PROCEDURE — 84156 ASSAY OF PROTEIN URINE: CPT

## 2020-07-16 PROCEDURE — 81001 URINALYSIS AUTO W/SCOPE: CPT

## 2020-07-16 PROCEDURE — 83735 ASSAY OF MAGNESIUM: CPT

## 2020-07-16 PROCEDURE — 82043 UR ALBUMIN QUANTITATIVE: CPT

## 2020-07-16 PROCEDURE — 36415 COLL VENOUS BLD VENIPUNCTURE: CPT

## 2020-07-16 PROCEDURE — 85027 COMPLETE CBC AUTOMATED: CPT

## 2020-07-28 ENCOUNTER — OFFICE VISIT (OUTPATIENT)
Dept: NEPHROLOGY | Facility: CLINIC | Age: 80
End: 2020-07-28
Payer: MEDICARE

## 2020-07-28 VITALS
TEMPERATURE: 97.9 F | SYSTOLIC BLOOD PRESSURE: 142 MMHG | HEART RATE: 92 BPM | RESPIRATION RATE: 16 BRPM | DIASTOLIC BLOOD PRESSURE: 88 MMHG | HEIGHT: 57 IN | BODY MASS INDEX: 32.79 KG/M2 | WEIGHT: 152 LBS

## 2020-07-28 DIAGNOSIS — N18.30 CKD (CHRONIC KIDNEY DISEASE) STAGE 3, GFR 30-59 ML/MIN (HCC): Primary | ICD-10-CM

## 2020-07-28 DIAGNOSIS — I12.9 BENIGN HYPERTENSION WITH CHRONIC KIDNEY DISEASE, STAGE III (HCC): ICD-10-CM

## 2020-07-28 DIAGNOSIS — D50.9 IRON DEFICIENCY ANEMIA, UNSPECIFIED IRON DEFICIENCY ANEMIA TYPE: ICD-10-CM

## 2020-07-28 DIAGNOSIS — N18.30 BENIGN HYPERTENSION WITH CHRONIC KIDNEY DISEASE, STAGE III (HCC): ICD-10-CM

## 2020-07-28 PROCEDURE — 3066F NEPHROPATHY DOC TX: CPT | Performed by: INTERNAL MEDICINE

## 2020-07-28 PROCEDURE — 2022F DILAT RTA XM EVC RTNOPTHY: CPT | Performed by: INTERNAL MEDICINE

## 2020-07-28 PROCEDURE — 3008F BODY MASS INDEX DOCD: CPT | Performed by: INTERNAL MEDICINE

## 2020-07-28 PROCEDURE — 1160F RVW MEDS BY RX/DR IN RCRD: CPT | Performed by: INTERNAL MEDICINE

## 2020-07-28 PROCEDURE — 3079F DIAST BP 80-89 MM HG: CPT | Performed by: INTERNAL MEDICINE

## 2020-07-28 PROCEDURE — 3077F SYST BP >= 140 MM HG: CPT | Performed by: INTERNAL MEDICINE

## 2020-07-28 PROCEDURE — 4040F PNEUMOC VAC/ADMIN/RCVD: CPT | Performed by: INTERNAL MEDICINE

## 2020-07-28 PROCEDURE — 1036F TOBACCO NON-USER: CPT | Performed by: INTERNAL MEDICINE

## 2020-07-28 PROCEDURE — 99214 OFFICE O/P EST MOD 30 MIN: CPT | Performed by: INTERNAL MEDICINE

## 2020-07-28 PROCEDURE — 3044F HG A1C LEVEL LT 7.0%: CPT | Performed by: INTERNAL MEDICINE

## 2020-07-28 NOTE — PROGRESS NOTES
NEPHROLOGY OUTPATIENT PROGRESS NOTE   Brandon Cisneros 70 y o  female MRN: [id number]  DATE: 7/28/2020  Reason for visit:   Chief Complaint   Patient presents with    Follow-up    Chronic Kidney Disease       ASSESSMENT and PLAN:     Chronic kidney disease stage III  - baseline creatinine 1 0-1 1   cr stable at 1 19  Electrolytes stable  -Renal function is at baseline     -discussed about avoiding nephrotoxins like NSAIDs and IV contrast   -PTH level was within normal limit- 21 1  Upc ratio stable at less than 0 11  -UA with bacteria and wbc- no symptoms   -Continue monitoring renal function  Repeat labs in 4 months      Primary HTN chronic kidney disease stage 3  -BP well elevated, not checking BP at home- asked to check BP at home  She Did not want any medication changes today and will check BP at home, IT did come down on repeat check in the office today    -Continue lisinopril 2 5 mg daily, if BP remains elevated- will increase lisinopril    -continue 2 g sodium diet and home monitoring of blood pressure  Stressed again on home monitoring of blood pressure and calling me if systolic blood pressure more than 318 or diastolic more than 85         Anemia, iron deficiency anemia  - hemoglobin  12  4  improving   Found to have low iron saturation, last iron panel showed iron saturation 15% with ferritin 31      -Patient prefers not to be on iron tablet due to possibility of constipation  She does take oral vitamin with iron supplementation in it     -no indication for IV iron at this time with hemoglobin 12 0      Diabetes mellitus type 2, currently she is off all medication   She is on diet control  A1c 6 4 % which is  Slightly high than last time  Continue to monitor     Hyperlipidemia:  Lipid panel improving with LDL now at 90 , TG trending down 199 0   Currently on Zetia and fish oil    Goal LDL should be less than 100 for CKD patients and LDL currently at goal    Defer to PCP for monitoring of lipid panel and further management  Recommend lifestyle modification      Thrombocytosis: platelet count 950  Has been elevated recently   -offered referral to hematologist but she is not interested at this time, will talk to her PCP    Diagnoses and all orders for this visit:    CKD (chronic kidney disease) stage 3, GFR 30-59 ml/min ([facility])  -     Basic metabolic panel; Future  -     Protein / creatinine ratio, urine; Future  -     Urinalysis with microscopic; Future  -     Phosphorus; Future  -     Magnesium; Future  -     CBC; Future  -     Vitamin D 25 hydroxy; Future    Benign hypertension with chronic kidney disease, stage III ([facility])    Iron deficiency anemia, unspecified iron deficiency anemia type  -     CBC; Future        Patient Instructions   Renal function is stable, you have chronic kidney disease stage 3  Blood pressure slightly elevated, recommend home monitoring of blood pressure with goal blood pressure less than 135/85  If blood pressure is persistently more than 140/90 please let me know  Discussed about low-sodium diet  Continue same medication  Avoid nephrotoxins  Will follow up in 4 months with repeat labs  SUBJECTIVE / HPI:  Lorne Costa is a 70 y o   female with medical issues of Type 2 diabetes mellitus- on diet control, Valerie Perera presents for follow-up of chronic kidney disease  Was taking aleeve in the past but now off it  She used to be on lisinopril- hctz in may but that was stopped in May 2018 due to ROBERTA with elevated cr to 1 3  Stopped simvastatin since nov 15th  Was on it for two years   Patient complaining of muscle soreness, attributed to statin from a different supplier then she was taking in the past  Was taking Aleve in the past but now off it  Patient has creatinine of 1 0-1 1 since 2016  Creatinine increased to 1 32 in May 2018  but improved  With stopping lisinopril hctz to creatinine 1 1  On 6th May 2018   Since June 2018 to November 2018 renal function has been stable at creatinine 1 0-1 1       Labs from 07/16/2020 showed creatinine 1 19, stable electrolytes  LDL 90, HDL 36 hemoglobin 12 4  Upc ratio less than 0 11, no RBCs, A1c improving to 6 4, PTH 21 1    Previous Labs from 12/06/2019 showed creatinine stable at 1 1, was 1 0 in November and 1 1 in July 2019  Electrolytes are stable  Normal phosphorus and magnesium  Hemoglobin 12 0  Iron saturation 15% with ferritin 31  PTH 33 7  Upc ratio less than 0 12 and UA was bland  Not checking BP at home  REVIEW OF SYSTEMS:    Review of Systems   Constitutional: Negative for activity change, appetite change, chills, diaphoresis, fatigue and fever  HENT: Negative for congestion, facial swelling and nosebleeds  Eyes: Negative for pain and visual disturbance  Respiratory: Negative for cough, chest tightness and shortness of breath  Cardiovascular: Negative for chest pain and palpitations  Gastrointestinal: Negative for abdominal distention, abdominal pain, diarrhea, nausea and vomiting  Genitourinary: Negative for difficulty urinating, dysuria, flank pain, frequency and hematuria  Musculoskeletal: Negative for arthralgias, back pain and joint swelling  Skin: Negative for rash  Neurological: Negative for dizziness, seizures, syncope, weakness and headaches  Psychiatric/Behavioral: Negative for agitation and confusion  The patient is not nervous/anxious  More than 10 point review of systems were obtained and discussed in length with the patient  Complete review of systems were negative / unremarkable except mentioned above         PAST MEDICAL HISTORY:  Past Medical History:   Diagnosis Date    CKD (chronic kidney disease)     Diabetes mellitus ([facility] [address] )     Disease of thyroid gland     Diverticulosis     GERD (gastroesophageal reflux disease)     Hypertension     Hyponatremia     Ovarian cyst     Rectal bleeding     Wears reading eyeglasses        PAST SURGICAL HISTORY:  Past Surgical History: Procedure Laterality Date    BREAST BIOPSY Right 05/13/2009    Ultrasound Bx  Benign    BREAST SURGERY      CATARACT EXTRACTION Bilateral     COLONOSCOPY      ESOPHAGOGASTRODUODENOSCOPY      OOPHORECTOMY Bilateral 2015    PA CMBND ANTERPOST COLPORRAPHY W/CYSTO N/A 6/26/2018    Procedure: ANTERIOR & POSTERIOR COLPORRHAPHY;  Surgeon: James Sinclair MD;  [facility];  Service: UroGynecology           PA CYSTOURETHROSCOPY N/A 6/26/2018    Procedure: [place];  Surgeon: James Sinclair MD;  [facility];  Service: UroGynecology           PA Maretta Anastacio LIG N/A 6/26/2018    Procedure: ANTERIOR COLPOPEXY VAGINAL EXTRAPERITONEAL (VEC); Surgeon: James Sinclair MD;  [facility];  Service: UroGynecology           PA SLING OPER STRES INCONTINENCE N/A 6/26/2018    Procedure: INSERTION PUBOVAGINAL SLING (FEMALE);   Surgeon: James Sinclair MD;  [facility];  Service: UroGynecology           TONSILLECTOMY         SOCIAL HISTORY:  Social History     Substance and Sexual Activity   Alcohol Use No     Social History     Substance and Sexual Activity   Drug Use No     Social History     Tobacco Use   Smoking Status Never Smoker   Smokeless Tobacco Never Used       FAMILY HISTORY:  Family History   Problem Relation Age of Onset    Cervical cancer Mother 76    Breast cancer Maternal Grandmother         Under 48    Mental illness Son     Schizophrenia Son     Cancer Family     No Known Problems Father     No Known Problems Maternal Aunt     No Known Problems Maternal Aunt     Brain cancer Maternal Aunt     Cancer Cousin        MEDICATIONS:    Current Outpatient Medications:     aspirin 81 MG tablet, Take 81 mg by mouth daily  , Disp: , Rfl:     Calcium Carbonate-Vitamin D 600-200 MG-UNIT TABS, Take 1 tablet by mouth every other day  , Disp: , Rfl:     docusate sodium (COLACE) 100 mg capsule, Take by mouth, Disp: , Rfl:     ezetimibe (ZETIA) 10 mg tablet, TAKE ONE TABLET BY MOUTH DAILY, Disp: 90 tablet, Rfl: 1    famotidine (PEPCID) 40 MG tablet, , Disp: , Rfl:     Glucosamine-Chondroitin 250-200 MG TABS, Take 1 tablet by mouth daily  , Disp: , Rfl:     lisinopril (ZESTRIL) 2 5 mg tablet, TAKE ONE TABLET BY MOUTH EVERY DAY, Disp: 90 tablet, Rfl: 3    magnesium citrate solution, Take 296 mL by mouth once, Disp: , Rfl:     Multiple Vitamins-Iron (DAILY MULTIPLE VITAMIN/IRON) TABS, Take 1 tablet by mouth daily  , Disp: , Rfl:     Omega-3 Fatty Acids ([employer] FISH OIL) 1200 MG CAPS, Take 1 capsule by mouth 2 (two) times a day, Disp: , Rfl:     levothyroxine 50 mcg tablet, Take 1 tablet (50 mcg total) by mouth daily, Disp: 90 tablet, Rfl: 1      PHYSICAL EXAM:  Vitals:    07/28/20 0925 07/28/20 0942   BP: 135/97 142/88   BP Location: Left arm    Patient Position: Sitting    Cuff Size: Standard    Pulse: 92    Resp: 16    Temp: 97 9 °F (36 6 °C)    TempSrc: Oral    Weight: 68 9 kg (152 lb)    Height: 4' 9" (1 448 m)      Body mass index is 32 89 kg/m²  Physical Exam   Constitutional: She is oriented to person, place, and time  She appears well-developed  No distress  HENT:   Head: Normocephalic and atraumatic  Nose: Nose normal    Eyes: Pupils are equal, round, and reactive to light  Conjunctivae are normal    Neck: Neck supple  No JVD present  No thyromegaly present  Cardiovascular: Normal rate, regular rhythm and normal heart sounds  Exam reveals no friction rub  No murmur heard  Pulmonary/Chest: Effort normal and breath sounds normal  She has no wheezes  She has no rales  Abdominal: Soft  Bowel sounds are normal  She exhibits no distension  There is no tenderness  Musculoskeletal: She exhibits no edema or deformity  Neurological: She is alert and oriented to person, place, and time  Skin: Skin is warm and dry  Psychiatric: She has a normal mood and affect         Lab Results:   Results for orders placed or performed in visit on 07/16/20 Phosphorus   Result Value Ref Range    Phosphorus 3 6 2 3 - 4 1 mg/dL   PTH, intact   Result Value Ref Range    PTH 21 1 18 4 - 80 1 pg/mL   Protein / creatinine ratio, urine   Result Value Ref Range    Creatinine, Ur 53 6 mg/dL    Protein Urine Random <6 mg/dL    Prot/Creat Ratio, Ur <0 11 (H) 0 00 - 0 10   Urinalysis with microscopic   Result Value Ref Range    Clarity, UA Cloudy     Color, UA Yellow     Specific [place], UA 1 012 1 003 - 1 030    pH, UA 6 5 4 5, 5 0, 5 5, 6 0, 6 5, 7 0, 7 5, 8 0    Glucose, UA Negative Negative mg/dl    Ketones, UA Negative Negative mg/dl    Blood, UA Negative Negative    Protein, UA Negative Negative mg/dl    Nitrite, UA Negative Negative    Bilirubin, UA Negative Negative    Urobilinogen, UA 0 2 0 2, 1 0 E U /dl E U /dl    Leukocytes, UA Large (A) Negative    WBC, UA 10-20 (A) None Seen, 0-5, 5-55, 5-65 /hpf    RBC, UA None Seen None Seen, 0-5 /hpf    Bacteria, UA Occasional None Seen, Occasional /hpf    Epithelial Cells Occasional None Seen, Occasional /hpf    MUCUS THREADS None Seen None Seen   Magnesium   Result Value Ref Range    Magnesium 2 2 1 6 - 2 6 mg/dL   CBC   Result Value Ref Range    WBC 7 28 4 31 - 10 16 Thousand/uL    RBC 4 55 3 81 - 5 12 Million/uL    Hemoglobin 12 4 11 5 - 15 4 g/dL    Hematocrit 40 1 34 8 - 46 1 %    MCV 88 82 - 98 fL    MCH 27 3 26 8 - 34 3 pg    MCHC 30 9 (L) 31 4 - 37 4 g/dL    RDW 15 7 (H) 11 6 - 15 1 %    Platelets 391 (H) 791 - 390 Thousands/uL    MPV 9 8 8 9 - 12 7 fL   Comprehensive metabolic panel   Result Value Ref Range    Sodium 134 (L) 136 - 145 mmol/L    Potassium 4 2 3 5 - 5 3 mmol/L    Chloride 103 100 - 108 mmol/L    CO2 25 21 - 32 mmol/L    ANION GAP 6 4 - 13 mmol/L    BUN 16 5 - 25 mg/dL    Creatinine 1 19 0 60 - 1 30 mg/dL    Glucose, Fasting 85 65 - 99 mg/dL    Calcium 9 6 8 3 - 10 1 mg/dL    AST 11 5 - 45 U/L    ALT 20 12 - 78 U/L    Alkaline Phosphatase 87 46 - 116 U/L    Total Protein 8 1 6 4 - 8 2 g/dL    Albumin 3 2 (L) 3 5 - 5 0 g/dL    Total Bilirubin 0 38 0 20 - 1 00 mg/dL    eGFR 44 ml/min/1 73sq m   Lipid panel   Result Value Ref Range    Cholesterol 166 50 - 200 mg/dL    Triglycerides 199 (H) <=150 mg/dL    HDL, Direct 36 (L) >=40 mg/dL    LDL Calculated 90 0 - 100 mg/dL    Non-HDL-Chol (CHOL-HDL) 130 mg/dl   Hemoglobin A1C   Result Value Ref Range    Hemoglobin A1C 6 4 (H) Normal 3 8-5 6%; PreDiabetic 5 7-6 4%; Diabetic >=6 5%; Glycemic control for adults with diabetes <7 0% %     mg/dl   Microalbumin / creatinine urine ratio   Result Value Ref Range    Creatinine, Ur 53 6 mg/dL    Microalbum  ,U,Random 7 2 0 0 - 20 0 mg/L    Microalb Creat Ratio 13 0 - 30 mg/g creatinine

## 2020-09-01 DIAGNOSIS — E03.9 HYPOTHYROIDISM, UNSPECIFIED TYPE: ICD-10-CM

## 2020-09-01 RX ORDER — LEVOTHYROXINE SODIUM 0.05 MG/1
50 TABLET ORAL DAILY
Qty: 90 TABLET | Refills: 1 | Status: SHIPPED | OUTPATIENT
Start: 2020-09-01 | End: 2021-02-22 | Stop reason: SDUPTHER

## 2020-09-08 DIAGNOSIS — E78.2 MIXED HYPERLIPIDEMIA: ICD-10-CM

## 2020-09-08 RX ORDER — EZETIMIBE 10 MG/1
10 TABLET ORAL DAILY
Qty: 90 TABLET | Refills: 1 | Status: SHIPPED | OUTPATIENT
Start: 2020-09-08 | End: 2021-03-08 | Stop reason: SDUPTHER

## 2020-09-24 ENCOUNTER — OFFICE VISIT (OUTPATIENT)
Dept: FAMILY MEDICINE CLINIC | Facility: CLINIC | Age: 80
End: 2020-09-24
Payer: MEDICARE

## 2020-09-24 VITALS
DIASTOLIC BLOOD PRESSURE: 70 MMHG | TEMPERATURE: 97.5 F | BODY MASS INDEX: 32.15 KG/M2 | HEIGHT: 57 IN | SYSTOLIC BLOOD PRESSURE: 136 MMHG | HEART RATE: 64 BPM | WEIGHT: 149 LBS | RESPIRATION RATE: 16 BRPM

## 2020-09-24 DIAGNOSIS — N18.30 TYPE 2 DIABETES MELLITUS WITH STAGE 3 CHRONIC KIDNEY DISEASE, WITHOUT LONG-TERM CURRENT USE OF INSULIN (HCC): ICD-10-CM

## 2020-09-24 DIAGNOSIS — E03.9 HYPOTHYROIDISM, UNSPECIFIED TYPE: ICD-10-CM

## 2020-09-24 DIAGNOSIS — E78.5 HYPERLIPIDEMIA, UNSPECIFIED HYPERLIPIDEMIA TYPE: ICD-10-CM

## 2020-09-24 DIAGNOSIS — I10 ESSENTIAL HYPERTENSION: Primary | ICD-10-CM

## 2020-09-24 DIAGNOSIS — E11.22 TYPE 2 DIABETES MELLITUS WITH STAGE 3 CHRONIC KIDNEY DISEASE, WITHOUT LONG-TERM CURRENT USE OF INSULIN (HCC): ICD-10-CM

## 2020-09-24 PROCEDURE — 99214 OFFICE O/P EST MOD 30 MIN: CPT | Performed by: FAMILY MEDICINE

## 2020-09-24 NOTE — PROGRESS NOTES
Assessment and Plan:    Problem List Items Addressed This Visit        Endocrine    Hypothyroidism     Lab stable         Type 2 diabetes mellitus with stage 3 chronic kidney disease, without long-term current use of insulin ([facility] [address] )       Lab Results   Component Value Date    HGBA1C 6 4 (H) 07/16/2020   BS stable         Relevant Orders    Hemoglobin A1C       Cardiovascular and Mediastinum    Essential hypertension - Primary       Other    Hyperlipidemia     Lipids stable         Relevant Orders    Lipid panel                 Diagnoses and all orders for this visit:    Essential hypertension    Type 2 diabetes mellitus with stage 3 chronic kidney disease, without long-term current use of insulin ([facility])  -     Hemoglobin A1C; Future    Hypothyroidism, unspecified type    Hyperlipidemia, unspecified hyperlipidemia type  -     Lipid panel; Future              Subjective:      Patient ID: Agustín Juan is a 70 y o  female  CC:    Chief Complaint   Patient presents with    Follow-up     Ludmila present today for her 4 month follow up   Wrist Pain     Patient complaints of left wrist pain when she hold something heavy and she also noticed a bump long time ago  HPI:    F/u lipids, diabetes, BP -all stable, l wrist bothering her      The following portions of the patient's history were reviewed and updated as appropriate: allergies, current medications, past family history, past medical history, past social history, past surgical history and problem list       Review of Systems   Constitutional: Positive for unexpected weight change  Negative for activity change, appetite change and fatigue  3 lb loss   Respiratory: Negative for shortness of breath  Cardiovascular: Negative for chest pain  Musculoskeletal: Positive for arthralgias  Wrist pain l   Neurological: Negative for dizziness and headaches  Psychiatric/Behavioral: The patient is not nervous/anxious            Data to review: Objective:    Vitals:    09/24/20 1001   BP: 136/70   BP Location: Left arm   Patient Position: Sitting   Cuff Size: Large   Pulse: 64   Resp: 16   Temp: 97 5 °F (36 4 °C)   TempSrc: Temporal   Weight: 67 6 kg (149 lb)   Height: 4' 9" (1 448 m)        Physical Exam  Vitals signs reviewed  Constitutional:       Appearance: Normal appearance  She is obese  Eyes:      General:         Right eye: No discharge  Left eye: No discharge  Neck:      Vascular: No carotid bruit  Cardiovascular:      Rate and Rhythm: Normal rate and regular rhythm  Pulses: Normal pulses  Heart sounds: Normal heart sounds  Pulmonary:      Effort: Pulmonary effort is normal       Breath sounds: Normal breath sounds  Musculoskeletal:      Right lower leg: No edema  Left lower leg: No edema  Lymphadenopathy:      Cervical: No cervical adenopathy  Skin:     Comments: ganglion cyst l wrist   Neurological:      Mental Status: She is alert

## 2020-10-14 ENCOUNTER — HOSPITAL ENCOUNTER (OUTPATIENT)
Dept: MAMMOGRAPHY | Facility: MEDICAL CENTER | Age: 80
Discharge: HOME | End: 2020-10-14
Payer: MEDICARE

## 2020-10-14 VITALS — BODY MASS INDEX: 32.15 KG/M2 | HEIGHT: 57 IN | WEIGHT: 149 LBS

## 2020-10-14 DIAGNOSIS — Z12.31 ENCOUNTER FOR SCREENING MAMMOGRAM FOR BREAST CANCER: ICD-10-CM

## 2020-10-14 PROCEDURE — 77063 BREAST TOMOSYNTHESIS BI: CPT

## 2020-10-14 PROCEDURE — 77067 SCR MAMMO BI INCL CAD: CPT

## 2020-10-26 ENCOUNTER — TELEPHONE (OUTPATIENT)
Dept: FAMILY MEDICINE CLINIC | Facility: CLINIC | Age: 80
End: 2020-10-26

## 2020-10-26 DIAGNOSIS — M67.40 GANGLION CYST: Primary | ICD-10-CM

## 2020-11-18 ENCOUNTER — OFFICE VISIT (OUTPATIENT)
Dept: OBGYN CLINIC | Facility: MEDICAL CENTER | Age: 80
End: 2020-11-18
Payer: MEDICARE

## 2020-11-18 VITALS
HEART RATE: 90 BPM | DIASTOLIC BLOOD PRESSURE: 77 MMHG | WEIGHT: 149 LBS | TEMPERATURE: 96.3 F | SYSTOLIC BLOOD PRESSURE: 132 MMHG | BODY MASS INDEX: 32.24 KG/M2

## 2020-11-18 DIAGNOSIS — M67.432 GANGLION CYST OF VOLAR ASPECT OF LEFT WRIST: ICD-10-CM

## 2020-11-18 PROCEDURE — 99214 OFFICE O/P EST MOD 30 MIN: CPT | Performed by: ORTHOPAEDIC SURGERY

## 2020-11-25 ENCOUNTER — HOSPITAL ENCOUNTER (OUTPATIENT)
Dept: ULTRASOUND IMAGING | Facility: HOSPITAL | Age: 80
Discharge: HOME | End: 2020-11-25
Attending: ORTHOPAEDIC SURGERY
Payer: MEDICARE

## 2020-11-25 DIAGNOSIS — M67.432 GANGLION CYST OF VOLAR ASPECT OF LEFT WRIST: ICD-10-CM

## 2020-11-25 PROCEDURE — 20611 DRAIN/INJ JOINT/BURSA W/US: CPT

## 2020-11-25 RX ORDER — LIDOCAINE HYDROCHLORIDE 10 MG/ML
5 INJECTION, SOLUTION EPIDURAL; INFILTRATION; INTRACAUDAL; PERINEURAL ONCE
Status: DISCONTINUED | OUTPATIENT
Start: 2020-11-25 | End: 2020-11-26 | Stop reason: HOSPADM

## 2020-12-02 ENCOUNTER — LAB (OUTPATIENT)
Dept: LAB | Facility: CLINIC | Age: 80
End: 2020-12-02
Payer: MEDICARE

## 2020-12-02 DIAGNOSIS — E11.22 TYPE 2 DIABETES MELLITUS WITH STAGE 3 CHRONIC KIDNEY DISEASE, WITHOUT LONG-TERM CURRENT USE OF INSULIN (HCC): ICD-10-CM

## 2020-12-02 DIAGNOSIS — N18.30 CKD (CHRONIC KIDNEY DISEASE) STAGE 3, GFR 30-59 ML/MIN (HCC): ICD-10-CM

## 2020-12-02 DIAGNOSIS — E78.5 HYPERLIPIDEMIA, UNSPECIFIED HYPERLIPIDEMIA TYPE: ICD-10-CM

## 2020-12-02 DIAGNOSIS — N18.30 TYPE 2 DIABETES MELLITUS WITH STAGE 3 CHRONIC KIDNEY DISEASE, WITHOUT LONG-TERM CURRENT USE OF INSULIN (HCC): ICD-10-CM

## 2020-12-02 DIAGNOSIS — D50.9 IRON DEFICIENCY ANEMIA, UNSPECIFIED IRON DEFICIENCY ANEMIA TYPE: ICD-10-CM

## 2020-12-02 LAB
25(OH)D3 SERPL-MCNC: 38.1 NG/ML (ref 30–100)
ANION GAP SERPL CALCULATED.3IONS-SCNC: 6 MMOL/L (ref 4–13)
BACTERIA UR QL AUTO: ABNORMAL /HPF
BILIRUB UR QL STRIP: NEGATIVE
BUN SERPL-MCNC: 14 MG/DL (ref 5–25)
CALCIUM SERPL-MCNC: 9.7 MG/DL (ref 8.3–10.1)
CHLORIDE SERPL-SCNC: 101 MMOL/L (ref 100–108)
CHOLEST SERPL-MCNC: 169 MG/DL (ref 50–200)
CLARITY UR: CLEAR
CO2 SERPL-SCNC: 27 MMOL/L (ref 21–32)
COLOR UR: YELLOW
CREAT SERPL-MCNC: 1.13 MG/DL (ref 0.6–1.3)
CREAT UR-MCNC: 51.3 MG/DL
ERYTHROCYTE [DISTWIDTH] IN BLOOD BY AUTOMATED COUNT: 15.5 % (ref 11.6–15.1)
EST. AVERAGE GLUCOSE BLD GHB EST-MCNC: 134 MG/DL
GFR SERPL CREATININE-BSD FRML MDRD: 47 ML/MIN/1.73SQ M
GLUCOSE P FAST SERPL-MCNC: 92 MG/DL (ref 65–99)
GLUCOSE UR STRIP-MCNC: NEGATIVE MG/DL
HBA1C MFR BLD: 6.3 %
HCT VFR BLD AUTO: 40.1 % (ref 34.8–46.1)
HDLC SERPL-MCNC: 34 MG/DL
HGB BLD-MCNC: 12.7 G/DL (ref 11.5–15.4)
HGB UR QL STRIP.AUTO: NEGATIVE
HYALINE CASTS #/AREA URNS LPF: ABNORMAL /LPF
KETONES UR STRIP-MCNC: NEGATIVE MG/DL
LDLC SERPL CALC-MCNC: 88 MG/DL (ref 0–100)
LEUKOCYTE ESTERASE UR QL STRIP: ABNORMAL
MAGNESIUM SERPL-MCNC: 2.3 MG/DL (ref 1.6–2.6)
MCH RBC QN AUTO: 27 PG (ref 26.8–34.3)
MCHC RBC AUTO-ENTMCNC: 31.7 G/DL (ref 31.4–37.4)
MCV RBC AUTO: 85 FL (ref 82–98)
NITRITE UR QL STRIP: NEGATIVE
NON-SQ EPI CELLS URNS QL MICRO: ABNORMAL /HPF
NONHDLC SERPL-MCNC: 135 MG/DL
PH UR STRIP.AUTO: 7 [PH]
PHOSPHATE SERPL-MCNC: 3.8 MG/DL (ref 2.3–4.1)
PLATELET # BLD AUTO: 513 THOUSANDS/UL (ref 149–390)
PMV BLD AUTO: 9.5 FL (ref 8.9–12.7)
POTASSIUM SERPL-SCNC: 4.1 MMOL/L (ref 3.5–5.3)
PROT UR STRIP-MCNC: NEGATIVE MG/DL
PROT UR-MCNC: <6 MG/DL
PROT/CREAT UR: <0.12 MG/G{CREAT} (ref 0–0.1)
RBC # BLD AUTO: 4.71 MILLION/UL (ref 3.81–5.12)
RBC #/AREA URNS AUTO: ABNORMAL /HPF
SODIUM SERPL-SCNC: 134 MMOL/L (ref 136–145)
SP GR UR STRIP.AUTO: 1.01 (ref 1–1.03)
TRIGL SERPL-MCNC: 233 MG/DL
UROBILINOGEN UR QL STRIP.AUTO: 0.2 E.U./DL
WBC # BLD AUTO: 8.28 THOUSAND/UL (ref 4.31–10.16)
WBC #/AREA URNS AUTO: ABNORMAL /HPF

## 2020-12-02 PROCEDURE — 81001 URINALYSIS AUTO W/SCOPE: CPT

## 2020-12-02 PROCEDURE — 83735 ASSAY OF MAGNESIUM: CPT

## 2020-12-02 PROCEDURE — 82306 VITAMIN D 25 HYDROXY: CPT

## 2020-12-02 PROCEDURE — 85027 COMPLETE CBC AUTOMATED: CPT

## 2020-12-02 PROCEDURE — 84156 ASSAY OF PROTEIN URINE: CPT

## 2020-12-02 PROCEDURE — 80048 BASIC METABOLIC PNL TOTAL CA: CPT

## 2020-12-02 PROCEDURE — 36415 COLL VENOUS BLD VENIPUNCTURE: CPT

## 2020-12-02 PROCEDURE — 80061 LIPID PANEL: CPT

## 2020-12-02 PROCEDURE — 84100 ASSAY OF PHOSPHORUS: CPT

## 2020-12-02 PROCEDURE — 82570 ASSAY OF URINE CREATININE: CPT

## 2020-12-02 PROCEDURE — 83036 HEMOGLOBIN GLYCOSYLATED A1C: CPT

## 2020-12-02 NOTE — TELEPHONE ENCOUNTER
"12/2/2020       RE: Ingrid Villavicencio  [address] [address]     Dear Colleague,    Thank you for referring your patient, Ingrid Villavicencio, to the [facility] [place] at [facility]. Please see a copy of my visit note below.    [place]  CHIEF COMPLAINT   It was my pleasure to see Ingrid Villavicencio who is a 64 year old male for follow-up of prostate cancer .      HPI   Ingrid Villavicencio is a very pleasant 64 year old male    Initially seen 9/25/20:  \"Ingrid Villavicencio is a 64 year old male who is being seen for evaluation of Elevated PSA.  He had recent PSA in August 2020 2.8.  No clear records that I could find of a prior PSA.  He denies any voiding symptoms.  He denies any family history of prostate cancer.  He denies any hip or bony tenderness.\"    10/14/20:  MR- fusion biopsy    TODAY:  He is scheduled for surgery on 12/14/2020  He was recently seen at [facility] for second opinion and presents today with a few questions he wants to discuss    PHYSICAL EXAM  Patient is a 64 year old  male   Vitals: Blood pressure 118/70, pulse 75, height 1.676 m (5' 6\"), weight 78 kg (172 lb), SpO2 100 %.  Body mass index is 27.76 kg/m .  General Appearance Adult:   Alert, no acute distress, oriented  HENT: throat/mouth:normal, good dentition  Lungs: no respiratory distress, or pursed lip breathing  Heart: No obvious jugular venous distension present  Abdomen: non - distended  Musculoskeltal: extremities normal, no peripheral edema  Skin: no suspicious lesions or rashes  Neuro: Alert, oriented, speech and mentation normal  Psych: affect and mood normal  Gait: Normal   Rectal: ~30cc gland, firm nodule on the LEFT apex, asymmetric    Component PSA PSA Diag Urologic Phys   Latest Ref Rng & Units 0 - 4 ug/L 0.00 - 4.00 ng/mL   8/17/2020 22.80 (H)    9/25/2020  30.50 (H)       PATHOLOGY:  FINAL DIAGNOSIS:   A.  Prostate, left lateral base , biopsy   - Benign prostatic " Pt spouse called requesting med refill for simvastatin sent to [address] [place] [address]   Pt last ov 6/15/18 last bw 8/9/18 tissue. Negative for malignancy     B.  Prostate, left lateral mid, biopsy   - Prostatic adenocarcinoma, acinar type, Wolfgang's grade 3+ 3 = 6, grade   group is1, number of cores involved   is 1 of 1, total surface area involved is <5 %,perineural invasion is not   identified     C.  Prostate, left lateral apex, biopsy   - Prostatic adenocarcinoma, acinar type, Vinemont's grade 4 +4 = 8, grade   group is 4, number of cores involved   is 1 of 1, total surface area involved is 40 %,perineural invasion is not   identified     D.  Prostate, left base, biopsy   - Benign prostatic tissue. Negative for malignancy     E.  Prostate, left mid, biopsy   - Prostatic adenocarcinoma, acinar type, Wolfgang's grade 4 + 5 = 9, grade   group is 5, number of cores involved   is 1 of 1, total surface area involved is 75 %,perineural invasion is   identified. Percentage of grade 4 = 90%,   percentage of grade 5 = 10% (Please see comment)     F.  Prostate, left apex, biopsy   - Prostatic adenocarcinoma, acinar type, Wolfgang's grade 4 + 5 = 9, grade   group is 5, number of cores involved   is 1 of 1, total surface area involved is 90 %,perineural invasion is   identified. Percentage of grade 4 = 95%,   percentage of grade 5 = 5% (Please see comment)     G.  Prostate, right lateral base, biopsy   - Benign prostatic tissue. Negative for malignancy     H.  Prostate, right lateral mid, biopsy   - Benign prostatic tissue. Negative for malignancy     I.  Prostate, right lateral apex, biopsy   -Focal high grade prostatic intraepithelial neoplasia. Negative for   invasive malignancy.     J.  Prostate, right base, biopsy   - Benign prostatic tissue. Negative for malignancy     K.  Prostate, right mid, biopsy   - Benign prostatic tissue. Negative for malignan.cy     L.  Prostate, right apex, biopsy   - Focal high-grade prostatic intraepithelial neoplasia. Negative for   invasive malignancy.     M. Prostate, left Armstrong lesion x3, biopsy-   - Prostatic  adenocarcinoma, acinar type, Raven's grade 4 + 5 = 9, grade   group is 5, number of cores involved   is 3 of 3, total surface area involved is 95 %,perineural invasion is   identified. Percentage of grade 4 = 95%,   percentage of grade 5 = 5% (Please see comment)     IMAGING:  All pertinent imaging reviewed:    All imaging studies reviewed by me.  I personally reviewed these imaging films.  A formal report from radiology will follow.    FINDINGS:  Size: 3.5 x 4.8 x 3.8 cm  Volume: 33.2 mL  Hemorrhage: Absent  Peripheral zone: Heterogeneous on T2-weighted images. Suspicious  lesions as detailed below.  Transition zone: Nonenlarged. Transition zone nodules which are  circumscribed or mostly encapsulated without diffusion restriction.   PI-RADS 2.  No highly suspicious nodules.     Lesion(s) in rank order of severity (highest score- to lowest score,  then by size)      Lesion 1:  Location: Left apex peripheral zone at the 5-6 o'clock position  relative to the urethra. Series 5 image 62.   Additional prostate regions involved: There is abutment of the  external urethral sphincter.  Size: 28 mm  T2 description: Moderate hypointense  T2 numerical assessment: 5  DWI description: Hypointense on ADC and hyperintense on high b-value  DWI  DWI numerical assessment: 5  DCE assessment: Positive    Prostate margin: Capsular abutment 6-15 mm with focal bulging. The  mass is also extending anteriorly and abutting the external urinary  sphincter (series 17 image 18) as well as extending superiorly is a  linear band invading the inferomedial bilateral seminal vesicles  (series 17 image 10).  Lesion overall PI-RADS category: 5     Neurovascular bundles: No neurovascular bundle involvement by  malignancy.  Seminal vesicles: Both seminal vesicles are involved by malignancy.  Lymph nodes: No lymph node involvement  Bones: No suspicious lesions  Other pelvic organs: No additional findings.                                                                    IMPRESSION:  1. Based on the most suspicious abnormality, this exam is  characterized as PIRADS 5 - Clinically significant cancer is highly  likely to be present.  The most suspicious abnormality is located at  the left apex peripheral zone at the 5-6 o'clock position relative to  the urethra and there is capsular bulging indicating moderately high  suspicion of minimal extraprostatic extension. The mass is also  extending anteriorly and abutting the external urinary sphincter as  well as extending superiorly and invading the inferomedial seminal  vesicles.  2. No suspicious adenopathy or evidence of pelvic metastases.    ASSESSMENT and PLAN  64 year old man with PSA 30.50 and Wolfgang 4+5=9 prostate cancer with MRI showing PIRADs 5 lesion with high suspicion of extra-prostatic extension and possible SV invasion. No evidence of lymphadenopathy.    MSK Nomogram:  Cancer-specific survival after RALP:  99% (10 years) 98%(15 years)  Progression-free % after RALP:  14% (5 years)  8% (10 years)  Organ-confined disease:   4%  Extra-prostatic extension:   95%  Lymph node involvement:   65%  Seminal Vesicle invasion:   58%      With respect to radical prostatectomy, the pros and cons of open vs robotic-assisted laparoscopic prostatectomy were discussed. The complications of this procedure were reviewed with the patient and include (but not limited to) urinary incontinence, erectile dysfunction, infertility, anastomotic stricture, lymphocele, hemorrhage requiring transfusion, rectal, ureteral, or nerve injury, infection, cardiovascular, pulmonary, thromboembolic, and anesthetic complications.  For robotic prostatectomy, the additional complications of anastomotic urine leak and small bowel obstruction from adhesions was conveyed. The anticipated post-operative course was explained, including an anticipated 1 night hospital stay.  We discussed that given his extensive disease on the left side I would attempt,  but am not optimistic that a safe nerve sparing can be completed on the left.  A right-sided nerve spare would be attempted.  We discussed the expected outcomes with regard to erectile status and urine control.    With surgery, we also discussed the potential advantage over radiation therapy in that biochemical recurrence can be detected at a relatively earlier stage and that salvage radiotherapy is successful in controlling recurrent disease in a substantial proportion of patients.  I also conveyed that salvage radiotherapy was associated with a considerably more favorable morbidity profile compared to local salvage therapies for radiorecurent disease.     All questions were answered.    We again reviewed his MSK nomogram and that there is a high likelihood of the requirement for early salvage radiotherapy pending his PSA after surgery.    Plan:  -Proceed with surgery as scheduled    I spent over 25 minutes with the patient.  Over half this time was spent on counseling regarding the above.    Dov Tellez MD   Urology  [facility]  [facility] Phone: [phone]  [facility] Phone: [phone]

## 2020-12-22 ENCOUNTER — OFFICE VISIT (OUTPATIENT)
Dept: NEPHROLOGY | Facility: CLINIC | Age: 80
End: 2020-12-22
Payer: MEDICARE

## 2020-12-22 VITALS
HEIGHT: 57 IN | SYSTOLIC BLOOD PRESSURE: 118 MMHG | WEIGHT: 154.6 LBS | BODY MASS INDEX: 33.36 KG/M2 | DIASTOLIC BLOOD PRESSURE: 80 MMHG | HEART RATE: 88 BPM | RESPIRATION RATE: 18 BRPM

## 2020-12-22 DIAGNOSIS — N18.31 STAGE 3A CHRONIC KIDNEY DISEASE (HCC): Primary | ICD-10-CM

## 2020-12-22 DIAGNOSIS — D50.9 IRON DEFICIENCY ANEMIA, UNSPECIFIED IRON DEFICIENCY ANEMIA TYPE: ICD-10-CM

## 2020-12-22 DIAGNOSIS — E87.1 HYPONATREMIA: ICD-10-CM

## 2020-12-22 DIAGNOSIS — I12.9 BENIGN HYPERTENSION WITH CHRONIC KIDNEY DISEASE, STAGE III (HCC): ICD-10-CM

## 2020-12-22 DIAGNOSIS — N18.30 BENIGN HYPERTENSION WITH CHRONIC KIDNEY DISEASE, STAGE III (HCC): ICD-10-CM

## 2020-12-22 PROCEDURE — 99213 OFFICE O/P EST LOW 20 MIN: CPT | Performed by: INTERNAL MEDICINE

## 2021-01-28 ENCOUNTER — OFFICE VISIT (OUTPATIENT)
Dept: FAMILY MEDICINE CLINIC | Facility: CLINIC | Age: 81
End: 2021-01-28
Payer: MEDICARE

## 2021-01-28 VITALS
BODY MASS INDEX: 32.79 KG/M2 | SYSTOLIC BLOOD PRESSURE: 128 MMHG | RESPIRATION RATE: 16 BRPM | DIASTOLIC BLOOD PRESSURE: 72 MMHG | HEART RATE: 96 BPM | WEIGHT: 152 LBS | HEIGHT: 57 IN | TEMPERATURE: 97.1 F

## 2021-01-28 DIAGNOSIS — I10 ESSENTIAL HYPERTENSION: ICD-10-CM

## 2021-01-28 DIAGNOSIS — N18.30 TYPE 2 DIABETES MELLITUS WITH STAGE 3 CHRONIC KIDNEY DISEASE, WITHOUT LONG-TERM CURRENT USE OF INSULIN, UNSPECIFIED WHETHER STAGE 3A OR 3B CKD (HCC): ICD-10-CM

## 2021-01-28 DIAGNOSIS — E03.9 HYPOTHYROIDISM, UNSPECIFIED TYPE: ICD-10-CM

## 2021-01-28 DIAGNOSIS — D75.839 THROMBOCYTOSIS: Primary | ICD-10-CM

## 2021-01-28 DIAGNOSIS — E11.22 TYPE 2 DIABETES MELLITUS WITH STAGE 3 CHRONIC KIDNEY DISEASE, WITHOUT LONG-TERM CURRENT USE OF INSULIN, UNSPECIFIED WHETHER STAGE 3A OR 3B CKD (HCC): ICD-10-CM

## 2021-01-28 DIAGNOSIS — H00.015 HORDEOLUM EXTERNUM OF LEFT LOWER EYELID: ICD-10-CM

## 2021-01-28 PROBLEM — E87.1 HYPONATREMIA: Status: RESOLVED | Noted: 2018-11-29 | Resolved: 2021-01-28

## 2021-01-28 PROBLEM — D50.9 IRON DEFICIENCY ANEMIA: Status: RESOLVED | Noted: 2020-12-22 | Resolved: 2021-01-28

## 2021-01-28 PROCEDURE — 99214 OFFICE O/P EST MOD 30 MIN: CPT | Performed by: FAMILY MEDICINE

## 2021-01-28 RX ORDER — CEPHALEXIN 500 MG/1
500 CAPSULE ORAL EVERY 8 HOURS SCHEDULED
Qty: 15 CAPSULE | Refills: 0 | Status: SHIPPED | OUTPATIENT
Start: 2021-01-28 | End: 2021-02-02

## 2021-01-28 NOTE — PATIENT INSTRUCTIONS
Await lab  Obesity   AMBULATORY CARE:   Obesity  is when your body mass index (BMI) is greater than 30  Your healthcare provider will use your height and weight to measure your BMI  The risks of obesity include  many health problems, such as injuries or physical disability  You may need tests to check for the following:  · Diabetes    · High blood pressure or high cholesterol    · Heart disease    · Gallbladder or liver disease    · Cancer of the colon, breast, prostate, liver, or kidney    · Sleep apnea    · Arthritis or gout    Seek care immediately if:   · You have a severe headache, confusion, or difficulty speaking  · You have weakness on one side of your body  · You have chest pain, sweating, or shortness of breath  Contact your healthcare provider if:   · You have symptoms of gallbladder or liver disease, such as pain in your upper abdomen  · You have knee or hip pain and discomfort while walking  · You have symptoms of diabetes, such as intense hunger and thirst, and frequent urination  · You have symptoms of sleep apnea, such as snoring or daytime sleepiness  · You have questions or concerns about your condition or care  Treatment for obesity  focuses on helping you lose weight to improve your health  Even a small decrease in BMI can reduce the risk for many health problems  Your healthcare provider will help you set a weight-loss goal   · Lifestyle changes  are the first step in treating obesity  These include making healthy food choices and getting regular physical activity  Your healthcare provider may suggest a weight-loss program that involves coaching, education, and therapy  · Medicine  may help you lose weight when it is used with a healthy diet and physical activity  · Surgery  can help you lose weight if you are very obese and have other health problems  There are several types of weight-loss surgery  Ask your healthcare provider for more information      Be successful losing weight:   · Set small, realistic goals  An example of a small goal is to walk for 20 minutes 5 days a week  Anther goal is to lose 5% of your body weight  · Tell friends, family members, and coworkers about your goals  and ask for their support  Ask a friend to lose weight with you, or join a weight-loss support group  · Identify foods or triggers that may cause you to overeat , and find ways to avoid them  Remove tempting high-calorie foods from your home and workplace  Place a bowl of fresh fruit on your kitchen counter  If stress causes you to eat, then find other ways to cope with stress  · Keep a diary to track what you eat and drink  Also write down how many minutes of physical activity you do each day  Weigh yourself once a week and record it in your diary  Eating changes: You will need to eat 500 to 1,000 fewer calories each day than you currently eat to lose 1 to 2 pounds a week  The following changes will help you cut calories:  · Eat smaller portions  Use small plates, no larger than 9 inches in diameter  Fill your plate half full of fruits and vegetables  Measure your food using measuring cups until you know what a serving size looks like  · Eat 3 meals and 1 or 2 snacks each day  Plan your meals in advance  [employer] and eat at home most of the time  Eat slowly  Do not skip meals  Skipping meals can lead to overeating later in the day  This can make it harder for you to lose weight  Talk with a dietitian to help you make a meal plan and schedule that is right for you  · Eat fruits and vegetables at every meal   They are low in calories and high in fiber, which makes you feel full  Do not add butter, margarine, or cream sauce to vegetables  Use herbs to season steamed vegetables  · Eat less fat and fewer fried foods  Eat more baked or grilled chicken and fish  These protein sources are lower in calories and fat than red meat  Limit fast food   Dress your salads with olive oil and vinegar instead of bottled dressing  · Limit the amount of sugar you eat  Do not drink sugary beverages  Limit alcohol  Activity changes:  Physical activity is good for your body in many ways  It helps you burn calories and build strong muscles  It decreases stress and depression, and improves your mood  It can also help you sleep better  Talk to your healthcare provider before you begin an exercise program   · Exercise for at least 30 minutes 5 days a week  Start slowly  Set aside time each day for physical activity that you enjoy and that is convenient for you  It is best to do both weight training and an activity that increases your heart rate, such as walking, bicycling, or swimming  · Find ways to be more active  Do yard work and housecleaning  Walk up the stairs instead of using elevators  Spend your leisure time going to events that require walking, such as outdoor festivals or fairs  This extra physical activity can help you lose weight and keep it off  Follow up with your healthcare provider as directed: You may need to meet with a dietitian  Write down your questions so you remember to ask them during your visits  © Copyright [address] [place] [address] Drive Information is for End User's use only and may not be sold, redistributed or otherwise used for commercial purposes  All illustrations and images included in CareNotes® are the copyrighted property of A D A M , Inc  or [facility] Carolyn Krause   The above information is an  only  It is not intended as medical advice for individual conditions or treatments  Talk to your doctor, nurse or pharmacist before following any medical regimen to see if it is safe and effective for you  Weight Management   AMBULATORY CARE:   Why it is important to manage your weight:  Being overweight increases your risk of health conditions such as heart disease, high blood pressure, type 2 diabetes, and certain types of cancer   It can also increase your risk for osteoarthritis, sleep apnea, and other respiratory problems  Aim for a slow, steady weight loss  Even a small amount of weight loss can lower your risk of health problems  How to lose weight safely:  A safe and healthy way to lose weight is to eat fewer calories and get regular exercise  · You can lose up about 1 pound a week by decreasing the number of calories you eat by 500 calories each day  You can decrease calories by eating smaller portion sizes or by cutting out high-calorie foods  Read labels to find out how many calories are in the foods you eat  · You can also burn calories with exercise such as walking, swimming, or biking  You will be more likely to keep weight off if you make these changes part of your lifestyle  Exercise at least 30 minutes per day on most days of the week  You can also fit in more physical activity by taking the stairs instead of the elevator or parking farther away from stores  Ask your healthcare provider about the best exercise plan for you  Healthy meal plan for weight management:  A healthy meal plan includes a variety of foods, contains fewer calories, and helps you stay healthy  A healthy meal plan includes the following:     · Eat whole-grain foods more often  A healthy meal plan should contain fiber  Fiber is the part of grains, fruits, and vegetables that is not broken down by your body  Whole-grain foods are healthy and provide extra fiber in your diet  Some examples of whole-grain foods are whole-wheat breads and pastas, oatmeal, brown rice, and bulgur  · Eat a variety of vegetables every day  Include dark, leafy greens such as spinach, kale, chelsea greens, and mustard greens  Eat yellow and orange vegetables such as carrots, sweet potatoes, and winter squash  · Eat a variety of fruits every day  Choose fresh or canned fruit (canned in its own juice or light syrup) instead of juice   Fruit juice has very little or no fiber     · Eat low-fat dairy foods  Drink fat-free (skim) milk or 1% milk  Eat fat-free yogurt and low-fat cottage cheese  Try low-fat cheeses such as mozzarella and other reduced-fat cheeses  · Choose meat and other protein foods that are low in fat  Choose beans or other legumes such as split peas or lentils  Choose fish, skinless poultry (chicken or turkey), or lean cuts of red meat (beef or pork)  Before you cook meat or poultry, cut off any visible fat  · Use less fat and oil  Try baking foods instead of frying them  Add less fat, such as margarine, sour cream, regular salad dressing and mayonnaise to foods  Eat fewer high-fat foods  Some examples of high-fat foods include french fries, doughnuts, ice cream, and cakes  · Eat fewer sweets  Limit foods and drinks that are high in sugar  This includes candy, cookies, regular soda, and sweetened drinks  Ways to decrease calories:   · Eat smaller portions  ? Use a small plate with smaller servings  ? Do not eat second helpings  ? When you eat at a restaurant, ask for a box and place half of your meal in the box before you eat  ? Share an entrée with someone else  · Replace high-calorie snacks with healthy, low-calorie snacks  ? Choose fresh fruit, vegetables, fat-free rice cakes, or air-popped popcorn instead of potato chips, nuts, or chocolate  ? Choose water or calorie-free drinks instead of soda or sweetened drinks  · Do not shop for groceries when you are hungry  You may be more likely to make unhealthy food choices  Take a grocery list of healthy foods and shop after you have eaten  · Eat regular meals  Do not skip meals  Skipping meals can lead to overeating later in the day  This can make it harder for you to lose weight  Eat a healthy snack in place of a meal if you do not have time to eat a regular meal  Talk with a dietitian to help you create a meal plan and schedule that is right for you      Other things to consider as you try to lose weight:   · Be aware of situations that may give you the urge to overeat, such as eating while watching television  Find ways to avoid these situations  For example, read a book, go for a walk, or do crafts  · Meet with a weight loss support group or friends who are also trying to lose weight  This may help you stay motivated to continue working on your weight loss goals  © Copyright [address] Information is for End User's use only and may not be sold, redistributed or otherwise used for commercial purposes  All illustrations and images included in CareNotes® are the copyrighted property of [employer]  or [address] [place], [place] [address]ristel   The above information is an  only  It is not intended as medical advice for individual conditions or treatments  Talk to your doctor, nurse or pharmacist before following any medical regimen to see if it is safe and effective for you

## 2021-01-28 NOTE — PROGRESS NOTES
Assessment and Plan:    Problem List Items Addressed This Visit        Endocrine    Hypothyroidism     Lab stable         Relevant Orders    TSH, 3rd generation    Type 2 diabetes mellitus with stage 3 chronic kidney disease, without long-term current use of insulin ([facility])       Lab Results   Component Value Date    HGBA1C 6 3 (H) 12/02/2020   lab stable         Relevant Orders    Hemoglobin A1C       Cardiovascular and Mediastinum    Essential hypertension     Bp stable            Hematopoietic and Hemostatic    Thrombocytosis ([facility] ) - Primary     Await repeat platelet count         Relevant Orders    CBC and differential      Other Visit Diagnoses     Hordeolum externum of left lower eyelid        Relevant Medications    cephalexin (KEFLEX) 500 mg capsule    BMI 32 0-32 9,adult                     Diagnoses and all orders for this visit:    Thrombocytosis ([facility] [address] )  -     CBC and differential; Future    Type 2 diabetes mellitus with stage 3 chronic kidney disease, without long-term current use of insulin, unspecified whether stage 3a or 3b CKD ([facility])  -     Hemoglobin A1C; Future    Hordeolum externum of left lower eyelid  -     cephalexin (KEFLEX) 500 mg capsule; Take 1 capsule (500 mg total) by mouth every 8 (eight) hours for 5 days    BMI 32 0-32 9,adult    Hypothyroidism, unspecified type  -     TSH, 3rd generation; Future    Essential hypertension              Subjective:      Patient ID: Yu Villafana is a 68 y o  female  CC:    Chief Complaint   Patient presents with    Follow-up     Patient present today for his 4 month follow up         HPI:    Here for f/u diabetes,htn, elevated platelets-does take aspirin, l lower eyelid with stye      The following portions of the patient's history were reviewed and updated as appropriate: allergies, current medications, past family history, past medical history, past social history, past surgical history and problem list         Review of Systems   Constitutional: Negative for activity change, appetite change and fatigue  Eyes:        Eyelid irritation   Respiratory: Negative for shortness of breath  Cardiovascular: Negative for chest pain  Neurological: Negative for dizziness and headaches  Data to review:       Objective:    Vitals:    01/28/21 1006   BP: 128/72   BP Location: Left arm   Patient Position: Sitting   Cuff Size: Large   Pulse: 96   Resp: 16   Temp: (!) 97 1 °F (36 2 °C)   TempSrc: Temporal   Weight: 68 9 kg (152 lb)   Height: 4' 9" (1 448 m)        Physical Exam  Vitals signs reviewed  Constitutional:       Appearance: Normal appearance  She is obese  Eyes:      Comments: Lower eyelid with stye   Cardiovascular:      Rate and Rhythm: Normal rate and regular rhythm  Pulses: Normal pulses  Heart sounds: Normal heart sounds  Pulmonary:      Effort: Pulmonary effort is normal       Breath sounds: Normal breath sounds  Musculoskeletal:      Right lower leg: No edema  Left lower leg: No edema  Neurological:      Mental Status: She is alert  BMI Counseling: Body mass index is 32 89 kg/m²  The BMI is above normal  Nutrition recommendations include reducing portion sizes, decreasing overall calorie intake, 3-5 servings of fruits/vegetables daily, reducing fast food intake and consuming healthier snacks  Exercise recommendations include exercising 3-5 times per week

## 2021-02-22 DIAGNOSIS — R80.1 PERSISTENT PROTEINURIA: ICD-10-CM

## 2021-02-22 DIAGNOSIS — E03.9 HYPOTHYROIDISM, UNSPECIFIED TYPE: ICD-10-CM

## 2021-02-22 DIAGNOSIS — I10 ESSENTIAL HYPERTENSION: ICD-10-CM

## 2021-02-22 RX ORDER — LEVOTHYROXINE SODIUM 0.05 MG/1
50 TABLET ORAL DAILY
Qty: 90 TABLET | Refills: 1 | Status: SHIPPED | OUTPATIENT
Start: 2021-02-22 | End: 2021-08-24 | Stop reason: SDUPTHER

## 2021-02-22 RX ORDER — LISINOPRIL 2.5 MG/1
2.5 TABLET ORAL DAILY
Qty: 90 TABLET | Refills: 3 | Status: SHIPPED | OUTPATIENT
Start: 2021-02-22 | End: 2022-02-14 | Stop reason: SDUPTHER

## 2021-03-08 DIAGNOSIS — E78.2 MIXED HYPERLIPIDEMIA: ICD-10-CM

## 2021-03-08 RX ORDER — EZETIMIBE 10 MG/1
10 TABLET ORAL DAILY
Qty: 90 TABLET | Refills: 1 | Status: SHIPPED | OUTPATIENT
Start: 2021-03-08 | End: 2021-08-24 | Stop reason: SDUPTHER

## 2021-03-22 ENCOUNTER — APPOINTMENT (OUTPATIENT)
Dept: LAB | Facility: CLINIC | Age: 81
End: 2021-03-22
Payer: MEDICARE

## 2021-03-22 DIAGNOSIS — D75.839 THROMBOCYTOSIS: ICD-10-CM

## 2021-03-22 DIAGNOSIS — N18.31 STAGE 3A CHRONIC KIDNEY DISEASE (HCC): ICD-10-CM

## 2021-03-22 DIAGNOSIS — E03.9 HYPOTHYROIDISM, UNSPECIFIED TYPE: ICD-10-CM

## 2021-03-22 DIAGNOSIS — E11.22 TYPE 2 DIABETES MELLITUS WITH STAGE 3 CHRONIC KIDNEY DISEASE, WITHOUT LONG-TERM CURRENT USE OF INSULIN, UNSPECIFIED WHETHER STAGE 3A OR 3B CKD (HCC): ICD-10-CM

## 2021-03-22 DIAGNOSIS — E87.1 HYPONATREMIA: ICD-10-CM

## 2021-03-22 DIAGNOSIS — N18.30 TYPE 2 DIABETES MELLITUS WITH STAGE 3 CHRONIC KIDNEY DISEASE, WITHOUT LONG-TERM CURRENT USE OF INSULIN, UNSPECIFIED WHETHER STAGE 3A OR 3B CKD (HCC): ICD-10-CM

## 2021-03-22 LAB
ANION GAP SERPL CALCULATED.3IONS-SCNC: 7 MMOL/L (ref 4–13)
BASOPHILS # BLD AUTO: 0.15 THOUSANDS/ΜL (ref 0–0.1)
BASOPHILS NFR BLD AUTO: 2 % (ref 0–1)
BUN SERPL-MCNC: 16 MG/DL (ref 5–25)
CALCIUM SERPL-MCNC: 9.4 MG/DL (ref 8.3–10.1)
CHLORIDE SERPL-SCNC: 104 MMOL/L (ref 100–108)
CO2 SERPL-SCNC: 25 MMOL/L (ref 21–32)
CREAT SERPL-MCNC: 1.2 MG/DL (ref 0.6–1.3)
EOSINOPHIL # BLD AUTO: 0.32 THOUSAND/ΜL (ref 0–0.61)
EOSINOPHIL NFR BLD AUTO: 3 % (ref 0–6)
ERYTHROCYTE [DISTWIDTH] IN BLOOD BY AUTOMATED COUNT: 16 % (ref 11.6–15.1)
EST. AVERAGE GLUCOSE BLD GHB EST-MCNC: 131 MG/DL
GFR SERPL CREATININE-BSD FRML MDRD: 43 ML/MIN/1.73SQ M
GLUCOSE P FAST SERPL-MCNC: 95 MG/DL (ref 65–99)
HBA1C MFR BLD: 6.2 %
HCT VFR BLD AUTO: 41.8 % (ref 34.8–46.1)
HGB BLD-MCNC: 12.9 G/DL (ref 11.5–15.4)
IMM GRANULOCYTES # BLD AUTO: 0.03 THOUSAND/UL (ref 0–0.2)
IMM GRANULOCYTES NFR BLD AUTO: 0 % (ref 0–2)
LYMPHOCYTES # BLD AUTO: 3.34 THOUSANDS/ΜL (ref 0.6–4.47)
LYMPHOCYTES NFR BLD AUTO: 36 % (ref 14–44)
MCH RBC QN AUTO: 26.5 PG (ref 26.8–34.3)
MCHC RBC AUTO-ENTMCNC: 30.9 G/DL (ref 31.4–37.4)
MCV RBC AUTO: 86 FL (ref 82–98)
MONOCYTES # BLD AUTO: 0.7 THOUSAND/ΜL (ref 0.17–1.22)
MONOCYTES NFR BLD AUTO: 8 % (ref 4–12)
NEUTROPHILS # BLD AUTO: 4.74 THOUSANDS/ΜL (ref 1.85–7.62)
NEUTS SEG NFR BLD AUTO: 51 % (ref 43–75)
NRBC BLD AUTO-RTO: 0 /100 WBCS
PLATELET # BLD AUTO: 566 THOUSANDS/UL (ref 149–390)
PMV BLD AUTO: 9.7 FL (ref 8.9–12.7)
POTASSIUM SERPL-SCNC: 4.3 MMOL/L (ref 3.5–5.3)
RBC # BLD AUTO: 4.86 MILLION/UL (ref 3.81–5.12)
SODIUM SERPL-SCNC: 136 MMOL/L (ref 136–145)
TSH SERPL DL<=0.05 MIU/L-ACNC: 3.37 UIU/ML (ref 0.36–3.74)
WBC # BLD AUTO: 9.28 THOUSAND/UL (ref 4.31–10.16)

## 2021-03-22 PROCEDURE — 83036 HEMOGLOBIN GLYCOSYLATED A1C: CPT

## 2021-03-22 PROCEDURE — 80048 BASIC METABOLIC PNL TOTAL CA: CPT

## 2021-03-22 PROCEDURE — 84443 ASSAY THYROID STIM HORMONE: CPT

## 2021-03-22 PROCEDURE — 85025 COMPLETE CBC W/AUTO DIFF WBC: CPT

## 2021-03-22 PROCEDURE — 36415 COLL VENOUS BLD VENIPUNCTURE: CPT

## 2021-03-23 ENCOUNTER — TELEPHONE (OUTPATIENT)
Dept: NEPHROLOGY | Facility: CLINIC | Age: 81
End: 2021-03-23

## 2021-03-23 NOTE — TELEPHONE ENCOUNTER
----- Message from Fly Weinberg MD sent at 3/22/2021  5:24 PM EDT -----  Please inform patient that renal function is stable at cr 1 2, electrolytes are stable   Sodium is normal

## 2021-03-23 NOTE — TELEPHONE ENCOUNTER
Spoke with patient and made her aware recent lab results showed sodium in akshat range  Renal functions and electrolytes stable  Creatinine at 1 2  Patient has no questions or concerns

## 2021-05-26 PROBLEM — E78.5 HYPERLIPIDEMIA DUE TO TYPE 2 DIABETES MELLITUS (HCC): Status: ACTIVE | Noted: 2021-05-26

## 2021-05-26 PROBLEM — E11.69 HYPERLIPIDEMIA DUE TO TYPE 2 DIABETES MELLITUS (HCC): Status: ACTIVE | Noted: 2021-05-26

## 2021-05-28 ENCOUNTER — TELEPHONE (OUTPATIENT)
Dept: FAMILY MEDICINE CLINIC | Facility: CLINIC | Age: 81
End: 2021-05-28

## 2021-05-28 ENCOUNTER — OFFICE VISIT (OUTPATIENT)
Dept: FAMILY MEDICINE CLINIC | Facility: CLINIC | Age: 81
End: 2021-05-28
Payer: MEDICARE

## 2021-05-28 VITALS
HEIGHT: 57 IN | RESPIRATION RATE: 14 BRPM | HEART RATE: 98 BPM | TEMPERATURE: 98.1 F | SYSTOLIC BLOOD PRESSURE: 130 MMHG | DIASTOLIC BLOOD PRESSURE: 82 MMHG | WEIGHT: 154.4 LBS | BODY MASS INDEX: 33.31 KG/M2

## 2021-05-28 DIAGNOSIS — Z12.31 ENCOUNTER FOR SCREENING MAMMOGRAM FOR MALIGNANT NEOPLASM OF BREAST: ICD-10-CM

## 2021-05-28 DIAGNOSIS — E78.5 HYPERLIPIDEMIA, UNSPECIFIED HYPERLIPIDEMIA TYPE: ICD-10-CM

## 2021-05-28 DIAGNOSIS — M81.0 AGE-RELATED OSTEOPOROSIS WITHOUT CURRENT PATHOLOGICAL FRACTURE: ICD-10-CM

## 2021-05-28 DIAGNOSIS — K21.9 GASTROESOPHAGEAL REFLUX DISEASE WITHOUT ESOPHAGITIS: Primary | ICD-10-CM

## 2021-05-28 DIAGNOSIS — N18.30 TYPE 2 DIABETES MELLITUS WITH STAGE 3 CHRONIC KIDNEY DISEASE, WITHOUT LONG-TERM CURRENT USE OF INSULIN, UNSPECIFIED WHETHER STAGE 3A OR 3B CKD (HCC): ICD-10-CM

## 2021-05-28 DIAGNOSIS — I10 ESSENTIAL HYPERTENSION: ICD-10-CM

## 2021-05-28 DIAGNOSIS — E03.9 HYPOTHYROIDISM, UNSPECIFIED TYPE: ICD-10-CM

## 2021-05-28 DIAGNOSIS — Z00.00 MEDICARE ANNUAL WELLNESS VISIT, SUBSEQUENT: ICD-10-CM

## 2021-05-28 DIAGNOSIS — E11.22 TYPE 2 DIABETES MELLITUS WITH STAGE 3 CHRONIC KIDNEY DISEASE, WITHOUT LONG-TERM CURRENT USE OF INSULIN, UNSPECIFIED WHETHER STAGE 3A OR 3B CKD (HCC): ICD-10-CM

## 2021-05-28 PROCEDURE — 1123F ACP DISCUSS/DSCN MKR DOCD: CPT | Performed by: FAMILY MEDICINE

## 2021-05-28 PROCEDURE — 99214 OFFICE O/P EST MOD 30 MIN: CPT | Performed by: FAMILY MEDICINE

## 2021-05-28 PROCEDURE — G0439 PPPS, SUBSEQ VISIT: HCPCS | Performed by: FAMILY MEDICINE

## 2021-05-28 RX ORDER — FAMOTIDINE 40 MG/1
40 TABLET, FILM COATED ORAL
Qty: 90 TABLET | Refills: 1 | Status: SHIPPED | OUTPATIENT
Start: 2021-05-28 | End: 2021-11-18 | Stop reason: SDUPTHER

## 2021-05-28 NOTE — PATIENT INSTRUCTIONS
Fall Prevention   AMBULATORY CARE:   Fall prevention  includes ways to make your home and other areas safer  It also includes ways you can move more carefully to prevent a fall  Health conditions that cause changes in your blood pressure, vision, or muscle strength and coordination may increase your risk for falls  Medicines may also increase your risk for falls if they make you dizzy, weak, or sleepy  Call 911 or have someone else call if:   · You have fallen and are unconscious  · You have fallen and cannot move part of your body  Contact your healthcare provider if:   · You have fallen and have pain or a headache  · You have questions or concerns about your condition or care  Fall prevention tips:   · Stand or sit up slowly  This may help you keep your balance and prevent falls  · Use assistive devices as directed  Your healthcare provider may suggest that you use a cane or walker to help you keep your balance  You may need to have grab bars put in your bathroom near the toilet or in the shower  · Wear shoes that fit well and have soles that   Wear shoes both inside and outside  Use slippers with good   Do not wear shoes with high heels  · Wear a personal alarm  This is a device that allows you to call 911 if you fall and need help  Ask your healthcare provider for more information  · Stay active  Exercise can help strengthen your muscles and improve your balance  Your healthcare provider may recommend water aerobics or walking  He or she may also recommend physical therapy to improve your coordination  Never start an exercise program without talking to your healthcare provider first          · Manage your medical conditions  Keep all appointments with your healthcare providers  Visit your eye doctor as directed  Home safety tips:       · Add items to prevent falls in the bathroom  Put nonslip strips on your bath or shower floor to prevent you from slipping   Use a bath mat if you do not have carpet in the bathroom  This will prevent you from falling when you step out of the bath or shower  Use a shower seat so you do not need to stand while you shower  Sit on the toilet or a chair in your bathroom to dry yourself and put on clothing  This will prevent you from losing your balance from drying or dressing yourself while you are standing  · Keep paths clear  Remove books, shoes, and other objects from walkways and stairs  Place cords for telephones and lamps out of the way so that you do not need to walk over them  Tape them down if you cannot move them  Remove small rugs  If you cannot remove a rug, secure it with double-sided tape  This will prevent you from tripping  · Install bright lights in your home  Use night lights to help light paths to the bathroom or kitchen  Always turn on the light before you start walking  · Keep items you use often on shelves within reach  Do not use a step stool to help you reach an item  · Paint or place reflective tape on the edges of your stairs  This will help you see the stairs better  Follow up with your healthcare provider as directed:  Write down your questions so you remember to ask them during your visits  © Copyright [address] Information is for End User's use only and may not be sold, redistributed or otherwise used for commercial purposes  All illustrations and images included in CareNotes® are the copyrighted property of [employer]  or [facility] Carolyn Krause   The above information is an  only  It is not intended as medical advice for individual conditions or treatments  Talk to your doctor, nurse or pharmacist before following any medical regimen to see if it is safe and effective for you

## 2021-05-28 NOTE — PROGRESS NOTES
Assessment and Plan:    Problem List Items Addressed This Visit        Digestive    GERD (gastroesophageal reflux disease) - Primary    Relevant Medications    famotidine (PEPCID) 40 MG tablet       Endocrine    Hypothyroidism     Thyroid nl         Type 2 diabetes mellitus with stage 3 chronic kidney disease, without long-term current use of insulin ([facility])    Relevant Orders    IRIS Diabetic eye exam    Hemoglobin A1C       Cardiovascular and Mediastinum    Essential hypertension     BP stable            Other    Hyperlipidemia    Relevant Orders    Lipid panel      Other Visit Diagnoses     Medicare annual wellness visit, subsequent        Age-related osteoporosis without current pathological fracture        Relevant Orders    DXA bone density spine hip and pelvis                 Diagnoses and all orders for this visit:    Gastroesophageal reflux disease without esophagitis  -     famotidine (PEPCID) 40 MG tablet; Take 1 tablet (40 mg total) by mouth daily at bedtime    Medicare annual wellness visit, subsequent    Type 2 diabetes mellitus with stage 3 chronic kidney disease, without long-term current use of insulin, unspecified whether stage 3a or 3b CKD ([facility] [address] )  -     IRIS Diabetic eye exam  -     Hemoglobin A1C; Future    Hyperlipidemia, unspecified hyperlipidemia type  -     Lipid panel; Future    Essential hypertension    Age-related osteoporosis without current pathological fracture  -     DXA bone density spine hip and pelvis; Future    Hypothyroidism, unspecified type              Subjective:      Patient ID: Serafin Nicholson is a 68 y o  female      CC:    Chief Complaint   Patient presents with    Follow-up    Medicare Wellness Visit       HPI:    F/u diabetes, lipids,thyroid      The following portions of the patient's history were reviewed and updated as appropriate: allergies, current medications, past family history, past medical history, past social history, past surgical history and problem list         Review of Systems   Constitutional: Negative for activity change, appetite change and fatigue  Respiratory: Negative for shortness of breath  Cardiovascular: Negative for chest pain  Musculoskeletal: Positive for joint swelling  L hand with swelling   Neurological: Negative for dizziness and numbness  Data to review:       Objective:    Vitals:    05/28/21 1002   BP: 130/82   BP Location: Left arm   Patient Position: Sitting   Cuff Size: Adult   Pulse: 98   Resp: 14   Temp: 98 1 °F (36 7 °C)   TempSrc: Tympanic   Weight: 70 kg (154 lb 6 4 oz)   Height: 5' 9" (1 753 m)        Physical Exam  Vitals signs reviewed  Constitutional:       Appearance: Normal appearance  Cardiovascular:      Rate and Rhythm: Normal rate and regular rhythm  Pulses: Normal pulses  Heart sounds: Normal heart sounds  Pulmonary:      Effort: Pulmonary effort is normal       Breath sounds: Normal breath sounds  Musculoskeletal:         General: Deformity present  Right lower leg: No edema  Left lower leg: No edema  Comments: Ganglion cyst l wrist   Neurological:      Mental Status: She is alert  Psychiatric:         Mood and Affect: Mood normal               Depression Screening Follow-up Plan: Patient's depression screening was positive with a PHQ-2 score of 0  Their PHQ-9 score was   Clinically patient does not have depression  No treatment is required  Falls Plan of Care: Balance, strength, and gait training instructions were provided

## 2021-05-28 NOTE — PROGRESS NOTES
Assessment and Plan:     Problem List Items Addressed This Visit        Digestive    GERD (gastroesophageal reflux disease) - Primary    Relevant Medications    famotidine (PEPCID) 40 MG tablet       Endocrine    Hypothyroidism     Thyroid nl         Type 2 diabetes mellitus with stage 3 chronic kidney disease, without long-term current use of insulin ([facility])    Relevant Orders    IRIS Diabetic eye exam    Hemoglobin A1C       Cardiovascular and Mediastinum    Essential hypertension     BP stable            Other    Hyperlipidemia    Relevant Orders    Lipid panel      Other Visit Diagnoses     Medicare annual wellness visit, subsequent        Age-related osteoporosis without current pathological fracture        Relevant Orders    DXA bone density spine hip and pelvis           Preventive health issues were discussed with patient, and age appropriate screening tests were ordered as noted in patient's After Visit Summary  Personalized health advice and appropriate referrals for health education or preventive services given if needed, as noted in patient's After Visit Summary       History of Present Illness:     Patient presents for Medicare Annual Wellness visit    Patient Care Team:  Jeanne Heard MD as PCP - General (Family Medicine)  MD Hazel Bernal MD Lanell Linger,      Problem List:     Patient Active Problem List   Diagnosis    GERD (gastroesophageal reflux disease)    Hyperlipidemia    Hypothyroidism    Type 2 diabetes mellitus with stage 3 chronic kidney disease, without long-term current use of insulin ([address] )    Primary osteoarthritis of right knee    Situational anxiety    Urinary incontinence    Stage 3a chronic kidney disease    Essential hypertension    Left knee pain    Primary osteoarthritis of left knee    Benign hypertension with chronic kidney disease, stage III ([address] )    Thrombocytosis ([address] )    Hyperlipidemia due to type 2 diabetes mellitus ([address] )      Past Medical and Surgical History:     Past Medical History:   Diagnosis Date    CKD (chronic kidney disease)     Diabetes mellitus ([address] )     Disease of thyroid gland     Diverticulosis     GERD (gastroesophageal reflux disease)     Hypertension     Hyponatremia     Ovarian cyst     Rectal bleeding     Wears reading eyeglasses      Past Surgical History:   Procedure Laterality Date    BREAST BIOPSY Right 05/13/2009    Ultrasound Bx  Benign    BREAST SURGERY      CATARACT EXTRACTION Bilateral     COLONOSCOPY      ESOPHAGOGASTRODUODENOSCOPY      OOPHORECTOMY Bilateral 2015    ID CMBND ANTERPOST COLPORRAPHY W/CYSTO N/A 6/26/2018    Procedure: ANTERIOR & POSTERIOR COLPORRHAPHY;  Surgeon: Hazeline Jeans, MD;  [facility];  Service: UroGynecology           ID CYSTOURETHROSCOPY N/A 6/26/2018    Procedure: Nilam Mixer;  Surgeon: Hazeline Jeans, MD;  [facility];  Service: UroGynecology           ID Francisco Javier Kenji LIG N/A 6/26/2018    Procedure: ANTERIOR COLPOPEXY VAGINAL EXTRAPERITONEAL (VEC); Surgeon: Hazeline Jeans, MD;  [facility];  Service: UroGynecology           ID SLING OPER STRES INCONTINENCE N/A 6/26/2018    Procedure: INSERTION PUBOVAGINAL SLING (FEMALE);   Surgeon: Hazeline Jeans, MD;  [facility];  Service: UroGynecology          [address] [place] [address] PROCEDURE  11/25/2020      Family History:     Family History   Problem Relation Age of Onset    Cervical cancer Mother 76    Breast cancer Maternal Grandmother         Under 48    Mental illness Son     Schizophrenia Son     Cancer Family     No Known Problems Father     No Known Problems Maternal Aunt     No Known Problems Maternal Aunt     Brain cancer Maternal Aunt         age unknown    Cancer Cousin       Social History:        Social History     Socioeconomic History    Marital status: /Civil Union     Spouse name: None    Number of children: 2    Years of education: None    Highest education level: None   Occupational History    Occupation: Retired    Social Needs    Financial resource strain: None    Food insecurity     Worry: None     Inability: None    Transportation needs     Medical: None     Non-medical: None   Tobacco Use    Smoking status: Never Smoker    Smokeless tobacco: Never Used   Substance and Sexual Activity    Alcohol use: No    Drug use: No    Sexual activity: Never     Partners: Male     Birth control/protection: None   Lifestyle    Physical activity     Days per week: None     Minutes per session: None    Stress: None   Relationships    Social connections     Talks on phone: None     Gets together: None     Attends Buddhist service: None     Active member of club or organization: None     Attends meetings of clubs or organizations: None     Relationship status: None    Intimate partner violence     Fear of current or ex partner: None     Emotionally abused: None     Physically abused: None     Forced sexual activity: None   Other Topics Concern    None   Social History Narrative    Active advance directive     Always uses seat belt     Lives with spouse/house     Patient has a living Will     Power of  in existence     [employer]     Supportive and safe           Medications and Allergies:     Current Outpatient Medications   Medication Sig Dispense Refill    aspirin 81 MG tablet Take 81 mg by mouth daily        Calcium Carbonate-Vitamin D 600-200 MG-UNIT TABS Take 1 tablet by mouth every other day        docusate sodium (COLACE) 100 mg capsule Take by mouth      ezetimibe (ZETIA) 10 mg tablet Take 1 tablet (10 mg total) by mouth daily 90 tablet 1    famotidine (PEPCID) 40 MG tablet Take 1 tablet (40 mg total) by mouth daily at bedtime 90 tablet 1    Glucosamine-Chondroitin 250-200 MG TABS Take 1 tablet by mouth daily        levothyroxine 50 mcg tablet Take 1 tablet (50 mcg total) by mouth daily 90 tablet 1    lisinopril (ZESTRIL) 2 5 mg tablet Take 1 tablet (2 5 mg total) by mouth daily 90 tablet 3    magnesium citrate solution Take 296 mL by mouth once      Multiple Vitamins-Iron (DAILY MULTIPLE VITAMIN/IRON) TABS Take 1 tablet by mouth daily        Omega-3 Fatty Acids ([employer] FISH OIL) 1200 MG CAPS Take 1 capsule by mouth 2 (two) times a day       No current facility-administered medications for this visit  No Known Allergies   Immunizations:     Immunization History   Administered Date(s) Administered    INFLUENZA 10/03/2020    Influenza Split High Dose Preservative Free IM 09/12/2014, 10/19/2015, 10/17/2016, 10/16/2017, 10/28/2019    Influenza, high dose seasonal 0 7 mL 10/17/2018    Influenza, seasonal, injectable 01/01/2011, 09/24/2012    Pneumococcal Conjugate 13-Valent 04/17/2019    Pneumococcal Polysaccharide PPV23 05/01/2010    Tdap 09/21/2018    Zoster 01/16/2008    Zoster Vaccine Recombinant 03/03/2019, 06/09/2019      Health Maintenance:         Topic Date Due    Colonoscopy Surveillance  05/25/2022     There are no preventive care reminders to display for this patient  Medicare Health Risk Assessment:     /82 (BP Location: Left arm, Patient Position: Sitting, Cuff Size: Adult)   Pulse 98   Temp 98 1 °F (36 7 °C) (Tympanic)   Resp 14   Ht 5' 9" (1 753 m)   Wt 70 kg (154 lb 6 4 oz)   BMI 22 80 kg/m²      Moe Espinoza is here for her Subsequent Wellness visit  Health Risk Assessment:   Patient rates overall health as good  Patient feels that their physical health rating is same  Patient is satisfied with their life  Eyesight was rated as same  Hearing was rated as same  Patient feels that their emotional and mental health rating is same  Patients states they are never, rarely angry  Patient states they are sometimes unusually tired/fatigued  Pain experienced in the last 7 days has been none  Patient states that she has experienced no weight loss or gain in last 6 months  Depression Screening:   PHQ-2 Score: 0      Fall Risk Screening: In the past year, patient has experienced: no history of falling in past year      Urinary Incontinence Screening:   Patient has not leaked urine accidently in the last six months  Home Safety:  Patient does not have trouble with stairs inside or outside of their home  Patient has working smoke alarms and has working carbon monoxide detector  Home safety hazards include: none  Nutrition:   Current diet is Regular  Medications:   Patient is currently taking over-the-counter supplements  OTC medications include: multivitamin, fishh oil  Patient is able to manage medications  Activities of Daily Living (ADLs)/Instrumental Activities of Daily Living (IADLs):   Walk and transfer into and out of bed and chair?: Yes  Dress and groom yourself?: Yes    Bathe or shower yourself?: Yes    Feed yourself? Yes  Do your laundry/housekeeping?: Yes  Manage your money, pay your bills and track your expenses?: Yes  Make your own meals?: Yes    Do your own shopping?: Yes    Previous Hospitalizations:   Any hospitalizations or ED visits within the last 12 months?: No      Advance Care Planning:   Living will: Yes    Durable POA for healthcare:  Yes    Advanced directive: Yes    Five wishes given: Yes      Cognitive Screening:   Provider or family/friend/caregiver concerned regarding cognition?: No    PREVENTIVE SCREENINGS      Cardiovascular Screening:    General: History Lipid Disorder and Screening Current      Diabetes Screening:     General: History Diabetes and Screening Current      Colorectal Cancer Screening:     General: Screening Current      Breast Cancer Screening:     General: Screening Current      Cervical Cancer Screening:    General: Screening Not Indicated      Osteoporosis Screening:    General: Screening Current      Abdominal Aortic Aneurysm (AAA) Screening:        General: Screening Not Indicated      Lung Cancer Screening: General: Screening Not Indicated      Hepatitis C Screening:    General: Screening Not Indicated    Screening, Brief Intervention, and Referral to Treatment (SBIRT)    Screening  Typical number of drinks in a day: 0  Typical number of drinks in a week: 0  Interpretation: Low risk drinking behavior        Salazar Priest MD

## 2021-05-28 NOTE — TELEPHONE ENCOUNTER
Pt called regarding 2 things:     Her height was entered incorrectly and should state that she is 4' 9", not 5' 9"  Also, can a mammogram script please be entered into the system for October 2021 & mailed to her? She has a copy of the dexa order, & wants to call to schedule both for the same day in October  Please call the pt when it is mailed out so she is aware  Thank you!

## 2021-06-02 ENCOUNTER — APPOINTMENT (OUTPATIENT)
Dept: LAB | Facility: CLINIC | Age: 81
End: 2021-06-02
Payer: MEDICARE

## 2021-06-02 DIAGNOSIS — N18.31 STAGE 3A CHRONIC KIDNEY DISEASE (HCC): ICD-10-CM

## 2021-06-02 DIAGNOSIS — N18.30 BENIGN HYPERTENSION WITH CHRONIC KIDNEY DISEASE, STAGE III (HCC): ICD-10-CM

## 2021-06-02 DIAGNOSIS — I12.9 BENIGN HYPERTENSION WITH CHRONIC KIDNEY DISEASE, STAGE III (HCC): ICD-10-CM

## 2021-06-02 DIAGNOSIS — E87.1 HYPONATREMIA: ICD-10-CM

## 2021-06-02 DIAGNOSIS — D50.9 IRON DEFICIENCY ANEMIA, UNSPECIFIED IRON DEFICIENCY ANEMIA TYPE: ICD-10-CM

## 2021-06-02 LAB
ANION GAP SERPL CALCULATED.3IONS-SCNC: 8 MMOL/L (ref 4–13)
BACTERIA UR QL AUTO: NORMAL /HPF
BILIRUB UR QL STRIP: NEGATIVE
BUN SERPL-MCNC: 17 MG/DL (ref 5–25)
CALCIUM SERPL-MCNC: 9.2 MG/DL (ref 8.3–10.1)
CHLORIDE SERPL-SCNC: 103 MMOL/L (ref 100–108)
CLARITY UR: CLEAR
CO2 SERPL-SCNC: 25 MMOL/L (ref 21–32)
COLOR UR: YELLOW
CREAT SERPL-MCNC: 1.04 MG/DL (ref 0.6–1.3)
CREAT UR-MCNC: 47.5 MG/DL
ERYTHROCYTE [DISTWIDTH] IN BLOOD BY AUTOMATED COUNT: 16.3 % (ref 11.6–15.1)
GFR SERPL CREATININE-BSD FRML MDRD: 52 ML/MIN/1.73SQ M
GLUCOSE P FAST SERPL-MCNC: 91 MG/DL (ref 65–99)
GLUCOSE UR STRIP-MCNC: NEGATIVE MG/DL
HCT VFR BLD AUTO: 40.3 % (ref 34.8–46.1)
HGB BLD-MCNC: 12.8 G/DL (ref 11.5–15.4)
HGB UR QL STRIP.AUTO: NEGATIVE
HYALINE CASTS #/AREA URNS LPF: NORMAL /LPF
KETONES UR STRIP-MCNC: NEGATIVE MG/DL
LEUKOCYTE ESTERASE UR QL STRIP: NEGATIVE
MAGNESIUM SERPL-MCNC: 2.2 MG/DL (ref 1.6–2.6)
MCH RBC QN AUTO: 26.8 PG (ref 26.8–34.3)
MCHC RBC AUTO-ENTMCNC: 31.8 G/DL (ref 31.4–37.4)
MCV RBC AUTO: 85 FL (ref 82–98)
NITRITE UR QL STRIP: NEGATIVE
NON-SQ EPI CELLS URNS QL MICRO: NORMAL /HPF
PH UR STRIP.AUTO: 6.5 [PH]
PHOSPHATE SERPL-MCNC: 3.4 MG/DL (ref 2.3–4.1)
PLATELET # BLD AUTO: 594 THOUSANDS/UL (ref 149–390)
PMV BLD AUTO: 9.7 FL (ref 8.9–12.7)
POTASSIUM SERPL-SCNC: 4 MMOL/L (ref 3.5–5.3)
PROT UR STRIP-MCNC: NEGATIVE MG/DL
PROT UR-MCNC: 8 MG/DL
PROT/CREAT UR: 0.17 MG/G{CREAT} (ref 0–0.1)
PTH-INTACT SERPL-MCNC: 21.9 PG/ML (ref 18.4–80.1)
RBC # BLD AUTO: 4.77 MILLION/UL (ref 3.81–5.12)
RBC #/AREA URNS AUTO: NORMAL /HPF
SODIUM SERPL-SCNC: 136 MMOL/L (ref 136–145)
SP GR UR STRIP.AUTO: 1.01 (ref 1–1.03)
UROBILINOGEN UR QL STRIP.AUTO: 0.2 E.U./DL
WBC # BLD AUTO: 8.66 THOUSAND/UL (ref 4.31–10.16)
WBC #/AREA URNS AUTO: NORMAL /HPF

## 2021-06-02 PROCEDURE — 82570 ASSAY OF URINE CREATININE: CPT

## 2021-06-02 PROCEDURE — 80048 BASIC METABOLIC PNL TOTAL CA: CPT

## 2021-06-02 PROCEDURE — 83970 ASSAY OF PARATHORMONE: CPT

## 2021-06-02 PROCEDURE — 84156 ASSAY OF PROTEIN URINE: CPT

## 2021-06-02 PROCEDURE — 83735 ASSAY OF MAGNESIUM: CPT

## 2021-06-02 PROCEDURE — 85027 COMPLETE CBC AUTOMATED: CPT

## 2021-06-02 PROCEDURE — 84100 ASSAY OF PHOSPHORUS: CPT

## 2021-06-02 PROCEDURE — 81001 URINALYSIS AUTO W/SCOPE: CPT

## 2021-06-02 PROCEDURE — 36415 COLL VENOUS BLD VENIPUNCTURE: CPT

## 2021-06-17 ENCOUNTER — OFFICE VISIT (OUTPATIENT)
Dept: NEPHROLOGY | Facility: CLINIC | Age: 81
End: 2021-06-17
Payer: MEDICARE

## 2021-06-17 VITALS
RESPIRATION RATE: 18 BRPM | DIASTOLIC BLOOD PRESSURE: 84 MMHG | BODY MASS INDEX: 32.79 KG/M2 | HEART RATE: 92 BPM | SYSTOLIC BLOOD PRESSURE: 118 MMHG | HEIGHT: 57 IN | WEIGHT: 152 LBS

## 2021-06-17 DIAGNOSIS — N18.31 TYPE 2 DIABETES MELLITUS WITH STAGE 3A CHRONIC KIDNEY DISEASE, WITHOUT LONG-TERM CURRENT USE OF INSULIN (HCC): ICD-10-CM

## 2021-06-17 DIAGNOSIS — N18.31 STAGE 3A CHRONIC KIDNEY DISEASE (HCC): Primary | ICD-10-CM

## 2021-06-17 DIAGNOSIS — R80.1 PERSISTENT PROTEINURIA: ICD-10-CM

## 2021-06-17 DIAGNOSIS — D50.9 IRON DEFICIENCY ANEMIA, UNSPECIFIED IRON DEFICIENCY ANEMIA TYPE: ICD-10-CM

## 2021-06-17 DIAGNOSIS — N18.30 BENIGN HYPERTENSION WITH CHRONIC KIDNEY DISEASE, STAGE III (HCC): ICD-10-CM

## 2021-06-17 DIAGNOSIS — E11.22 TYPE 2 DIABETES MELLITUS WITH STAGE 3A CHRONIC KIDNEY DISEASE, WITHOUT LONG-TERM CURRENT USE OF INSULIN (HCC): ICD-10-CM

## 2021-06-17 DIAGNOSIS — I12.9 BENIGN HYPERTENSION WITH CHRONIC KIDNEY DISEASE, STAGE III (HCC): ICD-10-CM

## 2021-06-17 PROCEDURE — 99213 OFFICE O/P EST LOW 20 MIN: CPT | Performed by: INTERNAL MEDICINE

## 2021-06-17 NOTE — PATIENT INSTRUCTIONS
Blood pressure is stable   -Recommend 2 g sodium diet  -Recommend daily exercise and weight loss  -Discussed home monitoring of BP and maintaining a log of blood pressure   -Recommend goal BP less than 130/80  Please inform me if SBP below 110 or above 140's persistently  Renal function is stable  Continue current treatment  Recommend discussion with PCP regarding elevated platelet count    Follow-up in 6 months with repeat labs

## 2021-06-17 NOTE — PROGRESS NOTES
NEPHROLOGY OUTPATIENT PROGRESS NOTE   Kim Li 68 y o  female MRN: [id number]  DATE: 6/17/2021  Reason for visit:   Chief Complaint   Patient presents with    Follow-up    Chronic Kidney Disease       ASSESSMENT and PLAN:  Chronic kidney disease stage III  - baseline creatinine 1 0-1 1   Renal function stable at creatinine 1 0 mg/dL with stable electrolytes  -Renal function is at baseline     -discussed about avoiding nephrotoxins like NSAIDs and IV contrast   -PTH level was within normal limit- 21 9  Upc ratio slightly worsened  -Continue monitoring renal function  Repeat labs in 6 months     Primary HTN chronic kidney disease stage 3  -BP stable and is at goal  -Continue lisinopril 2 5 mg daily   -continue 2 g sodium diet and home monitoring of blood pressure   Stressed again on home monitoring of blood pressure and calling me if systolic blood pressure more than 620 or diastolic more than 85         Anemia, iron deficiency anemia  - hemoglobin  12 8  improving   Found to have low iron saturation, last iron panel showed iron saturation 15% with ferritin 31      -Patient prefers not to be on iron tablet due to possibility of constipation   She does take oral vitamin with iron supplementation in it  Diabetes mellitus type 2, currently she is off all medication   She is on diet control  A1c 6 2 %  Continue to monitor      Hyperlipidemia:  Lipid panel improving with LDL now at 88 , TG trending up to 233 mg/dl in December 2020  Currently on Zetia and fish oil    -Goal LDL should be less than 100 for CKD patients and LDL currently at goal    Defer to PCP for monitoring of lipid panel and further management    Recommend lifestyle modification       Hyponatremia:  Resolved, sodium 136, continue to monitor    Thrombocytosis: platelet count 743, trending up    -recommend discussion with PCP and to consider hematologist referral           Diagnoses and all orders for this visit:    Stage 3a chronic kidney disease [facility])  -     Basic metabolic panel; Future  -     Magnesium; Future  -     Protein / creatinine ratio, urine; Future  -     PTH, intact; Future  -     CBC; Future    Persistent proteinuria  -     Protein / creatinine ratio, urine; Future    Benign hypertension with chronic kidney disease, stage III (HCC)  -     Basic metabolic panel; Future    Iron deficiency anemia, unspecified iron deficiency anemia type  -     CBC; Future    Type 2 diabetes mellitus with stage 3a chronic kidney disease, without long-term current use of insulin ([facility] [address] )        Patient Instructions   Blood pressure is stable   -Recommend 2 g sodium diet  -Recommend daily exercise and weight loss  -Discussed home monitoring of BP and maintaining a log of blood pressure   -Recommend goal BP less than 130/80  Please inform me if SBP below 110 or above 140's persistently  Renal function is stable  Continue current treatment  Recommend discussion with PCP regarding elevated platelet count  Follow-up in 6 months with repeat labs        SUBJECTIVE / HPI:  Kala Roach is a 68 y o   female with medical issues of Type 2 diabetes mellitus- on diet control, Reina Gastelum presents for follow-up of chronic kidney disease  Was taking aleeve in the past but now off it  She used to be on lisinopril- hctz in may but that was stopped in May 2018 due to ROBERTA with elevated cr to 1 3  Stopped simvastatin since nov 15th  Was on it for two years    Was taking Aleve in the past but now off it   Padmaja Leonardo has creatinine of 1 0-1 1 since 2016  Creatinine increased to 1 32 in May 2018  but improved  With stopping lisinopril hctz to creatinine 1 1  , recently renal function has been stable at creatinine 1 0-1 1    Blood work from 06/02/2021 showed renal function stable at creatinine 1 0, creatinine was 1 2 in March 2021, electrolytes stable  PTH 21 9, UA bland    Upc ratio 0 17 slightly trending up from previous level of less than 0 12    Blood pressure at home: not checked at home   Denies any new symptoms    REVIEW OF SYSTEMS:    Review of Systems   Constitutional: Negative for activity change, appetite change, chills, diaphoresis, fatigue and fever  HENT: Negative for congestion, facial swelling and nosebleeds  Eyes: Negative for pain and visual disturbance  Respiratory: Negative for cough, chest tightness and shortness of breath  Cardiovascular: Negative for chest pain and palpitations  Gastrointestinal: Negative for abdominal distention, abdominal pain, diarrhea, nausea and vomiting  Genitourinary: Negative for difficulty urinating, dysuria, flank pain, frequency and hematuria  Musculoskeletal: Negative for arthralgias, back pain and joint swelling  Skin: Negative for rash  Neurological: Negative for dizziness, seizures, syncope, weakness and headaches  Psychiatric/Behavioral: Negative for agitation and confusion  The patient is not nervous/anxious  More than 10 point review of systems were obtained and discussed in length with the patient  Complete review of systems were negative / unremarkable except mentioned above  PAST MEDICAL HISTORY:  Past Medical History:   Diagnosis Date    CKD (chronic kidney disease)     Diabetes mellitus ([facility] [address] )     Disease of thyroid gland     Diverticulosis     GERD (gastroesophageal reflux disease)     Hypertension     Hyponatremia     Ovarian cyst     Rectal bleeding     Wears reading eyeglasses        PAST SURGICAL HISTORY:  Past Surgical History:   Procedure Laterality Date    BREAST BIOPSY Right 05/13/2009    Ultrasound Bx   Benign    BREAST SURGERY      CATARACT EXTRACTION Bilateral     COLONOSCOPY      ESOPHAGOGASTRODUODENOSCOPY      OOPHORECTOMY Bilateral 2015    MA CMBND ANTERPOST COLPORRAPHY W/CYSTO N/A 6/26/2018    Procedure: ANTERIOR & POSTERIOR COLPORRHAPHY;  Surgeon: Avtar Kong MD;  [facility];  Service: UroGynecology           MA CYSTOURETHROSCOPY N/A 6/26/2018 Procedure: CYSTOSCOPY;  Surgeon: Asha Amanda MD;  [facility];  Service: UroGynecology           MI REVAGINAL PROLAPSE,SACROSP LIG N/A 6/26/2018    Procedure: ANTERIOR COLPOPEXY VAGINAL EXTRAPERITONEAL (VEC); Surgeon: Asha Amanda MD;  [facility];  Service: UroGynecology           MI SLING OPER STRES INCONTINENCE N/A 6/26/2018    Procedure: INSERTION PUBOVAGINAL SLING (FEMALE);   Surgeon: Asha Amanda MD;  [facility];  Service: UroGynecology          [facility] PROCEDURE  11/25/2020       SOCIAL HISTORY:  Social History     Substance and Sexual Activity   Alcohol Use No     Social History     Substance and Sexual Activity   Drug Use No     Social History     Tobacco Use   Smoking Status Never Smoker   Smokeless Tobacco Never Used       FAMILY HISTORY:  Family History   Problem Relation Age of Onset    Cervical cancer Mother 76    Breast cancer Maternal Grandmother         Under 48    Mental illness Son     Schizophrenia Son     Cancer Family     No Known Problems Father     No Known Problems Maternal Aunt     No Known Problems Maternal Aunt     Brain cancer Maternal Aunt         age unknown    Cancer Cousin        MEDICATIONS:    Current Outpatient Medications:     aspirin 81 MG tablet, Take 81 mg by mouth daily  , Disp: , Rfl:     Calcium Carbonate-Vitamin D 600-200 MG-UNIT TABS, Take 1 tablet by mouth every other day  , Disp: , Rfl:     docusate sodium (COLACE) 100 mg capsule, Take by mouth, Disp: , Rfl:     ezetimibe (ZETIA) 10 mg tablet, Take 1 tablet (10 mg total) by mouth daily, Disp: 90 tablet, Rfl: 1    famotidine (PEPCID) 40 MG tablet, Take 1 tablet (40 mg total) by mouth daily at bedtime, Disp: 90 tablet, Rfl: 1    Glucosamine-Chondroitin 250-200 MG TABS, Take 1 tablet by mouth daily  , Disp: , Rfl:     levothyroxine 50 mcg tablet, Take 1 tablet (50 mcg total) by mouth daily, Disp: 90 tablet, Rfl: 1    lisinopril (ZESTRIL) 2 5 mg tablet, Take 1 tablet (2 5 mg total) by mouth daily, Disp: 90 tablet, Rfl: 3    magnesium citrate solution, Take 296 mL by mouth once, Disp: , Rfl:     Multiple Vitamins-Iron (DAILY MULTIPLE VITAMIN/IRON) TABS, Take 1 tablet by mouth daily  , Disp: , Rfl:     Omega-3 Fatty Acids ([employer] FISH OIL) 1200 MG CAPS, Take 1 capsule by mouth 2 (two) times a day, Disp: , Rfl:       PHYSICAL EXAM:  Vitals:    06/17/21 0846   BP: 118/84   BP Location: Left arm   Patient Position: Sitting   Cuff Size: Standard   Pulse: 92   Resp: 18   Weight: 68 9 kg (152 lb)   Height: 4' 9" (1 448 m)     Body mass index is 32 89 kg/m²  Physical Exam  Constitutional:       General: She is not in acute distress  Appearance: Normal appearance  She is well-developed  HENT:      Head: Normocephalic and atraumatic  Nose: Nose normal       Mouth/Throat:      Mouth: Mucous membranes are moist    Eyes:      General: No scleral icterus  Conjunctiva/sclera: Conjunctivae normal       Pupils: Pupils are equal, round, and reactive to light  Neck:      Thyroid: No thyromegaly  Vascular: No JVD  Cardiovascular:      Rate and Rhythm: Normal rate and regular rhythm  Heart sounds: Normal heart sounds  No murmur heard  No friction rub  Pulmonary:      Effort: Pulmonary effort is normal  No respiratory distress  Breath sounds: Normal breath sounds  No wheezing or rales  Abdominal:      General: Bowel sounds are normal  There is no distension  Palpations: Abdomen is soft  Tenderness: There is no abdominal tenderness  Musculoskeletal:         General: No deformity  Cervical back: Neck supple  Right lower leg: No edema  Left lower leg: No edema  Skin:     General: Skin is warm and dry  Findings: No rash  Neurological:      Mental Status: She is alert and oriented to person, place, and time     Psychiatric:         Mood and Affect: Mood normal          Behavior: Behavior normal          Thought Content:  Thought content normal          Lab Results:   Results for orders placed or performed in visit on 86/90/63   Basic metabolic panel   Result Value Ref Range    Sodium 136 136 - 145 mmol/L    Potassium 4 0 3 5 - 5 3 mmol/L    Chloride 103 100 - 108 mmol/L    CO2 25 21 - 32 mmol/L    ANION GAP 8 4 - 13 mmol/L    BUN 17 5 - 25 mg/dL    Creatinine 1 04 0 60 - 1 30 mg/dL    Glucose, Fasting 91 65 - 99 mg/dL    Calcium 9 2 8 3 - 10 1 mg/dL    eGFR 52 ml/min/1 73sq m   CBC   Result Value Ref Range    WBC 8 66 4 31 - 10 16 Thousand/uL    RBC 4 77 3 81 - 5 12 Million/uL    Hemoglobin 12 8 11 5 - 15 4 g/dL    Hematocrit 40 3 34 8 - 46 1 %    MCV 85 82 - 98 fL    MCH 26 8 26 8 - 34 3 pg    MCHC 31 8 31 4 - 37 4 g/dL    RDW 16 3 (H) 11 6 - 15 1 %    Platelets 230 (H) 820 - 390 Thousands/uL    MPV 9 7 8 9 - 12 7 fL   Magnesium   Result Value Ref Range    Magnesium 2 2 1 6 - 2 6 mg/dL   Phosphorus   Result Value Ref Range    Phosphorus 3 4 2 3 - 4 1 mg/dL   Protein / creatinine ratio, urine   Result Value Ref Range    Creatinine, Ur 47 5 mg/dL    Protein Urine Random 8 mg/dL    Prot/Creat Ratio, Ur 0 17 (H) 0 00 - 0 10   PTH, intact   Result Value Ref Range    PTH 21 9 18 4 - 80 1 pg/mL   Urinalysis with microscopic   Result Value Ref Range    Clarity, UA Clear     Color, UA Yellow     Specific Erlanger, UA 1 012 1 003 - 1 030    pH, UA 6 5 4 5, 5 0, 5 5, 6 0, 6 5, 7 0, 7 5, 8 0    Glucose, UA Negative Negative mg/dl    Ketones, UA Negative Negative mg/dl    Blood, UA Negative Negative    Protein, UA Negative Negative mg/dl    Nitrite, UA Negative Negative    Bilirubin, UA Negative Negative    Urobilinogen, UA 0 2 0 2, 1 0 E U /dl E U /dl    Leukocytes, UA Negative Negative    WBC, UA None Seen None Seen, 2-4 /hpf    RBC, UA None Seen None Seen, 2-4 /hpf    Hyaline Casts, UA None Seen None Seen /lpf    Bacteria, UA None Seen None Seen, Occasional /hpf    Epithelial Cells None Seen None Seen, Occasional /hpf

## 2021-07-22 ENCOUNTER — APPOINTMENT (OUTPATIENT)
Dept: LAB | Facility: CLINIC | Age: 81
End: 2021-07-22
Payer: MEDICARE

## 2021-07-22 DIAGNOSIS — N18.30 TYPE 2 DIABETES MELLITUS WITH STAGE 3 CHRONIC KIDNEY DISEASE, WITHOUT LONG-TERM CURRENT USE OF INSULIN, UNSPECIFIED WHETHER STAGE 3A OR 3B CKD (HCC): ICD-10-CM

## 2021-07-22 DIAGNOSIS — E11.22 TYPE 2 DIABETES MELLITUS WITH STAGE 3 CHRONIC KIDNEY DISEASE, WITHOUT LONG-TERM CURRENT USE OF INSULIN, UNSPECIFIED WHETHER STAGE 3A OR 3B CKD (HCC): ICD-10-CM

## 2021-07-22 DIAGNOSIS — E78.5 HYPERLIPIDEMIA, UNSPECIFIED HYPERLIPIDEMIA TYPE: ICD-10-CM

## 2021-07-22 LAB
CHOLEST SERPL-MCNC: 168 MG/DL (ref 50–200)
EST. AVERAGE GLUCOSE BLD GHB EST-MCNC: 131 MG/DL
HBA1C MFR BLD: 6.2 %
HDLC SERPL-MCNC: 34 MG/DL
LDLC SERPL CALC-MCNC: 85 MG/DL (ref 0–100)
NONHDLC SERPL-MCNC: 134 MG/DL
TRIGL SERPL-MCNC: 244 MG/DL

## 2021-07-22 PROCEDURE — 80061 LIPID PANEL: CPT

## 2021-07-22 PROCEDURE — 83036 HEMOGLOBIN GLYCOSYLATED A1C: CPT

## 2021-07-22 PROCEDURE — 36415 COLL VENOUS BLD VENIPUNCTURE: CPT

## 2021-08-24 DIAGNOSIS — E78.2 MIXED HYPERLIPIDEMIA: ICD-10-CM

## 2021-08-24 DIAGNOSIS — E03.9 HYPOTHYROIDISM, UNSPECIFIED TYPE: ICD-10-CM

## 2021-08-24 RX ORDER — EZETIMIBE 10 MG/1
10 TABLET ORAL DAILY
Qty: 90 TABLET | Refills: 1 | Status: SHIPPED | OUTPATIENT
Start: 2021-08-24 | End: 2022-02-14 | Stop reason: SDUPTHER

## 2021-08-24 RX ORDER — LEVOTHYROXINE SODIUM 0.05 MG/1
50 TABLET ORAL DAILY
Qty: 90 TABLET | Refills: 1 | Status: SHIPPED | OUTPATIENT
Start: 2021-08-24 | End: 2022-02-14 | Stop reason: SDUPTHER

## 2021-10-06 ENCOUNTER — OFFICE VISIT (OUTPATIENT)
Dept: FAMILY MEDICINE CLINIC | Facility: CLINIC | Age: 81
End: 2021-10-06
Payer: MEDICARE

## 2021-10-06 VITALS
BODY MASS INDEX: 32.79 KG/M2 | DIASTOLIC BLOOD PRESSURE: 80 MMHG | SYSTOLIC BLOOD PRESSURE: 124 MMHG | WEIGHT: 152 LBS | HEART RATE: 114 BPM | HEIGHT: 57 IN | OXYGEN SATURATION: 98 % | RESPIRATION RATE: 18 BRPM

## 2021-10-06 DIAGNOSIS — E78.2 MIXED HYPERLIPIDEMIA: ICD-10-CM

## 2021-10-06 DIAGNOSIS — I10 ESSENTIAL HYPERTENSION: ICD-10-CM

## 2021-10-06 DIAGNOSIS — E11.22 TYPE 2 DIABETES MELLITUS WITH STAGE 3 CHRONIC KIDNEY DISEASE, WITHOUT LONG-TERM CURRENT USE OF INSULIN, UNSPECIFIED WHETHER STAGE 3A OR 3B CKD (HCC): ICD-10-CM

## 2021-10-06 DIAGNOSIS — N18.30 TYPE 2 DIABETES MELLITUS WITH STAGE 3 CHRONIC KIDNEY DISEASE, WITHOUT LONG-TERM CURRENT USE OF INSULIN, UNSPECIFIED WHETHER STAGE 3A OR 3B CKD (HCC): ICD-10-CM

## 2021-10-06 DIAGNOSIS — Z23 NEEDS FLU SHOT: Primary | ICD-10-CM

## 2021-10-06 DIAGNOSIS — E03.9 HYPOTHYROIDISM, UNSPECIFIED TYPE: ICD-10-CM

## 2021-10-06 PROCEDURE — 90662 IIV NO PRSV INCREASED AG IM: CPT | Performed by: FAMILY MEDICINE

## 2021-10-06 PROCEDURE — 99214 OFFICE O/P EST MOD 30 MIN: CPT | Performed by: FAMILY MEDICINE

## 2021-10-06 PROCEDURE — G0008 ADMIN INFLUENZA VIRUS VAC: HCPCS | Performed by: FAMILY MEDICINE

## 2021-10-18 ENCOUNTER — HOSPITAL ENCOUNTER (OUTPATIENT)
Dept: BONE DENSITY | Facility: MEDICAL CENTER | Age: 81
Discharge: HOME | End: 2021-10-18
Payer: MEDICARE

## 2021-10-18 ENCOUNTER — HOSPITAL ENCOUNTER (OUTPATIENT)
Dept: MAMMOGRAPHY | Facility: MEDICAL CENTER | Age: 81
Discharge: HOME | End: 2021-10-18
Payer: MEDICARE

## 2021-10-18 VITALS — BODY MASS INDEX: 32.79 KG/M2 | WEIGHT: 152 LBS | HEIGHT: 57 IN

## 2021-10-18 DIAGNOSIS — Z12.31 ENCOUNTER FOR SCREENING MAMMOGRAM FOR MALIGNANT NEOPLASM OF BREAST: ICD-10-CM

## 2021-10-18 DIAGNOSIS — M81.0 AGE-RELATED OSTEOPOROSIS WITHOUT CURRENT PATHOLOGICAL FRACTURE: ICD-10-CM

## 2021-10-18 PROCEDURE — 77063 BREAST TOMOSYNTHESIS BI: CPT

## 2021-10-18 PROCEDURE — 77067 SCR MAMMO BI INCL CAD: CPT

## 2021-10-18 PROCEDURE — 77080 DXA BONE DENSITY AXIAL: CPT

## 2021-10-21 ENCOUNTER — TELEPHONE (OUTPATIENT)
Dept: FAMILY MEDICINE CLINIC | Facility: CLINIC | Age: 81
End: 2021-10-21

## 2021-10-22 DIAGNOSIS — M81.0 AGE-RELATED OSTEOPOROSIS WITHOUT CURRENT PATHOLOGICAL FRACTURE: Primary | ICD-10-CM

## 2021-11-18 DIAGNOSIS — K21.9 GASTROESOPHAGEAL REFLUX DISEASE WITHOUT ESOPHAGITIS: ICD-10-CM

## 2021-11-18 RX ORDER — FAMOTIDINE 40 MG/1
40 TABLET, FILM COATED ORAL
Qty: 90 TABLET | Refills: 1 | Status: SHIPPED | OUTPATIENT
Start: 2021-11-18 | End: 2022-06-22 | Stop reason: SDUPTHER

## 2021-12-01 ENCOUNTER — TELEPHONE (OUTPATIENT)
Dept: NEPHROLOGY | Facility: CLINIC | Age: 81
End: 2021-12-01

## 2021-12-01 ENCOUNTER — APPOINTMENT (OUTPATIENT)
Dept: LAB | Facility: CLINIC | Age: 81
End: 2021-12-01
Payer: MEDICARE

## 2021-12-01 DIAGNOSIS — N18.30 BENIGN HYPERTENSION WITH CHRONIC KIDNEY DISEASE, STAGE III (HCC): ICD-10-CM

## 2021-12-01 DIAGNOSIS — E11.22 TYPE 2 DIABETES MELLITUS WITH STAGE 3 CHRONIC KIDNEY DISEASE, WITHOUT LONG-TERM CURRENT USE OF INSULIN, UNSPECIFIED WHETHER STAGE 3A OR 3B CKD (HCC): ICD-10-CM

## 2021-12-01 DIAGNOSIS — E87.1 HYPONATREMIA: Primary | ICD-10-CM

## 2021-12-01 DIAGNOSIS — N18.31 STAGE 3A CHRONIC KIDNEY DISEASE (HCC): ICD-10-CM

## 2021-12-01 DIAGNOSIS — E78.2 MIXED HYPERLIPIDEMIA: ICD-10-CM

## 2021-12-01 DIAGNOSIS — E03.9 HYPOTHYROIDISM, UNSPECIFIED TYPE: ICD-10-CM

## 2021-12-01 DIAGNOSIS — R80.1 PERSISTENT PROTEINURIA: ICD-10-CM

## 2021-12-01 DIAGNOSIS — I12.9 BENIGN HYPERTENSION WITH CHRONIC KIDNEY DISEASE, STAGE III (HCC): ICD-10-CM

## 2021-12-01 DIAGNOSIS — D50.9 IRON DEFICIENCY ANEMIA, UNSPECIFIED IRON DEFICIENCY ANEMIA TYPE: ICD-10-CM

## 2021-12-01 DIAGNOSIS — N18.30 TYPE 2 DIABETES MELLITUS WITH STAGE 3 CHRONIC KIDNEY DISEASE, WITHOUT LONG-TERM CURRENT USE OF INSULIN, UNSPECIFIED WHETHER STAGE 3A OR 3B CKD (HCC): ICD-10-CM

## 2021-12-01 LAB
ANION GAP SERPL CALCULATED.3IONS-SCNC: 4 MMOL/L (ref 4–13)
BUN SERPL-MCNC: 15 MG/DL (ref 5–25)
CALCIUM SERPL-MCNC: 9.1 MG/DL (ref 8.3–10.1)
CHLORIDE SERPL-SCNC: 99 MMOL/L (ref 100–108)
CHOLEST SERPL-MCNC: 169 MG/DL
CO2 SERPL-SCNC: 27 MMOL/L (ref 21–32)
CREAT SERPL-MCNC: 1.23 MG/DL (ref 0.6–1.3)
CREAT UR-MCNC: 56.9 MG/DL
ERYTHROCYTE [DISTWIDTH] IN BLOOD BY AUTOMATED COUNT: 16.2 % (ref 11.6–15.1)
EST. AVERAGE GLUCOSE BLD GHB EST-MCNC: 131 MG/DL
GFR SERPL CREATININE-BSD FRML MDRD: 42 ML/MIN/1.73SQ M
GLUCOSE P FAST SERPL-MCNC: 95 MG/DL (ref 65–99)
HBA1C MFR BLD: 6.2 %
HCT VFR BLD AUTO: 43.3 % (ref 34.8–46.1)
HDLC SERPL-MCNC: 37 MG/DL
HGB BLD-MCNC: 13.5 G/DL (ref 11.5–15.4)
LDLC SERPL CALC-MCNC: 92 MG/DL (ref 0–100)
MAGNESIUM SERPL-MCNC: 2 MG/DL (ref 1.6–2.6)
MCH RBC QN AUTO: 26.3 PG (ref 26.8–34.3)
MCHC RBC AUTO-ENTMCNC: 31.2 G/DL (ref 31.4–37.4)
MCV RBC AUTO: 84 FL (ref 82–98)
NONHDLC SERPL-MCNC: 132 MG/DL
PLATELET # BLD AUTO: 676 THOUSANDS/UL (ref 149–390)
PMV BLD AUTO: 9.7 FL (ref 8.9–12.7)
POTASSIUM SERPL-SCNC: 4.2 MMOL/L (ref 3.5–5.3)
PROT UR-MCNC: <6 MG/DL
PROT/CREAT UR: <0.11 MG/G{CREAT} (ref 0–0.1)
PTH-INTACT SERPL-MCNC: 22.4 PG/ML (ref 18.4–80.1)
RBC # BLD AUTO: 5.13 MILLION/UL (ref 3.81–5.12)
SODIUM SERPL-SCNC: 130 MMOL/L (ref 136–145)
TRIGL SERPL-MCNC: 198 MG/DL
TSH SERPL DL<=0.05 MIU/L-ACNC: 4.17 UIU/ML (ref 0.36–3.74)
WBC # BLD AUTO: 8.49 THOUSAND/UL (ref 4.31–10.16)

## 2021-12-01 PROCEDURE — 80048 BASIC METABOLIC PNL TOTAL CA: CPT

## 2021-12-01 PROCEDURE — 85027 COMPLETE CBC AUTOMATED: CPT

## 2021-12-01 PROCEDURE — 83036 HEMOGLOBIN GLYCOSYLATED A1C: CPT

## 2021-12-01 PROCEDURE — 83970 ASSAY OF PARATHORMONE: CPT

## 2021-12-01 PROCEDURE — 84156 ASSAY OF PROTEIN URINE: CPT

## 2021-12-01 PROCEDURE — 82570 ASSAY OF URINE CREATININE: CPT

## 2021-12-01 PROCEDURE — 36415 COLL VENOUS BLD VENIPUNCTURE: CPT

## 2021-12-01 PROCEDURE — 84443 ASSAY THYROID STIM HORMONE: CPT

## 2021-12-01 PROCEDURE — 83735 ASSAY OF MAGNESIUM: CPT

## 2021-12-01 PROCEDURE — 80061 LIPID PANEL: CPT

## 2021-12-21 ENCOUNTER — LAB (OUTPATIENT)
Dept: LAB | Facility: CLINIC | Age: 81
End: 2021-12-21
Payer: MEDICARE

## 2021-12-21 ENCOUNTER — TELEPHONE (OUTPATIENT)
Dept: NEPHROLOGY | Facility: CLINIC | Age: 81
End: 2021-12-21

## 2021-12-21 DIAGNOSIS — E87.1 HYPONATREMIA: ICD-10-CM

## 2021-12-21 LAB
ANION GAP SERPL CALCULATED.3IONS-SCNC: 5 MMOL/L (ref 4–13)
BUN SERPL-MCNC: 19 MG/DL (ref 5–25)
CALCIUM SERPL-MCNC: 8.9 MG/DL (ref 8.3–10.1)
CHLORIDE SERPL-SCNC: 103 MMOL/L (ref 100–108)
CO2 SERPL-SCNC: 27 MMOL/L (ref 21–32)
CREAT SERPL-MCNC: 1.04 MG/DL (ref 0.6–1.3)
GFR SERPL CREATININE-BSD FRML MDRD: 51 ML/MIN/1.73SQ M
GLUCOSE P FAST SERPL-MCNC: 90 MG/DL (ref 65–99)
OSMOLALITY UR/SERPL-RTO: 296 MMOL/KG (ref 282–298)
OSMOLALITY UR: 422 MMOL/KG
POTASSIUM SERPL-SCNC: 4 MMOL/L (ref 3.5–5.3)
SODIUM 24H UR-SCNC: 99 MOL/L
SODIUM SERPL-SCNC: 135 MMOL/L (ref 136–145)
URATE SERPL-MCNC: 6.4 MG/DL (ref 2–6.8)

## 2021-12-21 PROCEDURE — 83930 ASSAY OF BLOOD OSMOLALITY: CPT

## 2021-12-21 PROCEDURE — 83935 ASSAY OF URINE OSMOLALITY: CPT

## 2021-12-21 PROCEDURE — 80048 BASIC METABOLIC PNL TOTAL CA: CPT

## 2021-12-21 PROCEDURE — 84300 ASSAY OF URINE SODIUM: CPT

## 2021-12-21 PROCEDURE — 84550 ASSAY OF BLOOD/URIC ACID: CPT

## 2021-12-21 PROCEDURE — 36415 COLL VENOUS BLD VENIPUNCTURE: CPT

## 2022-01-06 ENCOUNTER — OFFICE VISIT (OUTPATIENT)
Dept: NEPHROLOGY | Facility: CLINIC | Age: 82
End: 2022-01-06
Payer: MEDICARE

## 2022-01-06 VITALS
HEART RATE: 94 BPM | DIASTOLIC BLOOD PRESSURE: 90 MMHG | BODY MASS INDEX: 33.31 KG/M2 | WEIGHT: 154.4 LBS | SYSTOLIC BLOOD PRESSURE: 150 MMHG | HEIGHT: 57 IN

## 2022-01-06 DIAGNOSIS — N18.31 TYPE 2 DIABETES MELLITUS WITH STAGE 3A CHRONIC KIDNEY DISEASE, WITHOUT LONG-TERM CURRENT USE OF INSULIN (HCC): ICD-10-CM

## 2022-01-06 DIAGNOSIS — N18.31 STAGE 3A CHRONIC KIDNEY DISEASE (HCC): Primary | ICD-10-CM

## 2022-01-06 DIAGNOSIS — E87.1 HYPONATREMIA: ICD-10-CM

## 2022-01-06 DIAGNOSIS — I12.9 BENIGN HYPERTENSION WITH CHRONIC KIDNEY DISEASE, STAGE III (HCC): ICD-10-CM

## 2022-01-06 DIAGNOSIS — N18.30 BENIGN HYPERTENSION WITH CHRONIC KIDNEY DISEASE, STAGE III (HCC): ICD-10-CM

## 2022-01-06 DIAGNOSIS — E11.22 TYPE 2 DIABETES MELLITUS WITH STAGE 3A CHRONIC KIDNEY DISEASE, WITHOUT LONG-TERM CURRENT USE OF INSULIN (HCC): ICD-10-CM

## 2022-01-06 DIAGNOSIS — D75.839 THROMBOCYTOSIS: ICD-10-CM

## 2022-01-06 PROCEDURE — 99213 OFFICE O/P EST LOW 20 MIN: CPT | Performed by: INTERNAL MEDICINE

## 2022-01-06 NOTE — PROGRESS NOTES
NEPHROLOGY OUTPATIENT PROGRESS NOTE   Joseline Agudelo 80 y o  female MRN: [id number]  DATE: 1/6/2022  Reason for visit:   Chief Complaint   Patient presents with    Follow-up     Stage 3a chronic kidney disease        ASSESSMENT and PLAN:  Chronic kidney disease stage III  - baseline creatinine 1 0-1 1   Renal function is stable at creatinine 1 0 mg/dL, continue to monitor  -chronic kidney disease likely due to hypertensive nephrosclerosis and age-related nephron loss  -discussed about avoiding nephrotoxins like NSAIDs and IV contrast   -PTH level was within normal limit- 22 4  Upc ratio less than 0 11, improving  -Continue monitoring renal function  Repeat labs in 6 months     Primary HTN chronic kidney disease stage 3  -BP elevated - did not take medication today and had coffee just before  Will be seeing PCP next month  IF persistently elevated would increase the dose of lisinopril   Stressed on home monitoring   -Continue lisinopril 2 5 mg daily   -continue 2 g sodium diet and home monitoring of blood pressure   Stressed again on home monitoring of blood pressure and calling me if systolic blood pressure more than 455 or diastolic more than 85       Hyponatremia  -sodium level dropped to 130 mEq/liter on 12/01 and now improved to 135 with fluid restriction  -not on medication which could cause hyponatremia, no history of malignancy  Last colonoscopy in 2017 did not suggest any malignancy  -Fluid restriction 50 oz fluid restriction  -workup showed normal serum osmolality  Urine sodium 99 and urine osmolality 422  -BMP in 3 months and follow-up in 6 months with repeat labs      Anemia, iron deficiency anemia  - hemoglobin 13 5 and improving   Iron saturation was 15% in 2019   -Patient prefers not to be on iron tablet due to possibility of constipation   She does take oral vitamin with iron supplementation in it        Diabetes mellitus type 2 with ckd stage 3, currently she is off all medication  María Pratt is on diet control  A1c 6 2 % in December 2021  Continue to monitor      Hyperlipidemia:  Lipid panel -LDL 92, TG improving to 198  Currently on Zetia and fish oil    -Goal LDL should be less than 100 for CKD patients and LDL currently at goal  -Defer to PCP for monitoring of lipid panel and further management  -Recommend lifestyle modification      Thrombocytosis: platelet count 241, trending up    -Referred to hematologist      [facility] maintenance, last colonoscopy in 2017 and was recommended for colonoscopy in 5 years    Diagnoses and all orders for this visit:    Stage 3a chronic kidney disease ([facility] [address] )  -     Basic metabolic panel; Future  -     Basic metabolic panel; Future  -     Protein / creatinine ratio, urine; Future  -     Urinalysis with microscopic; Future  -     PTH, intact; Future  -     CBC; Future    Benign hypertension with chronic kidney disease, stage III (HCC)  -     Basic metabolic panel; Future    Hyponatremia  -     Basic metabolic panel; Future    Type 2 diabetes mellitus with stage 3a chronic kidney disease, without long-term current use of insulin (HCC)  -     Basic metabolic panel; Future    Thrombocytosis  -     Ambulatory referral to Hematology / Oncology; Future        Patient Instructions   Blood pressure is slightly on higher side, continue lisinopril, stressed on home monitoring of blood pressure   -Recommend 2 g sodium diet  -Recommend daily exercise and weight loss  -Discussed home monitoring of BP and maintaining a log of blood pressure   -Recommend goal BP less than 130/80  Please inform me if SBP below 110 or above 140's persistently  Renal function is stable, continue to avoid nephrotoxins  Recommend fluid restriction 50 oz per day    Blood work in 3 months, follow-up in 6 months with repeat blood work and urine test      SUBJECTIVE / HPI:  Tracie Mir is a 80 y o   female with medical issues of Type 2 diabetes mellitus- on diet control, Gregory Hayes presents for follow-up of chronic kidney disease  Was taking aleeve in the past but now off it  She used to be on lisinopril- hctz in may but that was stopped in May 2018 due to ROBERTA with elevated cr to 1 3  Stopped simvastatin since nov 15th  Was on it for two years    Was taking Aleve in the past but now off it   Heraclio Sanon has creatinine of 1 0-1 1 since 2016  Creatinine increased to 1 32 in May 2018  but improved  With stopping lisinopril hctz to creatinine 1 1  , recently renal function has been stable at creatinine 1 0-1 1    Reviewed blood work from 12/21/2021, sodium level improved to 135 with fluid restriction from previous level of 130 on 12/01  Renal function stable at creatinine 1 04, serum osmolality was 296, urine sodium 99, urine osmolality 422  Hemoglobin A1c 6 2 PTH 22 4  TSH was slightly high at 4 1, recommend discussion with PCP  Upc ratio less than 0 11    Blood pressure at home:  Not checked    REVIEW OF SYSTEMS:    Review of Systems   Constitutional: Negative for chills and fever  HENT: Negative for ear pain and sore throat  Eyes: Negative for pain and visual disturbance  Respiratory: Negative for cough and shortness of breath  Cardiovascular: Negative for chest pain and palpitations  Gastrointestinal: Negative for abdominal pain and vomiting  Genitourinary: Negative for dysuria and hematuria  Musculoskeletal: Negative for arthralgias and back pain  Skin: Negative for color change and rash  Neurological: Negative for seizures and syncope  All other systems reviewed and are negative  More than 10 point review of systems were obtained and discussed in length with the patient  Complete review of systems were negative / unremarkable except mentioned above         PAST MEDICAL HISTORY:  Past Medical History:   Diagnosis Date    CKD (chronic kidney disease)     [facility] mellitus [facility] [address] )     Disease of thyroid gland     Diverticulosis     GERD (gastroesophageal reflux disease)     Hypertension  Hyponatremia     Ovarian cyst     Rectal bleeding     Wears reading eyeglasses        PAST SURGICAL HISTORY:  Past Surgical History:   Procedure Laterality Date    BREAST BIOPSY Right 05/13/2009    Ultrasound Bx  Benign    BREAST SURGERY      CATARACT EXTRACTION Bilateral     COLONOSCOPY      ESOPHAGOGASTRODUODENOSCOPY      OOPHORECTOMY Bilateral 2015    NM CMBND ANTERPOST COLPORRAPHY W/CYSTO N/A 6/26/2018    Procedure: ANTERIOR & POSTERIOR COLPORRHAPHY;  Surgeon: Carlos Paige MD;  [facility];  Service: UroGynecology           NM CYSTOURETHROSCOPY N/A 6/26/2018    Procedure: Magalis Overcast;  Surgeon: Carlos Paige MD;  [facility];  Service: UroGynecology           NM Popeye Comings LIG N/A 6/26/2018    Procedure: ANTERIOR COLPOPEXY VAGINAL EXTRAPERITONEAL (VEC); Surgeon: Carlos Paige MD;  [facility];  Service: UroGynecology           NM SLING OPER STRES INCONTINENCE N/A 6/26/2018    Procedure: INSERTION PUBOVAGINAL SLING (FEMALE);   Surgeon: Carlos Paige MD;  [facility];  Service: UroGynecology          [address] [facility] PROCEDURE  11/25/2020       SOCIAL HISTORY:  Social History     Substance and Sexual Activity   Alcohol Use No     Social History     Substance and Sexual Activity   Drug Use No     Social History     Tobacco Use   Smoking Status Never Smoker   Smokeless Tobacco Never Used       FAMILY HISTORY:  Family History   Problem Relation Age of Onset    Cervical cancer Mother 76    Breast cancer Maternal Grandmother         Under 48    Mental illness Son     Schizophrenia Son     Cancer Family     No Known Problems Father     No Known Problems Maternal Aunt     No Known Problems Maternal Aunt     Brain cancer Maternal Aunt         age unknown    Cancer Cousin        MEDICATIONS:    Current Outpatient Medications:     aspirin 81 MG tablet, Take 81 mg by mouth daily  , Disp: , Rfl:     Calcium Carbonate-Vitamin D 600-200 MG-UNIT TABS, Take 1 tablet by mouth every other day  , Disp: , Rfl:     docusate sodium (COLACE) 100 mg capsule, Take by mouth, Disp: , Rfl:     ezetimibe (ZETIA) 10 mg tablet, Take 1 tablet (10 mg total) by mouth daily, Disp: 90 tablet, Rfl: 1    famotidine (PEPCID) 40 MG tablet, Take 1 tablet (40 mg total) by mouth daily at bedtime, Disp: 90 tablet, Rfl: 1    Glucosamine-Chondroitin 250-200 MG TABS, Take 1 tablet by mouth daily  , Disp: , Rfl:     levothyroxine 50 mcg tablet, Take 1 tablet (50 mcg total) by mouth daily, Disp: 90 tablet, Rfl: 1    lisinopril (ZESTRIL) 2 5 mg tablet, Take 1 tablet (2 5 mg total) by mouth daily, Disp: 90 tablet, Rfl: 3    magnesium citrate solution, Take 296 mL by mouth once, Disp: , Rfl:     Multiple Vitamins-Iron (DAILY MULTIPLE VITAMIN/IRON) TABS, Take 1 tablet by mouth daily  , Disp: , Rfl:     Omega-3 Fatty Acids ([employer] FISH OIL) 1200 MG CAPS, Take 1 capsule by mouth 2 (two) times a day, Disp: , Rfl:       PHYSICAL EXAM:  Vitals:    01/06/22 0905 01/06/22 0941   BP: 136/90 150/90   BP Location: Left arm    Patient Position: Sitting    Cuff Size: Adult    Pulse: 94    Weight: 70 kg (154 lb 6 4 oz)    Height: 4' 9" (1 448 m)      Body mass index is 33 41 kg/m²  Physical Exam  Constitutional:       General: She is not in acute distress  Appearance: Normal appearance  She is well-developed  She is not diaphoretic  HENT:      Head: Normocephalic and atraumatic  Nose: Nose normal       Mouth/Throat:      Mouth: Mucous membranes are moist    Eyes:      General: No scleral icterus  Conjunctiva/sclera: Conjunctivae normal       Pupils: Pupils are equal, round, and reactive to light  Neck:      Thyroid: No thyromegaly  Vascular: No JVD  Cardiovascular:      Rate and Rhythm: Normal rate and regular rhythm  Heart sounds: Normal heart sounds  No murmur heard  No friction rub     Pulmonary:      Effort: Pulmonary effort is normal  No respiratory distress  Breath sounds: Normal breath sounds  No wheezing or rales  Abdominal:      General: Bowel sounds are normal  There is no distension  Palpations: Abdomen is soft  Tenderness: There is no abdominal tenderness  Musculoskeletal:         General: No deformity  Cervical back: Neck supple  Right lower leg: No edema  Left lower leg: No edema  Lymphadenopathy:      Cervical: No cervical adenopathy  Skin:     General: Skin is warm and dry  Coloration: Skin is not pale  Findings: No rash  Nails: There is no clubbing  Neurological:      Mental Status: She is alert and oriented to person, place, and time  She is not disoriented  Psychiatric:         Mood and Affect: Mood normal  Mood is not anxious  Affect is not inappropriate  Behavior: Behavior normal          Thought Content: Thought content normal          Lab Results:   Results for orders placed or performed in visit on 80/90/56   Basic metabolic panel   Result Value Ref Range    Sodium 135 (L) 136 - 145 mmol/L    Potassium 4 0 3 5 - 5 3 mmol/L    Chloride 103 100 - 108 mmol/L    CO2 27 21 - 32 mmol/L    ANION GAP 5 4 - 13 mmol/L    BUN 19 5 - 25 mg/dL    Creatinine 1 04 0 60 - 1 30 mg/dL    Glucose, Fasting 90 65 - 99 mg/dL    Calcium 8 9 8 3 - 10 1 mg/dL    eGFR 51 ml/min/1 73sq m   Osmolality-If this is regarding a toxic alcohol, STOP  Test is not routinely indicated  Please consult medical  on call for further guidance     Result Value Ref Range    Osmolality Serum 296 282 - 298 mmol/KG   Sodium, urine, random   Result Value Ref Range    Sodium, Ur 99    Osmolality, urine   Result Value Ref Range    Osmolality, Ur 422 250 - 900 mmol/KG   Uric acid   Result Value Ref Range    Uric Acid 6 4 2 0 - 6 8 mg/dL

## 2022-01-06 NOTE — PATIENT INSTRUCTIONS
Blood pressure is slightly on higher side, continue lisinopril, stressed on home monitoring of blood pressure   -Recommend 2 g sodium diet  -Recommend daily exercise and weight loss  -Discussed home monitoring of BP and maintaining a log of blood pressure   -Recommend goal BP less than 130/80  Please inform me if SBP below 110 or above 140's persistently  Renal function is stable, continue to avoid nephrotoxins  Recommend fluid restriction 50 oz per day    Blood work in 3 months, follow-up in 6 months with repeat blood work and urine test

## 2022-01-10 ENCOUNTER — TELEPHONE (OUTPATIENT)
Dept: HEMATOLOGY ONCOLOGY | Facility: CLINIC | Age: 82
End: 2022-01-10

## 2022-01-10 NOTE — TELEPHONE ENCOUNTER
New Patient Intake Form   Patient Details:    Brandon Cisneros  1940  [id number]    Appointment Information   Who is calling to schedule? Patient    If not self, what is the caller's name? Please put name of RBC nurse as well  Referring provider Dr Stuart Swain    What is the diagnosis? Thrombocytosis   Is there a confirmed tissue diagnosis? no   Is patient aware of diagnosis? yes   Have you had any imaging or labs done? If yes, where? (If imaging done outside of [facility], please remind patient to bring a disk ) yes   Have you been seen by another Oncologist/Hematologist?  If so, who and where? no   Are the records in [facility] or Care Everywhere? yes   Are records needed from an outside facility? no   If yes, Name of facility, city and state where facility is located  N/a    Preferred [place]   Is the patient willing to be seen by another provider?   (This is for breast patients only)    Miscellaneous Information:

## 2022-01-31 NOTE — PROGRESS NOTES
[facility] - Hematology & Medical Oncology  Outpatient Visit Encounter Note      Jess Sue 80 y o  female 1940 [id number] Date:  2/3/2022    HEMATOLOGICAL HISTORY        Clotting History Denies   Bleeding History Denies   Cancer History Denies   Family Cancer History Mother with cervical cancer, maternal grandmother with breast cancer, maternal aunt with brain cancer   H/O COVID Infection Denies   H/O COVID Vaccination 3/3 Khalil Martin   H/O Blood/Plt Transfusion Denies   Tobacco Use Denies   Alcohol Use Denies   Occupation [address] [place] [address] Marie Anderson is a 80 y o  here for new consultation with me today  The patient is referred by Dr Gillian De Leon and the reason for consultation is thrombocytosis  Her CBC shows longstanding history of thrombocytosis without abnormalities of WBC and hemoglobin:   7/16/2020  12/2/2020  3/22/2021  6/2/2021  12/1/2021    WBC 7 28 8 28 9 28 8 66 8 49   Hemoglobin 12 4 12 7 12 9 12 8 13 5   HCT 40 1 40 1 41 8 40 3 43 3   MCV 88 85 86 85 84   Platelet Count 512 (H) 513 (H) 566 (H) 594 (H) 676 (H)     Her differential was remarkable for basophilia:   5/4/2018  5/5/2018  5/6/2018  11/15/2019  3/22/2021    Immature Grans Absolute    0 02 0 03   Absolute Neutrophils 8 96 (H) 7 40 4 75 3 47 4 74   Lymphocytes Absolute 1 97 2 70 2 12 3 32 3 34   Absolute Monocytes 0 75 0 86 0 59 0 55 0 70   Absolute Eosinophils 0 39 0 67 (H) 0 52 0 24 0 32   Basophils Absolute 0 04 0 04 0 04 0 12 (H) 0 15 (H)       This Visit  She is here by herself today  She voices no acute complaints  She notes diffuse pruritis which has worsened in the last month  She also notes chronic constipation and flatulence  She also admits to hair thinning  Her last TSH was elevated and she is scheduled to follow-up with her PCP to discuss this  She notes new brown spot on her arms and chest which are also pruritic    She denies fevers, chills, unintentional weight loss, night sweats, fatigue, headaches, lightheadedness/dizziness, cough, SOB, palpitations, chest pain, abdominal tenderness/distension, nausea/vomiting, diarrhea, melena/hematochezia, hematuria, petechiae/purpura, increased ecchymosis, or LE swelling  I have reviewed the relevant past medical, surgical, social and family history  I have also reviewed allergies and medications for this patient  Review of Systems  Review of Systems   All other systems reviewed and are negative  OBJECTIVE     Physical Exam  Vitals:    02/03/22 0855   BP: 152/74   BP Location: Right arm   Patient Position: Sitting   Cuff Size: Adult   Pulse: 104   Resp: 17   Temp: 97 8 °F (36 6 °C)   TempSrc: Temporal   SpO2: 98%   Weight: 70 1 kg (154 lb 9 6 oz)   Height: 4' 9" (1 448 m)       Physical Exam  Vitals reviewed  Constitutional:       General: She is not in acute distress  Appearance: Normal appearance  She is obese  She is not ill-appearing, toxic-appearing or diaphoretic  Comments: Pleasant 80 y o  female sitting comfortably on an exam room chair  HENT:      Head: Normocephalic and atraumatic  Eyes:      General: No scleral icterus  Extraocular Movements: Extraocular movements intact  Conjunctiva/sclera: Conjunctivae normal       Pupils: Pupils are equal, round, and reactive to light  Comments: No conjunctival pallor  Cardiovascular:      Rate and Rhythm: Normal rate and regular rhythm  Pulses: Normal pulses  Heart sounds: Normal heart sounds  No murmur heard  No friction rub  No gallop  Pulmonary:      Effort: Pulmonary effort is normal  No respiratory distress  Breath sounds: Normal breath sounds  No wheezing or rales  Abdominal:      General: Bowel sounds are normal  There is no distension  Palpations: Abdomen is soft  There is no mass  Tenderness: There is no abdominal tenderness  There is no guarding or rebound     Musculoskeletal:         General: No swelling or tenderness  Normal range of motion  Cervical back: Normal range of motion and neck supple  No rigidity  Right lower leg: No edema  Left lower leg: No edema  Lymphadenopathy:      Cervical: No cervical adenopathy  Skin:     General: Skin is warm and dry  Coloration: Skin is not jaundiced or pale  Findings: No bruising, erythema or rash  Neurological:      General: No focal deficit present  Mental Status: She is alert  Mental status is at baseline  Psychiatric:         Mood and Affect: Mood normal          Behavior: Behavior normal           Imaging  Relevant imaging reviewed in chart    Labs  Relevant labs reviewed in chart     ASSESSMENT & PLAN      Diagnosis ICD-10-CM Associated Orders   1  Thrombocytosis  D75 839 Ambulatory referral to Hematology / Oncology     JAK2 V617F,Ql,W/RFL Exons 12,13 and MPL X128,V259     Iron Panel (Includes Ferritin, Iron Sat%, Iron, and TIBC)     CBC and differential   2  Elevated basophils  D72 824 JAK2 V617F,Ql,W/RFL Exons 12,13 and MPL K180,I266     CBC and differential   3  Other abnormality of red blood cells   R71 8 Iron Panel (Includes Ferritin, Iron Sat%, Iron, and TIBC)   4  Pruritus  L29 9        I discussed Kendell Amaral's case with Dr Anastasiia Daily and we formulated the plan together  80 y o  Male with anemia of CKD stage 3 and history of iron deficiency anemia seen in consultation for thrombocytosis  Discussion   I extensively reviewed her blood work with her, which shows progressive thrombocytosis and history of elevated basophils (in March)  At this time, I am concerned for MPN, as she also notes new-onset diffuse pruritis  Thus, I recommend preliminary work-up evaluating for somatic  mutations  I also recommend work-up for secondary thrombocytosis given his history of iron deficiency with iron panel   I recommend follow-up in 2 weeks to review her results and discuss plan moving forward     Recommend follow-up with PCP to discuss hypothyroidism and respective management  Plan/Labs  · JAK2 V617F w/ reflex to Exon 12-15, CALR, and MPL  · Iron panel  · CBC w/ diff (last diff was obtained March 2021)    Follow Up   2 weeks      All questions were answered to the patient's satisfaction during this encounter  They appreciated and thanked me for spending time with them  The patient knows the contact information for our office and know to reach out for any relevant concerns related to this encounter  For all other listed problems and medical diagnosis in his chart - they are managed by PCP and/or other specialists, which patient acknowledges        Franco Stubbs PA-C  Hematology & Medical Oncology

## 2022-02-02 ENCOUNTER — RA CDI HCC (OUTPATIENT)
Dept: OTHER | Facility: HOSPITAL | Age: 82
End: 2022-02-02

## 2022-02-02 NOTE — PROGRESS NOTES
[facility]  coding opportunities       Chart reviewed, no opportunity found: CHART REVIEWED, NO OPPORTUNITY FOUND                        Patients insurance company: [employer]

## 2022-02-03 ENCOUNTER — TELEPHONE (OUTPATIENT)
Dept: HEMATOLOGY ONCOLOGY | Facility: CLINIC | Age: 82
End: 2022-02-03

## 2022-02-03 ENCOUNTER — APPOINTMENT (OUTPATIENT)
Dept: LAB | Facility: CLINIC | Age: 82
End: 2022-02-03
Payer: MEDICARE

## 2022-02-03 ENCOUNTER — CONSULT (OUTPATIENT)
Dept: HEMATOLOGY ONCOLOGY | Facility: CLINIC | Age: 82
End: 2022-02-03
Payer: MEDICARE

## 2022-02-03 VITALS
HEART RATE: 104 BPM | HEIGHT: 57 IN | WEIGHT: 154.6 LBS | BODY MASS INDEX: 33.36 KG/M2 | DIASTOLIC BLOOD PRESSURE: 74 MMHG | OXYGEN SATURATION: 98 % | SYSTOLIC BLOOD PRESSURE: 152 MMHG | RESPIRATION RATE: 17 BRPM | TEMPERATURE: 97.8 F

## 2022-02-03 DIAGNOSIS — D75.839 THROMBOCYTOSIS: Primary | ICD-10-CM

## 2022-02-03 DIAGNOSIS — D75.839 THROMBOCYTOSIS: ICD-10-CM

## 2022-02-03 DIAGNOSIS — D72.824 ELEVATED BASOPHILS: ICD-10-CM

## 2022-02-03 DIAGNOSIS — R71.8 OTHER ABNORMALITY OF RED BLOOD CELLS: ICD-10-CM

## 2022-02-03 DIAGNOSIS — L29.9 PRURITUS: ICD-10-CM

## 2022-02-03 PROCEDURE — 36415 COLL VENOUS BLD VENIPUNCTURE: CPT

## 2022-02-03 PROCEDURE — 83550 IRON BINDING TEST: CPT

## 2022-02-03 PROCEDURE — 99204 OFFICE O/P NEW MOD 45 MIN: CPT | Performed by: STUDENT IN AN ORGANIZED HEALTH CARE EDUCATION/TRAINING PROGRAM

## 2022-02-03 PROCEDURE — 85025 COMPLETE CBC W/AUTO DIFF WBC: CPT

## 2022-02-03 PROCEDURE — 82728 ASSAY OF FERRITIN: CPT

## 2022-02-03 PROCEDURE — 83540 ASSAY OF IRON: CPT

## 2022-02-03 NOTE — TELEPHONE ENCOUNTER
I called the patient and left a message with the information on her 2 wk follow up for 2/17 @ 10 am  I then voiced to call the office, if she needs to reschedule due to conflicting appointment

## 2022-02-04 LAB
BASOPHILS # BLD AUTO: 0.18 THOUSANDS/ΜL (ref 0–0.1)
BASOPHILS NFR BLD AUTO: 2 % (ref 0–1)
EOSINOPHIL # BLD AUTO: 0.28 THOUSAND/ΜL (ref 0–0.61)
EOSINOPHIL NFR BLD AUTO: 3 % (ref 0–6)
ERYTHROCYTE [DISTWIDTH] IN BLOOD BY AUTOMATED COUNT: 16.6 % (ref 11.6–15.1)
FERRITIN SERPL-MCNC: 30 NG/ML (ref 8–388)
HCT VFR BLD AUTO: 45.2 % (ref 34.8–46.1)
HGB BLD-MCNC: 13.9 G/DL (ref 11.5–15.4)
IMM GRANULOCYTES # BLD AUTO: 0.02 THOUSAND/UL (ref 0–0.2)
IMM GRANULOCYTES NFR BLD AUTO: 0 % (ref 0–2)
IRON SATN MFR SERPL: 15 % (ref 15–50)
IRON SERPL-MCNC: 49 UG/DL (ref 50–170)
LYMPHOCYTES # BLD AUTO: 2.25 THOUSANDS/ΜL (ref 0.6–4.47)
LYMPHOCYTES NFR BLD AUTO: 26 % (ref 14–44)
MCH RBC QN AUTO: 26.2 PG (ref 26.8–34.3)
MCHC RBC AUTO-ENTMCNC: 30.8 G/DL (ref 31.4–37.4)
MCV RBC AUTO: 85 FL (ref 82–98)
MONOCYTES # BLD AUTO: 0.73 THOUSAND/ΜL (ref 0.17–1.22)
MONOCYTES NFR BLD AUTO: 8 % (ref 4–12)
NEUTROPHILS # BLD AUTO: 5.35 THOUSANDS/ΜL (ref 1.85–7.62)
NEUTS SEG NFR BLD AUTO: 61 % (ref 43–75)
NRBC BLD AUTO-RTO: 0 /100 WBCS
PLATELET # BLD AUTO: 700 THOUSANDS/UL (ref 149–390)
PMV BLD AUTO: 9.5 FL (ref 8.9–12.7)
RBC # BLD AUTO: 5.31 MILLION/UL (ref 3.81–5.12)
TIBC SERPL-MCNC: 334 UG/DL (ref 250–450)
WBC # BLD AUTO: 8.81 THOUSAND/UL (ref 4.31–10.16)

## 2022-02-09 ENCOUNTER — OFFICE VISIT (OUTPATIENT)
Dept: FAMILY MEDICINE CLINIC | Facility: CLINIC | Age: 82
End: 2022-02-09
Payer: MEDICARE

## 2022-02-09 VITALS
HEIGHT: 57 IN | SYSTOLIC BLOOD PRESSURE: 146 MMHG | BODY MASS INDEX: 33.22 KG/M2 | TEMPERATURE: 98.4 F | WEIGHT: 154 LBS | DIASTOLIC BLOOD PRESSURE: 84 MMHG

## 2022-02-09 DIAGNOSIS — N18.31 TYPE 2 DIABETES MELLITUS WITH STAGE 3A CHRONIC KIDNEY DISEASE, WITHOUT LONG-TERM CURRENT USE OF INSULIN (HCC): Primary | ICD-10-CM

## 2022-02-09 DIAGNOSIS — E11.69 HYPERLIPIDEMIA DUE TO TYPE 2 DIABETES MELLITUS (HCC): ICD-10-CM

## 2022-02-09 DIAGNOSIS — D75.839 THROMBOCYTOSIS: ICD-10-CM

## 2022-02-09 DIAGNOSIS — E03.9 HYPOTHYROIDISM, UNSPECIFIED TYPE: ICD-10-CM

## 2022-02-09 DIAGNOSIS — I10 ESSENTIAL HYPERTENSION: ICD-10-CM

## 2022-02-09 DIAGNOSIS — E11.22 TYPE 2 DIABETES MELLITUS WITH STAGE 3A CHRONIC KIDNEY DISEASE, WITHOUT LONG-TERM CURRENT USE OF INSULIN (HCC): Primary | ICD-10-CM

## 2022-02-09 DIAGNOSIS — E78.5 HYPERLIPIDEMIA DUE TO TYPE 2 DIABETES MELLITUS (HCC): ICD-10-CM

## 2022-02-09 PROCEDURE — 99214 OFFICE O/P EST MOD 30 MIN: CPT | Performed by: FAMILY MEDICINE

## 2022-02-09 NOTE — PROGRESS NOTES
Assessment and Plan:    Problem List Items Addressed This Visit        Endocrine    Hyperlipidemia due to type 2 diabetes mellitus ([facility] )    Relevant Orders    Lipid panel    Hypothyroidism     Borderline-wouldn't treat but monitor         Relevant Orders    TSH, 3rd generation    Type 2 diabetes mellitus with stage 3 chronic kidney disease, without long-term current use of insulin ([facility] ) - Primary    Relevant Orders    Hemoglobin A1C    Comprehensive metabolic panel       Cardiovascular and Mediastinum    Essential hypertension       Hematopoietic and Hemostatic    Thrombocytosis     Seeing hematology                      Diagnoses and all orders for this visit:    Type 2 diabetes mellitus with stage 3a chronic kidney disease, without long-term current use of insulin ([facility] )  -     Hemoglobin A1C; Future  -     Comprehensive metabolic panel; Future    Hyperlipidemia due to type 2 diabetes mellitus ([facility] )  -     Lipid panel; Future    Essential hypertension    Hypothyroidism, unspecified type  -     TSH, 3rd generation; Future    Thrombocytosis              Subjective:      Patient ID: Agustín Juan is a 80 y o  female  CC:    Chief Complaint   Patient presents with    Follow-up     chronic medical conditions       HPI:    F/u diabetes, lipids, HTn, saw hematology for thrombocytosis, has Giuseppe mutation, seeing them in follow up      The following portions of the patient's history were reviewed and updated as appropriate: allergies, current medications, past family history, past medical history, past social history, past surgical history and problem list       Review of Systems   Constitutional: Negative for activity change, appetite change and diaphoresis  Respiratory: Negative for shortness of breath  Skin:        pruritus   Neurological: Negative for dizziness and headaches  Hematological: Negative for adenopathy  Does not bruise/bleed easily           Data to review:       Objective:    Vitals:    02/09/22 1154   BP: 146/84   BP Location: Left arm   Patient Position: Sitting   Cuff Size: Standard   Temp: 98 4 °F (36 9 °C)   TempSrc: Temporal   Weight: 69 9 kg (154 lb)   Height: 4' 9" (1 448 m)        Physical Exam  Vitals reviewed  Constitutional:       Appearance: Normal appearance  She is obese  Neck:      Vascular: No carotid bruit  Cardiovascular:      Rate and Rhythm: Normal rate and regular rhythm  Pulses: Normal pulses  Heart sounds: Normal heart sounds  Pulmonary:      Effort: Pulmonary effort is normal       Breath sounds: Normal breath sounds  Musculoskeletal:      Right lower leg: No edema  Left lower leg: No edema  Lymphadenopathy:      Cervical: No cervical adenopathy  Neurological:      Mental Status: She is alert     Psychiatric:         Mood and Affect: Mood normal

## 2022-02-14 DIAGNOSIS — E78.2 MIXED HYPERLIPIDEMIA: ICD-10-CM

## 2022-02-14 DIAGNOSIS — E03.9 HYPOTHYROIDISM, UNSPECIFIED TYPE: ICD-10-CM

## 2022-02-14 DIAGNOSIS — I10 ESSENTIAL HYPERTENSION: ICD-10-CM

## 2022-02-14 DIAGNOSIS — R80.1 PERSISTENT PROTEINURIA: ICD-10-CM

## 2022-02-14 RX ORDER — LEVOTHYROXINE SODIUM 0.05 MG/1
50 TABLET ORAL DAILY
Qty: 90 TABLET | Refills: 1 | Status: SHIPPED | OUTPATIENT
Start: 2022-02-14 | End: 2022-05-15

## 2022-02-14 RX ORDER — LISINOPRIL 2.5 MG/1
2.5 TABLET ORAL DAILY
Qty: 90 TABLET | Refills: 3 | Status: SHIPPED | OUTPATIENT
Start: 2022-02-14 | End: 2023-02-14

## 2022-02-14 RX ORDER — EZETIMIBE 10 MG/1
10 TABLET ORAL DAILY
Qty: 90 TABLET | Refills: 1 | Status: SHIPPED | OUTPATIENT
Start: 2022-02-14

## 2022-02-14 NOTE — PROGRESS NOTES
[facility] - Hematology & Medical Oncology  Outpatient Visit Encounter Note      Juventino Hoffmann 80 y o  female 1940 [id number] Date:  2/17/2022    HEMATOLOGICAL HISTORY        Clotting History Denies   Bleeding History Denies   Cancer History Denies   Family Cancer History Mother with cervical cancer, maternal grandmother with breast cancer, maternal aunt with brain cancer   H/O COVID Infection Denies   H/O COVID Vaccination 3/3 Khalil Martin   H/O Blood/Plt Transfusion Denies   Tobacco Use Denies   Alcohol Use Denies   Occupation [address] [place] [address] Francy Dong is a 80 y o  here for new consultation with me today  The patient is referred by Dr Heike Monreal and the reason for consultation is thrombocytosis  Her CBC shows longstanding history of thrombocytosis without abnormalities of WBC and hemoglobin  Her differential was remarkable for basophilia  This Visit  Her 2/4/22 MPN  mutation work-up showed JAK2 V617F somatic  mutation  Her CBC continues to show progressive thrombocytosis:   12/2/2020  3/22/2021  6/2/2021  12/1/2021  2/3/2022    WBC 8 28 9 28 8 66 8 49 8 81   Hemoglobin 12 7 12 9 12 8 13 5 13 9   HCT 40 1 41 8 40 3 43 3 45 2   MCV 85 86 85 84 85   Platelet Count 543 (H) 566 (H) 594 (H) 676 (H) 700 (H)     Her differential shows increasing basophil count:   5/5/2018  5/6/2018  11/15/2019  3/22/2021  2/3/2022    Immature Grans Absolute   0 02 0 03 0 02   Absolute Neutrophils 7 40 4 75 3 47 4 74 5 35   Lymphocytes Absolute 2 70 2 12 3 32 3 34 2 25   Absolute Monocytes 0 86 0 59 0 55 0 70 0 73   Absolute Eosinophils 0 67 (H) 0 52 0 24 0 32 0 28   Basophils Absolute 0 04 0 04 0 12 (H) 0 15 (H) 0 18 (H)     Her iron panel shows iron deficiency:   2/3/2022    Iron 49 (L)   Ferritin 30   Iron Saturation 15   TIBC 334     She is here with her  Zion Fairbanks today  She voices no acute complaints    She continues to note diffuse pruritis which has worsened in the last month  She also notes chronic constipation and flatulence  She also admits to hair thinning  Her last TSH was elevated and she is scheduled to follow-up with her PCP to discuss this  She denies fevers, chills, unintentional weight loss, drenching night sweats, fatigue, headaches, lightheadedness/dizziness, cough, SOB, palpitations, chest pain, abdominal tenderness/distension, nausea/vomiting, diarrhea, melena/hematochezia, hematuria, petechiae/purpura, increased ecchymosis, or LE swelling  I have reviewed the relevant past medical, surgical, social and family history  I have also reviewed allergies and medications for this patient  Review of Systems  Review of Systems   All other systems reviewed and are negative  OBJECTIVE     Physical Exam  Vitals:    02/17/22 0950   BP: 136/78   BP Location: Left arm   Pulse: (!) 106   Resp: 18   Temp: (!) 97 °F (36 1 °C)   TempSrc: Tympanic Core   SpO2: 97%   Weight: 69 5 kg (153 lb 3 2 oz)   Height: 4' 9" (1 448 m)       Physical Exam  Vitals reviewed  Constitutional:       General: She is not in acute distress  Appearance: Normal appearance  She is obese  She is not ill-appearing, toxic-appearing or diaphoretic  Comments: Pleasant 80 y o  female sitting comfortably on an exam room chair  HENT:      Head: Normocephalic and atraumatic  Eyes:      General: No scleral icterus  Extraocular Movements: Extraocular movements intact  Conjunctiva/sclera: Conjunctivae normal       Pupils: Pupils are equal, round, and reactive to light  Comments: No conjunctival pallor  Cardiovascular:      Rate and Rhythm: Normal rate and regular rhythm  Pulses: Normal pulses  Heart sounds: Normal heart sounds  No murmur heard  No friction rub  No gallop  Pulmonary:      Effort: Pulmonary effort is normal  No respiratory distress  Breath sounds: Normal breath sounds  No wheezing or rales     Abdominal:      General: Bowel sounds are normal  There is no distension  Palpations: Abdomen is soft  There is no mass  Tenderness: There is no abdominal tenderness  There is no guarding or rebound  Musculoskeletal:         General: No swelling or tenderness  Normal range of motion  Cervical back: Normal range of motion and neck supple  No rigidity  Right lower leg: No edema  Left lower leg: No edema  Lymphadenopathy:      Cervical: No cervical adenopathy  Skin:     General: Skin is warm and dry  Coloration: Skin is not jaundiced or pale  Findings: No bruising, erythema or rash  Neurological:      General: No focal deficit present  Mental Status: She is alert  Mental status is at baseline  Psychiatric:         Mood and Affect: Mood normal          Behavior: Behavior normal           Imaging  Relevant imaging reviewed in chart    Labs  Relevant labs reviewed in chart     ASSESSMENT & PLAN      Diagnosis ICD-10-CM Associated Orders   1  Essential thrombocytosis (HCC)  D47 3 Ambulatory referral to Interventional Radiology     hydroxyurea (HYDREA) 500 mg capsule     folic acid (FOLVITE) 1 mg tablet     CBC and Platelet     Comprehensive metabolic panel     LD,Blood   2  On hydroxyurea therapy  Z79 899 hydroxyurea (HYDREA) 500 mg capsule     folic acid (FOLVITE) 1 mg tablet     CBC and Platelet     Comprehensive metabolic panel     LD,Blood   3  Iron deficiency  E61 1 Iron Panel (Includes Ferritin, Iron Sat%, Iron, and TIBC)       I discussed Lito Amaral's case with Dr Hardeep Manzano and we formulated the plan together  80 y o  Male with anemia of CKD stage 3 and history of iron deficiency anemia seen in follow-up with new diagnosis of 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) and iron deficiency  Now initiating cytoreductive therapy with Hydroxyurea    Discussion   Essential thrombocytosis - I extensively reviewed her blood work with her, which shows progressive thrombocytosis and history of elevated basophils (in March)  Given patient's significant thrombocytosis, positive somatic  mutation JAK2 V617F, and no reasonable alternative etiology, patient's results are clinically consistent with Essential Thrombocytosis  I extensively reviewed the pathophysiology, diagnosis, management, and prognosis of essential thrombocytosis with Augusta valdivia and answered all patient's questions  Thus far, patient meets 3 of 4 major criteria for the [facility] classification for ET  Given patient age >57, no prior VTE, and presence of JAK2 mutation, 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  · Given her new diagnosis of JAK2 V617F positive high-risk ET, I recommend a bone marrow biopsy for comprehensive work-up, rule-out myelofibrosis, and to establish baseline  I extensively reviewed the reasoning, process, and side effect profile of obtaining a bone marrow sample with the patient and answered all her questions  she understands that complications include but are not limited to hemorrhage, infection, needle breakage, and pain or discomfort site of the procedure  All questions were answered to their satisfaction  Given patient risk of of arterial and/or venous thrombosis at this time, the benefits of initiating cytoreductive therapy with Hydroxyurea (Hydrea) accompanied with folate supplementation at this time outweigh the risks  I also recommend initiation of low-dose aspirin to reduce risk of vascular events and vasomotor symptoms  I explained the reasoning for the recommendation as well as the risks and benefits of initiating Hydroxyurea, including but not limited to anemia, thrombocytopenia, leukopenia, fatigue, photosensitivity, mucositis, oral ulcers, nail changes, etc  I also reviewed the risks and benefits of choosing not to pursue recommended therapy  I reviewed the package insert with patient and answered all her questions   After voicing understanding of the risks and benefits of Hydroxyurea, she decided to initiate cytoreductive therapy using shared decision making and informed consent  Given the increased risk of photosensitivity and skin cancer with Hydroxyurea therapy, I recommend annual full skin exam by dermatology for surveillance  She says that she sees a dermatologist annually every September  Still, I recommended that she notify her dermatologist that she is being initiated on Hydroxyurea  Iron deficiency - Given Pricila Amaral's iron deficiency, I recommend IV iron supplementation with IV Venofer  I reviewed the reasoning, process, and side effect profile of Venofer, which includes but is not limited to nausea, headache, hypotension, tattooing of the skin, and an anaphylactic reaction  The underlying etiology of Pricila Rodriguezs iron deficiency is unclear  However, I defer to her PCP to investigate the underlying cause and coordinate the appropriate management  I explained that IV iron supplementation will only temporarily alleviate her iron deficiency anemia unless the underlying etiology is managed  Thus, I strongly encouraged her to follow up with her PCP  Plan/Labs  · I placed a referral to Interventional Radiology and filled out the [facility] form  Continue daily 81mg ASA   Daily 1mg folic acid  068RS Hydroxyurea daily  CBC CMP LDH in 3 weeks  Follow-up with Dermatology  I ordered IV iron sucrose (Venofer) 300mg weekly x 5  I have ordered an iron panel in 3 months to confirm adequate iron supplementation     Follow-up  3 weeks  · 2 weeks after scheduled bone marrow biopsy    All questions were answered to the patient's satisfaction during this encounter  They appreciated and thanked me for spending time with them  The patient knows the contact information for our office and know to reach out for any relevant concerns related to this encounter   For all other listed problems and medical diagnosis in his chart - they are managed by PCP and/or other specialists, which patient acknowledges        Marvell Gaucher, PA-C  Hematology & Medical Oncology

## 2022-02-17 ENCOUNTER — OFFICE VISIT (OUTPATIENT)
Dept: HEMATOLOGY ONCOLOGY | Facility: CLINIC | Age: 82
End: 2022-02-17
Payer: MEDICARE

## 2022-02-17 VITALS
WEIGHT: 153.2 LBS | SYSTOLIC BLOOD PRESSURE: 136 MMHG | BODY MASS INDEX: 33.05 KG/M2 | DIASTOLIC BLOOD PRESSURE: 78 MMHG | TEMPERATURE: 97 F | HEART RATE: 106 BPM | RESPIRATION RATE: 18 BRPM | HEIGHT: 57 IN | OXYGEN SATURATION: 97 %

## 2022-02-17 DIAGNOSIS — E61.1 IRON DEFICIENCY: ICD-10-CM

## 2022-02-17 DIAGNOSIS — D47.3 ESSENTIAL THROMBOCYTOSIS (HCC): Primary | ICD-10-CM

## 2022-02-17 DIAGNOSIS — Z79.899 ON HYDROXYUREA THERAPY: ICD-10-CM

## 2022-02-17 PROCEDURE — 99214 OFFICE O/P EST MOD 30 MIN: CPT | Performed by: STUDENT IN AN ORGANIZED HEALTH CARE EDUCATION/TRAINING PROGRAM

## 2022-02-17 RX ORDER — SODIUM CHLORIDE 9 MG/ML
20 INJECTION, SOLUTION INTRAVENOUS ONCE
Status: CANCELLED | OUTPATIENT
Start: 2022-02-23

## 2022-02-17 RX ORDER — HYDROXYUREA 500 MG/1
500 CAPSULE ORAL DAILY
Qty: 30 CAPSULE | Refills: 2 | Status: SHIPPED | OUTPATIENT
Start: 2022-02-17 | End: 2022-04-01

## 2022-02-17 RX ORDER — FOLIC ACID 1 MG/1
1 TABLET ORAL DAILY
Qty: 90 TABLET | Refills: 2 | Status: SHIPPED | OUTPATIENT
Start: 2022-02-17

## 2022-02-17 NOTE — PATIENT INSTRUCTIONS
Bone Marrow Biopsy   AMBULATORY CARE:   What you need to know about a bone marrow biopsy:  A bone marrow biopsy is a procedure to remove a small amount of bone marrow from your bone  Bone marrow is the soft tissue inside your bone that helps to make blood cells  Bone marrow biopsies are usually done on the pelvic bone  The sample is tested for disease or infection  How to prepare for a bone marrow biopsy: Your healthcare provider will talk to you about how to prepare for this procedure  He will tell you what medicines to take or not take on the day of your procedure  You may need to arrange for someone to drive you home after your procedure  What will happen during a bone marrow biopsy: You may be given medicine to help you relax or make you drowsy  You will be asked to lie on your side or on your stomach  You will be given local anesthesia to numb the biopsy area  Your healthcare provider will make a small incision on your lower back  He will then insert a needle through the incision and into your pelvic bone  The needle will be used to take a sample of bone marrow  You may feel pressure and discomfort  A bandage will be applied over the biopsy area  What will happen after a bone marrow biopsy: You may need to lie on the biopsy area for up to 1 hour  This helps to apply pressure on the area to stop any bleeding  You may have mild pain or discomfort in the biopsy area for up to 1 week after your procedure  Risks of a bone marrow biopsy: You may bleed more than expected or get an infection in the biopsy area  Seek care immediately if:   · You have severe pain in the biopsy area  · Blood soaks through your bandage  Contact your healthcare provider if:   · You have a fever or chills  · Your biopsy area is red, swollen, or draining pus  · You have questions or concerns about your condition or care  Medicines:   · Acetaminophen  decreases pain and fever   It is available without a doctor's order  Ask how much to take and how often to take it  Follow directions  Acetaminophen can cause liver damage if not taken correctly  · Take your medicine as directed  Contact your healthcare provider if you think your medicine is not helping or if you have side effects  Tell him or her if you are allergic to any medicine  Keep a list of the medicines, vitamins, and herbs you take  Include the amounts, and when and why you take them  Bring the list or the pill bottles to follow-up visits  Carry your medicine list with you in case of an emergency  Self-care:   · Rest as directed  Do not lift heavy objects or do intense exercise for a few days after the procedure  Ask your healthcare provider when you can return to your daily activities  · Care for your biopsy area as directed  You will need to keep the biopsy area dry and covered with a bandage for 24 to 48 hours  Do not take a shower or bath, or get into a hot tub or swimming pool for 24 to 48 hours after your procedure  Follow up with your doctor as directed:  Write down your questions so you remember to ask them during your visits  © Copyright [employer] 2021 Information is for End User's use only and may not be sold, redistributed or otherwise used for commercial purposes  All illustrations and images included in CareNotes® are the copyrighted property of A D A M , Inc  or [facility] Carolyn rKause   The above information is an  only  It is not intended as medical advice for individual conditions or treatments  Talk to your doctor, nurse or pharmacist before following any medical regimen to see if it is safe and effective for you

## 2022-02-23 ENCOUNTER — HOSPITAL ENCOUNTER (OUTPATIENT)
Dept: INFUSION CENTER | Facility: CLINIC | Age: 82
Discharge: HOME | End: 2022-02-23
Payer: MEDICARE

## 2022-02-23 VITALS
HEART RATE: 108 BPM | TEMPERATURE: 99.3 F | DIASTOLIC BLOOD PRESSURE: 78 MMHG | RESPIRATION RATE: 20 BRPM | SYSTOLIC BLOOD PRESSURE: 147 MMHG

## 2022-02-23 DIAGNOSIS — E61.1 IRON DEFICIENCY: Primary | ICD-10-CM

## 2022-02-23 PROCEDURE — 96365 THER/PROPH/DIAG IV INF INIT: CPT

## 2022-02-23 RX ORDER — SODIUM CHLORIDE 9 MG/ML
20 INJECTION, SOLUTION INTRAVENOUS ONCE
Status: COMPLETED | OUTPATIENT
Start: 2022-02-23 | End: 2022-02-23

## 2022-02-23 RX ORDER — SODIUM CHLORIDE 9 MG/ML
20 INJECTION, SOLUTION INTRAVENOUS ONCE
Status: CANCELLED | OUTPATIENT
Start: 2022-03-02

## 2022-02-23 RX ADMIN — SODIUM CHLORIDE 20 ML/HR: 0.9 INJECTION, SOLUTION INTRAVENOUS at 13:30

## 2022-02-23 RX ADMIN — IRON SUCROSE 300 MG: 20 INJECTION, SOLUTION INTRAVENOUS at 13:36

## 2022-03-02 ENCOUNTER — HOSPITAL ENCOUNTER (OUTPATIENT)
Dept: INFUSION CENTER | Facility: CLINIC | Age: 82
Discharge: HOME | End: 2022-03-02
Payer: MEDICARE

## 2022-03-02 DIAGNOSIS — E61.1 IRON DEFICIENCY: Primary | ICD-10-CM

## 2022-03-02 PROCEDURE — 96365 THER/PROPH/DIAG IV INF INIT: CPT

## 2022-03-02 RX ORDER — SODIUM CHLORIDE 9 MG/ML
20 INJECTION, SOLUTION INTRAVENOUS ONCE
Status: CANCELLED | OUTPATIENT
Start: 2022-03-09

## 2022-03-02 RX ORDER — SODIUM CHLORIDE 9 MG/ML
20 INJECTION, SOLUTION INTRAVENOUS ONCE
Status: COMPLETED | OUTPATIENT
Start: 2022-03-02 | End: 2022-03-02

## 2022-03-02 RX ADMIN — SODIUM CHLORIDE 20 ML/HR: 0.9 INJECTION, SOLUTION INTRAVENOUS at 13:55

## 2022-03-02 RX ADMIN — IRON SUCROSE 300 MG: 20 INJECTION, SOLUTION INTRAVENOUS at 13:59

## 2022-03-02 NOTE — PROGRESS NOTES
Pt presents for venofer infusion  No new meds or concerns  Pt tolerated infusion without adverse reaction  Future visits discussed  AVS given

## 2022-03-08 RX ORDER — SODIUM CHLORIDE 9 MG/ML
75 INJECTION, SOLUTION INTRAVENOUS CONTINUOUS
Status: CANCELLED | OUTPATIENT
Start: 2022-03-08

## 2022-03-09 ENCOUNTER — HOSPITAL ENCOUNTER (OUTPATIENT)
Dept: INFUSION CENTER | Facility: CLINIC | Age: 82
Discharge: HOME | End: 2022-03-09
Payer: MEDICARE

## 2022-03-09 VITALS
TEMPERATURE: 97.4 F | DIASTOLIC BLOOD PRESSURE: 94 MMHG | SYSTOLIC BLOOD PRESSURE: 163 MMHG | RESPIRATION RATE: 18 BRPM | HEART RATE: 108 BPM

## 2022-03-09 DIAGNOSIS — E61.1 IRON DEFICIENCY: Primary | ICD-10-CM

## 2022-03-09 PROCEDURE — 96365 THER/PROPH/DIAG IV INF INIT: CPT

## 2022-03-09 RX ORDER — SODIUM CHLORIDE 9 MG/ML
20 INJECTION, SOLUTION INTRAVENOUS ONCE
Status: COMPLETED | OUTPATIENT
Start: 2022-03-09 | End: 2022-03-09

## 2022-03-09 RX ORDER — SODIUM CHLORIDE 9 MG/ML
20 INJECTION, SOLUTION INTRAVENOUS ONCE
Status: CANCELLED | OUTPATIENT
Start: 2022-03-16

## 2022-03-09 RX ADMIN — SODIUM CHLORIDE 20 ML/HR: 9 INJECTION, SOLUTION INTRAVENOUS at 09:29

## 2022-03-09 RX ADMIN — IRON SUCROSE 300 MG: 20 INJECTION, SOLUTION INTRAVENOUS at 09:30

## 2022-03-09 NOTE — PLAN OF CARE
Problem: INFECTION - ADULT  Goal: Absence or prevention of progression during hospitalization  Description: INTERVENTIONS:  - Assess and monitor for signs and symptoms of infection  - Monitor lab/diagnostic results  - Monitor all insertion sites, i e  indwelling lines, tubes, and drains  - Monitor endotracheal if appropriate and nasal secretions for changes in amount and color  - [place] appropriate cooling/warming therapies per order  - Administer medications as ordered  - Instruct and encourage patient and family to use good hand hygiene technique  - Identify and instruct in appropriate isolation precautions for identified infection/condition  Outcome: Progressing

## 2022-03-09 NOTE — PROGRESS NOTES
Patient arrived to the unit and reported that she had pain in the arm that her iv was in last week  Her iv was lower than the location of the pain  She has slight swelling in her left antecubital with a healing bruise  I applied a warm compress and told her to call her doctor if there wasn't any improvement  Venofer initiated in the right arm

## 2022-03-09 NOTE — PLAN OF CARE
Problem: INFECTION - ADULT  Goal: Absence or prevention of progression during hospitalization  Description: INTERVENTIONS:  - Assess and monitor for signs and symptoms of infection  - Monitor lab/diagnostic results  - Monitor all insertion sites, i e  indwelling lines, tubes, and drains  - Monitor endotracheal if appropriate and nasal secretions for changes in amount and color  - [place] appropriate cooling/warming therapies per order  - Administer medications as ordered  - Instruct and encourage patient and family to use good hand hygiene technique  - Identify and instruct in appropriate isolation precautions for identified infection/condition  3/9/2022 1219 by Nic Kumari RN  Outcome: Progressing  3/9/2022 1136 by Nic Kumari RN  Outcome: Progressing     Problem: INFECTION - ADULT  Goal: Absence or prevention of progression during hospitalization  Description: INTERVENTIONS:  - Assess and monitor for signs and symptoms of infection  - Monitor lab/diagnostic results  - Monitor all insertion sites, i e  indwelling lines, tubes, and drains  - Monitor endotracheal if appropriate and nasal secretions for changes in amount and color  - [place] appropriate cooling/warming therapies per order  - Administer medications as ordered  - Instruct and encourage patient and family to use good hand hygiene technique  - Identify and instruct in appropriate isolation precautions for identified infection/condition  3/9/2022 1219 by Nic Kumari RN  Outcome: Progressing  3/9/2022 1136 by Nic Kumari RN  Outcome: Progressing

## 2022-03-10 ENCOUNTER — APPOINTMENT (OUTPATIENT)
Dept: LAB | Facility: CLINIC | Age: 82
End: 2022-03-10
Payer: MEDICARE

## 2022-03-10 DIAGNOSIS — D47.3 ESSENTIAL THROMBOCYTOSIS (HCC): ICD-10-CM

## 2022-03-10 DIAGNOSIS — Z79.899 ON HYDROXYUREA THERAPY: ICD-10-CM

## 2022-03-10 DIAGNOSIS — E61.1 IRON DEFICIENCY: ICD-10-CM

## 2022-03-10 LAB
ALBUMIN SERPL BCP-MCNC: 3.4 G/DL (ref 3.5–5)
ALP SERPL-CCNC: 91 U/L (ref 46–116)
ALT SERPL W P-5'-P-CCNC: 24 U/L (ref 12–78)
ANION GAP SERPL CALCULATED.3IONS-SCNC: 3 MMOL/L (ref 4–13)
AST SERPL W P-5'-P-CCNC: 15 U/L (ref 5–45)
BILIRUB SERPL-MCNC: 0.34 MG/DL (ref 0.2–1)
BUN SERPL-MCNC: 17 MG/DL (ref 5–25)
CALCIUM ALBUM COR SERPL-MCNC: 10 MG/DL (ref 8.3–10.1)
CALCIUM SERPL-MCNC: 9.5 MG/DL (ref 8.3–10.1)
CHLORIDE SERPL-SCNC: 103 MMOL/L (ref 100–108)
CO2 SERPL-SCNC: 28 MMOL/L (ref 21–32)
CREAT SERPL-MCNC: 1.16 MG/DL (ref 0.6–1.3)
ERYTHROCYTE [DISTWIDTH] IN BLOOD BY AUTOMATED COUNT: 19.4 % (ref 11.6–15.1)
FERRITIN SERPL-MCNC: 758 NG/ML (ref 8–388)
GFR SERPL CREATININE-BSD FRML MDRD: 44 ML/MIN/1.73SQ M
GLUCOSE P FAST SERPL-MCNC: 95 MG/DL (ref 65–99)
HCT VFR BLD AUTO: 43.7 % (ref 34.8–46.1)
HGB BLD-MCNC: 13.5 G/DL (ref 11.5–15.4)
IRON SATN MFR SERPL: 88 % (ref 15–50)
IRON SERPL-MCNC: 242 UG/DL (ref 50–170)
LDH SERPL-CCNC: 206 U/L (ref 81–234)
MCH RBC QN AUTO: 26.7 PG (ref 26.8–34.3)
MCHC RBC AUTO-ENTMCNC: 30.9 G/DL (ref 31.4–37.4)
MCV RBC AUTO: 86 FL (ref 82–98)
PLATELET # BLD AUTO: 525 THOUSANDS/UL (ref 149–390)
PMV BLD AUTO: 9.1 FL (ref 8.9–12.7)
POTASSIUM SERPL-SCNC: 4.5 MMOL/L (ref 3.5–5.3)
PROT SERPL-MCNC: 8.3 G/DL (ref 6.4–8.2)
RBC # BLD AUTO: 5.06 MILLION/UL (ref 3.81–5.12)
SODIUM SERPL-SCNC: 134 MMOL/L (ref 136–145)
TIBC SERPL-MCNC: 274 UG/DL (ref 250–450)
WBC # BLD AUTO: 7.72 THOUSAND/UL (ref 4.31–10.16)

## 2022-03-10 PROCEDURE — 83540 ASSAY OF IRON: CPT

## 2022-03-10 PROCEDURE — 85027 COMPLETE CBC AUTOMATED: CPT

## 2022-03-10 PROCEDURE — 82728 ASSAY OF FERRITIN: CPT

## 2022-03-10 PROCEDURE — 80053 COMPREHEN METABOLIC PANEL: CPT

## 2022-03-10 PROCEDURE — 83615 LACTATE (LD) (LDH) ENZYME: CPT

## 2022-03-10 PROCEDURE — 83550 IRON BINDING TEST: CPT

## 2022-03-10 PROCEDURE — 36415 COLL VENOUS BLD VENIPUNCTURE: CPT

## 2022-03-14 ENCOUNTER — CONSULT (OUTPATIENT)
Dept: RHEUMATOLOGY | Facility: CLINIC | Age: 82
End: 2022-03-14
Payer: MEDICARE

## 2022-03-14 VITALS
BODY MASS INDEX: 32.36 KG/M2 | HEART RATE: 111 BPM | WEIGHT: 150 LBS | TEMPERATURE: 97.5 F | DIASTOLIC BLOOD PRESSURE: 83 MMHG | HEIGHT: 57 IN | SYSTOLIC BLOOD PRESSURE: 141 MMHG

## 2022-03-14 DIAGNOSIS — Z79.899 HIGH RISK MEDICATION USE: ICD-10-CM

## 2022-03-14 DIAGNOSIS — M81.0 AGE-RELATED OSTEOPOROSIS WITHOUT CURRENT PATHOLOGICAL FRACTURE: Primary | ICD-10-CM

## 2022-03-14 PROCEDURE — 99204 OFFICE O/P NEW MOD 45 MIN: CPT | Performed by: INTERNAL MEDICINE

## 2022-03-14 PROCEDURE — 96372 THER/PROPH/DIAG INJ SC/IM: CPT | Performed by: INTERNAL MEDICINE

## 2022-03-14 NOTE — PROGRESS NOTES
[facility] - Hematology & Medical Oncology  Outpatient Visit Encounter Note      Ava Mcmanus 80 y o  female 1940 [id number] Date:  3/17/2022    HEMATOLOGICAL HISTORY        Clotting History Denies   Bleeding History Denies   Cancer History Denies   Family Cancer History Mother with cervical cancer, maternal grandmother with breast cancer, maternal aunt with brain cancer, paternal aunt with breast cancer   H/O COVID Infection Denies   H/O COVID Vaccination 3/3 Remi Salazar   H/O Blood/Plt Transfusion Denies   Tobacco Use Denies   Alcohol Use Denies   Occupation [address] [place] [address] Moe Romero is a 80 y o  here for new consultation with me today  The patient is referred by Dr Tuan Sharif and the reason for consultation is thrombocytosis  Her CBC shows longstanding history of thrombocytosis without abnormalities of WBC and hemoglobin  Her differential was remarkable for basophilia  This Visit  Her 2/4/22 MPN  mutation work-up showed JAK2 V617F somatic  mutation  Her CBC continues to show improvement of progressive thrombocytosis after initiating Hydrea therapy on 2/17:   3/22/2021  6/2/2021  12/1/2021  2/3/2022  3/10/2022    WBC 9 28 8 66 8 49 8 81 7 72   Hemoglobin 12 9 12 8 13 5 13 9 13 5   HCT 41 8 40 3 43 3 45 2 43 7   MCV 86 85 84 85 86   Platelet Count 247 (H) 594 (H) 676 (H) 700 (H) 525 (H)     Her differential shows increasing basophil count:   5/5/2018  5/6/2018  11/15/2019  3/22/2021  2/3/2022    Immature Grans Absolute   0 02 0 03 0 02   Absolute Neutrophils 7 40 4 75 3 47 4 74 5 35   Lymphocytes Absolute 2 70 2 12 3 32 3 34 2 25   Absolute Monocytes 0 86 0 59 0 55 0 70 0 73   Absolute Eosinophils 0 67 (H) 0 52 0 24 0 32 0 28   Basophils Absolute 0 04 0 04 0 12 (H) 0 15 (H) 0 18 (H)     Her iron panel shows resolution of iron deficiency   Her iron panel was drawn after receiving IV iron:   2/28/2019  12/6/2019  2/3/2022  3/10/2022    Iron 45 (L) 49 (L) 49 (L) 242 (H)   Ferritin 22 31 30 758 (H)   Iron Saturation 14 15 15 88 (H)   TIBC 320 326 334 274     She is here with her  Miguel Thompson today  She notes increased fatigue since initiating Hydrea therapy  She says that she is tolerating Hydrea therapy well and has been taking Hydrea 500mg, ASA 32UY, and Folic acid 1mg daily  She is scheduled for bone marrow biopsy tomorrow  She continues to note diffuse pruritis  She also notes chronic constipation and flatulence  Denies symptoms of erythromelalgia  She denies fevers, chills, unintentional weight loss, drenching night sweats, headaches, lightheadedness/dizziness, cough, SOB, palpitations, chest pain, abdominal tenderness/distension, nausea/vomiting, diarrhea, melena/hematochezia, hematuria, petechiae/purpura, increased ecchymosis, or LE swelling  I have reviewed the relevant past medical, surgical, social and family history  I have also reviewed allergies and medications for this patient  Review of Systems  Review of Systems   All other systems reviewed and are negative  OBJECTIVE     Physical Exam  Vitals:    03/17/22 1242   BP: 122/74   BP Location: Left arm   Pulse: 102   Resp: 18   Temp: 97 7 °F (36 5 °C)   TempSrc: Tympanic Core   SpO2: 96%   Weight: 68 kg (150 lb)   Height: 4' 9" (1 448 m)       Physical Exam  Vitals reviewed  Constitutional:       General: She is not in acute distress  Appearance: Normal appearance  She is obese  She is not ill-appearing, toxic-appearing or diaphoretic  Comments: Pleasant 80 y o  female sitting comfortably on an exam room chair  HENT:      Head: Normocephalic and atraumatic  Eyes:      General: No scleral icterus  Extraocular Movements: Extraocular movements intact  Conjunctiva/sclera: Conjunctivae normal       Pupils: Pupils are equal, round, and reactive to light  Comments: No conjunctival pallor  Cardiovascular:      Rate and Rhythm: Normal rate and regular rhythm  Pulses: Normal pulses  Heart sounds: Normal heart sounds  No murmur heard  No friction rub  No gallop  Pulmonary:      Effort: Pulmonary effort is normal  No respiratory distress  Breath sounds: Normal breath sounds  No wheezing or rales  Abdominal:      General: Bowel sounds are normal  There is no distension  Palpations: Abdomen is soft  There is no mass  Tenderness: There is no abdominal tenderness  There is no guarding or rebound  Musculoskeletal:         General: No swelling or tenderness  Normal range of motion  Cervical back: Normal range of motion and neck supple  No rigidity  Right lower leg: No edema  Left lower leg: No edema  Lymphadenopathy:      Cervical: No cervical adenopathy  Skin:     General: Skin is warm and dry  Coloration: Skin is not jaundiced or pale  Findings: No bruising, erythema or rash  Neurological:      General: No focal deficit present  Mental Status: She is alert  Mental status is at baseline  Psychiatric:         Mood and Affect: Mood normal          Behavior: Behavior normal           Imaging  Relevant imaging reviewed in chart    Labs  Relevant labs reviewed in chart     ASSESSMENT & PLAN      Diagnosis ICD-10-CM Associated Orders   1  Essential thrombocytosis (HCC)  D47 3 Comprehensive metabolic panel     LD,Blood     CBC and differential   2  On hydroxyurea therapy  Z79 899 Comprehensive metabolic panel     LD,Blood     CBC and differential   3  Iron deficiency  E61 1 Iron Panel (Includes Ferritin, Iron Sat%, Iron, and TIBC)       I discussed Jessy Goldjessie Amaral's case with Dr Tom Cifuentes and we formulated the plan together  80 y o  Male with anemia of CKD stage 3 and history of iron deficiency anemia seen in follow-up with new diagnosis of 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) and iron deficiency  Now initiating cytoreductive therapy with Hydroxyurea    Discussion  · Essential thrombocytosis - Refer to note from 2/17 for in-depth discussion of diagnosis  At this time, she is scheduled for bone marrow biopsy for comprehensive work-up on 3/18  We recommend continuation of cytoreductive therapy with Hydrea in combination with ASA 81mg to decrease CV events and risk of thrombosis  I also recommend folic acid supplementation given Hydrea therapy  I reviewed that her thrombocytosis has improved since starting Hydrea  Iron deficiency - Given Shirlene Rodriguezs iron deficiency, I recommend completion of IV iron supplementation with IV Venofer  She has received 4 of 5 doses thus far  The underlying etiology of Shirlene Rodriguezs iron deficiency is unclear  However, I defer to her PCP to investigate the underlying cause and coordinate the appropriate management  I explained that IV iron supplementation will only temporarily alleviate her iron deficiency anemia unless the underlying etiology is managed  Thus, I strongly encouraged her to follow up with her PCP  Plan/Labs  Continue daily 500mg Hydroxyurea, 94SD ASA, and 1mg folic acid  CBC CMP LDH on 3/23 at the infusion center for patient convenience  Follow-up with Dermatology  I have ordered an iron panel in 3 months to confirm adequate iron supplementation     Follow-up  · With Dr Stella Green 4/1 to discuss bone marrow biopsy and plan moving forward    All questions were answered to the patient's satisfaction during this encounter  They appreciated and thanked me for spending time with them  The patient knows the contact information for our office and know to reach out for any relevant concerns related to this encounter  For all other listed problems and medical diagnosis in his chart - they are managed by PCP and/or other specialists, which patient acknowledges        SHANNAN DimasC  Hematology & Medical Oncology TOKEN:'[id number]'

## 2022-03-14 NOTE — PROGRESS NOTES
Assessment and Plan:   Ava Mcmanus is a 80 y o   female who presents as a Rheumatology consult referred by Cherelle Escoto MD for evaluation of osteoporosis  Was on Fosamax for 2 years in the past; has progression of osteoporosis; has only taking Ca 600mg every other day  Take calcium 600mg daily  Continue Vit  D 1,000 units daily  Prolia injection given in clinic today    Return to clinic in 6 months for provider visit + next Prolia injection    Plan:  Diagnoses and all orders for this visit:    Age-related osteoporosis without current pathological fracture  -     Ambulatory referral to Rheumatology  -     Vitamin D 25 hydroxy  -     denosumab (PROLIA) subcutaneous injection 60 mg    High risk medication use    High risk medication use - Prolia (denosumab) is a monoclonal antibody biologic medication that can rapidly utilize the body's calcium and make one susceptible to hypocalcemia; the medication also has a risk of osteonecrosis of the jaw in patients with exposed jaw bone  Does not plan on any invasive dental procedures in the near future  Follow-up plan: RTC in 6 months for provider visit + next Prolia injection        HPI  Ava Mcmanus is a 80 y o   female who presents as a Rheumatology consult referred by Cherelle Escoto MD for evaluation of osteoporosis  Used to be on Fosamax for 2 years several years ago  Take calcium 600mg every other day  Takes Vit  D 1,000 units a day with Centrum Silver  Also has been getting iron infusions for iron deficiency anemia  Is stable on her feet, doesn't fall; has had no fractures  Review of Systems  Review of Systems   Constitutional: Negative for fatigue, fever and unexpected weight change  HENT: Negative for mouth sores  Dry mouth   Respiratory: Positive for cough and shortness of breath  Cardiovascular: Negative for chest pain and leg swelling  Gastrointestinal: Positive for constipation  Negative for abdominal pain and diarrhea  Indigestion/heartburn   Endocrine: Positive for polydipsia  Genitourinary: Positive for frequency  Urine retention   Musculoskeletal: Positive for arthralgias, back pain and myalgias  Negative for joint swelling  Skin: Negative for color change and rash  Neurological: Positive for weakness  Hematological: Negative for adenopathy  Psychiatric/Behavioral: Negative for sleep disturbance  Reviewed and agree      Allergies  No Known Allergies    Home Medications    Current Outpatient Medications:     aspirin 81 MG tablet, Take 81 mg by mouth daily  , Disp: , Rfl:     Calcium Carbonate-Vitamin D 600-200 MG-UNIT TABS, Take 1 tablet by mouth every other day  , Disp: , Rfl:     docusate sodium (COLACE) 100 mg capsule, Take by mouth, Disp: , Rfl:     ezetimibe (ZETIA) 10 mg tablet, Take 1 tablet (10 mg total) by mouth daily, Disp: 90 tablet, Rfl: 1    famotidine (PEPCID) 40 MG tablet, Take 1 tablet (40 mg total) by mouth daily at bedtime, Disp: 90 tablet, Rfl: 1    folic acid (FOLVITE) 1 mg tablet, Take 1 tablet (1 mg total) by mouth daily, Disp: 90 tablet, Rfl: 2    Glucosamine-Chondroitin 250-200 MG TABS, Take 1 tablet by mouth daily  , Disp: , Rfl:     hydroxyurea (HYDREA) 500 mg capsule, Take 1 capsule (500 mg total) by mouth daily, Disp: 30 capsule, Rfl: 2    levothyroxine 50 mcg tablet, Take 1 tablet (50 mcg total) by mouth daily, Disp: 90 tablet, Rfl: 1    lisinopril (ZESTRIL) 2 5 mg tablet, Take 1 tablet (2 5 mg total) by mouth daily, Disp: 90 tablet, Rfl: 3    magnesium citrate solution, Take 296 mL by mouth once, Disp: , Rfl:     Multiple Vitamins-Iron (DAILY MULTIPLE VITAMIN/IRON) TABS, Take 1 tablet by mouth daily  , Disp: , Rfl:     Omega-3 Fatty Acids ([employer] FISH OIL) 1200 MG CAPS, Take 1 capsule by mouth 2 (two) times a day, Disp: , Rfl:     Current Facility-Administered Medications:     denosumab (PROLIA) subcutaneous injection 60 mg, 60 mg, Subcutaneous, Once, Georgette Mahoney, MD    Past Medical History  Past Medical History:   Diagnosis Date    CKD (chronic kidney disease)     Diabetes mellitus ([address] )     Disease of thyroid gland     Diverticulosis     GERD (gastroesophageal reflux disease)     Hypertension     Hyponatremia     Ovarian cyst     Rectal bleeding     Wears reading eyeglasses        Past Surgical History   Past Surgical History:   Procedure Laterality Date    BREAST BIOPSY Right 05/13/2009    Ultrasound Bx  Benign    BREAST SURGERY      CATARACT EXTRACTION Bilateral     COLONOSCOPY      ESOPHAGOGASTRODUODENOSCOPY      OOPHORECTOMY Bilateral 2015    OH CMBND ANTERPOST COLPORRAPHY W/CYSTO N/A 6/26/2018    Procedure: ANTERIOR & POSTERIOR COLPORRHAPHY;  Surgeon: Hazeline Jeans, MD;  [facility];  Service: UroGynecology           OH CYSTOURETHROSCOPY N/A 6/26/2018    Procedure: Nilam Mixer;  Surgeon: Hazeline Jeans, MD;  [facility];  Service: UroGynecology           OH Francisco Javier Kenji LIG N/A 6/26/2018    Procedure: ANTERIOR COLPOPEXY VAGINAL EXTRAPERITONEAL (VEC); Surgeon: Hazeline Jeans, MD;  [facility];  Service: UroGynecology           OH SLING OPER STRES INCONTINENCE N/A 6/26/2018    Procedure: INSERTION PUBOVAGINAL SLING (FEMALE);   Surgeon: Hazeline Jeans, MD;  [facility];  Service: UroGynecology          [address] [place] [address] PROCEDURE  11/25/2020       Family History    Family History   Problem Relation Age of Onset    Cervical cancer Mother 76    Breast cancer Maternal Grandmother         Under 48    Mental illness Son     Schizophrenia Son     Cancer Family     No Known Problems Father     No Known Problems Maternal Aunt     No Known Problems Maternal Aunt     Brain cancer Maternal Aunt         age unknown    Cancer Cousin    mother - hip fracture      Social History  Occupation: used to be a   Social History     Substance and Sexual Activity   Alcohol Use No Social History     Substance and Sexual Activity   Drug Use No     Social History     Tobacco Use   Smoking Status Never Smoker   Smokeless Tobacco Never Used       Objective:  Vitals:    03/14/22 1304   BP: 141/83   Pulse: (!) 111   Temp: 97 5 °F (36 4 °C)   Weight: 68 kg (150 lb)   Height: 4' 9" (1 448 m)       Physical Exam  Constitutional:       General: She is not in acute distress  HENT:      Head: Normocephalic and atraumatic  Eyes:      Conjunctiva/sclera: Conjunctivae normal    Cardiovascular:      Rate and Rhythm: Normal rate and regular rhythm  Heart sounds: S1 normal and S2 normal  No friction rub  Pulmonary:      Effort: Pulmonary effort is normal  No respiratory distress  Breath sounds: Normal breath sounds  No wheezing, rhonchi or rales  Musculoskeletal:      Cervical back: Neck supple  Comments: Thoracic kyphosis   Skin:     General: Skin is warm and dry  Coloration: Skin is not pale  Findings: No rash  Neurological:      Mental Status: She is alert  Mental status is at baseline  Psychiatric:         Mood and Affect: Mood normal          Behavior: Behavior normal            Reviewed labs and imaging  Imaging:   DXA scan 10/18/21  RESULTS:   LUMBAR SPINE:  L1-L3:  BMD 1 200 gm/cm2  T-score 1 7 and 7% higher than 2019 and 40% higher than 1999, statistically significant changes related to the spine image findings  Z-score 4 2     LEFT TOTAL HIP:  BMD 0 769 gm/cm2  T-score -1 4  Z-score 0 6     LEFT FEMORAL NECK:  BMD 0 528 gm/cm2  T-score -2 9, osteoporosis, and 9% higher than 2019 and 3% less than 1999 when the T score was -2 7   Z-score -0 6     The left forearm BMD is 0 618 and the T score is -1 3 and unchanged from 2019 and 1% higher than 2012      IMPRESSION:  1   Based on the [facility] classification, this study identifies a diagnosis of osteoporosis, notable at the femoral neck area and the patient is considered at risk for fracture         2  A daily intake of calcium of at least 1200 mg and vitamin D, 800-1000 IU, as well as weight bearing and muscle strengthening exercise, fall prevention and avoidance of tobacco and excessive alcohol intake as basic preventive measures are recommended      3  Repeat DXA scan on the same equipment in 18-24 months as clinically indicated      The 10 year risk of hip fracture is 27%, with the 10 year risk of major osteoporotic fracture being 38%, as calculated by the WHO fracture risk assessment tool (FRAX)  The current NOF guidelines recommend treating patients with FRAX 10 year risk score of >3% for hip fracture and >20% for major osteoporotic fracture  DXA scan 7/24/19  RESULTS:   LUMBAR SPINE:  L1-L4:  BMD 1 119 gm/cm2  T-score normal, 0 9 and 4% less than 2017 but 31% higher than 2008, statistically significant but related to the spine image findings  Z-score +3 4     LEFT TOTAL HIP:  BMD 0 724 gm/cm2  T-score below normal, -1 8  Z-score 0 1     LEFT FEMORAL NECK:  BMD 0 483 gm/cm2  T-score below normal, -3 3 and 11% less than 2017 and 1999 when the T score was below normal, -2 7   Z-score -1 1     The left forearm BMD is 0 620 and the T score is below normal, -1 2 and 4% higher than 2017 and 2% higher than 2012  The Z score is +1 5      A 25-hydroxy vitamin D level, an intact PTH, and a comprehensive metabolic panel are suggested in this patient      Treatment is a consideration in view of the femoral neck findings if appropriate to total clinical health evaluation      IMPRESSION:  1  Based on the [facility] classification, this study identifies a diagnosis of osteoporosis, notable at the femoral neck area and the patient is considered at increased risk for fracture        Labs:   Appointment on 03/10/2022   Component Date Value Ref Range Status    WBC 03/10/2022 7 72  4 31 - 10 16 Thousand/uL Final    RBC 03/10/2022 5 06  3 81 - 5 12 Million/uL Final    Hemoglobin 03/10/2022 13 5  11 5 - 15 4 g/dL Final    Hematocrit 03/10/2022 43 7  34 8 - 46 1 % Final    MCV 03/10/2022 86  82 - 98 fL Final    MCH 03/10/2022 26 7* 26 8 - 34 3 pg Final    MCHC 03/10/2022 30 9* 31 4 - 37 4 g/dL Final    RDW 03/10/2022 19 4* 11 6 - 15 1 % Final    Platelets 96/52/4331 525* 149 - 390 Thousands/uL Final    MPV 03/10/2022 9 1  8 9 - 12 7 fL Final    Sodium 03/10/2022 134* 136 - 145 mmol/L Final    Potassium 03/10/2022 4 5  3 5 - 5 3 mmol/L Final    Chloride 03/10/2022 103  100 - 108 mmol/L Final    CO2 03/10/2022 28  21 - 32 mmol/L Final    ANION GAP 03/10/2022 3* 4 - 13 mmol/L Final    BUN 03/10/2022 17  5 - 25 mg/dL Final    Creatinine 03/10/2022 1 16  0 60 - 1 30 mg/dL Final    Standardized to IDMS reference method    Glucose, Fasting 03/10/2022 95  65 - 99 mg/dL Final    Specimen collection should occur prior to Sulfasalazine administration due to the potential for falsely depressed results  Specimen collection should occur prior to Sulfapyridine administration due to the potential for falsely elevated results   Calcium 03/10/2022 9 5  8 3 - 10 1 mg/dL Final    Corrected Calcium 03/10/2022 10 0  8 3 - 10 1 mg/dL Final    AST 03/10/2022 15  5 - 45 U/L Final    Specimen collection should occur prior to Sulfasalazine administration due to the potential for falsely depressed results   ALT 03/10/2022 24  12 - 78 U/L Final    Specimen collection should occur prior to Sulfasalazine and/or Sulfapyridine administration due to the potential for falsely depressed results   Alkaline Phosphatase 03/10/2022 91  46 - 116 U/L Final    Total Protein 03/10/2022 8 3* 6 4 - 8 2 g/dL Final    Albumin 03/10/2022 3 4* 3 5 - 5 0 g/dL Final    Total Bilirubin 03/10/2022 0 34  0 20 - 1 00 mg/dL Final    Use of this assay is not recommended for patients undergoing treatment with eltrombopag due to the potential for falsely elevated results      eGFR 03/10/2022 44  ml/min/1 73sq m Final    LD 03/10/2022 206  81 - 234 U/L Final    Iron Saturation 03/10/2022 88* 15 - 50 % Final    TIBC 03/10/2022 274  250 - 450 ug/dL Final    Iron 03/10/2022 242* 50 - 170 ug/dL Final    Patients treated with metal-binding drugs (ie  Deferoxamine) may have depressed iron values   Ferritin 03/10/2022 758* 8 - 388 ng/mL Final   Appointment on 02/03/2022   Component Date Value Ref Range Status    WBC 02/03/2022 8 81  4 31 - 10 16 Thousand/uL Final    RBC 02/03/2022 5 31* 3 81 - 5 12 Million/uL Final    Hemoglobin 02/03/2022 13 9  11 5 - 15 4 g/dL Final    Hematocrit 02/03/2022 45 2  34 8 - 46 1 % Final    MCV 02/03/2022 85  82 - 98 fL Final    MCH 02/03/2022 26 2* 26 8 - 34 3 pg Final    MCHC 02/03/2022 30 8* 31 4 - 37 4 g/dL Final    RDW 02/03/2022 16 6* 11 6 - 15 1 % Final    MPV 02/03/2022 9 5  8 9 - 12 7 fL Final    Platelets 10/68/5240 700* 149 - 390 Thousands/uL Final    nRBC 02/03/2022 0  /100 WBCs Final    Neutrophils Relative 02/03/2022 61  43 - 75 % Final    Immat GRANS % 02/03/2022 0  0 - 2 % Final    Lymphocytes Relative 02/03/2022 26  14 - 44 % Final    Monocytes Relative 02/03/2022 8  4 - 12 % Final    Eosinophils Relative 02/03/2022 3  0 - 6 % Final    Basophils Relative 02/03/2022 2* 0 - 1 % Final    Neutrophils Absolute 02/03/2022 5 35  1 85 - 7 62 Thousands/µL Final    Immature Grans Absolute 02/03/2022 0 02  0 00 - 0 20 Thousand/uL Final    Lymphocytes Absolute 02/03/2022 2 25  0 60 - 4 47 Thousands/µL Final    Monocytes Absolute 02/03/2022 0 73  0 17 - 1 22 Thousand/µL Final    Eosinophils Absolute 02/03/2022 0 28  0 00 - 0 61 Thousand/µL Final    Basophils Absolute 02/03/2022 0 18* 0 00 - 0 10 Thousands/µL Final    Iron Saturation 02/03/2022 15  15 - 50 % Final    TIBC 02/03/2022 334  250 - 450 ug/dL Final    Iron 02/03/2022 49* 50 - 170 ug/dL Final    Patients treated with metal-binding drugs (ie  Deferoxamine) may have depressed iron values      Ferritin 02/03/2022 30  8 - 388 ng/mL Final   Lab on 12/21/2021 Component Date Value Ref Range Status    Sodium 12/21/2021 135* 136 - 145 mmol/L Final    Potassium 12/21/2021 4 0  3 5 - 5 3 mmol/L Final    Chloride 12/21/2021 103  100 - 108 mmol/L Final    CO2 12/21/2021 27  21 - 32 mmol/L Final    ANION GAP 12/21/2021 5  4 - 13 mmol/L Final    BUN 12/21/2021 19  5 - 25 mg/dL Final    Creatinine 12/21/2021 1 04  0 60 - 1 30 mg/dL Final    Standardized to IDMS reference method    Glucose, Fasting 12/21/2021 90  65 - 99 mg/dL Final    Specimen collection should occur prior to Sulfasalazine administration due to the potential for falsely depressed results  Specimen collection should occur prior to Sulfapyridine administration due to the potential for falsely elevated results   Calcium 12/21/2021 8 9  8 3 - 10 1 mg/dL Final    eGFR 12/21/2021 51  ml/min/1 73sq m Final    Osmolality Serum 12/21/2021 296  282 - 298 mmol/KG Final    Sodium, Ur 12/21/2021 99   Final    Osmolality, Ur 12/21/2021 422  250 - 900 mmol/KG Final    Uric Acid 12/21/2021 6 4  2 0 - 6 8 mg/dL Final    Specimen collection should occur prior to Metamizole administration due to the potential for falsely depressed results  Appointment on 12/01/2021   Component Date Value Ref Range Status    Sodium 12/01/2021 130* 136 - 145 mmol/L Final    Potassium 12/01/2021 4 2  3 5 - 5 3 mmol/L Final    Chloride 12/01/2021 99* 100 - 108 mmol/L Final    CO2 12/01/2021 27  21 - 32 mmol/L Final    ANION GAP 12/01/2021 4  4 - 13 mmol/L Final    BUN 12/01/2021 15  5 - 25 mg/dL Final    Creatinine 12/01/2021 1 23  0 60 - 1 30 mg/dL Final    Standardized to IDMS reference method    Glucose, Fasting 12/01/2021 95  65 - 99 mg/dL Final    Specimen collection should occur prior to Sulfasalazine administration due to the potential for falsely depressed results  Specimen collection should occur prior to Sulfapyridine administration due to the potential for falsely elevated results      Calcium 12/01/2021 9 1  8 3 - 10 1 mg/dL Final    eGFR 12/01/2021 42  ml/min/1 73sq m Final    Magnesium 12/01/2021 2 0  1 6 - 2 6 mg/dL Final    Creatinine, Ur 12/01/2021 56 9  mg/dL Final    Protein Urine Random 12/01/2021 <6  mg/dL Final    Prot/Creat Ratio, Ur 12/01/2021 <0 11* 0 00 - 0 10 Final    PTH 12/01/2021 22 4  18 4 - 80 1 pg/mL Final    WBC 12/01/2021 8 49  4 31 - 10 16 Thousand/uL Final    RBC 12/01/2021 5 13* 3 81 - 5 12 Million/uL Final    Hemoglobin 12/01/2021 13 5  11 5 - 15 4 g/dL Final    Hematocrit 12/01/2021 43 3  34 8 - 46 1 % Final    MCV 12/01/2021 84  82 - 98 fL Final    MCH 12/01/2021 26 3* 26 8 - 34 3 pg Final    MCHC 12/01/2021 31 2* 31 4 - 37 4 g/dL Final    RDW 12/01/2021 16 2* 11 6 - 15 1 % Final    Platelets 23/27/5131 676* 149 - 390 Thousands/uL Final    MPV 12/01/2021 9 7  8 9 - 12 7 fL Final    Cholesterol 12/01/2021 169  See Comment mg/dL Final    Cholesterol:         Pediatric <18 Years        Desirable          <170 mg/dL      Borderline High    170-199 mg/dL      High               >=200 mg/dL        Adult >=18 Years            Desirable         <200 mg/dL      Borderline High   200-239 mg/dL      High              >239 mg/dL      Triglycerides 12/01/2021 198* See Comment mg/dL Final    Triglyceride:     0-9Y            <75mg/dL     10Y-17Y         <90 mg/dL       >=18Y     Normal          <150 mg/dL     Borderline High 150-199 mg/dL     High            200-499 mg/dL        Very High       >499 mg/dL    Specimen collection should occur prior to N-Acetylcysteine or Metamizole administration due to the potential for falsely depressed results   HDL, Direct 12/01/2021 37* >=50 mg/dL Final    Specimen collection should occur prior to Metamizole administration due to the potential for falsley depressed results      LDL Calculated 12/01/2021 92  0 - 100 mg/dL Final    LDL Cholesterol:     Optimal           <100 mg/dl     Near Optimal      100-129 mg/dl     Above Optimal       Borderline High 130-159 mg/dl       High            160-189 mg/dl       Very High       >189 mg/dl         This screening LDL is a calculated result  It does not have the accuracy of the Direct Measured LDL in the monitoring of patients with hyperlipidemia and/or statin therapy  Direct Measure LDL (LDK987) must be ordered separately in these patients   Non-HDL-Chol (CHOL-HDL) 12/01/2021 132  mg/dl Final    Hemoglobin A1C 12/01/2021 6 2* Normal 3 8-5 6%; PreDiabetic 5 7-6 4%; Diabetic >=6 5%; Glycemic control for adults with diabetes <7 0% % Final    EAG 12/01/2021 131  mg/dl Final    TSH 3RD GENERATON 12/01/2021 4 170* 0 358 - 3 740 uIU/mL Final    The recommended reference ranges for TSH during pregnancy are as follows:   First trimester 0 1 to 2 5 uIU/mL   Second trimester  0 2 to 3 0 uIU/mL   Third trimester 0 3 to 3 0 uIU/m    Note: Normal ranges may not apply to patients who are transgender, non-binary, or whose legal sex, sex at birth, and gender identity differ

## 2022-03-14 NOTE — PATIENT INSTRUCTIONS
Take calcium 600mg daily  Continue Vit  D 1,000 units daily  Prolia injection given in clinic today  Do Vit  D level; will call if need to go up on Vit  D supplement    Return to clinic in 6 months for provider visit + next Prolia injection    Osteoporosis   AMBULATORY CARE:   Osteoporosis  is a long-term medical condition that causes your bones to become weak, brittle, and more likely to fracture  Osteoporosis occurs when your body absorbs more bone than it makes  It is also caused by a lack of calcium and estrogen (female hormone)  Common symptoms include the following: You may not have any signs or symptoms  You may break a bone after a muscle strain, bump, or fall  A break usually occurs in the hip, spine, or wrist  A collapsed vertebra (bone in your spine) may cause severe back pain or loss of height from bent posture  Call your doctor if:   · You have severe pain  · You have increasing pain after a fall  · You have pain when you do your daily activities  · You have questions or concerns about your condition or care  Diagnosis of osteoporosis:   · Blood and urine tests  measure your calcium, vitamin D, and estrogen levels  · An x-ray or CT  may show thinned bones or a fracture  You may be given contrast liquid to help the bones show up better in the pictures  Tell the healthcare provider if you have ever had an allergic reaction to contrast liquid  Do not enter the MRI room with anything metal  Metal can cause serious injury  Tell the healthcare provider if you have any metal in or on your body  · A bone density test  compares your bone thickness with what is expected for someone of your age, gender, and ethnicity  Treatment for osteoporosis  may include medicines to prevent bone loss, build new bone, and increase estrogen  These medicines help prevent fractures and may be given as a pill or injection  Ask your healthcare provider for more information on these medicines    Prevent bone loss:       · Eat healthy foods that are high in calcium  This helps keep your bones strong  Good sources of calcium are milk, cheese, broccoli, tofu, almonds, and canned salmon and sardines  · Increase your vitamin D intake  Vitamin D is in fish oils, some vegetables, and fortified milk, cereal, and bread  Vitamin D is also formed in the skin when it is exposed to the sun  Ask your healthcare provider how much sunlight is safe for you  · Drink liquids as directed  Ask your healthcare provider how much liquid to drink each day and which liquids are best for you  Do not have alcohol or caffeine  They decrease bone mineral density, which can weaken your bones  · Exercise regularly  Ask your healthcare provider about the best exercise plan for you  Weight bearing exercise for 30 minutes, 3 times a week can help build and strengthen bone  · Do not smoke  Nicotine and other chemicals in cigarettes and cigars can cause lung damage  Ask your healthcare provider for information if you currently smoke and need help to quit  E-cigarettes or smokeless tobacco still contain nicotine  Talk to your healthcare provider before you use these products  · Go to physical therapy as directed  A physical therapist teaches you exercises to help improve movement and muscle strength  Follow up with your doctor as directed:  Write down your questions so you remember to ask them during your visits  © Copyright [employer] 2022 Information is for End User's use only and may not be sold, redistributed or otherwise used for commercial purposes  All illustrations and images included in CareNotes® are the copyrighted property of [employer]  or Mike Krause   The above information is an  only  It is not intended as medical advice for individual conditions or treatments   Talk to your doctor, nurse or pharmacist before following any medical regimen to see if it is safe and effective for you

## 2022-03-16 ENCOUNTER — HOSPITAL ENCOUNTER (OUTPATIENT)
Dept: INFUSION CENTER | Facility: CLINIC | Age: 82
Discharge: HOME | End: 2022-03-16
Payer: MEDICARE

## 2022-03-16 VITALS
DIASTOLIC BLOOD PRESSURE: 83 MMHG | TEMPERATURE: 98 F | RESPIRATION RATE: 18 BRPM | SYSTOLIC BLOOD PRESSURE: 148 MMHG | HEART RATE: 98 BPM

## 2022-03-16 DIAGNOSIS — E61.1 IRON DEFICIENCY: Primary | ICD-10-CM

## 2022-03-16 PROCEDURE — 96365 THER/PROPH/DIAG IV INF INIT: CPT

## 2022-03-16 RX ORDER — SODIUM CHLORIDE 9 MG/ML
20 INJECTION, SOLUTION INTRAVENOUS ONCE
Status: CANCELLED | OUTPATIENT
Start: 2022-03-23

## 2022-03-16 RX ORDER — SODIUM CHLORIDE 9 MG/ML
20 INJECTION, SOLUTION INTRAVENOUS ONCE
Status: COMPLETED | OUTPATIENT
Start: 2022-03-16 | End: 2022-03-16

## 2022-03-16 RX ADMIN — SODIUM CHLORIDE 20 ML/HR: 9 INJECTION, SOLUTION INTRAVENOUS at 08:59

## 2022-03-16 RX ADMIN — IRON SUCROSE 300 MG: 20 INJECTION, SOLUTION INTRAVENOUS at 09:02

## 2022-03-16 NOTE — PLAN OF CARE
Problem: Potential for Falls  Goal: Patient will remain free of falls  Description: INTERVENTIONS:  - Educate patient/family on patient safety including physical limitations  - Instruct patient to call for assistance with activity   - Consult OT/PT to assist with strengthening/mobility   - Keep Call bell within reach  - Keep bed low and locked with side rails adjusted as appropriate  - Keep care items and personal belongings within reach  - Initiate and maintain comfort rounds  - Make Fall Risk Sign visible to staff  - Offer Toileting every Hours, in advance of need  - Initiate/Maintain alarm  - Obtain necessary fall risk management equipment:   - Apply yellow socks and bracelet for high fall risk patients  - Consider moving patient to room near nurses station  3/16/2022 0905 by Elli Sanchez, RN  Outcome: Progressing  3/16/2022 [address] [place] by Elli Sanchez, RN  Outcome: Progressing

## 2022-03-17 ENCOUNTER — OFFICE VISIT (OUTPATIENT)
Dept: HEMATOLOGY ONCOLOGY | Facility: CLINIC | Age: 82
End: 2022-03-17
Payer: MEDICARE

## 2022-03-17 VITALS
HEIGHT: 57 IN | BODY MASS INDEX: 32.36 KG/M2 | RESPIRATION RATE: 18 BRPM | SYSTOLIC BLOOD PRESSURE: 122 MMHG | WEIGHT: 150 LBS | TEMPERATURE: 97.7 F | DIASTOLIC BLOOD PRESSURE: 74 MMHG | HEART RATE: 102 BPM | OXYGEN SATURATION: 96 %

## 2022-03-17 DIAGNOSIS — Z79.899 ON HYDROXYUREA THERAPY: ICD-10-CM

## 2022-03-17 DIAGNOSIS — D47.3 ESSENTIAL THROMBOCYTOSIS (HCC): Primary | ICD-10-CM

## 2022-03-17 DIAGNOSIS — E61.1 IRON DEFICIENCY: ICD-10-CM

## 2022-03-17 PROCEDURE — 99214 OFFICE O/P EST MOD 30 MIN: CPT | Performed by: STUDENT IN AN ORGANIZED HEALTH CARE EDUCATION/TRAINING PROGRAM

## 2022-03-18 ENCOUNTER — HOSPITAL ENCOUNTER (OUTPATIENT)
Dept: INTERVENTIONAL RADIOLOGY/VASCULAR | Facility: HOSPITAL | Age: 82
Discharge: HOME | End: 2022-03-18
Admitting: INTERNAL MEDICINE
Payer: MEDICARE

## 2022-03-18 VITALS
WEIGHT: 150 LBS | TEMPERATURE: 96.6 F | BODY MASS INDEX: 33.74 KG/M2 | RESPIRATION RATE: 20 BRPM | DIASTOLIC BLOOD PRESSURE: 76 MMHG | HEIGHT: 56 IN | OXYGEN SATURATION: 96 % | HEART RATE: 66 BPM | SYSTOLIC BLOOD PRESSURE: 116 MMHG

## 2022-03-18 DIAGNOSIS — D47.3 ESSENTIAL THROMBOCYTOSIS (HCC): ICD-10-CM

## 2022-03-18 PROCEDURE — 88360 TUMOR IMMUNOHISTOCHEM/MANUAL: CPT | Performed by: PATHOLOGY

## 2022-03-18 PROCEDURE — 38222 DX BONE MARROW BX & ASPIR: CPT

## 2022-03-18 PROCEDURE — 88342 IMHCHEM/IMCYTCHM 1ST ANTB: CPT | Performed by: PATHOLOGY

## 2022-03-18 PROCEDURE — 88184 FLOWCYTOMETRY/ TC 1 MARKER: CPT | Performed by: STUDENT IN AN ORGANIZED HEALTH CARE EDUCATION/TRAINING PROGRAM

## 2022-03-18 PROCEDURE — 88185 FLOWCYTOMETRY/TC ADD-ON: CPT

## 2022-03-18 PROCEDURE — 99152 MOD SED SAME PHYS/QHP 5/>YRS: CPT | Performed by: INTERNAL MEDICINE

## 2022-03-18 PROCEDURE — 38222 DX BONE MARROW BX & ASPIR: CPT | Performed by: INTERNAL MEDICINE

## 2022-03-18 PROCEDURE — 88311 DECALCIFY TISSUE: CPT | Performed by: PATHOLOGY

## 2022-03-18 PROCEDURE — 77002 NEEDLE LOCALIZATION BY XRAY: CPT | Performed by: INTERNAL MEDICINE

## 2022-03-18 PROCEDURE — 85097 BONE MARROW INTERPRETATION: CPT | Performed by: PATHOLOGY

## 2022-03-18 PROCEDURE — 88262 CHROMOSOME ANALYSIS 15-20: CPT | Performed by: STUDENT IN AN ORGANIZED HEALTH CARE EDUCATION/TRAINING PROGRAM

## 2022-03-18 PROCEDURE — 88305 TISSUE EXAM BY PATHOLOGIST: CPT | Performed by: PATHOLOGY

## 2022-03-18 PROCEDURE — 88341 IMHCHEM/IMCYTCHM EA ADD ANTB: CPT | Performed by: PATHOLOGY

## 2022-03-18 PROCEDURE — 88237 TISSUE CULTURE BONE MARROW: CPT | Performed by: STUDENT IN AN ORGANIZED HEALTH CARE EDUCATION/TRAINING PROGRAM

## 2022-03-18 PROCEDURE — 88313 SPECIAL STAINS GROUP 2: CPT | Performed by: PATHOLOGY

## 2022-03-18 PROCEDURE — 99152 MOD SED SAME PHYS/QHP 5/>YRS: CPT

## 2022-03-18 RX ORDER — SODIUM CHLORIDE 9 MG/ML
75 INJECTION, SOLUTION INTRAVENOUS CONTINUOUS
Status: DISCONTINUED | OUTPATIENT
Start: 2022-03-18 | End: 2022-03-19 | Stop reason: HOSPADM

## 2022-03-18 RX ORDER — LIDOCAINE WITH 8.4% SOD BICARB 0.9%(10ML)
SYRINGE (ML) INJECTION CODE/TRAUMA/SEDATION MEDICATION
Status: COMPLETED | OUTPATIENT
Start: 2022-03-18 | End: 2022-03-18

## 2022-03-18 RX ORDER — FENTANYL CITRATE 50 UG/ML
INJECTION, SOLUTION INTRAMUSCULAR; INTRAVENOUS CODE/TRAUMA/SEDATION MEDICATION
Status: COMPLETED | OUTPATIENT
Start: 2022-03-18 | End: 2022-03-18

## 2022-03-18 RX ORDER — MIDAZOLAM HYDROCHLORIDE 2 MG/2ML
INJECTION, SOLUTION INTRAMUSCULAR; INTRAVENOUS CODE/TRAUMA/SEDATION MEDICATION
Status: COMPLETED | OUTPATIENT
Start: 2022-03-18 | End: 2022-03-18

## 2022-03-18 RX ADMIN — FENTANYL CITRATE 50 MCG: 50 INJECTION, SOLUTION INTRAMUSCULAR; INTRAVENOUS at 11:40

## 2022-03-18 RX ADMIN — MIDAZOLAM 1 MG: 1 INJECTION INTRAMUSCULAR; INTRAVENOUS at 11:40

## 2022-03-18 RX ADMIN — MIDAZOLAM 1 MG: 1 INJECTION INTRAMUSCULAR; INTRAVENOUS at 11:46

## 2022-03-18 RX ADMIN — FENTANYL CITRATE 50 MCG: 50 INJECTION, SOLUTION INTRAMUSCULAR; INTRAVENOUS at 11:46

## 2022-03-18 RX ADMIN — SODIUM CHLORIDE 75 ML/HR: 0.9 INJECTION, SOLUTION INTRAVENOUS at 09:35

## 2022-03-18 RX ADMIN — Medication 10 ML: at 11:45

## 2022-03-18 NOTE — BRIEF OP NOTE (RAD/CATH)
INTERVENTIONAL RADIOLOGY PROCEDURE NOTE    Date: 3/18/2022    Procedure: IR BIOPSY BONE MARROW    Preoperative diagnosis:   1  Essential thrombocytosis (HCC)         Postoperative diagnosis: Same  Surgeon: Alexis Adler MD     Assistant: None  No qualified resident was available  Blood loss: minimal    Specimens:   7ml total aspirate, 11g 2cm core     Findings:     Fluoroscopically guided right posterior iliac crest bone marrow biopsy  Complications: None immediate      Anesthesia: conscious sedation

## 2022-03-18 NOTE — DISCHARGE INSTRUCTIONS
Bone Marrow Biopsy     WHAT YOU NEED TO KNOW:   A bone marrow biopsy is a procedure to remove a small amount of bone marrow from your bone  Bone marrow is the soft tissue inside your bone that helps to make blood cells  The sample is tested for disease or infection  DISCHARGE INSTRUCTIONS:     1  Limit your activities day of biopsy as directed by your doctor  2  Use medication as ordered  3  Return to your normal diet  Small sips of flat soda will help with nausea  4  Remove band-aid or dressing 24 hours after procedure  Contact Interventional Radiology at [phone] Rudi PATIENTS: Contact Interventional Radiology at [phone]) Brandi Vo PATIENTS: Contact Interventional Radiology at [phone]) if:    1  Difficulty breathing, nausea or vomiting  2  Chills or fever above 101 F     3  Pain at biopsy site not relieved by medication  4  Develop any redness, swelling, heat, unusual drainage, heavy bruising or bleeding from biopsy site  Procedural Sedation   WHAT YOU NEED TO KNOW:   Procedural sedation is medicine used during procedures to help you feel relaxed and calm  You will remember little to none of the procedure  After sedation you may feel tired, weak, or unsteady on your feet  You may also have trouble concentrating or short-term memory loss  These symptoms should go away in 24 hours or less  DISCHARGE INSTRUCTIONS:     Call 911 or have someone else call for any of the following:   · You have sudden trouble breathing      · You cannot be woken        Contact Interventional Radiology at [phone]   Rudi PATIENTS: Contact Interventional Radiology at [phone]) Brandi Vo PATIENTS: Contact Interventional Radiology at [phone]) if any of the following occur:     · You have a severe headache or dizziness      · Your heart is beating faster than usual     · You have a fever or chills      · Your skin is itchy, swollen, or you have a rash      · You have nausea or are vomiting for more than 8 hours after the procedure       · You have questions or concerns about your condition or care  Self-care:   · Have someone stay with you for 24 hours  This person can drive you to errands and help you do things around the house  This person can also watch for problems       · Rest and do quiet activities for 24 hours  Do not exercise, ride a bike, or play sports  Stand up slowly to prevent dizziness and falls  Take short walks around the house with another person  Slowly return to your usual activities the next day       · Do not drive or use dangerous machines or tools for 24 hours  You may injure yourself or others  Examples include a lawnmower, saw, or drill  Do not return to work for 24 hours if you use dangerous machines or tools for work       · Do not make important decisions for 24 hours  For example, do not sign important papers or invest money       · Drink liquids as directed  Liquids help flush the sedation medicine out of your body  Ask how much liquid to drink each day and which liquids are best for you       · Eat small, frequent meals to prevent nausea and vomiting  Start with clear liquids such as juice or broth  If you do not vomit after clear liquids, you can eat your usual foods       · Do not drink alcohol or take medicines that make you drowsy  This includes medicines that help you sleep and anxiety medicines  Ask your healthcare provider if it is safe for you to take pain medicine  Follow up with your healthcare provider as directed: Write down your questions so you remember to ask them during your visits  Procedural Sedation   WHAT YOU NEED TO KNOW:   Procedural sedation is medicine used during procedures to help you feel relaxed and calm  You will remember little to none of the procedure  After sedation you may feel tired, weak, or unsteady on your feet  You may also have trouble concentrating or short-term memory loss  These symptoms should go away in 24 hours or less  DISCHARGE INSTRUCTIONS:     Call 911 or have someone else call for any of the following:   · You have sudden trouble breathing      · You cannot be woken  Contact Interventional Radiology at [phone]   Rudi PATIENTS: Contact Interventional Radiology at [phone]) Katia Cook PATIENTS: Contact Interventional Radiology at [phone]) if any of the following occur:     · You have a severe headache or dizziness      · Your heart is beating faster than usual     · You have a fever or chills      · Your skin is itchy, swollen, or you have a rash      · You have nausea or are vomiting for more than 8 hours after the procedure       · You have questions or concerns about your condition or care  Self-care:   · Have someone stay with you for 24 hours  This person can drive you to errands and help you do things around the house  This person can also watch for problems       · Rest and do quiet activities for 24 hours  Do not exercise, ride a bike, or play sports  Stand up slowly to prevent dizziness and falls  Take short walks around the house with another person  Slowly return to your usual activities the next day       · Do not drive or use dangerous machines or tools for 24 hours  You may injure yourself or others  Examples include a lawnmower, saw, or drill  Do not return to work for 24 hours if you use dangerous machines or tools for work       · Do not make important decisions for 24 hours  For example, do not sign important papers or invest money       · Drink liquids as directed  Liquids help flush the sedation medicine out of your body  Ask how much liquid to drink each day and which liquids are best for you       · Eat small, frequent meals to prevent nausea and vomiting  Start with clear liquids such as juice or broth  If you do not vomit after clear liquids, you can eat your usual foods       · Do not drink alcohol or take medicines that make you drowsy   This includes medicines that help you sleep and anxiety medicines  Ask your healthcare provider if it is safe for you to take pain medicine  Follow up with your healthcare provider as directed: Write down your questions so you remember to ask them during your visits

## 2022-03-18 NOTE — H&P
Interventional Radiology  History and Physical 3/18/2022     Tracie Mir   1940   [id number]    Assessment/Plan:    80year old female with thrombocytopenia  Plan for fluoroscopically guided bone marrow biopsy  Procedure, risks, benefits, alternatives discussed with the patient  Consent obtained at bedside  Problem List Items Addressed This Visit     None      Visit Diagnoses     Essential thrombocytosis ([address] )        Relevant Orders    IR biopsy bone marrow             Subjective:  Normal state of health, no acute complaints  Patient ID: Tracie Mir is a 80 y o  female  History of Present Illness  See A/P above  Review of Systems   Respiratory: Negative  Cardiovascular: Negative  Past Medical History:   Diagnosis Date    CKD (chronic kidney disease)     Diabetes mellitus (HCC)     does not take medications, does not monitor blood sugars, pre-diabetic    Disease of thyroid gland     Diverticulosis     GERD (gastroesophageal reflux disease)     Hypertension     Hyponatremia     Ovarian cyst     Rectal bleeding     Wears reading eyeglasses         Past Surgical History:   Procedure Laterality Date    BREAST BIOPSY Right 05/13/2009    Ultrasound Bx  Benign    BREAST SURGERY      CATARACT EXTRACTION Bilateral     COLONOSCOPY      ESOPHAGOGASTRODUODENOSCOPY      OOPHORECTOMY Bilateral 2015    WA CMBND ANTERPOST COLPORRAPHY W/CYSTO N/A 6/26/2018    Procedure: ANTERIOR & POSTERIOR COLPORRHAPHY;  Surgeon: Lola Landaverde MD;  [facility];  Service: UroGynecology           WA CYSTOURETHROSCOPY N/A 6/26/2018    Procedure: Dragan Escalera;  Surgeon: Lola Landaverde MD;  [facility];  Service: UroGynecology           WA Patti Franco LIG N/A 6/26/2018    Procedure: ANTERIOR COLPOPEXY VAGINAL EXTRAPERITONEAL (VEC);   Surgeon: Lola Landaverde MD;  [facility];  Service: UroGynecology           WA SLING OPER STRES INCONTINENCE N/A 6/26/2018    Procedure: INSERTION PUBOVAGINAL SLING (FEMALE);   Surgeon: Peg Calloway MD;  [facility];  Service: UroGynecology          [facility] PROCEDURE  11/25/2020        Social History     Tobacco Use   Smoking Status Never Smoker   Smokeless Tobacco Never Used        Social History     Substance and Sexual Activity   Alcohol Use No        Social History     Substance and Sexual Activity   Drug Use No        No Known Allergies    Current Outpatient Medications   Medication Sig Dispense Refill    aspirin 81 MG tablet Take 81 mg by mouth daily        Calcium Carbonate-Vitamin D 600-200 MG-UNIT TABS Take 1 tablet by mouth every other day        docusate sodium (COLACE) 100 mg capsule Take by mouth      ezetimibe (ZETIA) 10 mg tablet Take 1 tablet (10 mg total) by mouth daily 90 tablet 1    famotidine (PEPCID) 40 MG tablet Take 1 tablet (40 mg total) by mouth daily at bedtime 90 tablet 1    folic acid (FOLVITE) 1 mg tablet Take 1 tablet (1 mg total) by mouth daily 90 tablet 2    Glucosamine-Chondroitin 250-200 MG TABS Take 1 tablet by mouth daily        hydroxyurea (HYDREA) 500 mg capsule Take 1 capsule (500 mg total) by mouth daily 30 capsule 2    levothyroxine 50 mcg tablet Take 1 tablet (50 mcg total) by mouth daily 90 tablet 1    lisinopril (ZESTRIL) 2 5 mg tablet Take 1 tablet (2 5 mg total) by mouth daily 90 tablet 3    magnesium citrate solution Take 296 mL by mouth once      Multiple Vitamins-Iron (DAILY MULTIPLE VITAMIN/IRON) TABS Take 1 tablet by mouth daily        Omega-3 Fatty Acids ([employer] FISH OIL) 1200 MG CAPS Take 1 capsule by mouth 2 (two) times a day       Current Facility-Administered Medications   Medication Dose Route Frequency Provider Last Rate Last Admin    sodium chloride 0 9 % infusion  75 mL/hr Intravenous Continuous James Rojas MD 75 mL/hr at 03/18/22 0935 75 mL/hr at 03/18/22 0935          Objective:    Vitals:    03/18/22 1776 BP: 152/72   Pulse: 89   Resp: 18   Temp: 97 8 °F (36 6 °C)   TempSrc: Temporal   SpO2: 97%   Weight: 68 kg (150 lb)   Height: 4' 8" (1 422 m)        Physical Exam  Cardiovascular:      Rate and Rhythm: Normal rate and regular rhythm  Pulmonary:      Effort: Pulmonary effort is normal            No results found for: BNP   Lab Results   Component Value Date    WBC 7 72 03/10/2022    HGB 13 5 03/10/2022    HCT 43 7 03/10/2022    MCV 86 03/10/2022     (H) 03/10/2022     Lab Results   Component Value Date    INR 1 02 05/02/2018    PROTIME 13 4 05/02/2018     Lab Results   Component Value Date    PTT 32 05/02/2018         I have personally reviewed pertinent imaging and laboratory results  Code Status: Prior  Advance Directive and Living Will:      Power of :    POLST:      This text is generated with voice recognition software  There may be translation, syntax,  or grammatical errors  If you have any questions, please contact the dictating provider

## 2022-03-21 LAB — SCAN RESULT: NORMAL

## 2022-03-23 ENCOUNTER — HOSPITAL ENCOUNTER (OUTPATIENT)
Dept: INFUSION CENTER | Facility: CLINIC | Age: 82
Discharge: HOME | End: 2022-03-23
Payer: MEDICARE

## 2022-03-23 VITALS
HEART RATE: 91 BPM | SYSTOLIC BLOOD PRESSURE: 149 MMHG | RESPIRATION RATE: 18 BRPM | TEMPERATURE: 98.3 F | DIASTOLIC BLOOD PRESSURE: 80 MMHG

## 2022-03-23 DIAGNOSIS — E61.1 IRON DEFICIENCY: Primary | ICD-10-CM

## 2022-03-23 PROCEDURE — 96365 THER/PROPH/DIAG IV INF INIT: CPT

## 2022-03-23 RX ORDER — SODIUM CHLORIDE 9 MG/ML
20 INJECTION, SOLUTION INTRAVENOUS ONCE
Status: CANCELLED | OUTPATIENT
Start: 2022-03-23

## 2022-03-23 RX ORDER — SODIUM CHLORIDE 9 MG/ML
20 INJECTION, SOLUTION INTRAVENOUS ONCE
Status: COMPLETED | OUTPATIENT
Start: 2022-03-23 | End: 2022-03-23

## 2022-03-23 RX ADMIN — IRON SUCROSE 300 MG: 20 INJECTION, SOLUTION INTRAVENOUS at 09:38

## 2022-03-23 RX ADMIN — SODIUM CHLORIDE 20 ML/HR: 9 INJECTION, SOLUTION INTRAVENOUS at 09:37

## 2022-03-23 NOTE — PROGRESS NOTES
Patient arrived to the unit and denied complications with previous infusions  Tolerated venofer infusion well without adverse affects  Aware of future appointments  She did report that the discomfort in her left arm has improved

## 2022-03-23 NOTE — PLAN OF CARE
Problem: INFECTION - ADULT  Goal: Absence or prevention of progression during hospitalization  Description: INTERVENTIONS:  - Assess and monitor for signs and symptoms of infection  - Monitor lab/diagnostic results  - Monitor all insertion sites, i e  indwelling lines, tubes, and drains  - Monitor endotracheal if appropriate and nasal secretions for changes in amount and color  - [place] appropriate cooling/warming therapies per order  - Administer medications as ordered  - Instruct and encourage patient and family to use good hand hygiene technique  - Identify and instruct in appropriate isolation precautions for identified infection/condition  Outcome: Progressing

## 2022-03-25 ENCOUNTER — APPOINTMENT (OUTPATIENT)
Dept: LAB | Facility: CLINIC | Age: 82
End: 2022-03-25
Payer: MEDICARE

## 2022-03-25 DIAGNOSIS — D47.3 ESSENTIAL THROMBOCYTOSIS (HCC): ICD-10-CM

## 2022-03-25 DIAGNOSIS — Z79.899 ON HYDROXYUREA THERAPY: ICD-10-CM

## 2022-03-25 LAB
25(OH)D3 SERPL-MCNC: 44 NG/ML (ref 30–100)
ALBUMIN SERPL BCP-MCNC: 3.4 G/DL (ref 3.5–5)
ALP SERPL-CCNC: 81 U/L (ref 46–116)
ALT SERPL W P-5'-P-CCNC: 29 U/L (ref 12–78)
ANION GAP SERPL CALCULATED.3IONS-SCNC: 3 MMOL/L (ref 4–13)
AST SERPL W P-5'-P-CCNC: 16 U/L (ref 5–45)
BASOPHILS # BLD AUTO: 0.18 THOUSANDS/ΜL (ref 0–0.1)
BASOPHILS NFR BLD AUTO: 2 % (ref 0–1)
BILIRUB SERPL-MCNC: 0.34 MG/DL (ref 0.2–1)
BUN SERPL-MCNC: 18 MG/DL (ref 5–25)
CALCIUM ALBUM COR SERPL-MCNC: 9 MG/DL (ref 8.3–10.1)
CALCIUM SERPL-MCNC: 8.5 MG/DL (ref 8.3–10.1)
CHLORIDE SERPL-SCNC: 106 MMOL/L (ref 100–108)
CO2 SERPL-SCNC: 27 MMOL/L (ref 21–32)
CREAT SERPL-MCNC: 1.08 MG/DL (ref 0.6–1.3)
EOSINOPHIL # BLD AUTO: 0.24 THOUSAND/ΜL (ref 0–0.61)
EOSINOPHIL NFR BLD AUTO: 3 % (ref 0–6)
ERYTHROCYTE [DISTWIDTH] IN BLOOD BY AUTOMATED COUNT: 22.4 % (ref 11.6–15.1)
GFR SERPL CREATININE-BSD FRML MDRD: 48 ML/MIN/1.73SQ M
GLUCOSE P FAST SERPL-MCNC: 92 MG/DL (ref 65–99)
HCT VFR BLD AUTO: 42.4 % (ref 34.8–46.1)
HGB BLD-MCNC: 13.3 G/DL (ref 11.5–15.4)
IMM GRANULOCYTES # BLD AUTO: 0.03 THOUSAND/UL (ref 0–0.2)
IMM GRANULOCYTES NFR BLD AUTO: 0 % (ref 0–2)
LDH SERPL-CCNC: 140 U/L (ref 81–234)
LYMPHOCYTES # BLD AUTO: 3.21 THOUSANDS/ΜL (ref 0.6–4.47)
LYMPHOCYTES NFR BLD AUTO: 38 % (ref 14–44)
MCH RBC QN AUTO: 27.7 PG (ref 26.8–34.3)
MCHC RBC AUTO-ENTMCNC: 31.4 G/DL (ref 31.4–37.4)
MCV RBC AUTO: 88 FL (ref 82–98)
MONOCYTES # BLD AUTO: 0.74 THOUSAND/ΜL (ref 0.17–1.22)
MONOCYTES NFR BLD AUTO: 9 % (ref 4–12)
NEUTROPHILS # BLD AUTO: 4.06 THOUSANDS/ΜL (ref 1.85–7.62)
NEUTS SEG NFR BLD AUTO: 48 % (ref 43–75)
NRBC BLD AUTO-RTO: 0 /100 WBCS
PLATELET # BLD AUTO: 411 THOUSANDS/UL (ref 149–390)
PMV BLD AUTO: 9.1 FL (ref 8.9–12.7)
POTASSIUM SERPL-SCNC: 4.3 MMOL/L (ref 3.5–5.3)
PROT SERPL-MCNC: 7.9 G/DL (ref 6.4–8.2)
RBC # BLD AUTO: 4.81 MILLION/UL (ref 3.81–5.12)
SCAN RESULT: NORMAL
SODIUM SERPL-SCNC: 136 MMOL/L (ref 136–145)
WBC # BLD AUTO: 8.46 THOUSAND/UL (ref 4.31–10.16)

## 2022-03-25 PROCEDURE — 36415 COLL VENOUS BLD VENIPUNCTURE: CPT | Performed by: INTERNAL MEDICINE

## 2022-03-25 PROCEDURE — 85025 COMPLETE CBC W/AUTO DIFF WBC: CPT

## 2022-03-25 PROCEDURE — 82306 VITAMIN D 25 HYDROXY: CPT | Performed by: INTERNAL MEDICINE

## 2022-03-25 PROCEDURE — 80053 COMPREHEN METABOLIC PANEL: CPT

## 2022-03-25 PROCEDURE — 83615 LACTATE (LD) (LDH) ENZYME: CPT

## 2022-04-01 ENCOUNTER — OFFICE VISIT (OUTPATIENT)
Dept: HEMATOLOGY ONCOLOGY | Facility: CLINIC | Age: 82
End: 2022-04-01
Payer: MEDICARE

## 2022-04-01 VITALS
BODY MASS INDEX: 33.01 KG/M2 | RESPIRATION RATE: 18 BRPM | DIASTOLIC BLOOD PRESSURE: 76 MMHG | HEIGHT: 57 IN | WEIGHT: 153 LBS | HEART RATE: 100 BPM | OXYGEN SATURATION: 96 % | TEMPERATURE: 96.9 F | SYSTOLIC BLOOD PRESSURE: 124 MMHG

## 2022-04-01 DIAGNOSIS — Z15.89 JAK2 GENE MUTATION: ICD-10-CM

## 2022-04-01 DIAGNOSIS — Z51.11 ENCOUNTER FOR ANTINEOPLASTIC CHEMOTHERAPY: ICD-10-CM

## 2022-04-01 DIAGNOSIS — R53.0 NEOPLASTIC (MALIGNANT) RELATED FATIGUE: ICD-10-CM

## 2022-04-01 DIAGNOSIS — D47.3 ESSENTIAL THROMBOCYTOSIS (HCC): Primary | ICD-10-CM

## 2022-04-01 PROCEDURE — 99214 OFFICE O/P EST MOD 30 MIN: CPT | Performed by: INTERNAL MEDICINE

## 2022-04-01 RX ORDER — HYDROXYUREA 500 MG/1
500 CAPSULE ORAL DAILY
Qty: 90 CAPSULE | Refills: 1 | Status: SHIPPED | OUTPATIENT
Start: 2022-05-02 | End: 2022-06-03

## 2022-04-01 NOTE — PROGRESS NOTES
[facility] - Hematology & Medical Oncology  Outpatient Visit Encounter Note      Mariluz Grande 80 y o  female 1940 [id number] Date:  4/1/2022    HEMATOLOGICAL HISTORY        Clotting History Denies   Bleeding History Denies   Cancer History Denies   Family Cancer History Mother with cervical cancer, maternal grandmother with breast cancer, maternal aunt with brain cancer, paternal aunt with breast cancer   H/O COVID Infection Denies   H/O COVID Vaccination 3/3 Remi Salazar   H/O Blood/Plt Transfusion Denies   Tobacco Use Denies   Alcohol Use Denies   Occupation [address] [place] [address] Iris Villa is a 80 y o  here for new consultation with me today  The patient is referred by Dr Flakito Mcleod and the reason for consultation is thrombocytosis  Her CBC shows longstanding history of thrombocytosis without abnormalities of WBC and hemoglobin  Her differential was remarkable for basophilia  This Visit    Denies any f/c/n/v/cp/ap/sob/cough  Denies diarrhea or skin rash  Taking her hydrea and asa without issues  I have reviewed the relevant past medical, surgical, social and family history  I have also reviewed allergies and medications for this patient  Review of Systems  Review of Systems   All other systems reviewed and are negative  OBJECTIVE     Physical Exam  Vitals:    04/01/22 0924   BP: 124/76   BP Location: Left arm   Pulse: 100   Resp: 18   Temp: (!) 96 9 °F (36 1 °C)   TempSrc: Tympanic Core   SpO2: 96%   Weight: 69 4 kg (153 lb)   Height: 4' 9" (1 448 m)        Physical Exam  Vitals and nursing note reviewed  Constitutional:       General: She is not in acute distress  Appearance: She is well-developed  HENT:      Head: Normocephalic and atraumatic  Eyes:      Conjunctiva/sclera: Conjunctivae normal    Cardiovascular:      Rate and Rhythm: Normal rate  Pulmonary:      Effort: Pulmonary effort is normal  No respiratory distress  Abdominal:      Tenderness: There is no abdominal tenderness  Musculoskeletal:         General: No swelling  Normal range of motion  Cervical back: Neck supple  Neurological:      General: No focal deficit present  Mental Status: She is alert and oriented to person, place, and time  Mental status is at baseline  Imaging  Relevant imaging reviewed in chart    Labs  Relevant labs reviewed in chart     Final Diagnosis   A -C  Bone marrow, right iliac crest,  biopsy and aspirate:  -  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, compatible with essential thrombocythemia (see note)  -  Maturing trilineage hematopoiesis without significant features of dysplasia or increased myeloblasts  -  Mildly decreased to adequate stainable storage iron  -  Mildly increased reticulin fibers without overt fibrosis  -  Negative for collagen fibrosis, granulomata, vasculitis, necrosis       Electronically signed by Sammy Smith MD on 3/22/2022 at 11:39 AM         ASSESSMENT & PLAN      Diagnosis ICD-10-CM Associated Orders   1  Essential thrombocytosis (HCC)  D47 3 CBC and differential     Comprehensive metabolic panel     Iron Panel (Includes Ferritin, Iron Sat%, Iron, and TIBC)     hydroxyurea (HYDREA) 500 mg capsule   2  JAK2 gene mutation  Z15 89 CBC and differential     Comprehensive metabolic panel     Iron Panel (Includes Ferritin, Iron Sat%, Iron, and TIBC)     hydroxyurea (HYDREA) 500 mg capsule   3  Encounter for antineoplastic chemotherapy  Z51 11 CBC and differential     Comprehensive metabolic panel     Iron Panel (Includes Ferritin, Iron Sat%, Iron, and TIBC)     hydroxyurea (HYDREA) 500 mg capsule   4  Neoplastic (malignant) related fatigue  R53 0          80 y o  female with anemia of CKD stage 3 and history of iron deficiency anemia diagnosed with 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)        Discussion/Plan/Labs  · Essential thrombocytosis   · Continue with ASA 81mg daily + Hydrea 500mg QD  · Reviewed her bone marrow biopsy in detail  · CBC shows decreasing platelet count to meet ELN criteria  · Patient gave insurance/policy papers to be completed and handed them to RN [facility] for further assistance  · DEIDRA   · Appears resolved with recommendation to check labs again in 2 months  Follow-up  · 2 months    All questions were answered to the patient's satisfaction during this encounter  They appreciated and thanked me for spending time with them  The patient knows the contact information for our office and know to reach out for any relevant concerns related to this encounter  For all other listed problems and medical diagnosis in his chart - they are managed by PCP and/or other specialists, which patient acknowledges        Dr Ingram Math

## 2022-04-06 ENCOUNTER — APPOINTMENT (OUTPATIENT)
Dept: LAB | Facility: CLINIC | Age: 82
End: 2022-04-06
Payer: MEDICARE

## 2022-04-06 DIAGNOSIS — E03.9 HYPOTHYROIDISM, UNSPECIFIED TYPE: ICD-10-CM

## 2022-04-06 DIAGNOSIS — N18.31 TYPE 2 DIABETES MELLITUS WITH STAGE 3A CHRONIC KIDNEY DISEASE, WITHOUT LONG-TERM CURRENT USE OF INSULIN (HCC): ICD-10-CM

## 2022-04-06 DIAGNOSIS — E11.22 TYPE 2 DIABETES MELLITUS WITH STAGE 3A CHRONIC KIDNEY DISEASE, WITHOUT LONG-TERM CURRENT USE OF INSULIN (HCC): ICD-10-CM

## 2022-04-06 DIAGNOSIS — E11.69 HYPERLIPIDEMIA DUE TO TYPE 2 DIABETES MELLITUS (HCC): ICD-10-CM

## 2022-04-06 DIAGNOSIS — E78.5 HYPERLIPIDEMIA DUE TO TYPE 2 DIABETES MELLITUS (HCC): ICD-10-CM

## 2022-04-06 LAB
ALBUMIN SERPL BCP-MCNC: 3.5 G/DL (ref 3.5–5)
ALP SERPL-CCNC: 86 U/L (ref 46–116)
ALT SERPL W P-5'-P-CCNC: 23 U/L (ref 12–78)
ANION GAP SERPL CALCULATED.3IONS-SCNC: 6 MMOL/L (ref 4–13)
AST SERPL W P-5'-P-CCNC: 14 U/L (ref 5–45)
BILIRUB SERPL-MCNC: 0.42 MG/DL (ref 0.2–1)
BUN SERPL-MCNC: 16 MG/DL (ref 5–25)
CALCIUM SERPL-MCNC: 8.9 MG/DL (ref 8.3–10.1)
CHLORIDE SERPL-SCNC: 105 MMOL/L (ref 100–108)
CHOLEST SERPL-MCNC: 164 MG/DL
CO2 SERPL-SCNC: 26 MMOL/L (ref 21–32)
CREAT SERPL-MCNC: 1.14 MG/DL (ref 0.6–1.3)
GFR SERPL CREATININE-BSD FRML MDRD: 45 ML/MIN/1.73SQ M
GLUCOSE P FAST SERPL-MCNC: 98 MG/DL (ref 65–99)
HDLC SERPL-MCNC: 38 MG/DL
LDLC SERPL CALC-MCNC: 80 MG/DL (ref 0–100)
NONHDLC SERPL-MCNC: 126 MG/DL
POTASSIUM SERPL-SCNC: 4.2 MMOL/L (ref 3.5–5.3)
PROT SERPL-MCNC: 7.9 G/DL (ref 6.4–8.2)
SODIUM SERPL-SCNC: 137 MMOL/L (ref 136–145)
TRIGL SERPL-MCNC: 232 MG/DL
TSH SERPL DL<=0.05 MIU/L-ACNC: 5.45 UIU/ML (ref 0.45–4.5)

## 2022-04-06 PROCEDURE — 80053 COMPREHEN METABOLIC PANEL: CPT

## 2022-04-06 PROCEDURE — 80061 LIPID PANEL: CPT

## 2022-04-06 PROCEDURE — 36415 COLL VENOUS BLD VENIPUNCTURE: CPT

## 2022-04-06 PROCEDURE — 83036 HEMOGLOBIN GLYCOSYLATED A1C: CPT

## 2022-04-06 PROCEDURE — 84443 ASSAY THYROID STIM HORMONE: CPT

## 2022-04-07 LAB
EST. AVERAGE GLUCOSE BLD GHB EST-MCNC: 128 MG/DL
HBA1C MFR BLD: 6.1 %

## 2022-05-16 ENCOUNTER — TELEPHONE (OUTPATIENT)
Dept: FAMILY MEDICINE CLINIC | Facility: CLINIC | Age: 82
End: 2022-05-16

## 2022-05-16 NOTE — TELEPHONE ENCOUNTER
PATIENT COMING TO SEE DR Karla Negro 06/07/2022 AND HAS LABS ORDERED BY HEM/ONC  IS ASKING IF DR Karla Negro WOULD PLEASE ORDER A REPEAT TSH AND OTHER LABS SHE FEEL SHE SHOULD HAVE IN ADDITION TO THOSE ORDERED BY HEM/ONC  PLEASE CALL PATIENT TO ADVISE   SHE IS GOING FOR LABS FOR HEM/ONC ON 06/01/2022

## 2022-05-17 ENCOUNTER — TELEPHONE (OUTPATIENT)
Dept: NEPHROLOGY | Facility: CLINIC | Age: 82
End: 2022-05-17

## 2022-05-31 ENCOUNTER — TELEPHONE (OUTPATIENT)
Dept: HEMATOLOGY ONCOLOGY | Facility: CLINIC | Age: 82
End: 2022-05-31

## 2022-06-01 ENCOUNTER — APPOINTMENT (OUTPATIENT)
Dept: LAB | Facility: CLINIC | Age: 82
End: 2022-06-01
Payer: MEDICARE

## 2022-06-01 DIAGNOSIS — Z51.11 ENCOUNTER FOR ANTINEOPLASTIC CHEMOTHERAPY: ICD-10-CM

## 2022-06-01 DIAGNOSIS — Z15.89 JAK2 GENE MUTATION: ICD-10-CM

## 2022-06-01 DIAGNOSIS — D47.3 ESSENTIAL THROMBOCYTOSIS (HCC): ICD-10-CM

## 2022-06-01 LAB
ALBUMIN SERPL BCP-MCNC: 3.2 G/DL (ref 3.5–5)
ALP SERPL-CCNC: 72 U/L (ref 46–116)
ALT SERPL W P-5'-P-CCNC: 21 U/L (ref 12–78)
ANION GAP SERPL CALCULATED.3IONS-SCNC: 3 MMOL/L (ref 4–13)
AST SERPL W P-5'-P-CCNC: 13 U/L (ref 5–45)
BASOPHILS # BLD AUTO: 0.13 THOUSANDS/ΜL (ref 0–0.1)
BASOPHILS NFR BLD AUTO: 2 % (ref 0–1)
BILIRUB SERPL-MCNC: 0.3 MG/DL (ref 0.2–1)
BUN SERPL-MCNC: 16 MG/DL (ref 5–25)
CALCIUM ALBUM COR SERPL-MCNC: 9.9 MG/DL (ref 8.3–10.1)
CALCIUM SERPL-MCNC: 9.3 MG/DL (ref 8.3–10.1)
CHLORIDE SERPL-SCNC: 104 MMOL/L (ref 100–108)
CO2 SERPL-SCNC: 28 MMOL/L (ref 21–32)
CREAT SERPL-MCNC: 1.07 MG/DL (ref 0.6–1.3)
EOSINOPHIL # BLD AUTO: 0.21 THOUSAND/ΜL (ref 0–0.61)
EOSINOPHIL NFR BLD AUTO: 3 % (ref 0–6)
ERYTHROCYTE [DISTWIDTH] IN BLOOD BY AUTOMATED COUNT: 23.1 % (ref 11.6–15.1)
FERRITIN SERPL-MCNC: 731 NG/ML (ref 8–388)
GFR SERPL CREATININE-BSD FRML MDRD: 49 ML/MIN/1.73SQ M
GLUCOSE P FAST SERPL-MCNC: 95 MG/DL (ref 65–99)
HCT VFR BLD AUTO: 40.1 % (ref 34.8–46.1)
HGB BLD-MCNC: 13.3 G/DL (ref 11.5–15.4)
IMM GRANULOCYTES # BLD AUTO: 0.02 THOUSAND/UL (ref 0–0.2)
IMM GRANULOCYTES NFR BLD AUTO: 0 % (ref 0–2)
IRON SATN MFR SERPL: 50 % (ref 15–50)
IRON SERPL-MCNC: 107 UG/DL (ref 50–170)
LYMPHOCYTES # BLD AUTO: 2.8 THOUSANDS/ΜL (ref 0.6–4.47)
LYMPHOCYTES NFR BLD AUTO: 37 % (ref 14–44)
MCH RBC QN AUTO: 33 PG (ref 26.8–34.3)
MCHC RBC AUTO-ENTMCNC: 33.2 G/DL (ref 31.4–37.4)
MCV RBC AUTO: 100 FL (ref 82–98)
MONOCYTES # BLD AUTO: 0.67 THOUSAND/ΜL (ref 0.17–1.22)
MONOCYTES NFR BLD AUTO: 9 % (ref 4–12)
NEUTROPHILS # BLD AUTO: 3.73 THOUSANDS/ΜL (ref 1.85–7.62)
NEUTS SEG NFR BLD AUTO: 49 % (ref 43–75)
NRBC BLD AUTO-RTO: 0 /100 WBCS
PLATELET # BLD AUTO: 377 THOUSANDS/UL (ref 149–390)
PMV BLD AUTO: 9.5 FL (ref 8.9–12.7)
POTASSIUM SERPL-SCNC: 4.2 MMOL/L (ref 3.5–5.3)
PROT SERPL-MCNC: 7.9 G/DL (ref 6.4–8.2)
RBC # BLD AUTO: 4.03 MILLION/UL (ref 3.81–5.12)
SODIUM SERPL-SCNC: 135 MMOL/L (ref 136–145)
TIBC SERPL-MCNC: 212 UG/DL (ref 250–450)
WBC # BLD AUTO: 7.56 THOUSAND/UL (ref 4.31–10.16)

## 2022-06-01 PROCEDURE — 83550 IRON BINDING TEST: CPT

## 2022-06-01 PROCEDURE — 36415 COLL VENOUS BLD VENIPUNCTURE: CPT

## 2022-06-01 PROCEDURE — 82728 ASSAY OF FERRITIN: CPT

## 2022-06-01 PROCEDURE — 83540 ASSAY OF IRON: CPT

## 2022-06-01 PROCEDURE — 85025 COMPLETE CBC W/AUTO DIFF WBC: CPT

## 2022-06-01 PROCEDURE — 80053 COMPREHEN METABOLIC PANEL: CPT

## 2022-06-03 ENCOUNTER — TELEPHONE (OUTPATIENT)
Dept: HEMATOLOGY ONCOLOGY | Facility: CLINIC | Age: 82
End: 2022-06-03

## 2022-06-03 ENCOUNTER — OFFICE VISIT (OUTPATIENT)
Dept: HEMATOLOGY ONCOLOGY | Facility: CLINIC | Age: 82
End: 2022-06-03
Payer: MEDICARE

## 2022-06-03 VITALS
WEIGHT: 149 LBS | OXYGEN SATURATION: 98 % | HEART RATE: 91 BPM | SYSTOLIC BLOOD PRESSURE: 124 MMHG | BODY MASS INDEX: 32.15 KG/M2 | DIASTOLIC BLOOD PRESSURE: 60 MMHG | HEIGHT: 57 IN | RESPIRATION RATE: 16 BRPM | TEMPERATURE: 98.6 F

## 2022-06-03 DIAGNOSIS — Z79.899 ON HYDROXYUREA THERAPY: ICD-10-CM

## 2022-06-03 DIAGNOSIS — R53.0 NEOPLASTIC (MALIGNANT) RELATED FATIGUE: ICD-10-CM

## 2022-06-03 DIAGNOSIS — E61.1 IRON DEFICIENCY: ICD-10-CM

## 2022-06-03 DIAGNOSIS — Q38.5 PALATE ABNORMALITY: ICD-10-CM

## 2022-06-03 DIAGNOSIS — Z51.11 ENCOUNTER FOR ANTINEOPLASTIC CHEMOTHERAPY: ICD-10-CM

## 2022-06-03 DIAGNOSIS — Z15.89 JAK2 GENE MUTATION: ICD-10-CM

## 2022-06-03 DIAGNOSIS — D47.3 ESSENTIAL THROMBOCYTOSIS (HCC): Primary | ICD-10-CM

## 2022-06-03 PROCEDURE — 99215 OFFICE O/P EST HI 40 MIN: CPT | Performed by: INTERNAL MEDICINE

## 2022-06-03 RX ORDER — HYDROXYUREA 500 MG/1
CAPSULE ORAL
Start: 2022-06-03 | End: 2022-07-06 | Stop reason: SDUPTHER

## 2022-06-03 NOTE — PROGRESS NOTES
[facility] - Hematology & Medical Oncology  Outpatient Visit Encounter Note      Karlos Rollins 80 y o  female 1940 [id number] Date:  6/3/2022    HEMATOLOGICAL HISTORY        Clotting History Denies   Bleeding History Denies   Cancer History Denies   Family Cancer History Mother with cervical cancer, maternal grandmother with breast cancer, maternal aunt with brain cancer, paternal aunt with breast cancer   H/O COVID Infection Denies   H/O COVID Vaccination 3/3 Remi Salazar   H/O Blood/Plt Transfusion Denies   Tobacco Use Denies   Alcohol Use Denies   Occupation [address] [place] [address] Sunshine Joyner is a 80 y o  here for new consultation with me today  The patient is referred by Dr Mona Fung and the reason for consultation is thrombocytosis  Her CBC shows longstanding history of thrombocytosis without abnormalities of WBC and hemoglobin  Her differential was remarkable for basophilia  This Visit    Continues to feel more tired  No issues taking Hydrea  No f/c/n/v/cp/ap/sob/cough  Has episodes of red streaking on stool  Has noticed lump on her roof palate  I have reviewed the relevant past medical, surgical, social and family history  I have also reviewed allergies and medications for this patient  Review of Systems  Review of Systems   All other systems reviewed and are negative  OBJECTIVE     Physical Exam  Vitals:    06/03/22 0926   BP: 124/60   BP Location: Left arm   Patient Position: Sitting   Cuff Size: Adult   Pulse: 91   Resp: 16   Temp: 98 6 °F (37 °C)   TempSrc: Temporal   SpO2: 98%   Weight: 67 6 kg (149 lb)   Height: 4' 9" (1 448 m)        Physical Exam  Vitals and nursing note reviewed  Constitutional:       General: She is not in acute distress  Appearance: She is well-developed  HENT:      Head: Normocephalic and atraumatic        Mouth/Throat:      Comments: Lump or mass like appearance on the roof of mouth palate  Eyes: Conjunctiva/sclera: Conjunctivae normal    Cardiovascular:      Rate and Rhythm: Normal rate  Pulmonary:      Effort: Pulmonary effort is normal  No respiratory distress  Abdominal:      Tenderness: There is no abdominal tenderness  Musculoskeletal:         General: No swelling  Normal range of motion  Cervical back: Neck supple  Neurological:      General: No focal deficit present  Mental Status: She is alert and oriented to person, place, and time  Mental status is at baseline  Imaging  Relevant imaging reviewed in chart    Labs  Relevant labs reviewed in chart     Final Diagnosis   A -C  Bone marrow, right iliac crest,  biopsy and aspirate:  -  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, compatible with essential thrombocythemia (see note)  -  Maturing trilineage hematopoiesis without significant features of dysplasia or increased myeloblasts  -  Mildly decreased to adequate stainable storage iron  -  Mildly increased reticulin fibers without overt fibrosis  -  Negative for collagen fibrosis, granulomata, vasculitis, necrosis       Electronically signed by Ginger Michelle MD on 3/22/2022 at 11:39 AM         ASSESSMENT & PLAN     No diagnosis found  80 y o  female with anemia of CKD stage 3 and history of iron deficiency anemia diagnosed with 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)  Discussion/Plan/Labs  · Essential thrombocytosis   · Continue with ASA 81mg daily + Hydrea 500mg QD  · CBC shows decreasing platelet count to meet ELN criteria  · CBC q2m  · DEIDRA   · Continues to remain resolved with recommendation to check labs in the future  · Recommend she follows with her established GI for her likely hemorrhoids given clinical description  · Recommend she sees ENT at the earliest given concerning lump seen on the roof of the mouth  Hence, referral placed      Follow-up  · 4 months    All questions were answered to the patient's satisfaction during this encounter  They appreciated and thanked me for spending time with them  The patient knows the contact information for our office and know to reach out for any relevant concerns related to this encounter  For all other listed problems and medical diagnosis in his chart - they are managed by PCP and/or other specialists, which patient acknowledges        Dr Juda Runner

## 2022-06-03 NOTE — TELEPHONE ENCOUNTER
Farris Lennox is referring patient to ENT, I contacted the [facility] office [phone] twice and both times I got the voicemail  I left a detailed message with patient information so patient can be scheduled  I also gave the office our call back number incase they will like to set up the appointment with me  Patient was made aware of all this and also given ENT number in case she will like to contact them directly  Referral is on file

## 2022-06-06 ENCOUNTER — RA CDI HCC (OUTPATIENT)
Dept: OTHER | Facility: HOSPITAL | Age: 82
End: 2022-06-06

## 2022-06-06 NOTE — PROGRESS NOTES
[facility]  coding opportunities          Chart Reviewed number of suggestions sent to Provider: 1   [id number]     Patients Insurance     Medicare Insurance: Medicare

## 2022-06-07 ENCOUNTER — OFFICE VISIT (OUTPATIENT)
Dept: FAMILY MEDICINE CLINIC | Facility: CLINIC | Age: 82
End: 2022-06-07
Payer: MEDICARE

## 2022-06-07 VITALS
OXYGEN SATURATION: 98 % | HEIGHT: 57 IN | SYSTOLIC BLOOD PRESSURE: 128 MMHG | WEIGHT: 149 LBS | BODY MASS INDEX: 32.15 KG/M2 | DIASTOLIC BLOOD PRESSURE: 76 MMHG | HEART RATE: 72 BPM

## 2022-06-07 DIAGNOSIS — D53.9 NUTRITIONAL ANEMIA, UNSPECIFIED: ICD-10-CM

## 2022-06-07 DIAGNOSIS — D47.3 ESSENTIAL THROMBOCYTOSIS (HCC): ICD-10-CM

## 2022-06-07 DIAGNOSIS — N18.30 BENIGN HYPERTENSION WITH CHRONIC KIDNEY DISEASE, STAGE III (HCC): ICD-10-CM

## 2022-06-07 DIAGNOSIS — E11.22 TYPE 2 DIABETES MELLITUS WITH STAGE 3A CHRONIC KIDNEY DISEASE, WITHOUT LONG-TERM CURRENT USE OF INSULIN (HCC): ICD-10-CM

## 2022-06-07 DIAGNOSIS — I10 ESSENTIAL HYPERTENSION: ICD-10-CM

## 2022-06-07 DIAGNOSIS — N18.31 TYPE 2 DIABETES MELLITUS WITH STAGE 3A CHRONIC KIDNEY DISEASE, WITHOUT LONG-TERM CURRENT USE OF INSULIN (HCC): ICD-10-CM

## 2022-06-07 DIAGNOSIS — E11.69 HYPERLIPIDEMIA DUE TO TYPE 2 DIABETES MELLITUS (HCC): ICD-10-CM

## 2022-06-07 DIAGNOSIS — Z00.00 MEDICARE ANNUAL WELLNESS VISIT, SUBSEQUENT: ICD-10-CM

## 2022-06-07 DIAGNOSIS — E78.5 HYPERLIPIDEMIA DUE TO TYPE 2 DIABETES MELLITUS (HCC): ICD-10-CM

## 2022-06-07 DIAGNOSIS — E03.9 HYPOTHYROIDISM, UNSPECIFIED TYPE: Primary | ICD-10-CM

## 2022-06-07 DIAGNOSIS — I12.9 BENIGN HYPERTENSION WITH CHRONIC KIDNEY DISEASE, STAGE III (HCC): ICD-10-CM

## 2022-06-07 DIAGNOSIS — D75.89 MACROCYTOSIS: ICD-10-CM

## 2022-06-07 PROCEDURE — G0439 PPPS, SUBSEQ VISIT: HCPCS | Performed by: FAMILY MEDICINE

## 2022-06-07 PROCEDURE — 99214 OFFICE O/P EST MOD 30 MIN: CPT | Performed by: FAMILY MEDICINE

## 2022-06-07 NOTE — PROGRESS NOTES
Assessment and Plan:    Problem List Items Addressed This Visit        Endocrine    Hyperlipidemia due to type 2 diabetes mellitus ([facility] )       Lab Results   Component Value Date    HGBA1C 6 1 (H) 04/06/2022   TG  Up  A tad, watch carbs           Relevant Orders    Lipid panel    Hypothyroidism - Primary     Recheck level with next lab           Relevant Orders    TSH, 3rd generation    Type 2 diabetes mellitus with stage 3 chronic kidney disease, without long-term current use of insulin ([facility] )       Lab Results   Component Value Date    HGBA1C 6 1 (H) 04/06/2022   bs stable           Relevant Orders    Comprehensive metabolic panel    Hemoglobin A1C       Cardiovascular and Mediastinum    Benign hypertension with chronic kidney disease, stage III ([facility] )     Lab Results   Component Value Date    EGFR 49 06/01/2022    EGFR 45 04/06/2022    EGFR 48 03/25/2022    CREATININE 1 07 06/01/2022    CREATININE 1 14 04/06/2022    CREATININE 1 08 03/25/2022   kidney function stable           Essential hypertension     BP stable              Hematopoietic and Hemostatic    Essential thrombocytosis ([facility] )     Sees hematology             Other Visit Diagnoses     Medicare annual wellness visit, subsequent        Macrocytosis        Relevant Orders    Folate    Vitamin B12    Nutritional anemia, unspecified         Relevant Orders    Folate    Vitamin B12                 Diagnoses and all orders for this visit:    Hypothyroidism, unspecified type  -     TSH, 3rd generation; Future    Medicare annual wellness visit, subsequent    Type 2 diabetes mellitus with stage 3a chronic kidney disease, without long-term current use of insulin ([facility] )  -     Comprehensive metabolic panel; Future  -     Hemoglobin A1C; Future    Essential hypertension    Benign hypertension with chronic kidney disease, stage III (HCC)    Hyperlipidemia due to type 2 diabetes mellitus ([facility] )  -     Lipid panel; Future    Macrocytosis  -     Folate;  Future  -     Vitamin B12; Future    Nutritional anemia, unspecified   -     Folate; Future  -     Vitamin B12; Future    Essential thrombocytosis (HCC)            Subjective:      Patient ID: Ailyn Swann is a 80 y o  female  CC:    Chief Complaint   Patient presents with    Follow-up     Patient present today for her 4 month follow up   Medicare Wellness Visit     Pt due       HPI:    F/u diabetes, lipids, thyroid-tsh up a tad at  5 4, taking hydroxyurea for blood disorder involving platelets, which makes her tired      The following portions of the patient's history were reviewed and updated as appropriate: allergies, current medications, past family history, past medical history, past social history, past surgical history and problem list       Review of Systems   Constitutional: Positive for fatigue  Negative for activity change and appetite change  Eyes: Negative for visual disturbance  Respiratory: Negative for apnea and shortness of breath  Cardiovascular: Negative for chest pain  Neurological: Negative for dizziness and headaches  Psychiatric/Behavioral: The patient is not nervous/anxious  Data to review:       Objective:    Vitals:    06/07/22 1456   BP: 128/76   BP Location: Left arm   Patient Position: Sitting   Cuff Size: Large   Pulse: 72   SpO2: 98%   Weight: 67 6 kg (149 lb)   Height: 4' 9" (1 448 m)        Physical Exam  Vitals reviewed  Constitutional:       Appearance: Normal appearance  She is obese  Neck:      Vascular: No carotid bruit  Cardiovascular:      Rate and Rhythm: Normal rate and regular rhythm  Pulses: Normal pulses  no weak pulses          Dorsalis pedis pulses are 2+ on the right side and 2+ on the left side  Posterior tibial pulses are 2+ on the right side and 2+ on the left side  Heart sounds: Normal heart sounds  Pulmonary:      Effort: Pulmonary effort is normal       Breath sounds: Normal breath sounds     Musculoskeletal:      Right lower leg: No edema  Left lower leg: No edema  Feet:      Right foot:      Skin integrity: No ulcer, skin breakdown, erythema, warmth, callus or dry skin  Left foot:      Skin integrity: No ulcer, skin breakdown, erythema, warmth, callus or dry skin  Lymphadenopathy:      Cervical: No cervical adenopathy  Neurological:      Mental Status: She is alert  Patient's shoes and socks removed  Right Foot/Ankle   Right Foot Inspection  Skin Exam: skin normal and skin intact  No dry skin, no warmth, no callus, no erythema, no maceration, no abnormal color, no pre-ulcer, no ulcer and no callus  Toe Exam: ROM and strength within normal limits  Sensory   Monofilament testing: intact    Vascular  Capillary refills: < 3 seconds  The right DP pulse is 2+  The right PT pulse is 2+  Left Foot/Ankle  Left Foot Inspection  Skin Exam: skin normal and skin intact  No dry skin, no warmth, no erythema, no maceration, normal color, no pre-ulcer, no ulcer and no callus  Toe Exam: ROM and strength within normal limits  Sensory   Monofilament testing: intact    Vascular  Capillary refills: < 3 seconds  The left DP pulse is 2+  The left PT pulse is 2+       Assign Risk Category  No deformity present  No loss of protective sensation  No weak pulses  Risk: 0

## 2022-06-07 NOTE — ASSESSMENT & PLAN NOTE
Lab Results   Component Value Date    EGFR 49 06/01/2022    EGFR 45 04/06/2022    EGFR 48 03/25/2022    CREATININE 1 07 06/01/2022    CREATININE 1 14 04/06/2022    CREATININE 1 08 03/25/2022   kidney function stable

## 2022-06-07 NOTE — PROGRESS NOTES
Assessment and Plan:     Problem List Items Addressed This Visit        Endocrine    Hyperlipidemia due to type 2 diabetes mellitus ([facility] )       Lab Results   Component Value Date    HGBA1C 6 1 (H) 04/06/2022   TG  Up  A tad, watch carbs           Relevant Orders    Lipid panel    Hypothyroidism - Primary     Recheck level with next lab           Relevant Orders    TSH, 3rd generation    Type 2 diabetes mellitus with stage 3 chronic kidney disease, without long-term current use of insulin ([place] [address] )       Lab Results   Component Value Date    HGBA1C 6 1 (H) 04/06/2022   bs stable           Relevant Orders    Comprehensive metabolic panel    Hemoglobin A1C       Cardiovascular and Mediastinum    Benign hypertension with chronic kidney disease, stage III ([place] [address] )     Lab Results   Component Value Date    EGFR 49 06/01/2022    EGFR 45 04/06/2022    EGFR 48 03/25/2022    CREATININE 1 07 06/01/2022    CREATININE 1 14 04/06/2022    CREATININE 1 08 03/25/2022   kidney function stable           Essential hypertension     BP stable              Hematopoietic and Hemostatic    Essential thrombocytosis ([place] [address] )     Sees hematology             Other Visit Diagnoses     Medicare annual wellness visit, subsequent        Macrocytosis        Relevant Orders    Folate    Vitamin B12    Nutritional anemia, unspecified         Relevant Orders    Folate    Vitamin B12           Preventive health issues were discussed with patient, and age appropriate screening tests were ordered as noted in patient's After Visit Summary  Personalized health advice and appropriate referrals for health education or preventive services given if needed, as noted in patient's After Visit Summary       History of Present Illness:     Patient presents for Medicare Annual Wellness visit    Patient Care Team:  Gina Grigsby MD as PCP - General (Family Medicine)  MD Marizol Stallworth MD Alayne Buff, DO     Problem List:     Patient Active Problem List   Diagnosis    GERD (gastroesophageal reflux disease)    Hyperlipidemia    Hypothyroidism    Type 2 diabetes mellitus with stage 3 chronic kidney disease, without long-term current use of insulin (HCC)    Primary osteoarthritis of right knee    Situational anxiety    Urinary incontinence    Stage 3a chronic kidney disease    Essential hypertension    Left knee pain    Primary osteoarthritis of left knee    Benign hypertension with chronic kidney disease, stage III (HCC)    Iron deficiency anemia    Essential thrombocytosis (HCC)    Hyperlipidemia due to type 2 diabetes mellitus (HCC)    Persistent proteinuria    Iron deficiency    JAK2 gene mutation    Encounter for antineoplastic chemotherapy    Neoplastic (malignant) related fatigue      Past Medical and Surgical History:     Past Medical History:   Diagnosis Date    CKD (chronic kidney disease)     Diabetes mellitus ([facility] )     does not take medications, does not monitor blood sugars, pre-diabetic    Disease of thyroid gland     Diverticulosis     GERD (gastroesophageal reflux disease)     Hypertension     Hyponatremia     Ovarian cyst     Rectal bleeding     Wears reading eyeglasses      Past Surgical History:   Procedure Laterality Date    BREAST BIOPSY Right 05/13/2009    Ultrasound Bx   Benign    BREAST SURGERY      CATARACT EXTRACTION Bilateral     COLONOSCOPY      ESOPHAGOGASTRODUODENOSCOPY      IR BIOPSY BONE MARROW  3/18/2022    OOPHORECTOMY Bilateral 2015    PA CMBND ANTERPOST COLPORRAPHY W/CYSTO N/A 6/26/2018    Procedure: ANTERIOR & POSTERIOR COLPORRHAPHY;  Surgeon: Chelle Taylor MD;  [facility];  Service: UroGynecology           PA CYSTOURETHROSCOPY N/A 6/26/2018    Procedure: Skip Meth;  Surgeon: Chelle Taylor MD;  [facility];  Service: UroGynecology           PA Les Daughters LIG N/A 6/26/2018    Procedure: ANTERIOR COLPOPEXY VAGINAL EXTRAPERITONEAL (VEC); Surgeon: Alex Ramesh MD;  [facility];  Service: UroGynecology           AK SLING OPER STRES INCONTINENCE N/A 6/26/2018    Procedure: INSERTION PUBOVAGINAL SLING (FEMALE);   Surgeon: Alex Ramesh MD;  [facility];  Service: UroGynecology          [address] [facility] PROCEDURE  11/25/2020      Family History:     Family History   Problem Relation Age of Onset    Cervical cancer Mother 76    Breast cancer Maternal Grandmother         Under 48    Mental illness Son     Schizophrenia Son     Cancer Family     No Known Problems Father     No Known Problems Maternal Aunt     No Known Problems Maternal Aunt     Brain cancer Maternal Aunt         age unknown    Cancer Cousin       Social History:     Social History     Socioeconomic History    Marital status: /Civil Union     Spouse name: None    Number of children: 2    Years of education: None    Highest education level: None   Occupational History    Occupation: Retired    Tobacco Use    Smoking status: Never Smoker    Smokeless tobacco: Never Used   Substance and Sexual Activity    Alcohol use: No    Drug use: No    Sexual activity: Never     Partners: Male     Birth control/protection: None   Other Topics Concern    None   Social History Narrative    Active advance directive     Always uses seat belt     Lives with spouse/house     Patient has a living Will     Power of  in existence     Mu-ism     Supportive and safe          Social Determinants of Health     Financial Resource Strain: Not on file   Food Insecurity: Not on file   Transportation Needs: Not on file   Physical Activity: Not on file   Stress: Not on file   Social Connections: Not on file   Intimate Partner Violence: Not on file   Housing Stability: Not on file      Medications and Allergies:     Current Outpatient Medications   Medication Sig Dispense Refill    aspirin 81 MG tablet Take 81 mg by mouth daily        Calcium Carbonate-Vitamin D 600-200 MG-UNIT TABS Take 1 tablet by mouth every other day        docusate sodium (COLACE) 100 mg capsule Take by mouth      ezetimibe (ZETIA) 10 mg tablet Take 1 tablet (10 mg total) by mouth daily 90 tablet 1    famotidine (PEPCID) 40 MG tablet Take 1 tablet (40 mg total) by mouth daily at bedtime 90 tablet 1    folic acid (FOLVITE) 1 mg tablet Take 1 tablet (1 mg total) by mouth daily 90 tablet 2    Glucosamine-Chondroitin 250-200 MG TABS Take 1 tablet by mouth daily        hydroxyurea (HYDREA) 500 mg capsule 500mg M-F only      lisinopril (ZESTRIL) 2 5 mg tablet Take 1 tablet (2 5 mg total) by mouth daily 90 tablet 3    magnesium citrate solution Take 296 mL by mouth once      Multiple Vitamins-Iron (DAILY MULTIPLE VITAMIN/IRON) TABS Take 1 tablet by mouth daily        Omega-3 Fatty Acids ([employer] FISH OIL) 1200 MG CAPS Take 1 capsule by mouth 2 (two) times a day      levothyroxine 50 mcg tablet Take 1 tablet (50 mcg total) by mouth daily 90 tablet 1     No current facility-administered medications for this visit       No Known Allergies   Immunizations:     Immunization History   Administered Date(s) Administered    COVID-19 PFIZER VACCINE 0 3 ML IM 02/05/2021, 02/26/2021, 11/18/2021    INFLUENZA 10/03/2020    Influenza Split High Dose Preservative Free IM 09/12/2014, 10/19/2015, 10/17/2016, 10/16/2017, 10/28/2019    Influenza, high dose seasonal 0 7 mL 10/17/2018, 10/06/2021    Influenza, seasonal, injectable 01/01/2011, 09/24/2012    Pneumococcal Conjugate 13-Valent 04/17/2019    Pneumococcal Polysaccharide PPV23 05/01/2010    Tdap 09/21/2018    Zoster 01/16/2008    Zoster Vaccine Recombinant 03/03/2019, 06/09/2019      Health Maintenance:         Topic Date Due    Colorectal Cancer Screening  05/25/2022    Hepatitis C Screening  11/06/2023 (Originally 1940)         Topic Date Due    COVID-19 Vaccine (4 - Booster for Khalil Peter series) 03/18/2022      Medicare Health Risk Assessment:     /76 (BP Location: Left arm, Patient Position: Sitting, Cuff Size: Large)   Pulse 72   Ht 4' 9" (1 448 m)   Wt 67 6 kg (149 lb)   SpO2 98%   BMI 32 24 kg/m²      Megan Anders is here for her Subsequent Wellness visit  Health Risk Assessment:   Patient rates overall health as good  Patient feels that their physical health rating is slightly worse  Patient is very satisfied with their life  Eyesight was rated as same  Hearing was rated as same  Patient feels that their emotional and mental health rating is same  Patients states they are never, rarely angry  Patient states they are always unusually tired/fatigued  Pain experienced in the last 7 days has been none  Patient states that she has experienced no weight loss or gain in last 6 months  Being treated for blood cancer by hematology that causes fatigue    Depression Screening:   PHQ-2 Score: 0      Fall Risk Screening: In the past year, patient has experienced: no history of falling in past year      Urinary Incontinence Screening:   Patient has not leaked urine accidently in the last six months  Home Safety:  Patient does not have trouble with stairs inside or outside of their home  Patient has working smoke alarms and has working carbon monoxide detector  Home safety hazards include: none  Nutrition:   Current diet is Regular  Medications:   Patient is currently taking over-the-counter supplements  OTC medications include: see medication list  Patient is able to manage medications  Activities of Daily Living (ADLs)/Instrumental Activities of Daily Living (IADLs):   Walk and transfer into and out of bed and chair?: Yes  Dress and groom yourself?: Yes    Bathe or shower yourself?: Yes    Feed yourself?  Yes  Do your laundry/housekeeping?: Yes  Manage your money, pay your bills and track your expenses?: Yes  Make your own meals?: Yes    Do your own shopping?: Yes    Previous Hospitalizations:   Any hospitalizations or ED visits within the last 12 months?: No      Advance Care Planning:   Living will: Yes    Durable POA for healthcare: Yes    Advanced directive: Yes      Comments:  is poa    Cognitive Screening:   Provider or family/friend/caregiver concerned regarding cognition?: No    PREVENTIVE SCREENINGS      Cardiovascular Screening:    General: History Lipid Disorder and Screening Current      Diabetes Screening:     General: History Diabetes and Screening Current      Colorectal Cancer Screening:     General: Screening Current      Breast Cancer Screening:     General: Screening Current      Cervical Cancer Screening:    General: Screening Not Indicated      Osteoporosis Screening:    General: History Osteoporosis and Screening Current      Abdominal Aortic Aneurysm (AAA) Screening:        General: Screening Not Indicated      Lung Cancer Screening:     General: Screening Not Indicated      Hepatitis C Screening:    General: Screening Current    Screening, Brief Intervention, and Referral to Treatment (SBIRT)    Screening  Typical number of drinks in a day: 0  Typical number of drinks in a week: 0  Interpretation: Low risk drinking behavior      Single Item Drug Screening:  How often have you used an illegal drug (including marijuana) or a prescription medication for non-medical reasons in the past year? never    Single Item Drug Screen Score: 0  Interpretation: Negative screen for possible drug use disorder      Karley Giles MD

## 2022-06-10 ENCOUNTER — TELEPHONE (OUTPATIENT)
Dept: HEMATOLOGY ONCOLOGY | Facility: CLINIC | Age: 82
End: 2022-06-10

## 2022-06-10 NOTE — TELEPHONE ENCOUNTER
Informed Selvin Bob I don't have her FMLA paperwork  Asked if she can have her company either email it, fax it or mail it to me and I'll get it filled out and faxed to her insurance company  Provided email address, address and fax number  She voiced understanding

## 2022-06-22 DIAGNOSIS — K21.9 GASTROESOPHAGEAL REFLUX DISEASE WITHOUT ESOPHAGITIS: ICD-10-CM

## 2022-06-22 RX ORDER — FAMOTIDINE 40 MG/1
40 TABLET, FILM COATED ORAL
Qty: 90 TABLET | Refills: 1 | Status: SHIPPED | OUTPATIENT
Start: 2022-06-22

## 2022-07-01 ENCOUNTER — APPOINTMENT (OUTPATIENT)
Dept: LAB | Facility: CLINIC | Age: 82
End: 2022-07-01
Payer: MEDICARE

## 2022-07-01 DIAGNOSIS — N18.31 STAGE 3A CHRONIC KIDNEY DISEASE (HCC): ICD-10-CM

## 2022-07-01 DIAGNOSIS — D75.89 MACROCYTOSIS: ICD-10-CM

## 2022-07-01 DIAGNOSIS — N18.30 BENIGN HYPERTENSION WITH CHRONIC KIDNEY DISEASE, STAGE III (HCC): ICD-10-CM

## 2022-07-01 DIAGNOSIS — E87.1 HYPONATREMIA: ICD-10-CM

## 2022-07-01 DIAGNOSIS — D53.9 NUTRITIONAL ANEMIA, UNSPECIFIED: ICD-10-CM

## 2022-07-01 DIAGNOSIS — E11.22 TYPE 2 DIABETES MELLITUS WITH STAGE 3A CHRONIC KIDNEY DISEASE, WITHOUT LONG-TERM CURRENT USE OF INSULIN (HCC): ICD-10-CM

## 2022-07-01 DIAGNOSIS — I12.9 BENIGN HYPERTENSION WITH CHRONIC KIDNEY DISEASE, STAGE III (HCC): ICD-10-CM

## 2022-07-01 DIAGNOSIS — N18.31 TYPE 2 DIABETES MELLITUS WITH STAGE 3A CHRONIC KIDNEY DISEASE, WITHOUT LONG-TERM CURRENT USE OF INSULIN (HCC): ICD-10-CM

## 2022-07-01 LAB
ANION GAP SERPL CALCULATED.3IONS-SCNC: 7 MMOL/L (ref 4–13)
BACTERIA UR QL AUTO: ABNORMAL /HPF
BILIRUB UR QL STRIP: NEGATIVE
BUN SERPL-MCNC: 17 MG/DL (ref 5–25)
CALCIUM SERPL-MCNC: 9.4 MG/DL (ref 8.3–10.1)
CHLORIDE SERPL-SCNC: 101 MMOL/L (ref 100–108)
CLARITY UR: CLEAR
CO2 SERPL-SCNC: 27 MMOL/L (ref 21–32)
COLOR UR: ABNORMAL
CREAT SERPL-MCNC: 1.1 MG/DL (ref 0.6–1.3)
CREAT UR-MCNC: 48.1 MG/DL
ERYTHROCYTE [DISTWIDTH] IN BLOOD BY AUTOMATED COUNT: 16 % (ref 11.6–15.1)
FOLATE SERPL-MCNC: >20 NG/ML (ref 3.1–17.5)
GFR SERPL CREATININE-BSD FRML MDRD: 47 ML/MIN/1.73SQ M
GLUCOSE P FAST SERPL-MCNC: 85 MG/DL (ref 65–99)
GLUCOSE UR STRIP-MCNC: NEGATIVE MG/DL
HCT VFR BLD AUTO: 40.9 % (ref 34.8–46.1)
HGB BLD-MCNC: 13.9 G/DL (ref 11.5–15.4)
HGB UR QL STRIP.AUTO: NEGATIVE
HYALINE CASTS #/AREA URNS LPF: ABNORMAL /LPF
KETONES UR STRIP-MCNC: NEGATIVE MG/DL
LEUKOCYTE ESTERASE UR QL STRIP: NEGATIVE
MCH RBC QN AUTO: 34.7 PG (ref 26.8–34.3)
MCHC RBC AUTO-ENTMCNC: 34 G/DL (ref 31.4–37.4)
MCV RBC AUTO: 102 FL (ref 82–98)
MUCOUS THREADS UR QL AUTO: ABNORMAL
NITRITE UR QL STRIP: NEGATIVE
NON-SQ EPI CELLS URNS QL MICRO: ABNORMAL /HPF
PH UR STRIP.AUTO: 6.5 [PH]
PLATELET # BLD AUTO: 376 THOUSANDS/UL (ref 149–390)
PMV BLD AUTO: 9.1 FL (ref 8.9–12.7)
POTASSIUM SERPL-SCNC: 4.3 MMOL/L (ref 3.5–5.3)
PROT UR STRIP-MCNC: NEGATIVE MG/DL
PROT UR-MCNC: <6 MG/DL
PROT/CREAT UR: <0.12 MG/G{CREAT} (ref 0–0.1)
PTH-INTACT SERPL-MCNC: 29.1 PG/ML (ref 18.4–80.1)
RBC # BLD AUTO: 4.01 MILLION/UL (ref 3.81–5.12)
RBC #/AREA URNS AUTO: ABNORMAL /HPF
SODIUM SERPL-SCNC: 135 MMOL/L (ref 136–145)
SP GR UR STRIP.AUTO: 1.01 (ref 1–1.03)
TRANS CELLS #/AREA URNS HPF: PRESENT /[HPF]
UROBILINOGEN UR STRIP-ACNC: <2 MG/DL
VIT B12 SERPL-MCNC: 644 PG/ML (ref 100–900)
WBC # BLD AUTO: 7.83 THOUSAND/UL (ref 4.31–10.16)
WBC #/AREA URNS AUTO: ABNORMAL /HPF

## 2022-07-01 PROCEDURE — 82607 VITAMIN B-12: CPT

## 2022-07-01 PROCEDURE — 85027 COMPLETE CBC AUTOMATED: CPT

## 2022-07-01 PROCEDURE — 84156 ASSAY OF PROTEIN URINE: CPT

## 2022-07-01 PROCEDURE — 36415 COLL VENOUS BLD VENIPUNCTURE: CPT

## 2022-07-01 PROCEDURE — 80048 BASIC METABOLIC PNL TOTAL CA: CPT

## 2022-07-01 PROCEDURE — 81001 URINALYSIS AUTO W/SCOPE: CPT

## 2022-07-01 PROCEDURE — 82570 ASSAY OF URINE CREATININE: CPT

## 2022-07-01 PROCEDURE — 82746 ASSAY OF FOLIC ACID SERUM: CPT

## 2022-07-01 PROCEDURE — 83970 ASSAY OF PARATHORMONE: CPT

## 2022-07-05 ENCOUNTER — TELEPHONE (OUTPATIENT)
Dept: NEPHROLOGY | Facility: CLINIC | Age: 82
End: 2022-07-05

## 2022-07-05 NOTE — TELEPHONE ENCOUNTER
----- Message from Kalina Huff MD sent at 7/3/2022 10:31 PM EDT -----  Please inform patient that renal function is stable at cr 1 1 and sodium is stable at 135  Continue same treatment

## 2022-07-05 NOTE — TELEPHONE ENCOUNTER
Left message with patient advising: Please inform patient that renal function is stable at cr 1 1 and sodium is stable at 135  Continue same treatment

## 2022-07-06 DIAGNOSIS — D47.3 ESSENTIAL THROMBOCYTOSIS (HCC): ICD-10-CM

## 2022-07-06 DIAGNOSIS — Z15.89 JAK2 GENE MUTATION: ICD-10-CM

## 2022-07-06 DIAGNOSIS — Z51.11 ENCOUNTER FOR ANTINEOPLASTIC CHEMOTHERAPY: ICD-10-CM

## 2022-07-06 RX ORDER — HYDROXYUREA 500 MG/1
500 CAPSULE ORAL DAILY
Qty: 60 CAPSULE | Refills: 1 | Status: SHIPPED | OUTPATIENT
Start: 2022-07-06 | End: 2022-10-07 | Stop reason: SDUPTHER

## 2022-07-20 ENCOUNTER — TELEPHONE (OUTPATIENT)
Dept: HEMATOLOGY ONCOLOGY | Facility: CLINIC | Age: 82
End: 2022-07-20

## 2022-07-20 NOTE — TELEPHONE ENCOUNTER
Patient called that her insurance company is asking for the pathology report to be faxed  Was included with the original paperwork, patient confirmed this as she was sent the original paperwork to sign off on  Faxed pathology to [facility] [phone], confirmation receipt received    Policy # [id number], Claim # [id number]

## 2022-08-09 NOTE — PROGRESS NOTES
OFFICE FOLLOW UP - Nephrology   Ibrahima Farnsworth 80 y o  female MRN: [id number]               ASSESSMENT and PLAN:  Nava Silva was seen today for follow-up and chronic kidney disease  Diagnoses and all orders for this visit:    Stage 3a chronic kidney disease  -     Basic metabolic panel; Future  -     Magnesium; Future  -     Phosphorus; Future  -     PTH, intact; Future  -     Vitamin D 25 hydroxy; Future  -     Protein / creatinine ratio, urine; Future  -     Urinalysis with microscopic; Future    Benign hypertension with chronic kidney disease, stage III (HCC)    Other iron deficiency anemia    Persistent proteinuria    Type 2 diabetes mellitus with stage 3a chronic kidney disease, without long-term current use of insulin (HCC)        Chronic kidney disease IIIA:  Assessment and Plan:  Etiology: likely secondary to hypertensive nephrosclerosis, age-related nephron loss and prior NSAID use component  · After review of medical records through [facility] Care everywhere, baseline creatinine 1 0-1 1     · Renal function is stable at creatinine 1 1    · Avoid NSAIDs to include Aleve  · PTH level was within normal limit- 22 4  Upc ratio less than 0 11, improving  · Continue monitoring renal function     · Return in 6 months with updated lab work and urine studies    Hypertension in the setting of CKD:  Assessment and plan:   Current BP acceptable    Currently on lisinopril 2 5 mg daily   Low sodium diet   If elevated may need increase in lisinopril-no need   Recommend home monitoring of blood pressure-occasionally   Please call systolic blood pressure more than 946 or diastolic more than 85       Hyponatremia  Assessment and plan:  · Current sodium 135  · Previous work up: normal serum osmolality  Urine sodium 99 and urine osmolality 422  · Etiology: unclear, ? 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, compatible with essential thrombocythemia   · On ASA and Hydrea  · Platelets improving   · 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 and 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     Diabetes  Assessment and plan:   Management per primary team   Diet controlled      Age related screening: Your primary caregiver may do yearly screening for colorectal cancer  It is recommended in all men and women over 48years old  You may have screening earlier if you have colon disease or a family history of colorectal cancer  Patient Instructions    All questions asked and answered  The patient has been instructed to call office at [phone] with any questions or concerns   Please obtain prescribed blood work and urine studies prior to next appointment    Avoid NSAID products which include: Motrin, Advil, Ibuprofen, Aleve, Naprosyn, Naproxen, Mobic or Celebrex     Fluid restriction 50 oz per day   Return in 6 months for follow up        HPI:    I had the pleasure of seeing Juventino Hoffmann today in the renal clinic for the continued management of CKD  She was last seen on 1/6/2022 and renal function stable  Please review Dr Ming Treviño last office not for full details  Since the last visit, there has been no ER visits or hospitilizations  Patient has no complaints at this time and is feeling well  Patient denies any chest pain, shortness of breath or swelling  The last blood work was done on 7/1/2022, which we have reviewed together   No following           ROS: Review of Systems   Constitutional: Negative  Negative for activity change, appetite change, chills, diaphoresis, fatigue and fever  HENT: Negative  Negative for congestion and facial swelling  Respiratory: Negative  Cardiovascular: Negative  Gastrointestinal: Negative  Endocrine: Negative  Genitourinary: Negative  Musculoskeletal: Negative  Skin: Negative  Allergic/Immunologic: Negative  Neurological: Negative  Hematological: Negative  Psychiatric/Behavioral: Negative  Allergies: Patient has no known allergies  Medications:   Current Outpatient Medications:     aspirin 81 MG tablet, Take 81 mg by mouth daily  , Disp: , Rfl:     Calcium Carbonate-Vitamin D 600-200 MG-UNIT TABS, Take 1 tablet by mouth every other day  , Disp: , Rfl:     ezetimibe (ZETIA) 10 mg tablet, Take 1 tablet (10 mg total) by mouth daily, Disp: 90 tablet, Rfl: 1    famotidine (PEPCID) 40 MG tablet, Take 1 tablet (40 mg total) by mouth daily at bedtime, Disp: 90 tablet, Rfl: 1    folic acid (FOLVITE) 1 mg tablet, Take 1 tablet (1 mg total) by mouth daily, Disp: 90 tablet, Rfl: 2    Glucosamine-Chondroitin 250-200 MG TABS, Take 1 tablet by mouth daily  , Disp: , Rfl:     hydroxyurea (HYDREA) 500 mg capsule, Take 1 capsule (500 mg total) by mouth daily Monday through Friday only  , Disp: 60 capsule, Rfl: 1    levothyroxine 50 mcg tablet, Take 1 tablet (50 mcg total) by mouth daily, Disp: 90 tablet, Rfl: 1    lisinopril (ZESTRIL) 2 5 mg tablet, Take 1 tablet (2 5 mg total) by mouth daily, Disp: 90 tablet, Rfl: 3    magnesium citrate solution, Take 296 mL by mouth once, Disp: , Rfl:     Multiple Vitamins-Iron (DAILY MULTIPLE VITAMIN/IRON) TABS, Take 1 tablet by mouth daily  , Disp: , Rfl:     Omega-3 Fatty Acids ([employer] FISH OIL) 1200 MG CAPS, Take 1 capsule by mouth 2 (two) times a day, Disp: , Rfl:     Polyethylene Glycol 3350 (MIRALAX MIX-IN PAX PO), Take by mouth, Disp: , Rfl:     Wheat Dextrin (BENEFIBER DRINK MIX PO), Take by mouth, Disp: , Rfl:     docusate sodium (COLACE) 100 mg capsule, Take by mouth (Patient not taking: Reported on 8/11/2022), Disp: , Rfl:     Past Medical History:   Diagnosis Date    CKD (chronic kidney disease)     Diabetes mellitus ([facility] [address] )     does not take medications, does not monitor blood sugars, pre-diabetic    Disease of thyroid gland     Diverticulosis     GERD (gastroesophageal reflux disease)     Hypertension     Hyponatremia     Ovarian cyst     Rectal bleeding     Wears reading eyeglasses      Past Surgical History:   Procedure Laterality Date    BREAST BIOPSY Right 05/13/2009    Ultrasound Bx  Benign    BREAST SURGERY      CATARACT EXTRACTION Bilateral     COLONOSCOPY      ESOPHAGOGASTRODUODENOSCOPY      IR BIOPSY BONE MARROW  3/18/2022    OOPHORECTOMY Bilateral 2015    OK CMBND ANTERPOST COLPORRAPHY W/CYSTO N/A 6/26/2018    Procedure: ANTERIOR & POSTERIOR COLPORRHAPHY;  Surgeon: Colton Acevedo MD;  [facility];  Service: UroGynecology           OK CYSTOURETHROSCOPY N/A 6/26/2018    Procedure: Juancarlos Flash;  Surgeon: Colton Acevedo MD;  [facility];  Service: UroGynecology           OK Sarah Blunt LIG N/A 6/26/2018    Procedure: ANTERIOR COLPOPEXY VAGINAL EXTRAPERITONEAL (VEC); Surgeon: Colton Acevedo MD;  [facility];  Service: UroGynecology           OK SLING OPER STRES INCONTINENCE N/A 6/26/2018    Procedure: INSERTION PUBOVAGINAL SLING (FEMALE);   Surgeon: Colton Acevedo MD;  [facility];  Service: UroGynecology          [address] [place] [address] MSK PROCEDURE  11/25/2020     Family History   Problem Relation Age of Onset    Cervical cancer Mother 76    Breast cancer Maternal Grandmother         Under 48    Mental illness Son    Exusha Weed Schizophrenia Son     Cancer Family     No Known Problems Father     No Known Problems Maternal Aunt     No Known Problems Maternal Aunt  Brain cancer Maternal Aunt         age unknown    Cancer Cousin       reports that she has never smoked  She has never used smokeless tobacco  She reports that she does not drink alcohol and does not use drugs  Physical Exam:   Vitals:    08/11/22 0850   BP: 122/70   BP Location: Left arm   Patient Position: Sitting   Cuff Size: Adult   Pulse: 102   SpO2: 99%   Weight: 68 3 kg (150 lb 9 6 oz)   Height: 4' 9" (1 448 m)     Body mass index is 32 59 kg/m²  Physical Exam  Vitals reviewed  Constitutional:       Appearance: Normal appearance  HENT:      Head: Normocephalic and atraumatic  Nose: Nose normal       Mouth/Throat:      Mouth: Mucous membranes are moist       Pharynx: Oropharynx is clear  Eyes:      Extraocular Movements: Extraocular movements intact  Conjunctiva/sclera: Conjunctivae normal    Cardiovascular:      Rate and Rhythm: Normal rate and regular rhythm  Pulses: Normal pulses  Heart sounds: Normal heart sounds  Pulmonary:      Effort: Pulmonary effort is normal       Breath sounds: Normal breath sounds  Abdominal:      General: Bowel sounds are normal       Palpations: Abdomen is soft  Musculoskeletal:         General: Normal range of motion  Cervical back: Normal range of motion and neck supple  Skin:     General: Skin is warm and dry  Neurological:      General: No focal deficit present  Mental Status: She is alert and oriented to person, place, and time  Psychiatric:         Mood and Affect: Mood normal          Behavior: Behavior normal          Thought Content:  Thought content normal          Judgment: Judgment normal             Lab Results:  Results for orders placed or performed in visit on 09/73/21   Basic metabolic panel   Result Value Ref Range    Sodium 135 (L) 136 - 145 mmol/L    Potassium 4 3 3 5 - 5 3 mmol/L    Chloride 101 100 - 108 mmol/L    CO2 27 21 - 32 mmol/L    ANION GAP 7 4 - 13 mmol/L    BUN 17 5 - 25 mg/dL    Creatinine 1  10 0 60 - 1 30 mg/dL    Glucose, Fasting 85 65 - 99 mg/dL    Calcium 9 4 8 3 - 10 1 mg/dL    eGFR 47 ml/min/1 73sq m   Protein / creatinine ratio, urine   Result Value Ref Range    Creatinine, Ur 48 1 mg/dL    Protein Urine Random <6 mg/dL    Prot/Creat Ratio, Ur <0 12 (H) 0 00 - 0 10   Urinalysis with microscopic   Result Value Ref Range    Color, UA Light Yellow     Clarity, UA Clear     Specific Gravity, UA 1 009 1 003 - 1 030    pH, UA 6 5 4 5, 5 0, 5 5, 6 0, 6 5, 7 0, 7 5, 8 0    Leukocytes, UA Negative Negative    Nitrite, UA Negative Negative    Protein, UA Negative Negative mg/dl    Glucose, UA Negative Negative mg/dl    Ketones, UA Negative Negative mg/dl    Urobilinogen, UA <2 0 <2 0 mg/dl mg/dl    Bilirubin, UA Negative Negative    Occult Blood, UA Negative Negative    RBC, UA 1-2 None Seen, 1-2 /hpf    WBC, UA 1-2 None Seen, 1-2 /hpf    Epithelial Cells Occasional None Seen, Occasional /hpf    Bacteria, UA None Seen None Seen, Occasional /hpf    MUCUS THREADS Occasional (A) None Seen    Hyaline Casts, UA 0-3 (A) None Seen /lpf    Transitional Epithelial Cells Present    PTH, intact   Result Value Ref Range    PTH 29 1 18 4 - 80 1 pg/mL   CBC   Result Value Ref Range    WBC 7 83 4 31 - 10 16 Thousand/uL    RBC 4 01 3 81 - 5 12 Million/uL    Hemoglobin 13 9 11 5 - 15 4 g/dL    Hematocrit 40 9 34 8 - 46 1 %     (H) 82 - 98 fL    MCH 34 7 (H) 26 8 - 34 3 pg    MCHC 34 0 31 4 - 37 4 g/dL    RDW 16 0 (H) 11 6 - 15 1 %    Platelets 167 020 - 452 Thousands/uL    MPV 9 1 8 9 - 12 7 fL   Folate   Result Value Ref Range    Folate >20 0 (H) 3 1 - 17 5 ng/mL   Vitamin B12   Result Value Ref Range    Vitamin B-12 644 100 - 900 pg/mL               Portions of the record may have been created with voice recognition software  Occasional wrong word or "sound a like" substitutions may have occurred due to the inherent limitations of voice recognition software   Read the chart carefully and recognize,

## 2022-08-09 NOTE — PATIENT INSTRUCTIONS
All questions asked and answered  The patient has been instructed to call office at [phone] with any questions or concerns  Please obtain prescribed blood work and urine studies prior to next appointment   Avoid NSAID products which include:  Motrin, Advil, Ibuprofen, Aleve, Naprosyn, Naproxen, Mobic or Celebrex    Fluid restriction 50 oz per day  Return in 6 months for follow up

## 2022-08-10 ENCOUNTER — TELEPHONE (OUTPATIENT)
Dept: NEPHROLOGY | Facility: CLINIC | Age: 82
End: 2022-08-10

## 2022-08-10 NOTE — TELEPHONE ENCOUNTER
Appointment Confirmation   Person confirmed appointment with  If not patient, name of the person Patient    Date and time of appointment 8/11 9:00    Patient acknowledged and will be at appointment? yes    Did you advise the patient that they will need a urine sample if they are a new patient?  N/A    Did you advise the patient to bring their current medications for verification? (including any OTC) Yes    Additional Information

## 2022-08-11 ENCOUNTER — OFFICE VISIT (OUTPATIENT)
Dept: NEPHROLOGY | Facility: CLINIC | Age: 82
End: 2022-08-11
Payer: MEDICARE

## 2022-08-11 VITALS
OXYGEN SATURATION: 99 % | HEIGHT: 57 IN | HEART RATE: 102 BPM | WEIGHT: 150.6 LBS | BODY MASS INDEX: 32.49 KG/M2 | SYSTOLIC BLOOD PRESSURE: 122 MMHG | DIASTOLIC BLOOD PRESSURE: 70 MMHG

## 2022-08-11 DIAGNOSIS — N18.31 STAGE 3A CHRONIC KIDNEY DISEASE (HCC): Primary | ICD-10-CM

## 2022-08-11 DIAGNOSIS — D50.8 OTHER IRON DEFICIENCY ANEMIA: ICD-10-CM

## 2022-08-11 DIAGNOSIS — N18.30 BENIGN HYPERTENSION WITH CHRONIC KIDNEY DISEASE, STAGE III (HCC): ICD-10-CM

## 2022-08-11 DIAGNOSIS — E11.22 TYPE 2 DIABETES MELLITUS WITH STAGE 3A CHRONIC KIDNEY DISEASE, WITHOUT LONG-TERM CURRENT USE OF INSULIN (HCC): ICD-10-CM

## 2022-08-11 DIAGNOSIS — N18.31 TYPE 2 DIABETES MELLITUS WITH STAGE 3A CHRONIC KIDNEY DISEASE, WITHOUT LONG-TERM CURRENT USE OF INSULIN (HCC): ICD-10-CM

## 2022-08-11 DIAGNOSIS — I12.9 BENIGN HYPERTENSION WITH CHRONIC KIDNEY DISEASE, STAGE III (HCC): ICD-10-CM

## 2022-08-11 DIAGNOSIS — R80.1 PERSISTENT PROTEINURIA: ICD-10-CM

## 2022-08-11 PROCEDURE — 99214 OFFICE O/P EST MOD 30 MIN: CPT | Performed by: NURSE PRACTITIONER

## 2022-08-21 ENCOUNTER — NURSE TRIAGE (OUTPATIENT)
Dept: OTHER | Facility: OTHER | Age: 82
End: 2022-08-21

## 2022-08-21 NOTE — TELEPHONE ENCOUNTER
Regarding: Covid positive concerns  ----- Message from Ирина Giron sent at 8/21/2022  8:35 AM EDT -----  " I tested positive for covid today   I feel better but still have a cough "

## 2022-08-21 NOTE — TELEPHONE ENCOUNTER
Paged out to Dr Melanie Bellamy, Hematology Oncology, he will call patient directly  Reason for Disposition   HIGH RISK for severe COVID complications (e g , weak immune system, age > 59 years, obesity with BMI > 22, pregnant, chronic lung disease or other chronic medical condition)  (Exception: Already seen by PCP and no new or worsening symptoms )    Answer Assessment - Initial Assessment Questions  1  COVID-19 DIAGNOSIS: "Who made your COVID-19 diagnosis?" "Was it confirmed by a positive lab test or self-test?" If not diagnosed by a doctor (or NP/PA), ask "Are there lots of cases (community spread) where you live?" Note: See public health department website, if unsure  At home test  2  COVID-19 EXPOSURE: "Was there any known exposure to COVID before the symptoms began?" CDC Definition of close contact: within 6 feet (2 meters) for a total of 15 minutes or more over a 24-hour period  No  3  ONSET: "When did the COVID-19 symptoms start?"      Started two days ago  4  WORST SYMPTOM: "What is your worst symptom?" (e g , cough, fever, shortness of breath, muscle aches)      Cough  5  COUGH: "Do you have a cough?" If Yes, ask: "How bad is the cough?"        Yes, today better than yesterday  6  FEVER: "Do you have a fever?" If Yes, ask: "What is your temperature, how was it measured, and when did it start?"     Denies  7  RESPIRATORY STATUS: "Describe your breathing?" (e g , shortness of breath, wheezing, unable to speak)       Denies  8  BETTER-SAME-WORSE: "Are you getting better, staying the same or getting worse compared to yesterday?"  If getting worse, ask, "In what way?"     Feels better overall  9  HIGH RISK DISEASE: "Do you have any chronic medical problems?" (e g , asthma, heart or lung disease, weak immune system, obesity, etc )     Cancer, DM, HTN,  10  VACCINE: "Have you had the COVID-19 vaccine?" If Yes, ask: "Which one, how many shots, when did you get it?"       Phfizer x2  11   BOOSTER: "Have you received your COVID-19 booster?" If Yes, ask: "Which one and when did you get it?"       Pfizer booster last November 13  OTHER SYMPTOMS: "Do you have any other symptoms?"  (e g , chills, fatigue, headache, loss of smell or taste, muscle pain, sore throat)        Fatigue, chills, sore throat  14   O2 SATURATION MONITOR:  "Do you use an oxygen saturation monitor (pulse oximeter) at home?" If Yes, ask "What is your reading (oxygen level) today?" "What is your usual oxygen saturation reading?" (e g , 95%)       Does not have one    Protocols used: CORONAVIRUS (COVID-19) DIAGNOSED OR SUSPECTED-ADULT-

## 2022-08-22 NOTE — TELEPHONE ENCOUNTER
Florinda Ring stated she received a call from our oncall team   Advised patient to contact PCP regarding a cough she is having  She has left a message with her PCPs office  Recommended supportive care, fluids, tea, rest tylenol for aches and pain  Patient voiced understanding

## 2022-08-23 ENCOUNTER — TELEMEDICINE (OUTPATIENT)
Dept: FAMILY MEDICINE CLINIC | Facility: CLINIC | Age: 82
End: 2022-08-23
Payer: MEDICARE

## 2022-08-23 DIAGNOSIS — U07.1 COVID-19: Primary | ICD-10-CM

## 2022-08-23 DIAGNOSIS — E27.8 ADRENAL MASS (HCC): ICD-10-CM

## 2022-08-23 PROCEDURE — 99442 PR PHYS/QHP TELEPHONE EVALUATION 11-20 MIN: CPT | Performed by: FAMILY MEDICINE

## 2022-08-23 RX ORDER — NIRMATRELVIR AND RITONAVIR 150-100 MG
2 KIT ORAL 2 TIMES DAILY
Qty: 20 TABLET | Refills: 0 | Status: SHIPPED | OUTPATIENT
Start: 2022-08-23 | End: 2022-08-28

## 2022-08-23 RX ORDER — PROMETHAZINE HYDROCHLORIDE AND CODEINE PHOSPHATE 6.25; 1 MG/5ML; MG/5ML
5 SYRUP ORAL EVERY 4 HOURS PRN
Qty: 120 ML | Refills: 0 | Status: SHIPPED | OUTPATIENT
Start: 2022-08-23

## 2022-08-23 NOTE — PROGRESS NOTES
COVID-19 Outpatient Progress Note    Assessment/Plan:    Problem List Items Addressed This Visit        Other    Adrenal mass ([address] )     Has new adrenal finding , will have pt  Contact Gi doc  To see what f/u should be         COVID-19 - Primary    Relevant Medications    nirmatrelvir & ritonavir (Paxlovid, 150/100,) tablet therapy pack    promethazine-codeine (PHENERGAN WITH CODEINE) 6 25-10 mg/5 mL syrup         Disposition:     Patient has asymptomatic or mild COVID-19 infection  Based off CDC guidelines, they were recommended to isolate for 5 days  If they are asymptomatic or symptoms are improving with no fevers in the past 24 hours, isolation may be ended followed by 5 days of wearing a mask when around othes to minimize risk of infecting others  If still have a fever or other symptoms have not improved, continue to isolate until they improve  Regardless of when they end isolation, avoid being around people who are more likely to get very sick from COVID-19 until at least day 11  Patient with moderate or severe illness or has a weakened immune system  They should isolate from others through at least day 10  Isolation can be ended if symptoms are improving and they are fever free for the past 24 hours  If they still have fever or other symptoms have not improved, continue to isolate until they improve  Regardless of when you isolation is ended, avoid being around people who are more likely to get very sick from COVID-19 until at least day 11  I have spent 20 minutes directly with the patient  Encounter provider: Jeanne Heard MD     Provider located at: [address] [address] [phone]     Recent Visits  No visits were found meeting these conditions    Showing recent visits within past 7 days and meeting all other requirements  Today's Visits  Date Type Provider Dept   08/23/22 Telemedicine Jeanne Heard MD [facility] Showing today's visits and meeting all other requirements  Future Appointments  No visits were found meeting these conditions  Showing future appointments within next 150 days and meeting all other requirements     This virtual check-in was done via telephone and she agrees to proceed  Patient agrees to participate in a virtual check in via telephone or video visit instead of presenting to the office to address urgent/immediate medical needs  Patient is aware this is a billable service  She acknowledged consent and understanding of privacy and security of the video platform  The patient has agreed to participate and understands they can discontinue the visit at any time  After connecting through Telephone, the patient was identified by name and date of birth  Khushi Ceron was informed that this was a telemedicine visit and that the exam was being conducted confidentially over secure lines  My office door was closed  No one else was in the room  Khushi Ceron acknowledged consent and understanding of privacy and security of the telemedicine visit  I informed the patient that I have reviewed her record in Epic and presented the opportunity for her to ask any questions regarding the visit today  The patient agreed to participate  It was my intent to perform this visit via video technology but the patient was not able to do a video connection so the visit was completed via audio telephone only  Verification of patient location:  Patient is located in the following state in which I hold an active license: [place]    Subjective:   Khushi Ceron is a 80 y o  female who has been screened for COVID-19  Patient's symptoms include fatigue, rhinorrhea, sore throat and cough  Patient denies anosmia and loss of taste  - Date of symptom onset: 8/20/2022  - Date of positive COVID-19 test: 8/21/2022  Type of test: Home antigen   Patient with typical symptoms of COVID-19 and they attest that they were positive on home rapid antigen testing  Image of positive result is not able to be uploaded into their chart  COVID-19 vaccination status: Fully vaccinated with booster    [employer] has been staying home and has isolated themselves in her home  She is taking care to not share personal items and is cleaning all surfaces that are touched often, like counters, tabletops, and doorknobs using household cleaning sprays or wipes  She is wearing a mask when she leaves her room  No results found for: [address] [facility], [address] [facility], [address] [facility], [facility], 350 [facility], 700 [facility]  Past Medical History:   Diagnosis Date    CKD (chronic kidney disease)     Diabetes mellitus ([facility] [address] )     does not take medications, does not monitor blood sugars, pre-diabetic    Disease of thyroid gland     Diverticulosis     GERD (gastroesophageal reflux disease)     Hypertension     Hyponatremia     Ovarian cyst     Rectal bleeding     Wears reading eyeglasses      Past Surgical History:   Procedure Laterality Date    BREAST BIOPSY Right 05/13/2009    Ultrasound Bx  Benign    BREAST SURGERY      CATARACT EXTRACTION Bilateral     COLONOSCOPY      ESOPHAGOGASTRODUODENOSCOPY      IR BIOPSY BONE MARROW  3/18/2022    OOPHORECTOMY Bilateral 2015    [place] CMBND ANTERPOST COLPORRAPHY W/CYSTO N/A 6/26/2018    Procedure: ANTERIOR & POSTERIOR COLPORRHAPHY;  Surgeon: Peg Calloway MD;  [facility];  Service: UroGynecology           [place] CYSTOURETHROSCOPY N/A 6/26/2018    Procedure: Enriqueta Jorge;  Surgeon: Peg Calloway MD;  [facility];  Service: UroGynecology           WA Alejandra Corners LIG N/A 6/26/2018    Procedure: ANTERIOR COLPOPEXY VAGINAL EXTRAPERITONEAL (VEC); Surgeon: Peg Calloway MD;  [facility];  Service: UroGynecology           WA SLING OPER STRES INCONTINENCE N/A 6/26/2018    Procedure: INSERTION PUBOVAGINAL SLING (FEMALE);   Surgeon: Peg Calloway MD;  [facility];  Service: UroGynecology          [address] [facility] MSK PROCEDURE  11/25/2020     Current Outpatient Medications   Medication Sig Dispense Refill    nirmatrelvir & ritonavir (Paxlovid, 150/100,) tablet therapy pack Take 2 tablets by mouth 2 (two) times a day for 5 days Take 1 nirmatrelvir tablet + 1 ritonavir tablet together per dose 20 tablet 0    promethazine-codeine (PHENERGAN WITH CODEINE) 6 25-10 mg/5 mL syrup Take 5 mL by mouth every 4 (four) hours as needed for cough 120 mL 0    aspirin 81 MG tablet Take 81 mg by mouth daily        Calcium Carbonate-Vitamin D 600-200 MG-UNIT TABS Take 1 tablet by mouth every other day        docusate sodium (COLACE) 100 mg capsule Take by mouth (Patient not taking: Reported on 8/11/2022)      ezetimibe (ZETIA) 10 mg tablet Take 1 tablet (10 mg total) by mouth daily 90 tablet 1    famotidine (PEPCID) 40 MG tablet Take 1 tablet (40 mg total) by mouth daily at bedtime 90 tablet 1    folic acid (FOLVITE) 1 mg tablet Take 1 tablet (1 mg total) by mouth daily 90 tablet 2    Glucosamine-Chondroitin 250-200 MG TABS Take 1 tablet by mouth daily        hydroxyurea (HYDREA) 500 mg capsule Take 1 capsule (500 mg total) by mouth daily Monday through Friday only  60 capsule 1    levothyroxine 50 mcg tablet Take 1 tablet (50 mcg total) by mouth daily 90 tablet 1    lisinopril (ZESTRIL) 2 5 mg tablet Take 1 tablet (2 5 mg total) by mouth daily 90 tablet 3    magnesium citrate solution Take 296 mL by mouth once      Multiple Vitamins-Iron (DAILY MULTIPLE VITAMIN/IRON) TABS Take 1 tablet by mouth daily        Omega-3 Fatty Acids ([employer]S FISH OIL) 1200 MG CAPS Take 1 capsule by mouth 2 (two) times a day      Polyethylene Glycol 3350 (MIRALAX MIX-IN PAX PO) Take by mouth      Wheat Dextrin (BENEFIBER DRINK MIX PO) Take by mouth       No current facility-administered medications for this visit  No Known Allergies    Review of Systems   Constitutional: Positive for fatigue  HENT: Positive for rhinorrhea and sore throat  Respiratory: Positive for cough  Cardiovascular: Negative for chest pain  Objective: There were no vitals filed for this visit      Physical Exam

## 2022-08-24 ENCOUNTER — TELEMEDICINE (OUTPATIENT)
Dept: FAMILY MEDICINE CLINIC | Facility: CLINIC | Age: 82
End: 2022-08-24
Payer: MEDICARE

## 2022-08-24 DIAGNOSIS — U07.1 COVID-19: Primary | ICD-10-CM

## 2022-08-24 PROCEDURE — 99441 PR PHYS/QHP TELEPHONE EVALUATION 5-10 MIN: CPT | Performed by: FAMILY MEDICINE

## 2022-08-24 NOTE — PROGRESS NOTES
COVID-19 Outpatient Progress Note    Assessment/Plan:    Problem List Items Addressed This Visit        Other    COVID-19 - Primary     Day #5 of sx, rest, fluids, vitamins, antiviral              Disposition:     Patient has asymptomatic or mild COVID-19 infection  Based off CDC guidelines, they were recommended to isolate for 5 days  If they are asymptomatic or symptoms are improving with no fevers in the past 24 hours, isolation may be ended followed by 5 days of wearing a mask when around othes to minimize risk of infecting others  If still have a fever or other symptoms have not improved, continue to isolate until they improve  Regardless of when they end isolation, avoid being around people who are more likely to get very sick from COVID-19 until at least day 11  Patient with moderate or severe illness or has a weakened immune system  They should isolate from others through at least day 10  Isolation can be ended if symptoms are improving and they are fever free for the past 24 hours  If they still have fever or other symptoms have not improved, continue to isolate until they improve  Regardless of when you isolation is ended, avoid being around people who are more likely to get very sick from COVID-19 until at least day 11  I have spent 5 minutes directly with the patient  Encounter provider: Latoya Turpin MD     Provider located at: [address] [address] [phone]     Recent Visits  Date Type Provider Dept   08/23/22 Telemedicine Latoya Turpin MD [address] [place] [address] Primary Care   Showing recent visits within past 7 days and meeting all other requirements  Today's Visits  Date Type Provider Dept   08/24/22 Telemedicine Latoya Turpin MD [facility] Primary Care   Showing today's visits and meeting all other requirements  Future Appointments  No visits were found meeting these conditions    Showing future appointments within next 150 days and meeting all other requirements     This virtual check-in was done via telephone and she agrees to proceed  Patient agrees to participate in a virtual check in via telephone or video visit instead of presenting to the office to address urgent/immediate medical needs  Patient is aware this is a billable service  She acknowledged consent and understanding of privacy and security of the video platform  The patient has agreed to participate and understands they can discontinue the visit at any time  After connecting through Telephone, the patient was identified by name and date of birth  Rosalva Alexander was informed that this was a telemedicine visit and that the exam was being conducted confidentially over secure lines  Rosalva Alexander acknowledged consent and understanding of privacy and security of the telemedicine visit  I informed the patient that I have reviewed her record in Epic and presented the opportunity for her to ask any questions regarding the visit today  The patient agreed to participate  It was my intent to perform this visit via video technology but the patient was not able to do a video connection so the visit was completed via audio telephone only  Verification of patient location:  Patient is located in the following state in which I hold an active license: [place]    Subjective:   Rosalva Alexander is a 80 y o  female who has been screened for COVID-19  Symptom change since last report: unchanged  Patient's symptoms include chills, fatigue and nasal congestion  Patient denies fever, shortness of breath and headaches  - Date of symptom onset: 8/20/2022  - Date of positive COVID-19 test: 8/21/2022  Type of test: Home antigen  COVID-19 vaccination status: Fully vaccinated with booster    Elvis Lowery has been staying home and has isolated themselves in her home   She is taking care to not share personal items and is cleaning all surfaces that are touched often, like counters, tabletops, and doorknobs using household cleaning sprays or wipes  She is wearing a mask when she leaves her room  No results found for: [address] [facility] 98, 185 [facility], 1106 [facility] [facility] [address], [facility] 116, 350 [facility], 700 [facility]  Past Medical History:   Diagnosis Date    CKD (chronic kidney disease)     Diabetes mellitus ([address] )     does not take medications, does not monitor blood sugars, pre-diabetic    Disease of thyroid gland     Diverticulosis     GERD (gastroesophageal reflux disease)     Hypertension     Hyponatremia     Ovarian cyst     Rectal bleeding     Wears reading eyeglasses      Past Surgical History:   Procedure Laterality Date    BREAST BIOPSY Right 05/13/2009    Ultrasound Bx  Benign    BREAST SURGERY      CATARACT EXTRACTION Bilateral     COLONOSCOPY      ESOPHAGOGASTRODUODENOSCOPY      IR BIOPSY BONE MARROW  3/18/2022    OOPHORECTOMY Bilateral 2015    GA CMBND ANTERPOST COLPORRAPHY W/CYSTO N/A 6/26/2018    Procedure: ANTERIOR & POSTERIOR COLPORRHAPHY;  Surgeon: Edenilson Baum MD;  [facility];  Service: UroGynecology           GA CYSTOURETHROSCOPY N/A 6/26/2018    Procedure: Ady Tamayo;  Surgeon: Edenilson Baum MD;  [facility];  Service: UroGynecology           GA Bernie Huge LIG N/A 6/26/2018    Procedure: ANTERIOR COLPOPEXY VAGINAL EXTRAPERITONEAL (VEC); Surgeon: Edenilson Baum MD;  [facility];  Service: UroGynecology           GA SLING OPER STRES INCONTINENCE N/A 6/26/2018    Procedure: INSERTION PUBOVAGINAL SLING (FEMALE);   Surgeon: Edenilson Baum MD;  [facility];  Service: UroGynecology           TONSILLECTOMY     [address] MSK PROCEDURE  11/25/2020     Current Outpatient Medications   Medication Sig Dispense Refill    aspirin 81 MG tablet Take 81 mg by mouth daily        Calcium Carbonate-Vitamin D 600-200 MG-UNIT TABS Take 1 tablet by mouth every other day        docusate sodium (COLACE) 100 mg capsule Take by mouth (Patient not taking: Reported on 8/11/2022)      ezetimibe (ZETIA) 10 mg tablet Take 1 tablet (10 mg total) by mouth daily 90 tablet 1    famotidine (PEPCID) 40 MG tablet Take 1 tablet (40 mg total) by mouth daily at bedtime 90 tablet 1    folic acid (FOLVITE) 1 mg tablet Take 1 tablet (1 mg total) by mouth daily 90 tablet 2    Glucosamine-Chondroitin 250-200 MG TABS Take 1 tablet by mouth daily        hydroxyurea (HYDREA) 500 mg capsule Take 1 capsule (500 mg total) by mouth daily Monday through Friday only  60 capsule 1    levothyroxine 50 mcg tablet Take 1 tablet (50 mcg total) by mouth daily 90 tablet 1    lisinopril (ZESTRIL) 2 5 mg tablet Take 1 tablet (2 5 mg total) by mouth daily 90 tablet 3    magnesium citrate solution Take 296 mL by mouth once      Multiple Vitamins-Iron (DAILY MULTIPLE VITAMIN/IRON) TABS Take 1 tablet by mouth daily        nirmatrelvir & ritonavir (Paxlovid, 150/100,) tablet therapy pack Take 2 tablets by mouth 2 (two) times a day for 5 days Take 1 nirmatrelvir tablet + 1 ritonavir tablet together per dose 20 tablet 0    Omega-3 Fatty Acids ([employer] FISH OIL) 1200 MG CAPS Take 1 capsule by mouth 2 (two) times a day      Polyethylene Glycol 3350 (MIRALAX MIX-IN PAX PO) Take by mouth      promethazine-codeine (PHENERGAN WITH CODEINE) 6 25-10 mg/5 mL syrup Take 5 mL by mouth every 4 (four) hours as needed for cough 120 mL 0    Wheat Dextrin (BENEFIBER DRINK MIX PO) Take by mouth       No current facility-administered medications for this visit  No Known Allergies    Review of Systems   Constitutional: Positive for chills and fatigue  Negative for fever  HENT: Positive for congestion  Respiratory: Negative for shortness of breath  Cardiovascular: Negative for chest pain  Neurological: Positive for weakness  Negative for dizziness and headaches  Hematological: Negative for adenopathy  Objective: There were no vitals filed for this visit      Physical Exam

## 2022-08-26 ENCOUNTER — TELEMEDICINE (OUTPATIENT)
Dept: FAMILY MEDICINE CLINIC | Facility: CLINIC | Age: 82
End: 2022-08-26
Payer: MEDICARE

## 2022-08-26 DIAGNOSIS — U07.1 COVID-19: ICD-10-CM

## 2022-08-26 DIAGNOSIS — E27.8 ADRENAL MASS (HCC): Primary | ICD-10-CM

## 2022-08-26 PROCEDURE — 99441 PR PHYS/QHP TELEPHONE EVALUATION 5-10 MIN: CPT | Performed by: FAMILY MEDICINE

## 2022-08-26 NOTE — PROGRESS NOTES
2  BMI Counseling: There is no height or weight on file to calculate BMI  The BMI is above normal  Nutrition recommendations include reducing portion sizes and decreasing overall calorie intake  Exercise recommendations include moderate aerobic physical activity for 150 minutes/week and exercising 3-5 times per week  COVID-19 Outpatient Progress Note    Assessment/Plan:    Problem List Items Addressed This Visit        Other    Adrenal mass ([facility] [address] ) - Primary     Check cortisol and aldosterone level         Relevant Orders    Cortisol Level, AM Specimen    Aldosterone    COVID-19     Day# 7 of sx, now  has, still congested, paxlovid leaving bad taste in mouth,fees like med causing urinary sx  So may not finish whole course              Disposition:     Patient has asymptomatic or mild COVID-19 infection  Based off CDC guidelines, they were recommended to isolate for 5 days  If they are asymptomatic or symptoms are improving with no fevers in the past 24 hours, isolation may be ended followed by 5 days of wearing a mask when around othes to minimize risk of infecting others  If still have a fever or other symptoms have not improved, continue to isolate until they improve  Regardless of when they end isolation, avoid being around people who are more likely to get very sick from COVID-19 until at least day 11  Patient with moderate or severe illness or has a weakened immune system  They should isolate from others through at least day 10  Isolation can be ended if symptoms are improving and they are fever free for the past 24 hours  If they still have fever or other symptoms have not improved, continue to isolate until they improve  Regardless of when you isolation is ended, avoid being around people who are more likely to get very sick from COVID-19 until at least day 11  I have spent 5 minutes directly with the patient  Encounter provider:  Harry Benoit MD     Provider located at: [address] [place] [address] [facility] PRIMARY CARE  [address] [facility] [address] [place] [address] [phone]     Recent Visits  Date Type Provider Dept   08/24/22 Telemedicine Darcie Raines  [address] Primary Care   08/23/22 Telemedicine Darcie Raines  [address] Primary Care   Showing recent visits within past 7 days and meeting all other requirements  Today's Visits  Date Type Provider Dept   08/26/22 Telemedicine Darcie Raines MD [address] [facility] Primary Care   Showing today's visits and meeting all other requirements  Future Appointments  No visits were found meeting these conditions  Showing future appointments within next 150 days and meeting all other requirements     This virtual check-in was done via telephone and she agrees to proceed  Patient agrees to participate in a virtual check in via telephone or video visit instead of presenting to the office to address urgent/immediate medical needs  Patient is aware this is a billable service  She acknowledged consent and understanding of privacy and security of the video platform  The patient has agreed to participate and understands they can discontinue the visit at any time  After connecting through Telephone, the patient was identified by name and date of birth  [facility] was informed that this was a telemedicine visit and that the exam was being conducted confidentially over secure lines  My office door was closed  Kisha Tena acknowledged consent and understanding of privacy and security of the telemedicine visit  I informed the patient that I have reviewed her record in Epic and presented the opportunity for her to ask any questions regarding the visit today  The patient agreed to participate  It was my intent to perform this visit via video technology but the patient was not able to do a video connection so the visit was completed via audio telephone only          Verification of patient location:  Patient is located in the following state in which I hold an active license: [place]    Subjective:   Lorne Costa is a 80 y o  female who has been screened for COVID-19  Symptom change since last report: improving  Patient's symptoms include chills and cough  Patient denies anosmia and loss of taste  - Date of symptom onset: 8/20/2022  - Date of exposure: 8/21/2022      COVID-19 vaccination status: Fully vaccinated with booster    Tomi Stallworth has been staying home and has isolated themselves in her home  She is taking care to not share personal items and is cleaning all surfaces that are touched often, like counters, tabletops, and doorknobs using household cleaning sprays or wipes  She is wearing a mask when she leaves her room  Having uti sx    No results found for: [facility], 1106 [facility] [facility] [address], [facility] 116, 350 [facility], 700 [facility]  Past Medical History:   Diagnosis Date    CKD (chronic kidney disease)     Diabetes mellitus ([facility] [address] )     does not take medications, does not monitor blood sugars, pre-diabetic    Disease of thyroid gland     Diverticulosis     GERD (gastroesophageal reflux disease)     Hypertension     Hyponatremia     Ovarian cyst     Rectal bleeding     Wears reading eyeglasses      Past Surgical History:   Procedure Laterality Date    BREAST BIOPSY Right 05/13/2009    Ultrasound Bx  Benign    BREAST SURGERY      CATARACT EXTRACTION Bilateral     COLONOSCOPY      ESOPHAGOGASTRODUODENOSCOPY      IR BIOPSY BONE MARROW  3/18/2022    OOPHORECTOMY Bilateral 2015    OK CMBND ANTERPOST COLPORRAPHY W/CYSTO N/A 6/26/2018    Procedure: ANTERIOR & POSTERIOR COLPORRHAPHY;  Surgeon: Carlos Paige MD;  [facility];  Service: UroGynecology           OK CYSTOURETHROSCOPY N/A 6/26/2018    Procedure: Magalis Overcast;  Surgeon: Carlos Paige MD;  [facility];  Service: UroGynecology           OK Popeye Comings LIG N/A 6/26/2018    Procedure: ANTERIOR COLPOPEXY VAGINAL EXTRAPERITONEAL (VEC);   Surgeon: Carlos Paige MD;  [facility];  Service: UroGynecology           SD SLING OPER STRES INCONTINENCE N/A 6/26/2018    Procedure: INSERTION PUBOVAGINAL SLING (FEMALE); Surgeon: Elona Osler, MD;  [facility];  Service: UroGynecology          [address] [place] [address] MSK PROCEDURE  11/25/2020     Current Outpatient Medications   Medication Sig Dispense Refill    aspirin 81 MG tablet Take 81 mg by mouth daily        Calcium Carbonate-Vitamin D 600-200 MG-UNIT TABS Take 1 tablet by mouth every other day        docusate sodium (COLACE) 100 mg capsule Take by mouth (Patient not taking: Reported on 8/11/2022)      ezetimibe (ZETIA) 10 mg tablet Take 1 tablet (10 mg total) by mouth daily 90 tablet 1    famotidine (PEPCID) 40 MG tablet Take 1 tablet (40 mg total) by mouth daily at bedtime 90 tablet 1    folic acid (FOLVITE) 1 mg tablet Take 1 tablet (1 mg total) by mouth daily 90 tablet 2    Glucosamine-Chondroitin 250-200 MG TABS Take 1 tablet by mouth daily        hydroxyurea (HYDREA) 500 mg capsule Take 1 capsule (500 mg total) by mouth daily Monday through Friday only   60 capsule 1    levothyroxine 50 mcg tablet Take 1 tablet (50 mcg total) by mouth daily 90 tablet 1    lisinopril (ZESTRIL) 2 5 mg tablet Take 1 tablet (2 5 mg total) by mouth daily 90 tablet 3    magnesium citrate solution Take 296 mL by mouth once      Multiple Vitamins-Iron (DAILY MULTIPLE VITAMIN/IRON) TABS Take 1 tablet by mouth daily        nirmatrelvir & ritonavir (Paxlovid, 150/100,) tablet therapy pack Take 2 tablets by mouth 2 (two) times a day for 5 days Take 1 nirmatrelvir tablet + 1 ritonavir tablet together per dose 20 tablet 0    Omega-3 Fatty Acids ([employer] FISH OIL) 1200 MG CAPS Take 1 capsule by mouth 2 (two) times a day      Polyethylene Glycol 3350 (MIRALAX MIX-IN PAX PO) Take by mouth      promethazine-codeine (PHENERGAN WITH CODEINE) 6 25-10 mg/5 mL syrup Take 5 mL by mouth every 4 (four) hours as needed for cough 120 mL 0    Wheat Dextrin (BENEFIBER DRINK MIX PO) Take by mouth       No current facility-administered medications for this visit  No Known Allergies    Review of Systems   Constitutional: Positive for appetite change and chills  Decreased appetite   Respiratory: Positive for cough  Cardiovascular: Negative for chest pain  Gastrointestinal: Negative  Objective: There were no vitals filed for this visit      Physical Exam

## 2022-08-26 NOTE — ASSESSMENT & PLAN NOTE
Day# 7 of sx, now  has, still congested, paxlovid leaving bad taste in mouth,fees like med causing urinary sx  So may not finish whole course

## 2022-08-28 ENCOUNTER — TELEMEDICINE (OUTPATIENT)
Dept: FAMILY MEDICINE CLINIC | Facility: CLINIC | Age: 82
End: 2022-08-28
Payer: MEDICARE

## 2022-08-28 DIAGNOSIS — U07.1 COVID-19: Primary | ICD-10-CM

## 2022-08-28 PROCEDURE — 99213 OFFICE O/P EST LOW 20 MIN: CPT | Performed by: FAMILY MEDICINE

## 2022-08-28 NOTE — ASSESSMENT & PLAN NOTE
Day #9, was able yo complete  Paxlovid, no rebound, doing  Much better  slowly increase activity, maintain hydration

## 2022-08-28 NOTE — PROGRESS NOTES
COVID-19 Outpatient Progress Note    Assessment/Plan:    Problem List Items Addressed This Visit        Other    COVID-19 - Primary     Day #9, was able yo complete  Paxlovid, no rebound, doing  Much better  slowly increase activity, maintain hydration              Disposition:     Patient has asymptomatic or mild COVID-19 infection  Based off CDC guidelines, they were recommended to isolate for 5 days  If they are asymptomatic or symptoms are improving with no fevers in the past 24 hours, isolation may be ended followed by 5 days of wearing a mask when around othes to minimize risk of infecting others  If still have a fever or other symptoms have not improved, continue to isolate until they improve  Regardless of when they end isolation, avoid being around people who are more likely to get very sick from COVID-19 until at least day 11  Patient with moderate or severe illness or has a weakened immune system  They should isolate from others through at least day 10  Isolation can be ended if symptoms are improving and they are fever free for the past 24 hours  If they still have fever or other symptoms have not improved, continue to isolate until they improve  Regardless of when you isolation is ended, avoid being around people who are more likely to get very sick from COVID-19 until at least day 11  I have spent 4 minutes directly with the patient  Encounter provider:  Shayy Rawls MD     Provider located at: [address] [address] [phone]     Recent Visits  Date Type Provider Dept   08/26/22 Telemedicine Shayy Rawls MD [address] [place] [address] Primary Care   08/24/22 Telemedicine Shayy Rawls MD [address] [place] [address] Primary Care   08/23/22 Telemedicine Shayy Rawls MD [address] [place] [address] Primary Care   Showing recent visits within past 7 days and meeting all other requirements  Today's Visits  Date Type Provider Dept   08/28/22 Telemedicine Shayy Rawls MD [address] [place] [address] Primary Care   Showing today's visits and meeting all other requirements  Future Appointments  No visits were found meeting these conditions  Showing future appointments within next 150 days and meeting all other requirements     This virtual check-in was done via telephone and she agrees to proceed  Patient agrees to participate in a virtual check in via telephone or video visit instead of presenting to the office to address urgent/immediate medical needs  Patient is aware this is a billable service  She acknowledged consent and understanding of privacy and security of the video platform  The patient has agreed to participate and understands they can discontinue the visit at any time  After connecting through Telephone, the patient was identified by name and date of birth  Navya Coates was informed that this was a telemedicine visit and that the exam was being conducted confidentially over secure lines  My office door was closed  No one else was in the room  Navya Coates acknowledged consent and understanding of privacy and security of the telemedicine visit  I informed the patient that I have reviewed her record in Epic and presented the opportunity for her to ask any questions regarding the visit today  The patient agreed to participate  It was my intent to perform this visit via video technology but the patient was not able to do a video connection so the visit was completed via audio telephone only  Verification of patient location:  Patient is located in the following state in which I hold an active license: [place]    Subjective:   Navya Coates is a 80 y o  female who has been screened for COVID-19  Symptom change since last report: resolving  Patient's symptoms include nasal congestion  Patient denies fatigue and shortness of breath       - Date of symptom onset: 8/20/2022  - Date of exposure: 8/21/2022      COVID-19 vaccination status: Fully vaccinated with booster    Ko Diane has been staying home and has isolated themselves in her home  She is taking care to not share personal items and is cleaning all surfaces that are touched often, like counters, tabletops, and doorknobs using household cleaning sprays or wipes  She is wearing a mask when she leaves her room  No results found for: Johanna Ernst, [address] [facility], [address] [facility], [facility], [facility], [facility]  Past Medical History:   Diagnosis Date    CKD (chronic kidney disease)     Diabetes mellitus ([facility] [address] )     does not take medications, does not monitor blood sugars, pre-diabetic    Disease of thyroid gland     Diverticulosis     GERD (gastroesophageal reflux disease)     Hypertension     Hyponatremia     Ovarian cyst     Rectal bleeding     Wears reading eyeglasses      Past Surgical History:   Procedure Laterality Date    BREAST BIOPSY Right 05/13/2009    Ultrasound Bx  Benign    BREAST SURGERY      CATARACT EXTRACTION Bilateral     COLONOSCOPY      ESOPHAGOGASTRODUODENOSCOPY      IR BIOPSY BONE MARROW  3/18/2022    OOPHORECTOMY Bilateral 2015    CA CMBND ANTERPOST COLPORRAPHY W/CYSTO N/A 6/26/2018    Procedure: ANTERIOR & POSTERIOR COLPORRHAPHY;  Surgeon: Hawa Harmon MD;  Location: [facility];  Service: UroGynecology           CA CYSTOURETHROSCOPY N/A 6/26/2018    Procedure: Harland Rubinstein;  Surgeon: Hawa Harmon MD;  [facility];  Service: UroGynecology           CA Willadean Drape LIG N/A 6/26/2018    Procedure: ANTERIOR COLPOPEXY VAGINAL EXTRAPERITONEAL (VEC); Surgeon: Hawa Harmon MD;  [facility];  Service: UroGynecology           CA SLING OPER STRES INCONTINENCE N/A 6/26/2018    Procedure: INSERTION PUBOVAGINAL SLING (FEMALE);   Surgeon: Hawa Harmon MD;  Location: [facility];  Service: UroGynecology          [facility] PROCEDURE  11/25/2020     Current Outpatient Medications   Medication Sig Dispense Refill    aspirin 81 MG tablet Take 81 mg by mouth daily        Calcium Carbonate-Vitamin D 600-200 MG-UNIT TABS Take 1 tablet by mouth every other day        docusate sodium (COLACE) 100 mg capsule Take by mouth (Patient not taking: Reported on 8/11/2022)      ezetimibe (ZETIA) 10 mg tablet Take 1 tablet (10 mg total) by mouth daily 90 tablet 1    famotidine (PEPCID) 40 MG tablet Take 1 tablet (40 mg total) by mouth daily at bedtime 90 tablet 1    folic acid (FOLVITE) 1 mg tablet Take 1 tablet (1 mg total) by mouth daily 90 tablet 2    Glucosamine-Chondroitin 250-200 MG TABS Take 1 tablet by mouth daily        hydroxyurea (HYDREA) 500 mg capsule Take 1 capsule (500 mg total) by mouth daily Monday through Friday only  60 capsule 1    levothyroxine 50 mcg tablet Take 1 tablet (50 mcg total) by mouth daily 90 tablet 1    lisinopril (ZESTRIL) 2 5 mg tablet Take 1 tablet (2 5 mg total) by mouth daily 90 tablet 3    magnesium citrate solution Take 296 mL by mouth once      Multiple Vitamins-Iron (DAILY MULTIPLE VITAMIN/IRON) TABS Take 1 tablet by mouth daily        nirmatrelvir & ritonavir (Paxlovid, 150/100,) tablet therapy pack Take 2 tablets by mouth 2 (two) times a day for 5 days Take 1 nirmatrelvir tablet + 1 ritonavir tablet together per dose 20 tablet 0    Omega-3 Fatty Acids ([employer] FISH OIL) 1200 MG CAPS Take 1 capsule by mouth 2 (two) times a day      Polyethylene Glycol 3350 (MIRALAX MIX-IN PAX PO) Take by mouth      promethazine-codeine (PHENERGAN WITH CODEINE) 6 25-10 mg/5 mL syrup Take 5 mL by mouth every 4 (four) hours as needed for cough 120 mL 0    Wheat Dextrin (BENEFIBER DRINK MIX PO) Take by mouth       No current facility-administered medications for this visit  No Known Allergies    Review of Systems   Constitutional: Negative for activity change, appetite change and fatigue  HENT: Positive for congestion  Respiratory: Negative for shortness of breath  Cardiovascular: Negative for chest pain  Gastrointestinal: Negative  Hematological: Negative for adenopathy  Objective: There were no vitals filed for this visit      Physical Exam

## 2022-09-02 ENCOUNTER — TELEPHONE (OUTPATIENT)
Dept: FAMILY MEDICINE CLINIC | Facility: CLINIC | Age: 82
End: 2022-09-02

## 2022-09-02 DIAGNOSIS — R30.0 DYSURIA: Primary | ICD-10-CM

## 2022-09-02 NOTE — TELEPHONE ENCOUNTER
Patient sees Dr Beatris Kelly and is going for her b/w on 9/7 and is asking please to make sure that there is urine test added to this b/w because she thinks she has a uti  Please let her know if this is in or not  Thank you  It appears to me that it is  Thank you

## 2022-09-02 NOTE — TELEPHONE ENCOUNTER
MF, I spoke to pt and she states that she is experiencing frequent urination for a couple of days, Do you want to order a Urine culture along with the other lab orders?

## 2022-09-06 DIAGNOSIS — E03.9 HYPOTHYROIDISM, UNSPECIFIED TYPE: ICD-10-CM

## 2022-09-06 DIAGNOSIS — E78.2 MIXED HYPERLIPIDEMIA: ICD-10-CM

## 2022-09-06 RX ORDER — LEVOTHYROXINE SODIUM 0.05 MG/1
50 TABLET ORAL DAILY
Qty: 90 TABLET | Refills: 1 | Status: SHIPPED | OUTPATIENT
Start: 2022-09-06 | End: 2022-12-05

## 2022-09-06 RX ORDER — EZETIMIBE 10 MG/1
10 TABLET ORAL DAILY
Qty: 90 TABLET | Refills: 1 | Status: SHIPPED | OUTPATIENT
Start: 2022-09-06

## 2022-09-07 ENCOUNTER — APPOINTMENT (OUTPATIENT)
Dept: LAB | Facility: CLINIC | Age: 82
End: 2022-09-07
Payer: MEDICARE

## 2022-09-07 DIAGNOSIS — E03.9 HYPOTHYROIDISM, UNSPECIFIED TYPE: ICD-10-CM

## 2022-09-07 DIAGNOSIS — E27.8 ADRENAL MASS (HCC): ICD-10-CM

## 2022-09-07 DIAGNOSIS — N18.31 TYPE 2 DIABETES MELLITUS WITH STAGE 3A CHRONIC KIDNEY DISEASE, WITHOUT LONG-TERM CURRENT USE OF INSULIN (HCC): ICD-10-CM

## 2022-09-07 DIAGNOSIS — R30.0 DYSURIA: ICD-10-CM

## 2022-09-07 DIAGNOSIS — E11.22 TYPE 2 DIABETES MELLITUS WITH STAGE 3A CHRONIC KIDNEY DISEASE, WITHOUT LONG-TERM CURRENT USE OF INSULIN (HCC): ICD-10-CM

## 2022-09-07 DIAGNOSIS — E78.5 HYPERLIPIDEMIA DUE TO TYPE 2 DIABETES MELLITUS (HCC): ICD-10-CM

## 2022-09-07 DIAGNOSIS — E61.1 IRON DEFICIENCY: ICD-10-CM

## 2022-09-07 DIAGNOSIS — E11.69 HYPERLIPIDEMIA DUE TO TYPE 2 DIABETES MELLITUS (HCC): ICD-10-CM

## 2022-09-07 LAB
ALBUMIN SERPL BCP-MCNC: 3.2 G/DL (ref 3.5–5)
ALP SERPL-CCNC: 72 U/L (ref 46–116)
ALT SERPL W P-5'-P-CCNC: 25 U/L (ref 12–78)
ANION GAP SERPL CALCULATED.3IONS-SCNC: 5 MMOL/L (ref 4–13)
AST SERPL W P-5'-P-CCNC: 15 U/L (ref 5–45)
BACTERIA UR QL AUTO: ABNORMAL /HPF
BILIRUB SERPL-MCNC: 0.39 MG/DL (ref 0.2–1)
BILIRUB UR QL STRIP: NEGATIVE
BUN SERPL-MCNC: 16 MG/DL (ref 5–25)
CALCIUM ALBUM COR SERPL-MCNC: 9.7 MG/DL (ref 8.3–10.1)
CALCIUM SERPL-MCNC: 9.1 MG/DL (ref 8.3–10.1)
CHLORIDE SERPL-SCNC: 103 MMOL/L (ref 96–108)
CHOLEST SERPL-MCNC: 176 MG/DL
CLARITY UR: ABNORMAL
CO2 SERPL-SCNC: 25 MMOL/L (ref 21–32)
COLOR UR: ABNORMAL
CORTIS AM PEAK SERPL-MCNC: 20.1 UG/DL (ref 4.2–22.4)
CREAT SERPL-MCNC: 1.24 MG/DL (ref 0.6–1.3)
EST. AVERAGE GLUCOSE BLD GHB EST-MCNC: 123 MG/DL
FERRITIN SERPL-MCNC: 754 NG/ML (ref 8–388)
GFR SERPL CREATININE-BSD FRML MDRD: 41 ML/MIN/1.73SQ M
GLUCOSE P FAST SERPL-MCNC: 96 MG/DL (ref 65–99)
GLUCOSE UR STRIP-MCNC: NEGATIVE MG/DL
HBA1C MFR BLD: 5.9 %
HDLC SERPL-MCNC: 38 MG/DL
HGB UR QL STRIP.AUTO: ABNORMAL
IRON SATN MFR SERPL: 34 % (ref 15–50)
IRON SERPL-MCNC: 76 UG/DL (ref 50–170)
KETONES UR STRIP-MCNC: NEGATIVE MG/DL
LDLC SERPL CALC-MCNC: 93 MG/DL (ref 0–100)
LEUKOCYTE ESTERASE UR QL STRIP: ABNORMAL
NITRITE UR QL STRIP: POSITIVE
NON-SQ EPI CELLS URNS QL MICRO: ABNORMAL /HPF
NONHDLC SERPL-MCNC: 138 MG/DL
PH UR STRIP.AUTO: 6.5 [PH]
POTASSIUM SERPL-SCNC: 4.2 MMOL/L (ref 3.5–5.3)
PROT SERPL-MCNC: 8.2 G/DL (ref 6.4–8.4)
PROT UR STRIP-MCNC: ABNORMAL MG/DL
RBC #/AREA URNS AUTO: ABNORMAL /HPF
SODIUM SERPL-SCNC: 133 MMOL/L (ref 135–147)
SP GR UR STRIP.AUTO: 1.01 (ref 1–1.03)
TIBC SERPL-MCNC: 225 UG/DL (ref 250–450)
TRIGL SERPL-MCNC: 225 MG/DL
TSH SERPL DL<=0.05 MIU/L-ACNC: 4.86 UIU/ML (ref 0.45–4.5)
UROBILINOGEN UR STRIP-ACNC: <2 MG/DL
WBC #/AREA URNS AUTO: ABNORMAL /HPF
WBC CLUMPS # UR AUTO: PRESENT /UL

## 2022-09-07 PROCEDURE — 83540 ASSAY OF IRON: CPT

## 2022-09-07 PROCEDURE — 87077 CULTURE AEROBIC IDENTIFY: CPT

## 2022-09-07 PROCEDURE — 87086 URINE CULTURE/COLONY COUNT: CPT

## 2022-09-07 PROCEDURE — 80061 LIPID PANEL: CPT

## 2022-09-07 PROCEDURE — 83036 HEMOGLOBIN GLYCOSYLATED A1C: CPT

## 2022-09-07 PROCEDURE — 81001 URINALYSIS AUTO W/SCOPE: CPT | Performed by: FAMILY MEDICINE

## 2022-09-07 PROCEDURE — 82728 ASSAY OF FERRITIN: CPT

## 2022-09-07 PROCEDURE — 80053 COMPREHEN METABOLIC PANEL: CPT

## 2022-09-07 PROCEDURE — 82533 TOTAL CORTISOL: CPT

## 2022-09-07 PROCEDURE — 83550 IRON BINDING TEST: CPT

## 2022-09-07 PROCEDURE — 87186 SC STD MICRODIL/AGAR DIL: CPT

## 2022-09-07 PROCEDURE — 84443 ASSAY THYROID STIM HORMONE: CPT

## 2022-09-07 PROCEDURE — 82088 ASSAY OF ALDOSTERONE: CPT

## 2022-09-09 ENCOUNTER — TELEPHONE (OUTPATIENT)
Dept: FAMILY MEDICINE CLINIC | Facility: CLINIC | Age: 82
End: 2022-09-09

## 2022-09-09 DIAGNOSIS — N30.00 ACUTE CYSTITIS WITHOUT HEMATURIA: ICD-10-CM

## 2022-09-09 DIAGNOSIS — R30.0 DYSURIA: Primary | ICD-10-CM

## 2022-09-09 RX ORDER — CEPHALEXIN 500 MG/1
500 CAPSULE ORAL EVERY 8 HOURS SCHEDULED
Qty: 21 CAPSULE | Refills: 0 | Status: SHIPPED | OUTPATIENT
Start: 2022-09-09 | End: 2022-09-16

## 2022-09-09 NOTE — TELEPHONE ENCOUNTER
----- Message from Raphael Mcclain MD sent at 9/7/2022  4:12 PM EDT -----  Urine abnormal, await culture-are sx significant enough to treat or is she ok  waiting until culture back tomorrow or friday

## 2022-09-09 NOTE — TELEPHONE ENCOUNTER
Pt is calling to review her lab results, but not everything is ready yet  Pt states that she IS still having urinary frequency  She does not have any pain or burning while urinating

## 2022-09-09 NOTE — TELEPHONE ENCOUNTER
Patient was advised of results and recommendations  Patient states the only sxs at this time is urgency, frequency and cloudy urine  Patient is willing to wait until results are in for medication

## 2022-09-10 LAB
BACTERIA UR CULT: ABNORMAL
BACTERIA UR CULT: ABNORMAL

## 2022-09-12 LAB — ALDOST SERPL-MCNC: 6.9 NG/DL (ref 0–30)

## 2022-09-15 NOTE — PROGRESS NOTES
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; has a HEATHER-2 mutation  Continue Ca and Vit  D daily  Prolia injection given in clinic today  Do lab when get the chance  Next DEXA scan due in 10/2023     Return to clinic in 6 5 months for provider visit + next Prolia injection    Plan:  Diagnoses and all orders for this visit:    Age-related osteoporosis without current pathological fracture  -     Vitamin D 25 hydroxy  -     denosumab (PROLIA) subcutaneous injection 60 mg    High risk medication use  High risk medication use - Prolia (denosumab) is a monoclonal antibody biologic medication that can rapidly utilize the body's calcium and make one susceptible to hypocalcemia; the medication also has a risk of osteonecrosis of the jaw in patients with exposed jaw bone  Patient does not plan to get any invasive dental procedures in the near future  Follow-up plan: Return to clinic in 6 5 months for provider visit + next Prolia injection        Rheumatic Disease Summary  03/14/2022: Bonita Ramos is a 80 y o   female who presented as a Rheumatology consult referred by Tigre Loya MD for evaluation of osteoporosis  Was on Fosamax for 2 years in the past; had progression of osteoporosis; was only taking Ca 600mg every other day  Take calcium 600mg daily  Continue Vit  D 1,000 units daily  Prolia injection given in clinic today  HPI  Bonita Ramos is a [de-identified] y o   female who presents for follow-up  Last clinic visit was 03/14/2022 which was her initial visit  Has been taking daily calcium and Vit  D  Was 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      The following portions of the patient's history were reviewed and updated as appropriate: allergies, current medications, past family history, past medical history, past social history, past surgical history and problem list     Review of Systems:   Review of Systems   Constitutional: Negative for fatigue  HENT: Negative for mouth sores  Dry mouth   Eyes: Negative for pain  Respiratory: Positive for cough  Negative for shortness of breath  Cardiovascular: Negative for leg swelling  Gastrointestinal: Positive for constipation  Indigestion/heartburn   Endocrine: Positive for polydipsia  Genitourinary: Positive for frequency  Musculoskeletal: Positive for arthralgias and back pain  Negative for joint swelling  Skin: Negative for rash  Neurological: Positive for weakness  Hematological: Negative for adenopathy  Psychiatric/Behavioral: Negative for sleep disturbance  Reviewed and agree      Home Medications:    Current Outpatient Medications:   •  aspirin 81 MG tablet, Take 81 mg by mouth daily  , Disp: , Rfl:   •  Calcium Carbonate-Vitamin D 600-200 MG-UNIT TABS, Take 1 tablet by mouth every other day  , Disp: , Rfl:   •  ezetimibe (ZETIA) 10 mg tablet, Take 1 tablet (10 mg total) by mouth daily, Disp: 90 tablet, Rfl: 1  •  famotidine (PEPCID) 40 MG tablet, Take 1 tablet (40 mg total) by mouth daily at bedtime, Disp: 90 tablet, Rfl: 1  •  folic acid (FOLVITE) 1 mg tablet, Take 1 tablet (1 mg total) by mouth daily, Disp: 90 tablet, Rfl: 2  •  Glucosamine-Chondroitin 250-200 MG TABS, Take 1 tablet by mouth daily  , Disp: , Rfl:   •  levothyroxine 50 mcg tablet, Take 1 tablet (50 mcg total) by mouth daily, Disp: 90 tablet, Rfl: 1  •  lisinopril (ZESTRIL) 2 5 mg tablet, Take 1 tablet (2 5 mg total) by mouth daily, Disp: 90 tablet, Rfl: 3  •  magnesium citrate solution, Take 296 mL by mouth once, Disp: , Rfl:   •  Multiple Vitamins-Iron (DAILY MULTIPLE VITAMIN/IRON) TABS, Take 1 tablet by mouth daily  , Disp: , Rfl:   •  Omega-3 Fatty Acids ([employer] FISH OIL) 1200 MG CAPS, Take 1 capsule by mouth 2 (two) times a day, Disp: , Rfl:   •  Polyethylene Glycol 3350 (MIRALAX MIX-IN PAX PO), Take by mouth, Disp: , Rfl:   • promethazine-codeine (PHENERGAN WITH CODEINE) 6 25-10 mg/5 mL syrup, Take 5 mL by mouth every 4 (four) hours as needed for cough (Patient not taking: No sig reported), Disp: 120 mL, Rfl: 0  •  Wheat Dextrin (BENEFIBER DRINK MIX PO), Take by mouth, Disp: , Rfl:   •  docusate sodium (COLACE) 100 mg capsule, Take by mouth (Patient not taking: No sig reported), Disp: , Rfl:   •  hydroxyurea (HYDREA) 500 mg capsule, Take 1 capsule (500 mg total) by mouth daily Monday through Friday only  , Disp: 90 capsule, Rfl: 1    Objective:    Vitals:    09/19/22 0938   BP: 126/72   Weight: 68 kg (150 lb)   Height: 4' 9" (1 448 m)       Physical Exam  Constitutional:       General: She is not in acute distress  HENT:      Head: Normocephalic and atraumatic  Eyes:      Conjunctiva/sclera: Conjunctivae normal    Cardiovascular:      Rate and Rhythm: Normal rate and regular rhythm  Heart sounds: S1 normal and S2 normal      No friction rub  Pulmonary:      Effort: Pulmonary effort is normal  No respiratory distress  Breath sounds: Normal breath sounds  No wheezing, rhonchi or rales  Musculoskeletal:      Cervical back: Neck supple  Skin:     Coloration: Skin is not pale  Neurological:      Mental Status: She is alert  Mental status is at baseline  Psychiatric:         Mood and Affect: Mood normal          Behavior: Behavior normal        Reviewed labs and imaging  Imaging:   DXA scan 10/18/21  RESULTS:   LUMBAR SPINE:  L1-L3:  BMD 1 200 gm/cm2  T-score 1 7 and 7% higher than 2019 and 40% higher than 1999, statistically significant changes related to the spine image findings    Z-score 4 2     LEFT TOTAL HIP:  BMD 0 769 gm/cm2  T-score -1 4  Z-score 0 6     LEFT FEMORAL NECK:  BMD 0 528 gm/cm2  T-score -2 9, osteoporosis, and 9% higher than 2019 and 3% less than 1999 when the T score was -2 7   Z-score -0 6     The left forearm BMD is 0 618 and the T score is -1 3 and unchanged from 2019 and 1% higher than 2012      IMPRESSION:  1  Based on the [facility] classification, this study identifies a diagnosis of osteoporosis, notable at the femoral neck area and the patient is considered at risk for fracture         2  A daily intake of calcium of at least 1200 mg and vitamin D, 800-1000 IU, as well as weight bearing and muscle strengthening exercise, fall prevention and avoidance of tobacco and excessive alcohol intake as basic preventive measures are recommended      3  Repeat DXA scan on the same equipment in 18-24 months as clinically indicated      The 10 year risk of hip fracture is 27%, with the 10 year risk of major osteoporotic fracture being 38%, as calculated by the WHO fracture risk assessment tool (FRAX)   The current NOF guidelines recommend treating patients with FRAX 10 year risk score of >3% for hip fracture and >20% for major osteoporotic fracture         DXA scan 7/24/19  RESULTS:   LUMBAR SPINE:  L1-L4:  BMD 1 119 gm/cm2  T-score normal, 0 9 and 4% less than 2017 but 31% higher than 2008, statistically significant but related to the spine image findings  Z-score +3 4     LEFT TOTAL HIP:  BMD 0 724 gm/cm2  T-score below normal, -1 8  Z-score 0 1     LEFT FEMORAL NECK:  BMD 0 483 gm/cm2  T-score below normal, -3 3 and 11% less than 2017 and 1999 when the T score was below normal, -2 7   Z-score -1 1     The left forearm BMD is 0 620 and the T score is below normal, -1 2 and 4% higher than 2017 and 2% higher than 2012   The Z score is +1 5      A 25-hydroxy vitamin D level, an intact PTH, and a comprehensive metabolic panel are suggested in this patient      Treatment is a consideration in view of the femoral neck findings if appropriate to total clinical health evaluation      IMPRESSION:  1  Based on the [facility] classification, this study identifies a diagnosis of osteoporosis, notable at the femoral neck area and the patient is considered at increased risk for fracture      Labs:   Office Visit on 09/19/2022 Component Date Value Ref Range Status   • Vit D, 25-Hydroxy 10/04/2022 47 3  30 0 - 100 0 ng/mL Final   Appointment on 09/07/2022   Component Date Value Ref Range Status   • TSH 3RD GENERATON 09/07/2022 4 860 (A) 0 450 - 4 500 uIU/mL Final    The recommended reference ranges for TSH during pregnancy are as follows:   First trimester 0 1 to 2 5 uIU/mL   Second trimester  0 2 to 3 0 uIU/mL   Third trimester 0 3 to 3 0 uIU/m    Note: Normal ranges may not apply to patients who are transgender, non-binary, or whose legal sex, sex at birth, and gender identity differ  Adult TSH (3rd generation) reference range follows the recommended guidelines of the [employer], January, 2020  • Cholesterol 09/07/2022 176  See Comment mg/dL Final    Cholesterol:         Pediatric <18 Years        Desirable          <170 mg/dL      Borderline High    170-199 mg/dL      High               >=200 mg/dL        Adult >=18 Years            Desirable         <200 mg/dL      Borderline High   200-239 mg/dL      High              >239 mg/dL     • Triglycerides 09/07/2022 225 (A) See Comment mg/dL Final    Triglyceride:     0-9Y            <75mg/dL     10Y-17Y         <90 mg/dL       >=18Y     Normal          <150 mg/dL     Borderline High 150-199 mg/dL     High            200-499 mg/dL        Very High       >499 mg/dL    Specimen collection should occur prior to N-Acetylcysteine or Metamizole administration due to the potential for falsely depressed results  • HDL, Direct 09/07/2022 38 (A) >=50 mg/dL Final    Specimen collection should occur prior to Metamizole administration due to the potential for falsley depressed results     • LDL Calculated 09/07/2022 93  0 - 100 mg/dL Final    LDL Cholesterol:     Optimal           <100 mg/dl     Near Optimal      100-129 mg/dl     Above Optimal       Borderline High 130-159 mg/dl       High            160-189 mg/dl       Very High       >189 mg/dl         This screening LDL is a calculated result  It does not have the accuracy of the Direct Measured LDL in the monitoring of patients with hyperlipidemia and/or statin therapy  Direct Measure LDL (CER061) must be ordered separately in these patients  • Non-HDL-Chol (CHOL-HDL) 09/07/2022 138  mg/dl Final   • Sodium 09/07/2022 133 (A) 135 - 147 mmol/L Final   • Potassium 09/07/2022 4 2  3 5 - 5 3 mmol/L Final   • Chloride 09/07/2022 103  96 - 108 mmol/L Final   • CO2 09/07/2022 25  21 - 32 mmol/L Final   • ANION GAP 09/07/2022 5  4 - 13 mmol/L Final   • BUN 09/07/2022 16  5 - 25 mg/dL Final   • Creatinine 09/07/2022 1 24  0 60 - 1 30 mg/dL Final    Standardized to IDMS reference method   • Glucose, Fasting 09/07/2022 96  65 - 99 mg/dL Final    Specimen collection should occur prior to Sulfasalazine administration due to the potential for falsely depressed results  Specimen collection should occur prior to Sulfapyridine administration due to the potential for falsely elevated results  • Calcium 09/07/2022 9 1  8 3 - 10 1 mg/dL Final   • Corrected Calcium 09/07/2022 9 7  8 3 - 10 1 mg/dL Final   • AST 09/07/2022 15  5 - 45 U/L Final    Specimen collection should occur prior to Sulfasalazine administration due to the potential for falsely depressed results  • ALT 09/07/2022 25  12 - 78 U/L Final    Specimen collection should occur prior to Sulfasalazine and/or Sulfapyridine administration due to the potential for falsely depressed results  • Alkaline Phosphatase 09/07/2022 72  46 - 116 U/L Final   • Total Protein 09/07/2022 8 2  6 4 - 8 4 g/dL Final   • Albumin 09/07/2022 3 2 (A) 3 5 - 5 0 g/dL Final   • Total Bilirubin 09/07/2022 0 39  0 20 - 1 00 mg/dL Final    Use of this assay is not recommended for patients undergoing treatment with eltrombopag due to the potential for falsely elevated results  • eGFR 09/07/2022 41  ml/min/1 73sq m Final   • Hemoglobin A1C 09/07/2022 5 9 (A) Normal 3 8-5 6%; PreDiabetic 5 7-6 4%;  Diabetic >=6 5%; Glycemic control for adults with diabetes <7 0% % Final   • EAG 09/07/2022 123  mg/dl Final   • Cortisol - AM 09/07/2022 20 1  4 2 - 22 4 ug/dL Final   • Aldosterone 09/07/2022 6 9  0 0 - 30 0 ng/dL Final   • Urine Culture 09/07/2022 >100,000 cfu/ml Escherichia coli (A)  Final    This organism has been edited  The previous result was Gram Negative Calvin Enteric Like on 9/8/2022 at 1235 EDT  • Urine Culture 09/07/2022 40,000-49,000 cfu/ml Aerococcus urinae (A)  Final    Comment: Aerococcus urinae has been described as typically being susceptible to most Penicillins, Cephalosporins, and nitrofurantoin  Activity is variable with Fluoroquinolones and poor with Sulfonamides  Susceptibility is not normally performed on this                            organism  • Iron Saturation 09/07/2022 34  15 - 50 % Final   • TIBC 09/07/2022 225 (A) 250 - 450 ug/dL Final   • Iron 09/07/2022 76  50 - 170 ug/dL Final    Patients treated with metal-binding drugs (ie  Deferoxamine) may have depressed iron values     • Ferritin 09/07/2022 754 (A) 8 - 388 ng/mL Final   Orders Only on 09/02/2022   Component Date Value Ref Range Status   • Color, UA 09/07/2022 Light Orange   Final   • Clarity, UA 09/07/2022 Extra Turbid   Final   • Specific Gravity, UA 09/07/2022 1 009  1 003 - 1 030 Final   • pH, UA 09/07/2022 6 5  4 5, 5 0, 5 5, 6 0, 6 5, 7 0, 7 5, 8 0 Final   • Leukocytes, UA 09/07/2022 Large (A) Negative Final   • Nitrite, UA 09/07/2022 Positive (A) Negative Final   • Protein, UA 09/07/2022 Trace (A) Negative mg/dl Final   • Glucose, UA 09/07/2022 Negative  Negative mg/dl Final   • Ketones, UA 09/07/2022 Negative  Negative mg/dl Final   • Urobilinogen, UA 09/07/2022 <2 0  <2 0 mg/dl mg/dl Final   • Bilirubin, UA 09/07/2022 Negative  Negative Final   • Occult Blood, UA 09/07/2022 Small (A) Negative Final   • RBC, UA 09/07/2022 20-30 (A) None Seen, 1-2 /hpf Final   • WBC, UA 09/07/2022 Innumerable (A) None Seen, 1-2 /hpf Final • Epithelial Cells 09/07/2022 Occasional  None Seen, Occasional /hpf Final   • Bacteria, UA 09/07/2022 Innumerable (A) None Seen, Occasional /hpf Final   • WBC Clumps 09/07/2022 Present   Final   Ancillary Orders on 07/29/2022   Component Date Value Ref Range Status   • WBC 09/07/2022 7 61  4 31 - 10 16 Thousand/uL Final   • RBC 09/07/2022 3 65 (A) 3 81 - 5 12 Million/uL Final   • Hemoglobin 09/07/2022 12 8  11 5 - 15 4 g/dL Final   • Hematocrit 09/07/2022 38 6  34 8 - 46 1 % Final   • MCV 09/07/2022 106 (A) 82 - 98 fL Final   • MCH 09/07/2022 35 1 (A) 26 8 - 34 3 pg Final   • MCHC 09/07/2022 33 2  31 4 - 37 4 g/dL Final   • RDW 09/07/2022 13 3  11 6 - 15 1 % Final   • MPV 09/07/2022 9 4  8 9 - 12 7 fL Final   • Platelets 42/19/1710 409 (A) 149 - 390 Thousands/uL Final   • nRBC 09/07/2022 0  /100 WBCs Final   • Neutrophils Relative 09/07/2022 55  43 - 75 % Final   • Immat GRANS % 09/07/2022 0  0 - 2 % Final   • Lymphocytes Relative 09/07/2022 31  14 - 44 % Final   • Monocytes Relative 09/07/2022 9  4 - 12 % Final   • Eosinophils Relative 09/07/2022 3  0 - 6 % Final   • Basophils Relative 09/07/2022 2 (A) 0 - 1 % Final   • Neutrophils Absolute 09/07/2022 4 17  1 85 - 7 62 Thousands/µL Final   • Immature Grans Absolute 09/07/2022 0 02  0 00 - 0 20 Thousand/uL Final   • Lymphocytes Absolute 09/07/2022 2 36  0 60 - 4 47 Thousands/µL Final   • Monocytes Absolute 09/07/2022 0 67  0 17 - 1 22 Thousand/µL Final   • Eosinophils Absolute 09/07/2022 0 26  0 00 - 0 61 Thousand/µL Final   • Basophils Absolute 09/07/2022 0 13 (A) 0 00 - 0 10 Thousands/µL Final   Appointment on 07/01/2022   Component Date Value Ref Range Status   • Sodium 07/01/2022 135 (A) 136 - 145 mmol/L Final   • Potassium 07/01/2022 4 3  3 5 - 5 3 mmol/L Final   • Chloride 07/01/2022 101  100 - 108 mmol/L Final   • CO2 07/01/2022 27  21 - 32 mmol/L Final   • ANION GAP 07/01/2022 7  4 - 13 mmol/L Final   • BUN 07/01/2022 17  5 - 25 mg/dL Final   • Creatinine 07/01/2022 1 10  0 60 - 1 30 mg/dL Final    Standardized to IDMS reference method   • Glucose, Fasting 07/01/2022 85  65 - 99 mg/dL Final    Specimen collection should occur prior to Sulfasalazine administration due to the potential for falsely depressed results  Specimen collection should occur prior to Sulfapyridine administration due to the potential for falsely elevated results     • Calcium 07/01/2022 9 4  8 3 - 10 1 mg/dL Final   • eGFR 07/01/2022 47  ml/min/1 73sq m Final   • Creatinine, Ur 07/01/2022 48 1  mg/dL Final   • Protein Urine Random 07/01/2022 <6  mg/dL Final   • Prot/Creat Ratio, Ur 07/01/2022 <0 12 (A) 0 00 - 0 10 Final   • Color, UA 07/01/2022 Light Yellow   Final   • Clarity, UA 07/01/2022 Clear   Final   • Specific Gravity, UA 07/01/2022 1 009  1 003 - 1 030 Final   • pH, UA 07/01/2022 6 5  4 5, 5 0, 5 5, 6 0, 6 5, 7 0, 7 5, 8 0 Final   • Leukocytes, UA 07/01/2022 Negative  Negative Final   • Nitrite, UA 07/01/2022 Negative  Negative Final   • Protein, UA 07/01/2022 Negative  Negative mg/dl Final   • Glucose, UA 07/01/2022 Negative  Negative mg/dl Final   • Ketones, UA 07/01/2022 Negative  Negative mg/dl Final   • Urobilinogen, UA 07/01/2022 <2 0  <2 0 mg/dl mg/dl Final   • Bilirubin, UA 07/01/2022 Negative  Negative Final   • Occult Blood, UA 07/01/2022 Negative  Negative Final   • RBC, UA 07/01/2022 1-2  None Seen, 1-2 /hpf Final   • WBC, UA 07/01/2022 1-2  None Seen, 1-2 /hpf Final   • Epithelial Cells 07/01/2022 Occasional  None Seen, Occasional /hpf Final   • Bacteria, UA 07/01/2022 None Seen  None Seen, Occasional /hpf Final   • MUCUS THREADS 07/01/2022 Occasional (A) None Seen Final   • Hyaline Casts, UA 07/01/2022 0-3 (A) None Seen /lpf Final   • Transitional Epithelial Cells 07/01/2022 Present   Final   • PTH 07/01/2022 29 1  18 4 - 80 1 pg/mL Final   • WBC 07/01/2022 7 83  4 31 - 10 16 Thousand/uL Final   • RBC 07/01/2022 4 01  3 81 - 5 12 Million/uL Final   • Hemoglobin 07/01/2022 13 9  11 5 - 15 4 g/dL Final   • Hematocrit 07/01/2022 40 9  34 8 - 46 1 % Final   • MCV 07/01/2022 102 (A) 82 - 98 fL Final   • MCH 07/01/2022 34 7 (A) 26 8 - 34 3 pg Final   • MCHC 07/01/2022 34 0  31 4 - 37 4 g/dL Final   • RDW 07/01/2022 16 0 (A) 11 6 - 15 1 % Final   • Platelets 32/07/7974 376  149 - 390 Thousands/uL Final   • MPV 07/01/2022 9 1  8 9 - 12 7 fL Final   • Folate 07/01/2022 >20 0 (A) 3 1 - 17 5 ng/mL Final    E521   • Vitamin B-12 07/01/2022 644  100 - 900 pg/mL Final   Appointment on 06/01/2022   Component Date Value Ref Range Status   • WBC 06/01/2022 7 56  4 31 - 10 16 Thousand/uL Final   • RBC 06/01/2022 4 03  3 81 - 5 12 Million/uL Final   • Hemoglobin 06/01/2022 13 3  11 5 - 15 4 g/dL Final   • Hematocrit 06/01/2022 40 1  34 8 - 46 1 % Final   • MCV 06/01/2022 100 (A) 82 - 98 fL Final   • MCH 06/01/2022 33 0  26 8 - 34 3 pg Final   • MCHC 06/01/2022 33 2  31 4 - 37 4 g/dL Final   • RDW 06/01/2022 23 1 (A) 11 6 - 15 1 % Final   • MPV 06/01/2022 9 5  8 9 - 12 7 fL Final   • Platelets 55/39/1058 377  149 - 390 Thousands/uL Final   • nRBC 06/01/2022 0  /100 WBCs Final   • Neutrophils Relative 06/01/2022 49  43 - 75 % Final   • Immat GRANS % 06/01/2022 0  0 - 2 % Final   • Lymphocytes Relative 06/01/2022 37  14 - 44 % Final   • Monocytes Relative 06/01/2022 9  4 - 12 % Final   • Eosinophils Relative 06/01/2022 3  0 - 6 % Final   • Basophils Relative 06/01/2022 2 (A) 0 - 1 % Final   • Neutrophils Absolute 06/01/2022 3 73  1 85 - 7 62 Thousands/µL Final   • Immature Grans Absolute 06/01/2022 0 02  0 00 - 0 20 Thousand/uL Final   • Lymphocytes Absolute 06/01/2022 2 80  0 60 - 4 47 Thousands/µL Final   • Monocytes Absolute 06/01/2022 0 67  0 17 - 1 22 Thousand/µL Final   • Eosinophils Absolute 06/01/2022 0 21  0 00 - 0 61 Thousand/µL Final   • Basophils Absolute 06/01/2022 0 13 (A) 0 00 - 0 10 Thousands/µL Final   • Sodium 06/01/2022 135 (A) 136 - 145 mmol/L Final   • Potassium 06/01/2022 4 2  3 5 - 5 3 mmol/L Final   • Chloride 06/01/2022 104  100 - 108 mmol/L Final   • CO2 06/01/2022 28  21 - 32 mmol/L Final   • ANION GAP 06/01/2022 3 (A) 4 - 13 mmol/L Final   • BUN 06/01/2022 16  5 - 25 mg/dL Final   • Creatinine 06/01/2022 1 07  0 60 - 1 30 mg/dL Final    Standardized to IDMS reference method   • Glucose, Fasting 06/01/2022 95  65 - 99 mg/dL Final    Specimen collection should occur prior to Sulfasalazine administration due to the potential for falsely depressed results  Specimen collection should occur prior to Sulfapyridine administration due to the potential for falsely elevated results  • Calcium 06/01/2022 9 3  8 3 - 10 1 mg/dL Final   • Corrected Calcium 06/01/2022 9 9  8 3 - 10 1 mg/dL Final   • AST 06/01/2022 13  5 - 45 U/L Final    Specimen collection should occur prior to Sulfasalazine administration due to the potential for falsely depressed results  • ALT 06/01/2022 21  12 - 78 U/L Final    Specimen collection should occur prior to Sulfasalazine and/or Sulfapyridine administration due to the potential for falsely depressed results  • Alkaline Phosphatase 06/01/2022 72  46 - 116 U/L Final   • Total Protein 06/01/2022 7 9  6 4 - 8 2 g/dL Final   • Albumin 06/01/2022 3 2 (A) 3 5 - 5 0 g/dL Final   • Total Bilirubin 06/01/2022 0 30  0 20 - 1 00 mg/dL Final    Use of this assay is not recommended for patients undergoing treatment with eltrombopag due to the potential for falsely elevated results  • eGFR 06/01/2022 49  ml/min/1 73sq m Final   • Iron Saturation 06/01/2022 50  15 - 50 % Final   • TIBC 06/01/2022 212 (A) 250 - 450 ug/dL Final   • Iron 06/01/2022 107  50 - 170 ug/dL Final    Patients treated with metal-binding drugs (ie  Deferoxamine) may have depressed iron values     • Ferritin 06/01/2022 731 (A) 8 - 388 ng/mL Final   Appointment on 04/06/2022   Component Date Value Ref Range Status   • Cholesterol 04/06/2022 164  See Comment mg/dL Final    Cholesterol:         Pediatric <18 Years        Desirable          <170 mg/dL      Borderline High    170-199 mg/dL      High               >=200 mg/dL        Adult >=18 Years            Desirable         <200 mg/dL      Borderline High   200-239 mg/dL      High              >239 mg/dL     • Triglycerides 04/06/2022 232 (A) See Comment mg/dL Final    Triglyceride:     0-9Y            <75mg/dL     10Y-17Y         <90 mg/dL       >=18Y     Normal          <150 mg/dL     Borderline High 150-199 mg/dL     High            200-499 mg/dL        Very High       >499 mg/dL    Specimen collection should occur prior to N-Acetylcysteine or Metamizole administration due to the potential for falsely depressed results  • HDL, Direct 04/06/2022 38 (A) >=50 mg/dL Final    Specimen collection should occur prior to Metamizole administration due to the potential for falsley depressed results  • LDL Calculated 04/06/2022 80  0 - 100 mg/dL Final    LDL Cholesterol:     Optimal           <100 mg/dl     Near Optimal      100-129 mg/dl     Above Optimal       Borderline High 130-159 mg/dl       High            160-189 mg/dl       Very High       >189 mg/dl         This screening LDL is a calculated result  It does not have the accuracy of the Direct Measured LDL in the monitoring of patients with hyperlipidemia and/or statin therapy  Direct Measure LDL (ANK966) must be ordered separately in these patients  • Non-HDL-Chol (CHOL-HDL) 04/06/2022 126  mg/dl Final   • Hemoglobin A1C 04/06/2022 6 1 (A) Normal 3 8-5 6%; PreDiabetic 5 7-6 4%;  Diabetic >=6 5%; Glycemic control for adults with diabetes <7 0% % Final   • EAG 04/06/2022 128  mg/dl Final   • Sodium 04/06/2022 137  136 - 145 mmol/L Final   • Potassium 04/06/2022 4 2  3 5 - 5 3 mmol/L Final   • Chloride 04/06/2022 105  100 - 108 mmol/L Final   • CO2 04/06/2022 26  21 - 32 mmol/L Final   • ANION GAP 04/06/2022 6  4 - 13 mmol/L Final   • BUN 04/06/2022 16  5 - 25 mg/dL Final   • Creatinine 04/06/2022 1 14  0 60 - 1 30 mg/dL Final    Standardized to IDMS reference method   • Glucose, Fasting 04/06/2022 98  65 - 99 mg/dL Final    Specimen collection should occur prior to Sulfasalazine administration due to the potential for falsely depressed results  Specimen collection should occur prior to Sulfapyridine administration due to the potential for falsely elevated results  • Calcium 04/06/2022 8 9  8 3 - 10 1 mg/dL Final   • AST 04/06/2022 14  5 - 45 U/L Final    Specimen collection should occur prior to Sulfasalazine administration due to the potential for falsely depressed results  • ALT 04/06/2022 23  12 - 78 U/L Final    Specimen collection should occur prior to Sulfasalazine and/or Sulfapyridine administration due to the potential for falsely depressed results  • Alkaline Phosphatase 04/06/2022 86  46 - 116 U/L Final   • Total Protein 04/06/2022 7 9  6 4 - 8 2 g/dL Final   • Albumin 04/06/2022 3 5  3 5 - 5 0 g/dL Final   • Total Bilirubin 04/06/2022 0 42  0 20 - 1 00 mg/dL Final    Use of this assay is not recommended for patients undergoing treatment with eltrombopag due to the potential for falsely elevated results  • eGFR 04/06/2022 45  ml/min/1 73sq m Final   • TSH 3RD GENERATON 04/06/2022 5 450 (A) 0 450 - 4 500 uIU/mL Final    The recommended reference ranges for TSH during pregnancy are as follows:   First trimester 0 1 to 2 5 uIU/mL   Second trimester  0 2 to 3 0 uIU/mL   Third trimester 0 3 to 3 0 uIU/m    Note: Normal ranges may not apply to patients who are transgender, non-binary, or whose legal sex, sex at birth, and gender identity differ  Adult TSH (3rd generation) reference range follows the recommended guidelines of the American Thy[employer] Association, January, 2020     Appointment on 03/25/2022   Component Date Value Ref Range Status   • Sodium 03/25/2022 136  136 - 145 mmol/L Final   • Potassium 03/25/2022 4 3  3 5 - 5 3 mmol/L Final   • Chloride 03/25/2022 106  100 - 108 mmol/L Final   • CO2 03/25/2022 27  21 - 32 mmol/L Final   • ANION GAP 03/25/2022 3 (A) 4 - 13 mmol/L Final   • BUN 03/25/2022 18  5 - 25 mg/dL Final   • Creatinine 03/25/2022 1 08  0 60 - 1 30 mg/dL Final    Standardized to IDMS reference method   • Glucose, Fasting 03/25/2022 92  65 - 99 mg/dL Final    Specimen collection should occur prior to Sulfasalazine administration due to the potential for falsely depressed results  Specimen collection should occur prior to Sulfapyridine administration due to the potential for falsely elevated results  • Calcium 03/25/2022 8 5  8 3 - 10 1 mg/dL Final   • Corrected Calcium 03/25/2022 9 0  8 3 - 10 1 mg/dL Final   • AST 03/25/2022 16  5 - 45 U/L Final    Specimen collection should occur prior to Sulfasalazine administration due to the potential for falsely depressed results  • ALT 03/25/2022 29  12 - 78 U/L Final    Specimen collection should occur prior to Sulfasalazine and/or Sulfapyridine administration due to the potential for falsely depressed results  • Alkaline Phosphatase 03/25/2022 81  46 - 116 U/L Final   • Total Protein 03/25/2022 7 9  6 4 - 8 2 g/dL Final   • Albumin 03/25/2022 3 4 (A) 3 5 - 5 0 g/dL Final   • Total Bilirubin 03/25/2022 0 34  0 20 - 1 00 mg/dL Final    Use of this assay is not recommended for patients undergoing treatment with eltrombopag due to the potential for falsely elevated results     • eGFR 03/25/2022 48  ml/min/1 73sq m Final   • LD 03/25/2022 140  81 - 234 U/L Final   • WBC 03/25/2022 8 46  4 31 - 10 16 Thousand/uL Final   • RBC 03/25/2022 4 81  3 81 - 5 12 Million/uL Final   • Hemoglobin 03/25/2022 13 3  11 5 - 15 4 g/dL Final   • Hematocrit 03/25/2022 42 4  34 8 - 46 1 % Final   • MCV 03/25/2022 88  82 - 98 fL Final   • MCH 03/25/2022 27 7  26 8 - 34 3 pg Final   • MCHC 03/25/2022 31 4  31 4 - 37 4 g/dL Final   • RDW 03/25/2022 22 4 (A) 11 6 - 15 1 % Final   • MPV 03/25/2022 9 1  8 9 - 12 7 fL Final   • Platelets 77/92/3859 411 (A) 149 - 390 Thousands/uL Final   • nRBC 03/25/2022 0  /100 WBCs Final   • Neutrophils Relative 03/25/2022 48  43 - 75 % Final   • Immat GRANS % 03/25/2022 0  0 - 2 % Final   • Lymphocytes Relative 03/25/2022 38  14 - 44 % Final   • Monocytes Relative 03/25/2022 9  4 - 12 % Final   • Eosinophils Relative 03/25/2022 3  0 - 6 % Final   • Basophils Relative 03/25/2022 2 (A) 0 - 1 % Final   • Neutrophils Absolute 03/25/2022 4 06  1 85 - 7 62 Thousands/µL Final   • Immature Grans Absolute 03/25/2022 0 03  0 00 - 0 20 Thousand/uL Final   • Lymphocytes Absolute 03/25/2022 3 21  0 60 - 4 47 Thousands/µL Final   • Monocytes Absolute 03/25/2022 0 74  0 17 - 1 22 Thousand/µL Final   • Eosinophils Absolute 03/25/2022 0 24  0 00 - 0 61 Thousand/µL Final   • Basophils Absolute 03/25/2022 0 18 (A) 0 00 - 0 10 Thousands/µL Final

## 2022-09-19 ENCOUNTER — OFFICE VISIT (OUTPATIENT)
Dept: RHEUMATOLOGY | Facility: CLINIC | Age: 82
End: 2022-09-19
Payer: MEDICARE

## 2022-09-19 VITALS
BODY MASS INDEX: 32.36 KG/M2 | DIASTOLIC BLOOD PRESSURE: 72 MMHG | WEIGHT: 150 LBS | HEIGHT: 57 IN | SYSTOLIC BLOOD PRESSURE: 126 MMHG

## 2022-09-19 DIAGNOSIS — Z79.899 HIGH RISK MEDICATION USE: ICD-10-CM

## 2022-09-19 DIAGNOSIS — M81.0 AGE-RELATED OSTEOPOROSIS WITHOUT CURRENT PATHOLOGICAL FRACTURE: Primary | ICD-10-CM

## 2022-09-19 PROCEDURE — 99214 OFFICE O/P EST MOD 30 MIN: CPT | Performed by: INTERNAL MEDICINE

## 2022-09-19 PROCEDURE — 96372 THER/PROPH/DIAG INJ SC/IM: CPT | Performed by: INTERNAL MEDICINE

## 2022-09-19 NOTE — PATIENT INSTRUCTIONS
Continue Ca and Vit   D daily  Prolia injection given in clinic today  Do lab when you get the chance  Next DEXA scan due in 10/2023     Return to clinic in 6 5 months for provider visit + next Prolia injection

## 2022-09-28 ENCOUNTER — RA CDI HCC (OUTPATIENT)
Dept: OTHER | Facility: HOSPITAL | Age: 82
End: 2022-09-28

## 2022-09-28 NOTE — PROGRESS NOTES
[facility] 75  coding opportunities          Chart Reviewed number of suggestions sent to Provider: 1   E11 51    Patients Insurance     Medicare Insurance: Medicare

## 2022-10-04 ENCOUNTER — APPOINTMENT (OUTPATIENT)
Dept: LAB | Facility: CLINIC | Age: 82
End: 2022-10-04
Payer: MEDICARE

## 2022-10-04 ENCOUNTER — TELEPHONE (OUTPATIENT)
Dept: ADMINISTRATIVE | Facility: OTHER | Age: 82
End: 2022-10-04

## 2022-10-04 ENCOUNTER — OFFICE VISIT (OUTPATIENT)
Dept: FAMILY MEDICINE CLINIC | Facility: CLINIC | Age: 82
End: 2022-10-04
Payer: MEDICARE

## 2022-10-04 VITALS
SYSTOLIC BLOOD PRESSURE: 132 MMHG | HEIGHT: 57 IN | DIASTOLIC BLOOD PRESSURE: 72 MMHG | OXYGEN SATURATION: 95 % | BODY MASS INDEX: 32.1 KG/M2 | HEART RATE: 82 BPM | WEIGHT: 148.8 LBS

## 2022-10-04 DIAGNOSIS — M81.0 SENILE OSTEOPOROSIS: ICD-10-CM

## 2022-10-04 DIAGNOSIS — Z12.31 ENCOUNTER FOR SCREENING MAMMOGRAM FOR BREAST CANCER: Primary | ICD-10-CM

## 2022-10-04 DIAGNOSIS — E61.1 IRON DEFICIENCY: ICD-10-CM

## 2022-10-04 DIAGNOSIS — E27.8 ADRENAL MASS (HCC): ICD-10-CM

## 2022-10-04 DIAGNOSIS — E11.22 TYPE 2 DIABETES MELLITUS WITH STAGE 3A CHRONIC KIDNEY DISEASE, WITHOUT LONG-TERM CURRENT USE OF INSULIN (HCC): ICD-10-CM

## 2022-10-04 DIAGNOSIS — Z23 INFLUENZA VACCINATION GIVEN: ICD-10-CM

## 2022-10-04 DIAGNOSIS — E11.69 HYPERLIPIDEMIA DUE TO TYPE 2 DIABETES MELLITUS (HCC): ICD-10-CM

## 2022-10-04 DIAGNOSIS — N18.31 TYPE 2 DIABETES MELLITUS WITH STAGE 3A CHRONIC KIDNEY DISEASE, WITHOUT LONG-TERM CURRENT USE OF INSULIN (HCC): ICD-10-CM

## 2022-10-04 DIAGNOSIS — N18.31 STAGE 3A CHRONIC KIDNEY DISEASE (HCC): ICD-10-CM

## 2022-10-04 DIAGNOSIS — E03.9 HYPOTHYROIDISM, UNSPECIFIED TYPE: ICD-10-CM

## 2022-10-04 DIAGNOSIS — I10 ESSENTIAL HYPERTENSION: ICD-10-CM

## 2022-10-04 DIAGNOSIS — D47.3 ESSENTIAL THROMBOCYTOSIS (HCC): ICD-10-CM

## 2022-10-04 DIAGNOSIS — E78.5 HYPERLIPIDEMIA DUE TO TYPE 2 DIABETES MELLITUS (HCC): ICD-10-CM

## 2022-10-04 PROBLEM — U07.1 COVID-19: Status: RESOLVED | Noted: 2022-08-23 | Resolved: 2022-10-04

## 2022-10-04 LAB
25(OH)D3 SERPL-MCNC: 47.3 NG/ML (ref 30–100)
BASOPHILS # BLD AUTO: 0.16 THOUSANDS/ΜL (ref 0–0.1)
BASOPHILS NFR BLD AUTO: 2 % (ref 0–1)
EOSINOPHIL # BLD AUTO: 0.2 THOUSAND/ΜL (ref 0–0.61)
EOSINOPHIL NFR BLD AUTO: 3 % (ref 0–6)
ERYTHROCYTE [DISTWIDTH] IN BLOOD BY AUTOMATED COUNT: 13.9 % (ref 11.6–15.1)
FERRITIN SERPL-MCNC: 649 NG/ML (ref 8–388)
HCT VFR BLD AUTO: 40.3 % (ref 34.8–46.1)
HGB BLD-MCNC: 13.2 G/DL (ref 11.5–15.4)
IMM GRANULOCYTES # BLD AUTO: 0.02 THOUSAND/UL (ref 0–0.2)
IMM GRANULOCYTES NFR BLD AUTO: 0 % (ref 0–2)
IRON SATN MFR SERPL: 32 % (ref 15–50)
IRON SERPL-MCNC: 79 UG/DL (ref 50–170)
LYMPHOCYTES # BLD AUTO: 2.15 THOUSANDS/ΜL (ref 0.6–4.47)
LYMPHOCYTES NFR BLD AUTO: 31 % (ref 14–44)
MCH RBC QN AUTO: 33.8 PG (ref 26.8–34.3)
MCHC RBC AUTO-ENTMCNC: 32.8 G/DL (ref 31.4–37.4)
MCV RBC AUTO: 103 FL (ref 82–98)
MONOCYTES # BLD AUTO: 0.54 THOUSAND/ΜL (ref 0.17–1.22)
MONOCYTES NFR BLD AUTO: 8 % (ref 4–12)
NEUTROPHILS # BLD AUTO: 3.8 THOUSANDS/ΜL (ref 1.85–7.62)
NEUTS SEG NFR BLD AUTO: 56 % (ref 43–75)
NRBC BLD AUTO-RTO: 0 /100 WBCS
PLATELET # BLD AUTO: 486 THOUSANDS/UL (ref 149–390)
PMV BLD AUTO: 9.2 FL (ref 8.9–12.7)
RBC # BLD AUTO: 3.91 MILLION/UL (ref 3.81–5.12)
TIBC SERPL-MCNC: 244 UG/DL (ref 250–450)
WBC # BLD AUTO: 6.87 THOUSAND/UL (ref 4.31–10.16)

## 2022-10-04 PROCEDURE — 82728 ASSAY OF FERRITIN: CPT

## 2022-10-04 PROCEDURE — 82306 VITAMIN D 25 HYDROXY: CPT | Performed by: INTERNAL MEDICINE

## 2022-10-04 PROCEDURE — 83540 ASSAY OF IRON: CPT

## 2022-10-04 PROCEDURE — 83550 IRON BINDING TEST: CPT

## 2022-10-04 PROCEDURE — G0008 ADMIN INFLUENZA VIRUS VAC: HCPCS

## 2022-10-04 PROCEDURE — 36415 COLL VENOUS BLD VENIPUNCTURE: CPT | Performed by: INTERNAL MEDICINE

## 2022-10-04 PROCEDURE — 85025 COMPLETE CBC W/AUTO DIFF WBC: CPT

## 2022-10-04 PROCEDURE — 99214 OFFICE O/P EST MOD 30 MIN: CPT

## 2022-10-04 PROCEDURE — 90662 IIV NO PRSV INCREASED AG IM: CPT

## 2022-10-04 NOTE — PROGRESS NOTES
Name: Jess Sue      : 1940      MRN: [id number]  Encounter Provider: Andreea nOeill MD  Encounter Date: 10/4/2022   Encounter department: [facility] PRIMARY CARE    Assessment & Plan     1  Encounter for screening mammogram for breast cancer  -     Mammo screening bilateral w 3d & cad; Future; Expected date: 10/04/2022    2  Hypothyroidism, unspecified type    3  Type 2 diabetes mellitus with stage 3a chronic kidney disease, without long-term current use of insulin (HCC)  -     Comprehensive metabolic panel; Future; Expected date: 2023  -     Hemoglobin A1C; Future; Expected date: 2023    4  Essential hypertension  Assessment & Plan:  bp stable      5  Stage 3a chronic kidney disease  Assessment & Plan:  Lab Results   Component Value Date    EGFR 41 2022    EGFR 47 2022    EGFR 49 2022    CREATININE 1 24 2022    CREATININE 1 10 2022    CREATININE 1 07 2022   stable      6  Essential thrombocytosis (HCC)  Assessment & Plan:  Sees hematology      7  Hyperlipidemia due to type 2 diabetes mellitus [facility])  Assessment & Plan:    Lab Results   Component Value Date    HGBA1C 5 9 (H) 2022   lab stable    Orders:  -     Lipid panel; Future; Expected date: 2023    8  Adrenal mass [facility])  Assessment & Plan:  F/u 6 months    Orders:  -     MRI abdomen w wo contrast; Future; Expected date: 2023    9  Influenza vaccination given  -     influenza vaccine, high-dose, PF 0 7 mL (FLUZONE HIGH-DOSE)           Subjective      F/u lipids, diabetes, thyroid, doing well, needs to go to for lab for hematologist, feels  Well, going for colonoscopy next week, had laser surgery for cataracts last  month    Review of Systems   Constitutional: Negative for activity change, appetite change and fatigue  Respiratory: Negative for shortness of breath  Cardiovascular: Negative for chest pain  Neurological: Negative for dizziness and headaches  Psychiatric/Behavioral: The patient is not nervous/anxious  Current Outpatient Medications on File Prior to Visit   Medication Sig    aspirin 81 MG tablet Take 81 mg by mouth daily      Calcium Carbonate-Vitamin D 600-200 MG-UNIT TABS Take 1 tablet by mouth every other day      ezetimibe (ZETIA) 10 mg tablet Take 1 tablet (10 mg total) by mouth daily    famotidine (PEPCID) 40 MG tablet Take 1 tablet (40 mg total) by mouth daily at bedtime    folic acid (FOLVITE) 1 mg tablet Take 1 tablet (1 mg total) by mouth daily    Glucosamine-Chondroitin 250-200 MG TABS Take 1 tablet by mouth daily      hydroxyurea (HYDREA) 500 mg capsule Take 1 capsule (500 mg total) by mouth daily Monday through Friday only   levothyroxine 50 mcg tablet Take 1 tablet (50 mcg total) by mouth daily    lisinopril (ZESTRIL) 2 5 mg tablet Take 1 tablet (2 5 mg total) by mouth daily    magnesium citrate solution Take 296 mL by mouth once    Multiple Vitamins-Iron (DAILY MULTIPLE VITAMIN/IRON) TABS Take 1 tablet by mouth daily      Omega-3 Fatty Acids ([employer] FISH OIL) 1200 MG CAPS Take 1 capsule by mouth 2 (two) times a day    Polyethylene Glycol 3350 (MIRALAX MIX-IN PAX PO) Take by mouth    Wheat Dextrin (BENEFIBER DRINK MIX PO) Take by mouth    docusate sodium (COLACE) 100 mg capsule Take by mouth (Patient not taking: No sig reported)    promethazine-codeine (PHENERGAN WITH CODEINE) 6 25-10 mg/5 mL syrup Take 5 mL by mouth every 4 (four) hours as needed for cough (Patient not taking: Reported on 10/4/2022)       Objective     /72 (BP Location: Right arm, Patient Position: Sitting)   Pulse 82   Ht 4' 9" (1 448 m)   Wt 67 5 kg (148 lb 12 8 oz)   SpO2 95%   BMI 32 20 kg/m²     Physical Exam  Vitals reviewed  Constitutional:       Appearance: Normal appearance  She is obese  Cardiovascular:      Rate and Rhythm: Normal rate and regular rhythm  Pulses: Normal pulses  Heart sounds: Normal heart sounds  Pulmonary:      Effort: Pulmonary effort is normal       Breath sounds: Normal breath sounds  Musculoskeletal:      Right lower leg: No edema  Left lower leg: No edema  Lymphadenopathy:      Cervical: No cervical adenopathy  Skin:     Findings: No bruising  Neurological:      Mental Status: She is alert     Psychiatric:         Mood and Affect: Mood normal        Jose Raul Yo MD

## 2022-10-04 NOTE — TELEPHONE ENCOUNTER
Upon review of the In Basket request and the patient's chart, initial outreach has been made via telephone call, please see Contacts section for details       Thank you  Yane Jaquez

## 2022-10-04 NOTE — ASSESSMENT & PLAN NOTE
Lab Results   Component Value Date    EGFR 41 09/07/2022    EGFR 47 07/01/2022    EGFR 49 06/01/2022    CREATININE 1 24 09/07/2022    CREATININE 1 10 07/01/2022    CREATININE 1 07 06/01/2022   stable

## 2022-10-04 NOTE — TELEPHONE ENCOUNTER
----- Message from Jeanette Mcgill sent at 10/4/2022  8:26 AM EDT -----  Regarding: Care Gap Request  10/04/22 8:26 AM    Hello, our patient attached above has had Diabetic Eye Exam completed/performed  Please assist in updating the patient chart by making an External outreach to Dr Aj Marques facility located in [facility]  The date of service is within the last 3 months per pt      Phone: [phone]      Thank you,  Jeanette [facility]

## 2022-10-07 ENCOUNTER — OFFICE VISIT (OUTPATIENT)
Dept: HEMATOLOGY ONCOLOGY | Facility: CLINIC | Age: 82
End: 2022-10-07
Payer: MEDICARE

## 2022-10-07 VITALS
TEMPERATURE: 97.9 F | DIASTOLIC BLOOD PRESSURE: 80 MMHG | SYSTOLIC BLOOD PRESSURE: 148 MMHG | BODY MASS INDEX: 31.93 KG/M2 | WEIGHT: 148 LBS | RESPIRATION RATE: 16 BRPM | HEIGHT: 57 IN | OXYGEN SATURATION: 98 % | HEART RATE: 72 BPM

## 2022-10-07 DIAGNOSIS — Z51.11 ENCOUNTER FOR ANTINEOPLASTIC CHEMOTHERAPY: ICD-10-CM

## 2022-10-07 DIAGNOSIS — D47.3 ESSENTIAL THROMBOCYTOSIS (HCC): Primary | ICD-10-CM

## 2022-10-07 DIAGNOSIS — Z15.89 JAK2 GENE MUTATION: ICD-10-CM

## 2022-10-07 PROCEDURE — 99214 OFFICE O/P EST MOD 30 MIN: CPT | Performed by: INTERNAL MEDICINE

## 2022-10-07 RX ORDER — HYDROXYUREA 500 MG/1
500 CAPSULE ORAL DAILY
Qty: 90 CAPSULE | Refills: 1 | Status: SHIPPED | OUTPATIENT
Start: 2022-10-07

## 2022-10-07 NOTE — PROGRESS NOTES
[facility] - Hematology & Medical Oncology  Outpatient Visit Encounter Note      Luisa Llanos [de-identified] y o  female 1940 [id number] Date:  10/7/2022    HEMATOLOGICAL HISTORY        Clotting History Denies   Bleeding History Denies   Cancer History Denies   Family Cancer History Mother with cervical cancer, maternal grandmother with breast cancer, maternal aunt with brain cancer, paternal aunt with breast cancer   H/O COVID Infection Denies   H/O COVID Vaccination 3/3 [facility]   H/O Blood/Plt Transfusion Denies   Tobacco Use Denies   Alcohol Use Denies   Occupation [address] [place] [address] Zach Royal is a [de-identified] y o  here for new consultation with me today  The patient is referred by Dr Alejandra Iqbal and the reason for consultation is thrombocytosis  Her CBC shows longstanding history of thrombocytosis without abnormalities of WBC and hemoglobin  Her differential was remarkable for basophilia  This Visit    Denies any f/c/n/v/cp/ap/sob/cough  Denies diarrhea or skin rash  Taking his medication  I have reviewed the relevant past medical, surgical, social and family history  I have also reviewed allergies and medications for this patient  Review of Systems  Review of Systems   All other systems reviewed and are negative  OBJECTIVE     Physical Exam  Vitals:    10/07/22 1036   BP: 148/80   BP Location: Left arm   Patient Position: Sitting   Cuff Size: Standard   Pulse: 72   Resp: 16   Temp: 97 9 °F (36 6 °C)   TempSrc: Temporal   SpO2: 98%   Weight: 67 1 kg (148 lb)   Height: 4' 9" (1 448 m)        Physical Exam  Vitals reviewed  Constitutional:       General: She is not in acute distress  Appearance: She is well-developed  HENT:      Head: Normocephalic  Eyes:      Conjunctiva/sclera: Conjunctivae normal    Cardiovascular:      Rate and Rhythm: Normal rate  Pulmonary:      Effort: Pulmonary effort is normal  No respiratory distress     Abdominal: Tenderness: There is no abdominal tenderness  Musculoskeletal:         General: No swelling  Normal range of motion  Cervical back: Normal range of motion  Neurological:      General: No focal deficit present  Mental Status: She is alert and oriented to person, place, and time  Mental status is at baseline  Imaging  Relevant imaging reviewed in chart    Labs  Relevant labs reviewed in chart     Final Diagnosis   A -C  Bone marrow, right iliac crest,  biopsy and aspirate:  -  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, compatible with essential thrombocythemia (see note)  -  Maturing trilineage hematopoiesis without significant features of dysplasia or increased myeloblasts  -  Mildly decreased to adequate stainable storage iron  -  Mildly increased reticulin fibers without overt fibrosis  -  Negative for collagen fibrosis, granulomata, vasculitis, necrosis       Electronically signed by Yaneli Oliva MD on 3/22/2022 at 11:39 AM         ASSESSMENT & PLAN      Diagnosis ICD-10-CM Associated Orders   1  Essential thrombocytosis (HCC)  D47 3 hydroxyurea (HYDREA) 500 mg capsule     CBC and differential     Comprehensive metabolic panel     LD,Blood     CBC and differential     Comprehensive metabolic panel     LD,Blood   2  JAK2 gene mutation  Z15 89 hydroxyurea (HYDREA) 500 mg capsule     CBC and differential     Comprehensive metabolic panel     LD,Blood     CBC and differential     Comprehensive metabolic panel     LD,Blood   3  Encounter for antineoplastic chemotherapy  Z51 11 CBC and differential     Comprehensive metabolic panel     LD,Blood     CBC and differential     Comprehensive metabolic panel     LD,Blood         [de-identified] y o  female with anemia of CKD stage 3 and history of iron deficiency anemia diagnosed with 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)        Discussion/Plan/Labs  · Essential thrombocytosis   · Continue with ASA 81mg daily + Hydrea 500mg QD  · CBC shows platelet count to meet ELN criteria  · CBC CMP q3m  · DEIDRA   · Continues to remain resolved with recommendation to check labs in the future  Follow-up  · 6 months    All questions were answered to the patient's satisfaction during this encounter  They appreciated and thanked me for spending time with them  The patient knows the contact information for our office and know to reach out for any relevant concerns related to this encounter  For all other listed problems and medical diagnosis in his chart - they are managed by PCP and/or other specialists, which patient acknowledges        Dr Nicolasa Hazel

## 2022-10-27 ENCOUNTER — TELEPHONE (OUTPATIENT)
Dept: CARDIOLOGY CLINIC | Facility: CLINIC | Age: 82
End: 2022-10-27

## 2022-10-27 NOTE — TELEPHONE ENCOUNTER
Call to [facility], obtained fax number notified that fax was sent to us in error and this office is faxing it to them    Result faxed to [facility]

## 2022-10-27 NOTE — TELEPHONE ENCOUNTER
----- Message from Ken Castelan sent at 10/26/2022  7:01 PM EDT -----  This was routed to the wrong provider  This result needs to go to Dr Jane Ozuna with [facility]  Please ensure his office receives    Thank you

## 2022-11-17 ENCOUNTER — HOSPITAL ENCOUNTER (OUTPATIENT)
Dept: MAMMOGRAPHY | Facility: MEDICAL CENTER | Age: 82
Discharge: HOME | End: 2022-11-17

## 2022-11-17 VITALS — WEIGHT: 147.93 LBS | BODY MASS INDEX: 31.91 KG/M2 | HEIGHT: 57 IN

## 2022-11-17 DIAGNOSIS — Z12.31 ENCOUNTER FOR SCREENING MAMMOGRAM FOR BREAST CANCER: ICD-10-CM

## 2022-11-18 ENCOUNTER — TELEPHONE (OUTPATIENT)
Dept: HEMATOLOGY ONCOLOGY | Facility: CLINIC | Age: 82
End: 2022-11-18

## 2022-11-18 DIAGNOSIS — D47.3 ESSENTIAL THROMBOCYTOSIS (HCC): ICD-10-CM

## 2022-11-18 DIAGNOSIS — Z79.64 ON HYDROXYUREA THERAPY: ICD-10-CM

## 2022-11-18 RX ORDER — FOLIC ACID 1 MG/1
1 TABLET ORAL DAILY
Qty: 90 TABLET | Refills: 2 | Status: SHIPPED | OUTPATIENT
Start: 2022-11-18

## 2022-11-18 NOTE — TELEPHONE ENCOUNTER
MEDICATION REFILL ROUTE TO RN    Who is Calling  Patient   Medication folic acid (FOLVITE) 1 mg tablet        How many pills left N/A   Preferred Pharmacy / Address [address] [facility]   Who is your Physician?  Arelis   Call back number  [phone]   Relevant Information  Yes

## 2023-01-04 ENCOUNTER — APPOINTMENT (OUTPATIENT)
Dept: LAB | Facility: CLINIC | Age: 83
End: 2023-01-04

## 2023-01-10 ENCOUNTER — APPOINTMENT (OUTPATIENT)
Dept: LAB | Facility: CLINIC | Age: 83
End: 2023-01-10

## 2023-01-10 DIAGNOSIS — E11.22 TYPE 2 DIABETES MELLITUS WITH STAGE 3A CHRONIC KIDNEY DISEASE, WITHOUT LONG-TERM CURRENT USE OF INSULIN (HCC): ICD-10-CM

## 2023-01-10 DIAGNOSIS — E11.69 HYPERLIPIDEMIA DUE TO TYPE 2 DIABETES MELLITUS (HCC): ICD-10-CM

## 2023-01-10 DIAGNOSIS — E78.5 HYPERLIPIDEMIA DUE TO TYPE 2 DIABETES MELLITUS (HCC): ICD-10-CM

## 2023-01-10 DIAGNOSIS — N18.31 TYPE 2 DIABETES MELLITUS WITH STAGE 3A CHRONIC KIDNEY DISEASE, WITHOUT LONG-TERM CURRENT USE OF INSULIN (HCC): ICD-10-CM

## 2023-01-10 LAB
ALBUMIN SERPL BCP-MCNC: 3.3 G/DL (ref 3.5–5)
ALP SERPL-CCNC: 66 U/L (ref 46–116)
ALT SERPL W P-5'-P-CCNC: 17 U/L (ref 12–78)
ANION GAP SERPL CALCULATED.3IONS-SCNC: 5 MMOL/L (ref 4–13)
AST SERPL W P-5'-P-CCNC: 14 U/L (ref 5–45)
BILIRUB SERPL-MCNC: 0.38 MG/DL (ref 0.2–1)
BUN SERPL-MCNC: 21 MG/DL (ref 5–25)
CALCIUM ALBUM COR SERPL-MCNC: 9.8 MG/DL (ref 8.3–10.1)
CALCIUM SERPL-MCNC: 9.2 MG/DL (ref 8.3–10.1)
CHLORIDE SERPL-SCNC: 104 MMOL/L (ref 96–108)
CHOLEST SERPL-MCNC: 162 MG/DL
CO2 SERPL-SCNC: 26 MMOL/L (ref 21–32)
CREAT SERPL-MCNC: 1.08 MG/DL (ref 0.6–1.3)
EST. AVERAGE GLUCOSE BLD GHB EST-MCNC: 120 MG/DL
GFR SERPL CREATININE-BSD FRML MDRD: 48 ML/MIN/1.73SQ M
GLUCOSE P FAST SERPL-MCNC: 97 MG/DL (ref 65–99)
HBA1C MFR BLD: 5.8 %
HDLC SERPL-MCNC: 37 MG/DL
LDLC SERPL CALC-MCNC: 79 MG/DL (ref 0–100)
NONHDLC SERPL-MCNC: 125 MG/DL
POTASSIUM SERPL-SCNC: 4.1 MMOL/L (ref 3.5–5.3)
PROT SERPL-MCNC: 7.9 G/DL (ref 6.4–8.4)
SODIUM SERPL-SCNC: 135 MMOL/L (ref 135–147)
TRIGL SERPL-MCNC: 228 MG/DL

## 2023-02-01 ENCOUNTER — RA CDI HCC (OUTPATIENT)
Dept: OTHER | Facility: HOSPITAL | Age: 83
End: 2023-02-01

## 2023-02-01 NOTE — PROGRESS NOTES
[facility] 75  coding opportunities          Chart Reviewed number of suggestions sent to Provider: 2   E11 22  E11 51    Patients Insurance     Medicare Insurance: [employer]

## 2023-02-06 ENCOUNTER — TELEPHONE (OUTPATIENT)
Dept: NEPHROLOGY | Facility: CLINIC | Age: 83
End: 2023-02-06

## 2023-02-06 NOTE — TELEPHONE ENCOUNTER
Appointment Confirmation   Person confirmed appointment with  If not patient, name of the person Patient    Date and time of appointment 2/7/23 10 am    Patient acknowledged and will be at appointment? yes    Did you advise the patient that they will need a urine sample if they are a new patient?  N/A    Did you advise the patient to bring their current medications for verification? (including any OTC) Yes    Additional Information

## 2023-02-07 ENCOUNTER — OFFICE VISIT (OUTPATIENT)
Dept: NEPHROLOGY | Facility: CLINIC | Age: 83
End: 2023-02-07

## 2023-02-07 VITALS
SYSTOLIC BLOOD PRESSURE: 132 MMHG | BODY MASS INDEX: 32.4 KG/M2 | WEIGHT: 150.2 LBS | HEIGHT: 57 IN | DIASTOLIC BLOOD PRESSURE: 80 MMHG

## 2023-02-07 DIAGNOSIS — E87.1 HYPONATREMIA: ICD-10-CM

## 2023-02-07 DIAGNOSIS — E11.22 TYPE 2 DIABETES MELLITUS WITH STAGE 3A CHRONIC KIDNEY DISEASE, WITHOUT LONG-TERM CURRENT USE OF INSULIN (HCC): ICD-10-CM

## 2023-02-07 DIAGNOSIS — N18.31 TYPE 2 DIABETES MELLITUS WITH STAGE 3A CHRONIC KIDNEY DISEASE, WITHOUT LONG-TERM CURRENT USE OF INSULIN (HCC): ICD-10-CM

## 2023-02-07 DIAGNOSIS — N18.32 STAGE 3B CHRONIC KIDNEY DISEASE (HCC): Primary | ICD-10-CM

## 2023-02-07 DIAGNOSIS — N18.30 BENIGN HYPERTENSION WITH CHRONIC KIDNEY DISEASE, STAGE III (HCC): ICD-10-CM

## 2023-02-07 DIAGNOSIS — E27.8 ADRENAL MASS (HCC): ICD-10-CM

## 2023-02-07 DIAGNOSIS — D50.9 IRON DEFICIENCY ANEMIA, UNSPECIFIED IRON DEFICIENCY ANEMIA TYPE: ICD-10-CM

## 2023-02-07 DIAGNOSIS — I12.9 BENIGN HYPERTENSION WITH CHRONIC KIDNEY DISEASE, STAGE III (HCC): ICD-10-CM

## 2023-02-07 NOTE — PATIENT INSTRUCTIONS
Blood pressure is stable   Continue same medications    -Recommend 2 g sodium diet  -Recommend daily exercise and weight loss  -Discussed home monitoring of BP and maintaining a log of blood pressure   -Recommend goal BP less than 130/80  Please inform me if SBP below 110 or above 140's persistently  -Renal Function is stable   -You have Chronic Kidney Disease Stage 3  -Avoid NSAIDs like Ibuprofen/Motrin, Naproxen/Aleve, Celebrex, meloxicam/Mobic, Diclofenac and other NSAIDs   -Okay to take Acetaminophen/Tylenol if you do not have any liver problems  -Avoid IV contrast used for CT scan and cardiac catheterization     -If plan for any study with IV contrast, please let me know so we could hydrate with fluids before and after IV contrast  -Dosage  of certain medications may need to be adjusted depending on Kidney function  Follow up in 6 months with repeat blood work and urine test before the office visit

## 2023-02-07 NOTE — PROGRESS NOTES
NEPHROLOGY OUTPATIENT PROGRESS NOTE   Chikis Dwyer [de-identified] y o  female MRN: [id number]  DATE: 2/7/2023  Reason for visit:   Chief Complaint   Patient presents with   • Follow-up   • Chronic Kidney Disease       ASSESSMENT and PLAN:  Chronic kidney disease stage IIIb  - baseline creatinine 1 0-1 1   Renal function is stable creatinine 1 08 mg/dL with stable electrolytes  -chronic kidney disease likely due to hypertensive nephrosclerosis and age-related nephron loss  -discussed about avoiding nephrotoxins like NSAIDs and IV contrast   -PTH level was within normal limit- 29 1  Upc ratio less than 0 12 on last urine test in July, will monitor before the next office visit  -Continue monitoring renal function   Repeat labs in 6 months     Primary HTN chronic kidney disease stage 3  -Blood pressure stable and is at goal   Continue lisinopril 2 5 mg daily  -continue 2 g sodium diet and home monitoring of blood pressure   Stressed again on home monitoring of blood pressure and calling me if systolic blood pressure more than 475 or diastolic more than 85        Hyponatremia-resolved  -not on medication which could cause hyponatremia, no history of malignancy  Last colonoscopy in 2017 did not suggest any malignancy  -workup showed normal serum osmolality  Urine sodium 99 and urine osmolality 422 in December 2021  -Was placed on fluid restriction 50 ounces per day and sodium level improved, last sodium level was at normal range at 135 mEq/L     Anemia, iron deficiency anemia  - hemoglobin 13 4 and improving   Iron saturation was 32% in October 2022    Previously was 15% in 2019  -Patient prefers not to be on iron tablet due to possibility of constipation   She does take oral vitamin with iron supplementation in it        Diabetes mellitus type 2 with ckd stage 3, currently she is off all medication  Paradise Eaves is on diet control  A1c improved to 5 8% in January 2023     Hyperlipidemia:  Lipid panel -LDL at goal at 79, triglyceride elevated at 228  -Currently on Zetia and fish oil    -Goal LDL should be less than 100 for CKD patients and LDL currently at goal  -Defer to PCP for monitoring of lipid panel and further management  -Recommend lifestyle modification       Thrombocytosis: Was referred to hematologist oncologist during the last office visit and work-up was done and suggestive of essential thrombocytosis and was recommended aspirin 81 mg daily and hydroxyurea 500 mg once daily  Continue follow-up and management per hematology oncology patient underwent bone marrow biopsy which was suggestive of essential thrombocythemia     Health maintenance, last colonoscopy in  October 2022 and was recommended for colonoscopy in 5 years  Polyp removed     Left Adrenal adenoma  -on MRI from July 2022 and last MRI from jan 2023- recommend follow up per PCP or may consider endocrine referral  Older MRI does not mention adrenal adenoma      Patient Instructions   Blood pressure is stable   Continue same medications    -Recommend 2 g sodium diet  -Recommend daily exercise and weight loss  -Discussed home monitoring of BP and maintaining a log of blood pressure   -Recommend goal BP less than 130/80  Please inform me if SBP below 110 or above 140's persistently  -Renal Function is stable   -You have Chronic Kidney Disease Stage 3  -Avoid NSAIDs like Ibuprofen/Motrin, Naproxen/Aleve, Celebrex, meloxicam/Mobic, Diclofenac and other NSAIDs   -Okay to take Acetaminophen/Tylenol if you do not have any liver problems  -Avoid IV contrast used for CT scan and cardiac catheterization     -If plan for any study with IV contrast, please let me know so we could hydrate with fluids before and after IV contrast  -Dosage  of certain medications may need to be adjusted depending on Kidney function  Follow up in 6 months with repeat blood work and urine test before the office visit          Diagnoses and all orders for this visit:    Stage 3b chronic kidney disease [facility])  -     Basic metabolic panel; Future  -     Protein / creatinine ratio, urine; Future  -     PTH, intact; Future  -     Urinalysis with microscopic; Future  -     Phosphorus; Future  -     Magnesium; Future  -     CBC; Future    Benign hypertension with chronic kidney disease, stage III (HCC)  -     Basic metabolic panel; Future  -     Protein / creatinine ratio, urine; Future    Hyponatremia  -     Basic metabolic panel; Future    Iron deficiency anemia, unspecified iron deficiency anemia type  -     CBC; Future    Type 2 diabetes mellitus with stage 3a chronic kidney disease, without long-term current use of insulin (HCC)  -     Basic metabolic panel; Future  -     Protein / creatinine ratio, urine; Future    Adrenal mass (HCC)            SUBJECTIVE / HPI:  Kalpana Li is a [de-identified] y o   female with medical issues of Type 2 diabetes mellitus- on diet control, Jewell Hurt presents for follow-up of chronic kidney disease  Was taking aleeve in the past but now off it  She used to be on lisinopril- hctz in may but that was stopped in May 2018 due to ROBERTA with elevated cr to 1 3  Stopped simvastatin since nov 15th  Was on it for two years    Was taking Aleve in the past but now off it     Rozanna Frankel has creatinine of 1 0-1 1 since 2016  Creatinine increased to 1 32 in May 2018  but improved  With stopping lisinopril hctz to creatinine 1 1  , recently renal function has been stable at creatinine 1 0-1 1    Labs from 1/10/23 reviewed-renal function stable at creatinine 1 08 mg/dL, EGFR 48  LDL is at goal at 79 but triglyceride elevated at 228  Hemoglobin improving to 13 4, platelet count still elevated at 479 and following with hematology oncology  Last A1c was 5 8 last UPC ratio was from July 2022 was less than 0 12    MRI from jan 2023 in [facility]- ADRENALS: LEFT adrenal gland lesion 1 3 x 1 7 cm demonstrates signal dropout on   in/out of phase imaging suggesting adrenal adenoma  -KIDNEYS: No hydronephrosis  Kidneys enhance symmetrically without solid renal   mass  Prominent fat within the LEFT renal pelvis most consistent with renal   Lipomatosis  -Hepatic steatosis, with multiple intrahepatic cysts       BP stable         REVIEW OF SYSTEMS:    Review of Systems   Constitutional: Negative for chills and fever  HENT: Negative for ear pain and sore throat  Eyes: Negative for pain and visual disturbance  Respiratory: Negative for cough and shortness of breath  Cardiovascular: Negative for chest pain and palpitations  Gastrointestinal: Negative for abdominal pain and vomiting  Genitourinary: Negative for dysuria and hematuria  Musculoskeletal: Negative for arthralgias and back pain  Skin: Negative for color change and rash  Neurological: Negative for seizures and syncope  All other systems reviewed and are negative  More than 10 point review of systems were obtained and discussed in length with the patient  Complete review of systems were negative / unremarkable except mentioned above  PAST MEDICAL HISTORY:  Past Medical History:   Diagnosis Date   • CKD (chronic kidney disease)    • Diabetes mellitus ([facility] [address] )     does not take medications, does not monitor blood sugars, pre-diabetic   • Disease of thyroid gland    • Diverticulosis    • GERD (gastroesophageal reflux disease)    • Hypertension    • Hyponatremia    • Ovarian cyst    • Rectal bleeding    • Wears reading eyeglasses        PAST SURGICAL HISTORY:  Past Surgical History:   Procedure Laterality Date   • BREAST BIOPSY Right 05/13/2009    Ultrasound Bx   Benign   • BREAST SURGERY     • CATARACT EXTRACTION Bilateral    • COLONOSCOPY     • ESOPHAGOGASTRODUODENOSCOPY     • IR BIOPSY BONE MARROW  3/18/2022   • OOPHORECTOMY Bilateral 2015   • MS CMBND ANTERPOST COLPORRAPHY W/CYSTO N/A 6/26/2018    Procedure: ANTERIOR & POSTERIOR COLPORRHAPHY;  Surgeon: Lili Dao MD;  [facility]: [facility];  Service: UroGynecology          • MS COLPOPEXY VAGINAL EXTRAPERITONEAL APPROACH N/A 6/26/2018    Procedure: ANTERIOR COLPOPEXY VAGINAL EXTRAPERITONEAL (VEC); Surgeon: Ahmad Bamberger, MD;  [facility];  Service: UroGynecology          • WI CYSTOURETHROSCOPY N/A 6/26/2018    Procedure: CYSTOSCOPY;  Surgeon: Ahmad Bamberger, MD;  [facility];  Service: UroGynecology          • WI SLING OPERATION STRESS INCONTINENCE N/A 6/26/2018    Procedure: INSERTION PUBOVAGINAL SLING (FEMALE);   Surgeon: Ahmad Bamberger, MD;  [facility];  Service: UroGynecology          • TONSILLECTOMY     • US GUIDED MSK PROCEDURE  11/25/2020       SOCIAL HISTORY:  Social History     Substance and Sexual Activity   Alcohol Use No     Social History     Substance and Sexual Activity   Drug Use No     Social History     Tobacco Use   Smoking Status Never   Smokeless Tobacco Never       FAMILY HISTORY:  Family History   Problem Relation Age of Onset   • Cervical cancer Mother 76   • Breast cancer Maternal Grandmother         Under 48   • Mental illness Son    • Schizophrenia Son    • Cancer Family    • No Known Problems Father    • No Known Problems Maternal Aunt    • No Known Problems Maternal Aunt    • Brain cancer Maternal Aunt         age unknown   • Cancer Cousin        MEDICATIONS:    Current Outpatient Medications:   •  aspirin 81 MG tablet, Take 81 mg by mouth daily  , Disp: , Rfl:   •  Calcium Carbonate-Vitamin D 600-200 MG-UNIT TABS, Take 1 tablet by mouth every other day  , Disp: , Rfl:   •  ezetimibe (ZETIA) 10 mg tablet, Take 1 tablet (10 mg total) by mouth daily, Disp: 90 tablet, Rfl: 1  •  famotidine (PEPCID) 40 MG tablet, Take 1 tablet (40 mg total) by mouth daily at bedtime, Disp: 90 tablet, Rfl: 1  •  folic acid (FOLVITE) 1 mg tablet, Take 1 tablet (1 mg total) by mouth daily, Disp: 90 tablet, Rfl: 2  •  Glucosamine-Chondroitin 250-200 MG TABS, Take 1 tablet by mouth daily  , Disp: , Rfl:   •  hydroxyurea (HYDREA) 500 mg capsule, Take 1 capsule (500 mg total) by mouth daily Monday through Friday only  , Disp: 90 capsule, Rfl: 1  •  levothyroxine 50 mcg tablet, Take 1 tablet (50 mcg total) by mouth daily, Disp: 90 tablet, Rfl: 1  •  lisinopril (ZESTRIL) 2 5 mg tablet, Take 1 tablet (2 5 mg total) by mouth daily, Disp: 90 tablet, Rfl: 3  •  magnesium citrate solution, Take 296 mL by mouth once, Disp: , Rfl:   •  Multiple Vitamins-Iron (DAILY MULTIPLE VITAMIN/IRON) TABS, Take 1 tablet by mouth daily  , Disp: , Rfl:   •  Omega-3 Fatty Acids ([employer] FISH OIL) 1200 MG CAPS, Take 1 capsule by mouth 2 (two) times a day, Disp: , Rfl:   •  Polyethylene Glycol 3350 (MIRALAX MIX-IN PAX PO), Take by mouth, Disp: , Rfl:   •  Wheat Dextrin (BENEFIBER DRINK MIX PO), Take by mouth, Disp: , Rfl:   •  docusate sodium (COLACE) 100 mg capsule, Take by mouth (Patient not taking: Reported on 8/11/2022), Disp: , Rfl:   •  promethazine-codeine (PHENERGAN WITH CODEINE) 6 25-10 mg/5 mL syrup, Take 5 mL by mouth every 4 (four) hours as needed for cough (Patient not taking: Reported on 10/4/2022), Disp: 120 mL, Rfl: 0      PHYSICAL EXAM:  Vitals:    02/07/23 0958   BP: 132/80   BP Location: Left arm   Patient Position: Sitting   Cuff Size: Adult   Weight: 68 1 kg (150 lb 3 2 oz)   Height: 4' 9" (1 448 m)     Body mass index is 32 5 kg/m²  Physical Exam  Constitutional:       General: She is not in acute distress  Appearance: Normal appearance  She is well-developed  HENT:      Head: Normocephalic and atraumatic  Nose: Nose normal       Mouth/Throat:      Mouth: Mucous membranes are moist    Eyes:      General: No scleral icterus  Conjunctiva/sclera: Conjunctivae normal       Pupils: Pupils are equal, round, and reactive to light  Neck:      Thyroid: No thyromegaly  Vascular: No JVD  Cardiovascular:      Rate and Rhythm: Normal rate and regular rhythm  Heart sounds: Normal heart sounds  No murmur heard  No friction rub     Pulmonary:      Effort: Pulmonary effort is normal  No respiratory distress  Breath sounds: Normal breath sounds  No wheezing or rales  Abdominal:      General: Bowel sounds are normal  There is no distension  Palpations: Abdomen is soft  Tenderness: There is no abdominal tenderness  Musculoskeletal:         General: No deformity  Cervical back: Neck supple  Right lower leg: No edema  Left lower leg: No edema  Skin:     General: Skin is warm and dry  Findings: No rash  Neurological:      Mental Status: She is alert and oriented to person, place, and time  Psychiatric:         Mood and Affect: Mood normal          Behavior: Behavior normal          Thought Content: Thought content normal          Lab Results:   Results for orders placed or performed in visit on 01/10/23   Lipid panel   Result Value Ref Range    Cholesterol 162 See Comment mg/dL    Triglycerides 228 (H) See Comment mg/dL    HDL, Direct 37 (L) >=50 mg/dL    LDL Calculated 79 0 - 100 mg/dL    Non-HDL-Chol (CHOL-HDL) 125 mg/dl   Comprehensive metabolic panel   Result Value Ref Range    Sodium 135 135 - 147 mmol/L    Potassium 4 1 3 5 - 5 3 mmol/L    Chloride 104 96 - 108 mmol/L    CO2 26 21 - 32 mmol/L    ANION GAP 5 4 - 13 mmol/L    BUN 21 5 - 25 mg/dL    Creatinine 1 08 0 60 - 1 30 mg/dL    Glucose, Fasting 97 65 - 99 mg/dL    Calcium 9 2 8 3 - 10 1 mg/dL    Corrected Calcium 9 8 8 3 - 10 1 mg/dL    AST 14 5 - 45 U/L    ALT 17 12 - 78 U/L    Alkaline Phosphatase 66 46 - 116 U/L    Total Protein 7 9 6 4 - 8 4 g/dL    Albumin 3 3 (L) 3 5 - 5 0 g/dL    Total Bilirubin 0 38 0 20 - 1 00 mg/dL    eGFR 48 ml/min/1 73sq m   Hemoglobin A1C   Result Value Ref Range    Hemoglobin A1C 5 8 (H) Normal 3 8-5 6%; PreDiabetic 5 7-6 4%;  Diabetic >=6 5%; Glycemic control for adults with diabetes <7 0% %     mg/dl

## 2023-02-08 ENCOUNTER — OFFICE VISIT (OUTPATIENT)
Dept: FAMILY MEDICINE CLINIC | Facility: CLINIC | Age: 83
End: 2023-02-08

## 2023-02-08 VITALS
WEIGHT: 150 LBS | BODY MASS INDEX: 32.36 KG/M2 | OXYGEN SATURATION: 96 % | HEIGHT: 57 IN | SYSTOLIC BLOOD PRESSURE: 122 MMHG | HEART RATE: 100 BPM | DIASTOLIC BLOOD PRESSURE: 68 MMHG

## 2023-02-08 DIAGNOSIS — I10 ESSENTIAL HYPERTENSION: ICD-10-CM

## 2023-02-08 DIAGNOSIS — N18.32 STAGE 3B CHRONIC KIDNEY DISEASE (HCC): ICD-10-CM

## 2023-02-08 DIAGNOSIS — E78.5 HYPERLIPIDEMIA, UNSPECIFIED HYPERLIPIDEMIA TYPE: ICD-10-CM

## 2023-02-08 DIAGNOSIS — E11.22 TYPE 2 DIABETES MELLITUS WITH STAGE 3A CHRONIC KIDNEY DISEASE, WITHOUT LONG-TERM CURRENT USE OF INSULIN (HCC): Primary | ICD-10-CM

## 2023-02-08 DIAGNOSIS — D47.3 ESSENTIAL THROMBOCYTOSIS (HCC): ICD-10-CM

## 2023-02-08 DIAGNOSIS — E27.8 ADRENAL MASS (HCC): ICD-10-CM

## 2023-02-08 DIAGNOSIS — N18.31 TYPE 2 DIABETES MELLITUS WITH STAGE 3A CHRONIC KIDNEY DISEASE, WITHOUT LONG-TERM CURRENT USE OF INSULIN (HCC): Primary | ICD-10-CM

## 2023-02-08 PROBLEM — E87.1 HYPONATREMIA: Status: RESOLVED | Noted: 2018-11-29 | Resolved: 2023-02-08

## 2023-02-08 NOTE — PROGRESS NOTES
Name: Erick Speaker      : 1940      MRN: [id number]  Encounter Provider: Avis Boyle MD  Encounter Date: 2023   Encounter department: [facility] PRIMARY CARE    Assessment & Plan     1  Type 2 diabetes mellitus with stage 3a chronic kidney disease, without long-term current use of insulin (HCC)  Assessment & Plan:    Lab Results   Component Value Date    HGBA1C 5 8 (H) 01/10/2023   bs stable    Orders:  -     Comprehensive metabolic panel; Future; Expected date: 05/10/2023  -     Hemoglobin A1C; Future; Expected date: 05/10/2023    2  Essential hypertension  Assessment & Plan:  BP stable      3  Stage 3b chronic kidney disease [facility])  Assessment & Plan:  Lab Results   Component Value Date    EGFR 48 01/10/2023    EGFR 43 2023    EGFR 41 2022    CREATININE 1 08 01/10/2023    CREATININE 1 18 2023    CREATININE 1 24 2022   stable lab      4  Adrenal mass ([address] )  Assessment & Plan:  Stable, repeat  6  months      5  Hyperlipidemia, unspecified hyperlipidemia type  Assessment & Plan:  Lab stable    Orders:  -     Lipid panel; Future; Expected date: 05/10/2023    6  Essential thrombocytosis (HCC)  Assessment & Plan:  stable           Subjective      F/u chronic  Health conditions, main complaint  Is  Fatigue, no cp, no sob    Review of Systems   Constitutional: Positive for fatigue and unexpected weight change  Negative for activity change and appetite change  3 lb gain   Respiratory: Negative for shortness of breath  Cardiovascular: Negative for chest pain  Neurological: Negative for dizziness and headaches  Psychiatric/Behavioral: The patient is not nervous/anxious          Current Outpatient Medications on File Prior to Visit   Medication Sig   • aspirin 81 MG tablet Take 81 mg by mouth daily     • Calcium Carbonate-Vitamin D 600-200 MG-UNIT TABS Take 1 tablet by mouth every other day     • ezetimibe (ZETIA) 10 mg tablet Take 1 tablet (10 mg total) by mouth daily   • famotidine (PEPCID) 40 MG tablet Take 1 tablet (40 mg total) by mouth daily at bedtime   • folic acid (FOLVITE) 1 mg tablet Take 1 tablet (1 mg total) by mouth daily   • Glucosamine-Chondroitin 250-200 MG TABS Take 1 tablet by mouth daily     • hydroxyurea (HYDREA) 500 mg capsule Take 1 capsule (500 mg total) by mouth daily Monday through Friday only  • lisinopril (ZESTRIL) 2 5 mg tablet Take 1 tablet (2 5 mg total) by mouth daily   • Multiple Vitamins-Iron (DAILY MULTIPLE VITAMIN/IRON) TABS Take 1 tablet by mouth daily     • Omega-3 Fatty Acids ([employer] FISH OIL) 1200 MG CAPS Take 1 capsule by mouth 2 (two) times a day   • Polyethylene Glycol 3350 (MIRALAX MIX-IN PAX PO) Take by mouth   • Wheat Dextrin (BENEFIBER DRINK MIX PO) Take by mouth   • docusate sodium (COLACE) 100 mg capsule Take by mouth (Patient not taking: Reported on 8/11/2022)   • levothyroxine 50 mcg tablet Take 1 tablet (50 mcg total) by mouth daily   • magnesium citrate solution Take 296 mL by mouth once (Patient not taking: Reported on 2/8/2023)   • [DISCONTINUED] promethazine-codeine (PHENERGAN WITH CODEINE) 6 25-10 mg/5 mL syrup Take 5 mL by mouth every 4 (four) hours as needed for cough (Patient not taking: Reported on 10/4/2022)       Objective     /68 (BP Location: Right arm, Patient Position: Sitting, Cuff Size: Large)   Pulse 100   Ht 4' 9" (1 448 m)   Wt 68 kg (150 lb)   SpO2 96%   BMI 32 46 kg/m²     Physical Exam  Vitals reviewed  Constitutional:       Appearance: Normal appearance  She is obese  Neck:      Vascular: No carotid bruit  Cardiovascular:      Rate and Rhythm: Normal rate and regular rhythm  Pulses: Normal pulses  Heart sounds: Normal heart sounds  Pulmonary:      Effort: Pulmonary effort is normal       Breath sounds: Normal breath sounds  Musculoskeletal:      Right lower leg: No edema  Left lower leg: No edema  Lymphadenopathy:      Cervical: No cervical adenopathy  Neurological:      Mental Status: She is alert         Fortunato Vaughn MD

## 2023-02-08 NOTE — ASSESSMENT & PLAN NOTE
Lab Results   Component Value Date    EGFR 48 01/10/2023    EGFR 43 01/04/2023    EGFR 41 09/07/2022    CREATININE 1 08 01/10/2023    CREATININE 1 18 01/04/2023    CREATININE 1 24 09/07/2022   stable lab

## 2023-02-13 DIAGNOSIS — I10 ESSENTIAL HYPERTENSION: ICD-10-CM

## 2023-02-13 DIAGNOSIS — R80.1 PERSISTENT PROTEINURIA: ICD-10-CM

## 2023-02-13 RX ORDER — LISINOPRIL 2.5 MG/1
2.5 TABLET ORAL DAILY
Qty: 90 TABLET | Refills: 3 | Status: SHIPPED | OUTPATIENT
Start: 2023-02-13 | End: 2024-02-13

## 2023-02-13 NOTE — TELEPHONE ENCOUNTER
Called patient and spoke with Mike Hay to let then know that lisinopril was approved and sent to the pharmacy

## 2023-02-13 NOTE — TELEPHONE ENCOUNTER
Received a call from patient requesting a refill of lisinopril 2 5 mg take 1 tablet daily  Sent to [facility] at [address]

## 2023-02-14 ENCOUNTER — TELEPHONE (OUTPATIENT)
Dept: HEMATOLOGY ONCOLOGY | Facility: CLINIC | Age: 83
End: 2023-02-14

## 2023-02-14 NOTE — TELEPHONE ENCOUNTER
Appointment Cancellation Or Reschedule     Person calling In self   If someone other than patient calling, are they listed on the communication consent form? self   Provider Lary Gamez   Office Visit Date and Time 4/7 11am   Office Visit Location [place]   Did patient want to reschedule their office appointment? If so, when was it scheduled to? Yes, 4/10 2:20pm [place]   Did you have STAR scheduled for this appointment? no   Do you need STAR set up for your new appointment? If yes, [facility] for [facility] rescheduling no   Is this patient calling to reschedule an infusion appointment? no   When is their next infusion appointment? no   Is this patient a Chemo patient? no   Reason for Cancellation or Reschedule SARAH from Dr Lary Gamez   Was No show policy reviewed with patient if patient canceling within 24 hours? yes     If the patient is cancelling an appointment and needs their STAR Transport cancelled, please route to [address]  If the patient is a treatment patient, please route this to the office nurse  If the patient is not on treatment, please route to the Clerical pool based on location  If the patient is a surgical oncology patient, please route to surg/onc clinical pool  Route message as high priority if same day cancellation

## 2023-03-06 DIAGNOSIS — E78.2 MIXED HYPERLIPIDEMIA: ICD-10-CM

## 2023-03-06 RX ORDER — EZETIMIBE 10 MG/1
10 TABLET ORAL DAILY
Qty: 90 TABLET | Refills: 1 | Status: SHIPPED | OUTPATIENT
Start: 2023-03-06

## 2023-04-04 ENCOUNTER — LAB (OUTPATIENT)
Dept: LAB | Facility: CLINIC | Age: 83
End: 2023-04-04

## 2023-04-04 DIAGNOSIS — Z15.89 DEFICIENCY OF DNA REPAIR: ICD-10-CM

## 2023-04-04 DIAGNOSIS — Z51.11 ENCOUNTER FOR ANTINEOPLASTIC CHEMOTHERAPY: ICD-10-CM

## 2023-04-04 DIAGNOSIS — N18.32 STAGE 3B CHRONIC KIDNEY DISEASE (HCC): ICD-10-CM

## 2023-04-04 DIAGNOSIS — D50.9 IRON DEFICIENCY ANEMIA, UNSPECIFIED IRON DEFICIENCY ANEMIA TYPE: ICD-10-CM

## 2023-04-04 LAB
ALBUMIN SERPL BCP-MCNC: 3.5 G/DL (ref 3.5–5)
ALP SERPL-CCNC: 69 U/L (ref 46–116)
ALT SERPL W P-5'-P-CCNC: 20 U/L (ref 12–78)
ANION GAP SERPL CALCULATED.3IONS-SCNC: 2 MMOL/L (ref 4–13)
AST SERPL W P-5'-P-CCNC: 15 U/L (ref 5–45)
BASOPHILS # BLD AUTO: 0.19 THOUSANDS/ÂΜL (ref 0–0.1)
BASOPHILS NFR BLD AUTO: 2 % (ref 0–1)
BILIRUB SERPL-MCNC: 0.37 MG/DL (ref 0.2–1)
BUN SERPL-MCNC: 19 MG/DL (ref 5–25)
CALCIUM SERPL-MCNC: 9.8 MG/DL (ref 8.3–10.1)
CHLORIDE SERPL-SCNC: 102 MMOL/L (ref 96–108)
CO2 SERPL-SCNC: 27 MMOL/L (ref 21–32)
CREAT SERPL-MCNC: 1.2 MG/DL (ref 0.6–1.3)
EOSINOPHIL # BLD AUTO: 0.27 THOUSAND/ÂΜL (ref 0–0.61)
EOSINOPHIL NFR BLD AUTO: 3 % (ref 0–6)
ERYTHROCYTE [DISTWIDTH] IN BLOOD BY AUTOMATED COUNT: 14.6 % (ref 11.6–15.1)
GFR SERPL CREATININE-BSD FRML MDRD: 42 ML/MIN/1.73SQ M
GLUCOSE P FAST SERPL-MCNC: 90 MG/DL (ref 65–99)
HCT VFR BLD AUTO: 40.7 % (ref 34.8–46.1)
HGB BLD-MCNC: 13.2 G/DL (ref 11.5–15.4)
IMM GRANULOCYTES # BLD AUTO: 0.03 THOUSAND/UL (ref 0–0.2)
IMM GRANULOCYTES NFR BLD AUTO: 0 % (ref 0–2)
LDH SERPL-CCNC: 191 U/L (ref 81–234)
LYMPHOCYTES # BLD AUTO: 2.76 THOUSANDS/ÂΜL (ref 0.6–4.47)
LYMPHOCYTES NFR BLD AUTO: 29 % (ref 14–44)
MCH RBC QN AUTO: 32.9 PG (ref 26.8–34.3)
MCHC RBC AUTO-ENTMCNC: 32.4 G/DL (ref 31.4–37.4)
MCV RBC AUTO: 102 FL (ref 82–98)
MONOCYTES # BLD AUTO: 0.69 THOUSAND/ÂΜL (ref 0.17–1.22)
MONOCYTES NFR BLD AUTO: 7 % (ref 4–12)
NEUTROPHILS # BLD AUTO: 5.54 THOUSANDS/ÂΜL (ref 1.85–7.62)
NEUTS SEG NFR BLD AUTO: 59 % (ref 43–75)
NRBC BLD AUTO-RTO: 0 /100 WBCS
PLATELET # BLD AUTO: 468 THOUSANDS/UL (ref 149–390)
PMV BLD AUTO: 9.2 FL (ref 8.9–12.7)
POTASSIUM SERPL-SCNC: 3.9 MMOL/L (ref 3.5–5.3)
PROT SERPL-MCNC: 8.3 G/DL (ref 6.4–8.4)
RBC # BLD AUTO: 4.01 MILLION/UL (ref 3.81–5.12)
SODIUM SERPL-SCNC: 131 MMOL/L (ref 135–147)
WBC # BLD AUTO: 9.48 THOUSAND/UL (ref 4.31–10.16)

## 2023-04-04 NOTE — RESULT ENCOUNTER NOTE
Please inform patient that the sodium level dropped to 131 from previous level of 135, renal function is stable at creatinine 1 2  Please stress  on fluid restriction 50 ounces per day  Please order for repeat BMP for hyponatremia to be done in a month

## 2023-04-05 ENCOUNTER — TELEPHONE (OUTPATIENT)
Dept: NEPHROLOGY | Facility: CLINIC | Age: 83
End: 2023-04-05

## 2023-04-05 DIAGNOSIS — N18.32 STAGE 3B CHRONIC KIDNEY DISEASE (HCC): Primary | ICD-10-CM

## 2023-04-05 NOTE — TELEPHONE ENCOUNTER
----- Message from Milton Jacobs MD sent at 4/4/2023  5:44 PM EDT -----  Please inform patient that the sodium level dropped to 131 from previous level of 135, renal function is stable at creatinine 1 2  Please stress  on fluid restriction 50 ounces per day  Please order for repeat BMP for hyponatremia to be done in a month

## 2023-04-05 NOTE — TELEPHONE ENCOUNTER
Orders placed for BMP to be done in a month  Please inform patient that she could take ibuprofen for about a week but would avoid long-term use due to risk of worsening renal function

## 2023-04-05 NOTE — TELEPHONE ENCOUNTER
Received phone call from patient stating that she was diagnosed with TMJ and was advised to take ibuprofen for the pain  Patient admits that she did buy some and has taken 2 pills so far and is wondering if she can continue this for a few days until her pain has subsided? Message about labs was also relayed to patient per Dr Genet Villanueva - pt had no questions but labs need to be placed

## 2023-04-06 ENCOUNTER — HOSPITAL ENCOUNTER (OUTPATIENT)
Dept: CT IMAGING | Facility: HOSPITAL | Age: 83
Discharge: HOME | End: 2023-04-06
Attending: INTERNAL MEDICINE

## 2023-04-06 DIAGNOSIS — Z79.899 HIGH RISK MEDICATION USE: ICD-10-CM

## 2023-04-06 DIAGNOSIS — R68.84 JAW PAIN: ICD-10-CM

## 2023-04-06 DIAGNOSIS — M81.0 AGE-RELATED OSTEOPOROSIS WITHOUT CURRENT PATHOLOGICAL FRACTURE: ICD-10-CM

## 2023-04-06 NOTE — TELEPHONE ENCOUNTER
I spoke to  Johnie  and made him aware that a BMP was ordered due in 1 month  She may take ibuprofen for a week but do not use for long term as it will affect the kidney

## 2023-04-07 NOTE — RESULT ENCOUNTER NOTE
Please let patient know that her CT reveals significant degenerative changes (wear and tear arthritis changes) at her TMJ joints  I don't think it's due to Prolia  But either way, I want her seen ASAP by [facility]  I touched based with the provider on call, Dr Astrid Mendez, who is saying she should call their office at [phone] and come as an emergency patient for evaluation of TMJ pain

## 2023-04-07 NOTE — RESULT ENCOUNTER NOTE
Called and spoke to pt  Relayed provider message  Pt verbalized understanding  Provided pt the phone number to call to make appt  Pt stated she will make the appt

## 2023-04-10 NOTE — TELEPHONE ENCOUNTER
Call from patient stating that she has been taking ibuprofen 1 x day for a week due TMJ and it has been very painful  Patient would like to know if there is anything else she can take that wont be harsh for her kidneys  She stated the ibuprofen did help a little that she has been taking for a week and advised to only take for a week  So she wants to know what else can she take

## 2023-04-11 NOTE — TELEPHONE ENCOUNTER
Would recommend discussion with her PCP  She can be prescribed percocet or tramadol  Also copying PCP on thie message  Would avoid long term use of NSAIDS     Thank you

## 2023-04-25 ENCOUNTER — TELEPHONE (OUTPATIENT)
Dept: INTERNAL MEDICINE CLINIC | Facility: CLINIC | Age: 83
End: 2023-04-25

## 2023-05-10 ENCOUNTER — LAB (OUTPATIENT)
Dept: LAB | Facility: CLINIC | Age: 83
End: 2023-05-10

## 2023-05-10 ENCOUNTER — TELEPHONE (OUTPATIENT)
Dept: NEPHROLOGY | Facility: HOSPITAL | Age: 83
End: 2023-05-10

## 2023-05-10 DIAGNOSIS — E27.8 ADRENAL MASS (HCC): ICD-10-CM

## 2023-05-10 DIAGNOSIS — E78.5 HYPERLIPIDEMIA, UNSPECIFIED HYPERLIPIDEMIA TYPE: ICD-10-CM

## 2023-05-10 DIAGNOSIS — E11.22 TYPE 2 DIABETES MELLITUS WITH STAGE 3A CHRONIC KIDNEY DISEASE, WITHOUT LONG-TERM CURRENT USE OF INSULIN (HCC): ICD-10-CM

## 2023-05-10 DIAGNOSIS — N18.31 STAGE 3A CHRONIC KIDNEY DISEASE (HCC): ICD-10-CM

## 2023-05-10 DIAGNOSIS — N18.31 TYPE 2 DIABETES MELLITUS WITH STAGE 3A CHRONIC KIDNEY DISEASE, WITHOUT LONG-TERM CURRENT USE OF INSULIN (HCC): ICD-10-CM

## 2023-05-10 DIAGNOSIS — N18.32 STAGE 3B CHRONIC KIDNEY DISEASE (HCC): ICD-10-CM

## 2023-05-10 LAB
25(OH)D3 SERPL-MCNC: 42.4 NG/ML (ref 30–100)
ALBUMIN SERPL BCP-MCNC: 3.3 G/DL (ref 3.5–5)
ALP SERPL-CCNC: 68 U/L (ref 46–116)
ALT SERPL W P-5'-P-CCNC: 17 U/L (ref 12–78)
ANION GAP SERPL CALCULATED.3IONS-SCNC: 3 MMOL/L (ref 4–13)
AST SERPL W P-5'-P-CCNC: 13 U/L (ref 5–45)
BILIRUB SERPL-MCNC: 0.26 MG/DL (ref 0.2–1)
BUN SERPL-MCNC: 18 MG/DL (ref 5–25)
CALCIUM ALBUM COR SERPL-MCNC: 9.7 MG/DL (ref 8.3–10.1)
CALCIUM SERPL-MCNC: 9.1 MG/DL (ref 8.3–10.1)
CHLORIDE SERPL-SCNC: 104 MMOL/L (ref 96–108)
CHOLEST SERPL-MCNC: 158 MG/DL
CO2 SERPL-SCNC: 27 MMOL/L (ref 21–32)
CORTIS AM PEAK SERPL-MCNC: 28.5 UG/DL (ref 4.2–22.4)
CREAT SERPL-MCNC: 1.22 MG/DL (ref 0.6–1.3)
CREAT UR-MCNC: 67.9 MG/DL
EST. AVERAGE GLUCOSE BLD GHB EST-MCNC: 123 MG/DL
GFR SERPL CREATININE-BSD FRML MDRD: 41 ML/MIN/1.73SQ M
GLUCOSE P FAST SERPL-MCNC: 88 MG/DL (ref 65–99)
HBA1C MFR BLD: 5.9 %
HDLC SERPL-MCNC: 34 MG/DL
LDLC SERPL CALC-MCNC: 77 MG/DL (ref 0–100)
MAGNESIUM SERPL-MCNC: 2.4 MG/DL (ref 1.6–2.6)
NONHDLC SERPL-MCNC: 124 MG/DL
PHOSPHATE SERPL-MCNC: 3.5 MG/DL (ref 2.3–4.1)
POTASSIUM SERPL-SCNC: 4.2 MMOL/L (ref 3.5–5.3)
PROT SERPL-MCNC: 8.3 G/DL (ref 6.4–8.4)
PROT UR-MCNC: 7 MG/DL
PROT/CREAT UR: 0.1 MG/G{CREAT} (ref 0–0.1)
PTH-INTACT SERPL-MCNC: 26.7 PG/ML (ref 18.4–80.1)
SODIUM SERPL-SCNC: 134 MMOL/L (ref 135–147)
TRIGL SERPL-MCNC: 236 MG/DL

## 2023-05-10 NOTE — RESULT ENCOUNTER NOTE
Please inform patient that renal function is stable at creatinine 1 22 mg/dL  Sodium level is slightly low but has improved to 134 meq/L compared to previous level of 131  Recommend fluid restriction 50 ounces per day    Continue current treatment

## 2023-05-10 NOTE — TELEPHONE ENCOUNTER
----- Message from Anson Espinoza MD sent at 5/10/2023  3:16 PM EDT -----  Please inform patient that renal function is stable at creatinine 1 22 mg/dL  Sodium level is slightly low but has improved to 134 meq/L compared to previous level of 131  Recommend fluid restriction 50 ounces per day    Continue current treatment

## 2023-05-11 ENCOUNTER — TELEPHONE (OUTPATIENT)
Dept: NEPHROLOGY | Facility: CLINIC | Age: 83
End: 2023-05-11

## 2023-05-11 NOTE — TELEPHONE ENCOUNTER
----- Message from Annalisa Gruber, [address] sent at 5/10/2023  3:35 PM EDT -----  Please let patient know labs have been reviewed  Kidney function remained stable  Stable at 134  No current changes in treatment  We will discuss further with her upcoming appointment in July

## 2023-05-11 NOTE — TELEPHONE ENCOUNTER
Jim Jaeger in regards to Dr Trotter's message, renal function is stable at creatinine 1 22 mg/dL  Sodium level is slightly low but has improved to 134 meq/L compared to previous level of 131  Recommend fluid restriction 50 ounces per day  Continue current treatment  I asked her to call back and verify that she got my message

## 2023-05-11 NOTE — TELEPHONE ENCOUNTER
Called patient and went over the following information:     Patients labs have been reviewed  Kidney function remained stable at 134  No current changes in treatment  We will discuss further with her upcoming appointment in July  Patient verbally understood and had no further questions for me at this time

## 2023-05-14 LAB — ALDOST SERPL-MCNC: 8.8 NG/DL (ref 0–30)

## 2023-05-15 DIAGNOSIS — E27.8 ADRENAL MASS (HCC): Primary | ICD-10-CM

## 2023-05-15 DIAGNOSIS — R79.89 ELEVATED MORNING SERUM CORTISOL LEVEL: ICD-10-CM

## 2023-05-19 ENCOUNTER — CONSULT (OUTPATIENT)
Dept: ENDOCRINOLOGY | Facility: CLINIC | Age: 83
End: 2023-05-19

## 2023-05-19 VITALS
HEART RATE: 81 BPM | HEIGHT: 57 IN | BODY MASS INDEX: 32.23 KG/M2 | WEIGHT: 149.4 LBS | DIASTOLIC BLOOD PRESSURE: 74 MMHG | SYSTOLIC BLOOD PRESSURE: 130 MMHG

## 2023-05-19 DIAGNOSIS — E27.8 ADRENAL INCIDENTALOMA (HCC): Primary | ICD-10-CM

## 2023-05-19 DIAGNOSIS — E27.8 ADRENAL MASS (HCC): ICD-10-CM

## 2023-05-19 DIAGNOSIS — R79.89 ELEVATED MORNING SERUM CORTISOL LEVEL: ICD-10-CM

## 2023-05-19 RX ORDER — DEXAMETHASONE 1 MG
1 TABLET ORAL ONCE
Qty: 1 TABLET | Refills: 0 | Status: SHIPPED | OUTPATIENT
Start: 2023-05-19 | End: 2023-05-19

## 2023-05-19 NOTE — PATIENT INSTRUCTIONS
Tests to be done on 2 different days  Day 1: Labs for ACTH, cortisol, DHEAS, renin, aldosterone and plasma metanephrines  Day 2: Lab for a m cortisol   1 mg dexamethasone suppression test (take 1 mg dexamethasone pill at 10 pm night prior to lab)

## 2023-05-22 ENCOUNTER — TELEPHONE (OUTPATIENT)
Dept: ENDOCRINOLOGY | Facility: CLINIC | Age: 83
End: 2023-05-22

## 2023-05-22 ENCOUNTER — TELEPHONE (OUTPATIENT)
Dept: OTHER | Facility: HOSPITAL | Age: 83
End: 2023-05-22

## 2023-05-22 NOTE — TELEPHONE ENCOUNTER
Pt left a message Requesting a call back, she has question regarding the new labs that you order, on of the order  Has directions of   Comments: This is a patient instruction: Patient should be ambulatory 30 minutes before specimen collection for this test  Patient should be taken off medications at least three weeks prior to sample collection  Per Pt this was not discuss at her appointment, she wants to know what medication she will need to stop taking, pt is planning to herminio her labs done this weekend

## 2023-05-22 NOTE — TELEPHONE ENCOUNTER
Called patient as requested  She was concerned that her AVS summary had said something about holding off medications prior to getting testing done and wanted more clarification on that  Discussed being aware she's on lisinopril, however given very low dose and low concerns for hyperfunctioning nodule, okay to proceed with ordered bloodwork as discussed during office visit

## 2023-05-24 ENCOUNTER — APPOINTMENT (OUTPATIENT)
Dept: LAB | Facility: CLINIC | Age: 83
End: 2023-05-24

## 2023-05-24 DIAGNOSIS — E27.8 ADRENAL INCIDENTALOMA (HCC): ICD-10-CM

## 2023-05-24 DIAGNOSIS — Z15.89 JAK2 GENE MUTATION: ICD-10-CM

## 2023-05-24 DIAGNOSIS — D47.3 ESSENTIAL THROMBOCYTOSIS (HCC): ICD-10-CM

## 2023-05-24 LAB
BASOPHILS # BLD AUTO: 0.17 THOUSANDS/ÂΜL (ref 0–0.1)
BASOPHILS NFR BLD AUTO: 2 % (ref 0–1)
CORTIS SERPL-MCNC: 12 UG/DL
EOSINOPHIL # BLD AUTO: 0.23 THOUSAND/ÂΜL (ref 0–0.61)
EOSINOPHIL NFR BLD AUTO: 3 % (ref 0–6)
ERYTHROCYTE [DISTWIDTH] IN BLOOD BY AUTOMATED COUNT: 15.7 % (ref 11.6–15.1)
HCT VFR BLD AUTO: 38.8 % (ref 34.8–46.1)
HGB BLD-MCNC: 12.7 G/DL (ref 11.5–15.4)
IMM GRANULOCYTES # BLD AUTO: 0.02 THOUSAND/UL (ref 0–0.2)
IMM GRANULOCYTES NFR BLD AUTO: 0 % (ref 0–2)
LYMPHOCYTES # BLD AUTO: 2.82 THOUSANDS/ÂΜL (ref 0.6–4.47)
LYMPHOCYTES NFR BLD AUTO: 34 % (ref 14–44)
MCH RBC QN AUTO: 33.9 PG (ref 26.8–34.3)
MCHC RBC AUTO-ENTMCNC: 32.7 G/DL (ref 31.4–37.4)
MCV RBC AUTO: 104 FL (ref 82–98)
MONOCYTES # BLD AUTO: 0.73 THOUSAND/ÂΜL (ref 0.17–1.22)
MONOCYTES NFR BLD AUTO: 9 % (ref 4–12)
NEUTROPHILS # BLD AUTO: 4.27 THOUSANDS/ÂΜL (ref 1.85–7.62)
NEUTS SEG NFR BLD AUTO: 52 % (ref 43–75)
NRBC BLD AUTO-RTO: 0 /100 WBCS
PLATELET # BLD AUTO: 442 THOUSANDS/UL (ref 149–390)
PMV BLD AUTO: 9.5 FL (ref 8.9–12.7)
RBC # BLD AUTO: 3.75 MILLION/UL (ref 3.81–5.12)
WBC # BLD AUTO: 8.24 THOUSAND/UL (ref 4.31–10.16)

## 2023-05-25 ENCOUNTER — APPOINTMENT (OUTPATIENT)
Dept: LAB | Facility: CLINIC | Age: 83
End: 2023-05-25

## 2023-05-25 DIAGNOSIS — E27.8 ADRENAL INCIDENTALOMA (HCC): ICD-10-CM

## 2023-05-25 LAB
ACTH PLAS-MCNC: 23.1 PG/ML (ref 7.2–63.3)
CORTIS AM PEAK SERPL-MCNC: 1.2 UG/DL (ref 6.7–22.6)
DHEA-S SERPL-MCNC: 92.9 UG/DL (ref 13.9–142.8)

## 2023-06-01 LAB
ALDOST SERPL-MCNC: 8.5 NG/DL (ref 0–30)
METANEPH FREE SERPL-MCNC: 26.4 PG/ML (ref 0–88)
NORMETANEPHRINE SERPL-MCNC: 65.8 PG/ML (ref 0–285.2)

## 2023-06-02 LAB — RENIN PLAS-CCNC: 0.8 NG/ML/HR (ref 0.17–5.38)

## 2023-06-05 ENCOUNTER — TELEPHONE (OUTPATIENT)
Dept: ENDOCRINOLOGY | Facility: CLINIC | Age: 83
End: 2023-06-05

## 2023-06-05 NOTE — RESULT ENCOUNTER NOTE
Kindly notify patient of adrenal work up labs review  All ordered labs came back normal suggestive of a non-functional (non hyperfunctioning) adrenal nodule  No new changes needed  Plan is to repeat imaging (CT abdomen and pelvis without contrast) in January 2024- one year from her previous MRI for surveillance as discussed during last office visit

## 2023-06-08 ENCOUNTER — RA CDI HCC (OUTPATIENT)
Dept: OTHER | Facility: HOSPITAL | Age: 83
End: 2023-06-08

## 2023-06-08 NOTE — PROGRESS NOTES
[address]  coding opportunities       Chart reviewed, no opportunity found: CHART REVIEWED, NO OPPORTUNITY FOUND        Patients Insurance     Medicare Insurance: Medicare

## 2023-06-21 ENCOUNTER — OFFICE VISIT (OUTPATIENT)
Dept: FAMILY MEDICINE CLINIC | Facility: CLINIC | Age: 83
End: 2023-06-21
Payer: MEDICARE

## 2023-06-21 ENCOUNTER — TELEPHONE (OUTPATIENT)
Dept: ADMINISTRATIVE | Facility: OTHER | Age: 83
End: 2023-06-21

## 2023-06-21 VITALS
HEIGHT: 57 IN | OXYGEN SATURATION: 94 % | SYSTOLIC BLOOD PRESSURE: 124 MMHG | BODY MASS INDEX: 31.99 KG/M2 | HEART RATE: 84 BPM | WEIGHT: 148.25 LBS | TEMPERATURE: 97.3 F | DIASTOLIC BLOOD PRESSURE: 72 MMHG

## 2023-06-21 DIAGNOSIS — E27.8 ADRENAL MASS (HCC): ICD-10-CM

## 2023-06-21 DIAGNOSIS — Z00.00 MEDICARE ANNUAL WELLNESS VISIT, SUBSEQUENT: Primary | ICD-10-CM

## 2023-06-21 DIAGNOSIS — E11.22 TYPE 2 DIABETES MELLITUS WITH STAGE 3A CHRONIC KIDNEY DISEASE, WITHOUT LONG-TERM CURRENT USE OF INSULIN (HCC): ICD-10-CM

## 2023-06-21 DIAGNOSIS — N18.31 TYPE 2 DIABETES MELLITUS WITH STAGE 3A CHRONIC KIDNEY DISEASE, WITHOUT LONG-TERM CURRENT USE OF INSULIN (HCC): ICD-10-CM

## 2023-06-21 PROCEDURE — 99214 OFFICE O/P EST MOD 30 MIN: CPT | Performed by: FAMILY MEDICINE

## 2023-06-21 PROCEDURE — G0439 PPPS, SUBSEQ VISIT: HCPCS | Performed by: FAMILY MEDICINE

## 2023-06-21 NOTE — LETTER
Diabetic Eye Exam Form    Date Requested: 23  Patient: Farheen Burris  Patient : 1940   Referring Provider: Katie Burton MD      DIABETIC Eye Exam Date _______________________________      Type of Exam MUST be documented for Diabetic Eye Exams. Please CHECK ONE. Retinal Exam       Dilated Retinal Exam       OCT       Optomap-Iris Exam      Fundus Photography       Left Eye - Please check Retinopathy or No Retinopathy        Exam did show retinopathy    Exam did not show retinopathy       Right Eye - Please check Retinopathy or No Retinopathy       Exam did show retinopathy    Exam did not show retinopathy       Comments __________________________________________________________    Practice Providing Exam ______________________________________________    Exam Performed By (print name) _______________________________________      Provider Signature ___________________________________________________      These reports are needed for  compliance. Please fax this completed form and a copy of the Diabetic Eye Exam report to our office located at [address] [place] [address] as soon as possible via Fax [phone] arie Bowen: Phone [phone]  We thank you for your assistance in treating our mutual patient.

## 2023-06-21 NOTE — PROGRESS NOTES
Assessment and Plan:     Problem List Items Addressed This Visit        Endocrine    Type 2 diabetes mellitus with stage 3 chronic kidney disease, without long-term current use of insulin ([facility] )       Lab Results   Component Value Date    HGBA1C 5 9 (H) 05/10/2023   stable         Relevant Orders    Albumin / creatinine urine ratio    Comprehensive metabolic panel    Hemoglobin A1C    Lipid panel       Other    Adrenal mass ([facility] )     stable        Other Visit Diagnoses     Medicare annual wellness visit, subsequent    -  Primary           Preventive health issues were discussed with patient, and age appropriate screening tests were ordered as noted in patient's After Visit Summary  Personalized health advice and appropriate referrals for health education or preventive services given if needed, as noted in patient's After Visit Summary  History of Present Illness:     Patient presents for a Medicare Wellness Visit    Here  For  F/u  Diabetes, lipids, htn, no cp  , no sob, no headaches, travelling  To [place] with daughter-in-law to visit  Children and  Grandchildren, just  [email or web address]  Podiatry last week, [facility]  Eye e xa     Patient Care Team:  Anupama Forte MD as PCP - General (Family Medicine)  MD Charlene Juarez MD Norm Leyland, MD (Nephrology)     Review of Systems:     Review of Systems   Constitutional: Negative for activity change, appetite change and fatigue  Respiratory: Negative for cough  Cardiovascular: Negative for chest pain  Neurological: Negative for dizziness and headaches          Problem List:     Patient Active Problem List   Diagnosis   • GERD (gastroesophageal reflux disease)   • Hyperlipidemia   • Hypothyroidism   • Type 2 diabetes mellitus with stage 3 chronic kidney disease, without long-term current use of insulin ([facility])   • Primary osteoarthritis of right knee   • Situational anxiety   • Urinary incontinence   • Essential hypertension   • Left knee pain   • Primary osteoarthritis of left knee   • Stage 3b chronic kidney disease (HCC)   • Iron deficiency anemia   • Essential thrombocytosis (HCC)   • Hyperlipidemia due to type 2 diabetes mellitus (HCC)   • Persistent proteinuria   • Iron deficiency   • JAK2 gene mutation   • Encounter for antineoplastic chemotherapy   • Neoplastic (malignant) related fatigue   • Adrenal mass [facility])      Past Medical and Surgical History:     Past Medical History:   Diagnosis Date   • CKD (chronic kidney disease)    • Diabetes mellitus (HCC)     does not take medications, does not monitor blood sugars, pre-diabetic   • Disease of thyroid gland    • Diverticulosis    • GERD (gastroesophageal reflux disease)    • Hypertension    • Hyponatremia    • Ovarian cyst    • Rectal bleeding    • Wears reading eyeglasses      Past Surgical History:   Procedure Laterality Date   • BREAST BIOPSY Right 05/13/2009    Ultrasound Bx  Benign   • BREAST SURGERY     • CATARACT EXTRACTION Bilateral    • COLONOSCOPY     • ESOPHAGOGASTRODUODENOSCOPY     • IR BIOPSY BONE MARROW  3/18/2022   • OOPHORECTOMY Bilateral 2015   • VT CMBND ANTERPOST COLPORRAPHY W/CYSTO N/A 6/26/2018    Procedure: ANTERIOR & POSTERIOR COLPORRHAPHY;  Surgeon: Faye Mack MD;  Location: [facility];  Service: UroGynecology          • VT COLPOPEXY VAGINAL EXTRAPERITONEAL APPROACH N/A 6/26/2018    Procedure: ANTERIOR COLPOPEXY VAGINAL EXTRAPERITONEAL (VEC); Surgeon: Faye Mack MD;  [facility];  Service: UroGynecology          • VT CYSTOURETHROSCOPY N/A 6/26/2018    Procedure: CYSTOSCOPY;  Surgeon: Faye Mack MD;  [facility];  Service: UroGynecology          • VT SLING OPERATION STRESS INCONTINENCE N/A 6/26/2018    Procedure: INSERTION PUBOVAGINAL SLING (FEMALE);   Surgeon: Faye Mack MD;  [facility];  Service: UroGynecology          • TONSILLECTOMY     • US GUIDED MSK PROCEDURE  11/25/2020      Family History:     Family History   Problem Relation Age of Onset   • Cervical cancer Mother 76   • Breast cancer Maternal Grandmother         Under 48   • Mental illness Son    • Schizophrenia Son    • Cancer Family    • No Known Problems Father    • No Known Problems Maternal Aunt    • No Known Problems Maternal Aunt    • Brain cancer Maternal Aunt         age unknown   • Cancer Cousin       Social History:     Social History     Socioeconomic History   • Marital status: /Civil Union     Spouse name: None   • Number of children: 2   • Years of education: None   • Highest education level: None   Occupational History   • Occupation: Retired    Tobacco Use   • Smoking status: Never   • Smokeless tobacco: Never   Substance and Sexual Activity   • Alcohol use: No   • Drug use: No   • Sexual activity: Never     Partners: Male     Birth control/protection: None   Other Topics Concern   • None   Social History Narrative    Active advance directive     Always uses seat belt     Lives with spouse/house     Patient has a living Will     Power of  in existence     Restoration     Supportive and safe          Social Determinants of Health     Financial Resource Strain: Low Risk  (6/21/2023)    Overall Financial Resource Strain (CARDIA)    • Difficulty of Paying Living Expenses: Not hard at all   Food Insecurity: Not on file   Transportation Needs: No Transportation Needs (6/21/2023)    PRAPARE - Transportation    • Lack of Transportation (Medical): No    • Lack of Transportation (Non-Medical):  No   Physical Activity: Not on file   Stress: Not on file   Social Connections: Not on file   Intimate Partner Violence: Not on file   Housing Stability: Not on file      Medications and Allergies:     Current Outpatient Medications   Medication Sig Dispense Refill   • aspirin 81 MG tablet Take 81 mg by mouth daily       • Calcium Carbonate-Vitamin D 600-200 MG-UNIT TABS Take 1 tablet by mouth every other day       • ezetimibe (ZETIA) 10 mg tablet Take 1 tablet (10 mg total) by mouth daily 90 tablet 1   • famotidine (PEPCID) 40 MG tablet Take 1 tablet (40 mg total) by mouth daily at bedtime 90 tablet 1   • folic acid (FOLVITE) 1 mg tablet Take 1 tablet (1 mg total) by mouth daily 90 tablet 2   • Glucosamine-Chondroitin 250-200 MG TABS Take 1 tablet by mouth daily       • hydroxyurea (HYDREA) 500 mg capsule Take 1 capsule (500 mg total) by mouth daily 90 capsule 1   • levothyroxine 50 mcg tablet Take 1 tablet (50 mcg total) by mouth daily 90 tablet 1   • lisinopril (ZESTRIL) 2 5 mg tablet Take 1 tablet (2 5 mg total) by mouth daily 90 tablet 3   • Multiple Vitamins-Iron (DAILY MULTIPLE VITAMIN/IRON) TABS Take 1 tablet by mouth daily       • Omega-3 Fatty Acids ([employer] FISH OIL) 1200 MG CAPS Take 1 capsule by mouth 2 (two) times a day     • Polyethylene Glycol 3350 (MIRALAX MIX-IN PAX PO) Take by mouth     • Wheat Dextrin (BENEFIBER DRINK MIX PO) Take by mouth     • docusate sodium (COLACE) 100 mg capsule Take by mouth (Patient not taking: Reported on 8/11/2022)     • magnesium citrate solution Take 296 mL by mouth once (Patient not taking: Reported on 4/6/2023)       No current facility-administered medications for this visit       No Known Allergies   Immunizations:     Immunization History   Administered Date(s) Administered   • COVID-19 PFIZER VACCINE 0 3 ML IM 02/05/2021, 02/26/2021, 11/18/2021   • INFLUENZA 10/03/2020, 10/04/2022   • Influenza Split High Dose Preservative Free IM 09/12/2014, 10/19/2015, 10/17/2016, 10/16/2017, 10/28/2019   • Influenza, high dose seasonal 0 7 mL 10/17/2018, 10/06/2021, 10/04/2022   • Influenza, seasonal, injectable 01/01/2011, 09/24/2012   • Pneumococcal Conjugate 13-Valent 04/17/2019   • Pneumococcal Polysaccharide PPV23 05/01/2010   • Tdap 09/21/2018   • Zoster 01/16/2008   • Zoster Vaccine Recombinant 03/03/2019, 06/09/2019      Health Maintenance:         Topic Date Due   • Colorectal Cancer Screening  10/12/2027 Topic Date Due   • COVID-19 Vaccine (4 - Pfizer series) 01/13/2022      Medicare Screening Tests and Risk Assessments:     Razia Rubi is here for her Subsequent Wellness visit  Health Risk Assessment:   Patient rates overall health as good  Patient feels that their physical health rating is same  Patient is satisfied with their life  Eyesight was rated as same  Hearing was rated as same  Patient feels that their emotional and mental health rating is same  Patients states they are never, rarely angry  Patient states they are never, rarely unusually tired/fatigued  Pain experienced in the last 7 days has been none  Patient states that she has experienced no weight loss or gain in last 6 months  Depression Screening:   PHQ-2 Score: 0      Fall Risk Screening: In the past year, patient has experienced: no history of falling in past year      Urinary Incontinence Screening:   Patient has not leaked urine accidently in the last six months  Home Safety:  Patient does not have trouble with stairs inside or outside of their home  Patient has working smoke alarms and has working carbon monoxide detector  Home safety hazards include: none  Nutrition:   Current diet is Regular  Medications:   Patient is currently taking over-the-counter supplements  OTC medications include: see medication list  Patient is not able to manage medications  Activities of Daily Living (ADLs)/Instrumental Activities of Daily Living (IADLs):   Walk and transfer into and out of bed and chair?: Yes  Dress and groom yourself?: Yes    Bathe or shower yourself?: Yes    Feed yourself? Yes  Do your laundry/housekeeping?: Yes  Manage your money, pay your bills and track your expenses?: Yes  Make your own meals?: Yes    Do your own shopping?: Yes    Previous Hospitalizations:   Any hospitalizations or ED visits within the last 12 months?: No      Advance Care Planning:   Living will: Yes    Durable POA for healthcare:  Yes    Advanced "directive: Yes      Comments:   CRISTOBAL,secondary Hu  Her  son    Cognitive Screening:   Provider or family/friend/caregiver concerned regarding cognition?: No    PREVENTIVE SCREENINGS      Cardiovascular Screening:    General: History Lipid Disorder and Screening Current      Diabetes Screening:     General: History Diabetes and Screening Current      Colorectal Cancer Screening:     General: Screening Current      Breast Cancer Screening:     General: Screening Current      Cervical Cancer Screening:    General: Screening Not Indicated      Osteoporosis Screening:    General: History Osteoporosis and Screening Current      Abdominal Aortic Aneurysm (AAA) Screening:        General: Screening Not Indicated      Lung Cancer Screening:     General: Screening Not Indicated    Screening, Brief Intervention, and Referral to Treatment (SBIRT)    Screening  Typical number of drinks in a day: 0  Typical number of drinks in a week: 0  Interpretation: Low risk drinking behavior  Single Item Drug Screening:  How often have you used an illegal drug (including marijuana) or a prescription medication for non-medical reasons in the past year? never    Single Item Drug Screen Score: 0  Interpretation: Negative screen for possible drug use disorder    No results found  Physical Exam:     /72 (BP Location: Left arm, Patient Position: Sitting, Cuff Size: Large)   Pulse 84   Temp (!) 97 3 °F (36 3 °C) (Temporal)   Ht 4' 9\" (1 448 m)   Wt 67 2 kg (148 lb 4 oz)   SpO2 94%   BMI 32 08 kg/m²     Physical Exam  Vitals reviewed  Constitutional:       Appearance: Normal appearance  She is obese  Cardiovascular:      Rate and Rhythm: Normal rate and regular rhythm  Pulses: Normal pulses  Heart sounds: Normal heart sounds  Pulmonary:      Effort: Pulmonary effort is normal       Breath sounds: Normal breath sounds  Musculoskeletal:      Right lower leg: No edema  Left lower leg: No edema   " Lymphadenopathy:      Cervical: No cervical adenopathy  Neurological:      Mental Status: She is alert     Psychiatric:         Mood and Affect: Mood normal           Stella Loya MD

## 2023-06-21 NOTE — LETTER
Diabetic Eye Exam Form  Second Attempt    Date Requested: 23  Patient: Adelaida Limb  Patient : 1940   Referring Provider: Ernesto Ahumada MD      DIABETIC Eye Exam Date _______________________________      Type of Exam MUST be documented for Diabetic Eye Exams. Please CHECK ONE. Retinal Exam       Dilated Retinal Exam       OCT       Optomap-Iris Exam      Fundus Photography       Left Eye - Please check Retinopathy or No Retinopathy        Exam did show retinopathy    Exam did not show retinopathy       Right Eye - Please check Retinopathy or No Retinopathy       Exam did show retinopathy    Exam did not show retinopathy       Comments __________________________________________________________    Practice Providing Exam ______________________________________________    Exam Performed By (print name) _______________________________________      Provider Signature ___________________________________________________      These reports are needed for  compliance. Please fax this completed form and a copy of the Diabetic Eye Exam report to our office located at [address] [place] [address] as soon as possible via Fax [phone] attention Ayaka Ogden: Phone [phone]  We thank you for your assistance in treating our mutual patient.

## 2023-06-21 NOTE — TELEPHONE ENCOUNTER
Upon review of the In Basket request and the patient's chart, initial outreach has been made via fax to facility. Please see Contacts section for details.      Thank you  Lyudmila Murguia

## 2023-06-21 NOTE — TELEPHONE ENCOUNTER
----- Message from Larry Perez sent at 6/21/2023  8:48 AM EDT -----  Regarding: diabetic eye exam  06/21/23 8:49 AM    Hello, our patient Nabil Lopez has had Diabetic Eye Exam completed/performed. Please assist in updating the patient chart by making an External outreach to Dr. Olga Salinas facility located in [address] [place] [address]. The date of service is 4/2023.     Thank you,  Larry Perez  [facility]

## 2023-06-29 NOTE — TELEPHONE ENCOUNTER
As a follow-up, a second attempt has been made for outreach via fax to facility. Please see Contacts section for details.     Thank you  Wilber Grimes

## 2023-07-07 NOTE — TELEPHONE ENCOUNTER
As a final attempt, a third outreach has been made via telephone call to facility. Please see Contacts section for details. This encounter will be closed and completed by end of day. Should we receive the requested information because of previous outreach attempts, the requested patient's chart will be updated appropriately.      Thank you  Sandeep Guidry

## 2023-07-10 ENCOUNTER — APPOINTMENT (OUTPATIENT)
Dept: LAB | Facility: CLINIC | Age: 83
End: 2023-07-10
Payer: MEDICARE

## 2023-07-10 DIAGNOSIS — D47.3 ESSENTIAL THROMBOCYTOSIS (HCC): ICD-10-CM

## 2023-07-10 DIAGNOSIS — Z15.89 JAK2 GENE MUTATION: ICD-10-CM

## 2023-07-10 LAB
BASOPHILS # BLD AUTO: 0.1 THOUSANDS/ÂΜL (ref 0–0.1)
BASOPHILS NFR BLD AUTO: 1 % (ref 0–1)
EOSINOPHIL # BLD AUTO: 0.22 THOUSAND/ÂΜL (ref 0–0.61)
EOSINOPHIL NFR BLD AUTO: 2 % (ref 0–6)
ERYTHROCYTE [DISTWIDTH] IN BLOOD BY AUTOMATED COUNT: 15.4 % (ref 11.6–15.1)
HCT VFR BLD AUTO: 40.2 % (ref 34.8–46.1)
HGB BLD-MCNC: 13.2 G/DL (ref 11.5–15.4)
IMM GRANULOCYTES # BLD AUTO: 0.02 THOUSAND/UL (ref 0–0.2)
IMM GRANULOCYTES NFR BLD AUTO: 0 % (ref 0–2)
LYMPHOCYTES # BLD AUTO: 3.14 THOUSANDS/ÂΜL (ref 0.6–4.47)
LYMPHOCYTES NFR BLD AUTO: 33 % (ref 14–44)
MCH RBC QN AUTO: 33.8 PG (ref 26.8–34.3)
MCHC RBC AUTO-ENTMCNC: 32.8 G/DL (ref 31.4–37.4)
MCV RBC AUTO: 103 FL (ref 82–98)
MONOCYTES # BLD AUTO: 0.74 THOUSAND/ÂΜL (ref 0.17–1.22)
MONOCYTES NFR BLD AUTO: 8 % (ref 4–12)
NEUTROPHILS # BLD AUTO: 5.19 THOUSANDS/ÂΜL (ref 1.85–7.62)
NEUTS SEG NFR BLD AUTO: 56 % (ref 43–75)
NRBC BLD AUTO-RTO: 0 /100 WBCS
PLATELET # BLD AUTO: 473 THOUSANDS/UL (ref 149–390)
PMV BLD AUTO: 9.4 FL (ref 8.9–12.7)
RBC # BLD AUTO: 3.91 MILLION/UL (ref 3.81–5.12)
WBC # BLD AUTO: 9.41 THOUSAND/UL (ref 4.31–10.16)

## 2023-07-10 PROCEDURE — 36415 COLL VENOUS BLD VENIPUNCTURE: CPT

## 2023-07-10 PROCEDURE — 85025 COMPLETE CBC W/AUTO DIFF WBC: CPT

## 2023-07-20 ENCOUNTER — TELEPHONE (OUTPATIENT)
Dept: NEPHROLOGY | Facility: CLINIC | Age: 83
End: 2023-07-20

## 2023-07-20 NOTE — TELEPHONE ENCOUNTER
Called and spoke with [employer] to complete their bloodwork prior to their appointment on 7/28 with Dr. Joanna Henderson at the [facility].

## 2023-07-25 ENCOUNTER — APPOINTMENT (OUTPATIENT)
Dept: LAB | Facility: CLINIC | Age: 83
End: 2023-07-25
Payer: MEDICARE

## 2023-07-25 DIAGNOSIS — E11.22 TYPE 2 DIABETES MELLITUS WITH STAGE 3A CHRONIC KIDNEY DISEASE, WITHOUT LONG-TERM CURRENT USE OF INSULIN (HCC): ICD-10-CM

## 2023-07-25 DIAGNOSIS — N18.31 TYPE 2 DIABETES MELLITUS WITH STAGE 3A CHRONIC KIDNEY DISEASE, WITHOUT LONG-TERM CURRENT USE OF INSULIN (HCC): ICD-10-CM

## 2023-07-25 DIAGNOSIS — E87.1 HYPONATREMIA: ICD-10-CM

## 2023-07-25 DIAGNOSIS — I12.9 BENIGN HYPERTENSION WITH CHRONIC KIDNEY DISEASE, STAGE III (HCC): ICD-10-CM

## 2023-07-25 DIAGNOSIS — N18.32 STAGE 3B CHRONIC KIDNEY DISEASE (HCC): ICD-10-CM

## 2023-07-25 DIAGNOSIS — N18.30 BENIGN HYPERTENSION WITH CHRONIC KIDNEY DISEASE, STAGE III (HCC): ICD-10-CM

## 2023-07-25 LAB
ANION GAP SERPL CALCULATED.3IONS-SCNC: 6 MMOL/L
BACTERIA UR QL AUTO: ABNORMAL /HPF
BILIRUB UR QL STRIP: NEGATIVE
BUN SERPL-MCNC: 19 MG/DL (ref 5–25)
CALCIUM SERPL-MCNC: 9.3 MG/DL (ref 8.3–10.1)
CHLORIDE SERPL-SCNC: 104 MMOL/L (ref 96–108)
CLARITY UR: CLEAR
CO2 SERPL-SCNC: 25 MMOL/L (ref 21–32)
COLOR UR: ABNORMAL
CREAT SERPL-MCNC: 1.21 MG/DL (ref 0.6–1.3)
CREAT UR-MCNC: 49.6 MG/DL
GFR SERPL CREATININE-BSD FRML MDRD: 42 ML/MIN/1.73SQ M
GLUCOSE P FAST SERPL-MCNC: 95 MG/DL (ref 65–99)
GLUCOSE UR STRIP-MCNC: NEGATIVE MG/DL
HGB UR QL STRIP.AUTO: NEGATIVE
KETONES UR STRIP-MCNC: NEGATIVE MG/DL
LEUKOCYTE ESTERASE UR QL STRIP: ABNORMAL
MAGNESIUM SERPL-MCNC: 2.5 MG/DL (ref 1.6–2.6)
NITRITE UR QL STRIP: NEGATIVE
NON-SQ EPI CELLS URNS QL MICRO: ABNORMAL /HPF
PH UR STRIP.AUTO: 6 [PH]
PHOSPHATE SERPL-MCNC: 3.9 MG/DL (ref 2.3–4.1)
POTASSIUM SERPL-SCNC: 4.3 MMOL/L (ref 3.5–5.3)
PROT UR STRIP-MCNC: NEGATIVE MG/DL
PROT UR-MCNC: <6 MG/DL
PTH-INTACT SERPL-MCNC: 28.3 PG/ML (ref 12–88)
RBC #/AREA URNS AUTO: ABNORMAL /HPF
SODIUM SERPL-SCNC: 135 MMOL/L (ref 135–147)
SP GR UR STRIP.AUTO: 1.01 (ref 1–1.03)
UROBILINOGEN UR STRIP-ACNC: <2 MG/DL
WBC #/AREA URNS AUTO: ABNORMAL /HPF

## 2023-07-25 PROCEDURE — 80048 BASIC METABOLIC PNL TOTAL CA: CPT

## 2023-07-25 PROCEDURE — 84100 ASSAY OF PHOSPHORUS: CPT

## 2023-07-25 PROCEDURE — 82570 ASSAY OF URINE CREATININE: CPT

## 2023-07-25 PROCEDURE — 83735 ASSAY OF MAGNESIUM: CPT

## 2023-07-25 PROCEDURE — 81001 URINALYSIS AUTO W/SCOPE: CPT

## 2023-07-25 PROCEDURE — 36415 COLL VENOUS BLD VENIPUNCTURE: CPT

## 2023-07-25 PROCEDURE — 83970 ASSAY OF PARATHORMONE: CPT

## 2023-07-25 PROCEDURE — 84156 ASSAY OF PROTEIN URINE: CPT

## 2023-07-28 ENCOUNTER — OFFICE VISIT (OUTPATIENT)
Dept: NEPHROLOGY | Facility: CLINIC | Age: 83
End: 2023-07-28

## 2023-07-28 VITALS
BODY MASS INDEX: 31.71 KG/M2 | WEIGHT: 147 LBS | SYSTOLIC BLOOD PRESSURE: 124 MMHG | DIASTOLIC BLOOD PRESSURE: 72 MMHG | HEIGHT: 57 IN

## 2023-07-28 DIAGNOSIS — E11.22 CONTROLLED TYPE 2 DIABETES MELLITUS WITH STAGE 3 CHRONIC KIDNEY DISEASE, WITHOUT LONG-TERM CURRENT USE OF INSULIN (HCC): ICD-10-CM

## 2023-07-28 DIAGNOSIS — N18.32 STAGE 3B CHRONIC KIDNEY DISEASE (HCC): Primary | ICD-10-CM

## 2023-07-28 DIAGNOSIS — I12.9 BENIGN HYPERTENSION WITH CHRONIC KIDNEY DISEASE, STAGE III (HCC): ICD-10-CM

## 2023-07-28 DIAGNOSIS — E87.1 HYPONATREMIA: ICD-10-CM

## 2023-07-28 DIAGNOSIS — N18.30 CONTROLLED TYPE 2 DIABETES MELLITUS WITH STAGE 3 CHRONIC KIDNEY DISEASE, WITHOUT LONG-TERM CURRENT USE OF INSULIN (HCC): ICD-10-CM

## 2023-07-28 DIAGNOSIS — N18.30 BENIGN HYPERTENSION WITH CHRONIC KIDNEY DISEASE, STAGE III (HCC): ICD-10-CM

## 2023-07-28 NOTE — PROGRESS NOTES
NEPHROLOGY OUTPATIENT PROGRESS NOTE   Zac Manzanares 82 y.o. female MRN: [id number]  DATE: 7/28/2023  Reason for visit:   Chief Complaint   Patient presents with   • Follow-up   • Chronic Kidney Disease       ASSESSMENT and PLAN:  Chronic kidney disease stage IIIb  - baseline creatinine 1.0-1.2.  Creatinine has been around 1.2 since April 2023, electrolytes stable, continue to monitor. Continue to avoid nephrotoxins like NSAIDs and IV contrast, continue oral hydration   -chronic kidney disease likely due to hypertensive nephrosclerosis and age-related nephron loss  -discussed about avoiding nephrotoxins like NSAIDs and IV contrast.  -PTH level at normal limit, no proteinuria on urine test  -Continue monitoring renal function.  Repeat labs in 6 months     Primary HTN chronic kidney disease stage 3  -BP stable and is at goal, continue lisinopril 2.5 mg daily  -continue 2 g sodium diet and home monitoring of blood pressure.  Stressed again on home monitoring of blood pressure and calling me if systolic blood pressure more than 282 or diastolic more than 85.       Hyponatremia-resolved  -not on medication which could cause hyponatremia, no history of malignancy. Last colonoscopy in 2017 did not suggest any malignancy  -workup showed normal serum osmolality. Urine sodium 99 and urine osmolality 422 in December 2021  -Last sodium level improved to 135, continue fluid restriction 50 ounces per day.     Anemia, iron deficiency anemia-resolved  - hemoglobin 13.2 and improving . Iron saturation was 32% in October 2022.   Previously was 15% in 2019  -Patient prefers not to be on iron tablet due to possibility of constipation.  She does take oral vitamin with iron supplementation in it.       Diabetes mellitus type 2 with ckd stage 3, currently she is off all medication. Meliza Butcher is on diet control. A1c 5.9 in May 2023     Hyperlipidemia:  Lipid panel -LDL at goal at 77, triglyceride elevated at 236  -Currently on Zetia and fish oil.   -Goal LDL should be less than 100 for CKD patients and LDL currently at goal.   -Defer to PCP for monitoring of lipid panel and further management. -Recommend lifestyle modification       Thrombocytosis: Was referred to hematologist oncologist during the last office visit and work-up was done and suggestive of essential thrombocytosis and was recommended aspirin 81 mg daily and hydroxyurea 500 mg once daily. -patient underwent bone marrow biopsy which was suggestive of essential thrombocythemia  -Continue follow-up and management per hematology oncology     Left Adrenal adenoma  - MRI from jan 2023- LEFT adrenal gland lesion 1.3 x 1.7 cm demonstrates signal dropout on in/out of phase imaging suggesting adrenal adenoma.   -hormone work-up was done by endocrine  -noted plan for CT without contrast in jan 2024     Health maintenance, last colonoscopy in  October 2022 and was recommended for colonoscopy in 5 years. Polyp removed         Patient Instructions   -Renal Function is stable   -You have Chronic Kidney Disease Stage  3  -Avoid NSAIDs like Ibuprofen/Motrin, Naproxen/Aleve, Celebrex, meloxicam/Mobic, Diclofenac and other NSAIDs.  -Okay to take Acetaminophen/Tylenol if you do not have any liver problems  -Avoid IV contrast used for CT scan and cardiac catheterization.    -If plan for any study with IV contrast, please let me know so we could hydrate with fluids before and after IV contrast  -Dosage  of certain medications may need to be adjusted depending on Kidney function. Blood pressure is stable    -Recommend 2 g sodium diet. -Recommend daily exercise and weight loss  -Discussed home monitoring of BP and maintaining a log of blood pressure.  -Recommend goal BP less than 130/80. Please inform me if SBP below 110 or above 140's persistently. Fluid restriction 50 oz/day. Follow up: 6  months with repeat Lab work within a week of the scheduled office visit.  Will discuss the results of the previsit Labs during the office visit. Diagnoses and all orders for this visit:    Stage 3b chronic kidney disease ([address])  -     Basic metabolic panel; Future  -     Protein / creatinine ratio, urine; Future  -     Urinalysis with microscopic; Future  -     Albumin / creatinine urine ratio; Future  -     Magnesium; Future  -     Phosphorus; Future  -     CBC; Future  -     PTH, intact; Future    Benign hypertension with chronic kidney disease, stage III (HCC)  -     Basic metabolic panel; Future  -     Protein / creatinine ratio, urine; Future  -     Albumin / creatinine urine ratio; Future    Hyponatremia  -     Basic metabolic panel; Future    Controlled type 2 diabetes mellitus with stage 3 chronic kidney disease, without long-term current use of insulin (HCC)  -     Basic metabolic panel; Future  -     Protein / creatinine ratio, urine; Future  -     Urinalysis with microscopic; Future  -     Albumin / creatinine urine ratio; Future            SUBJECTIVE / HPI:  Jelani Hawthorne is a 82 y.o.  female with medical issues of Type 2 diabetes mellitus- on diet control, Adán Anne presents for follow-up of chronic kidney disease. Was taking aleeve in the past but now off it. She used to be on lisinopril- hctz in may but that was stopped in May 2018 due to ROBERTA with elevated cr to 1.3. Stopped simvastatin since nov 15th. Was on it for two years.   Was taking Aleve in the past but now off it.    Kelsey Awad has creatinine of 1.0-1.1 since 2016. Creatinine increased to 1.32 in May 2018  but improved  With stopping lisinopril hctz to creatinine 1.1  , recently renal function has been stable at creatinine 1.0-1.2    [facility] ADRENALS: LEFT adrenal gland lesion 1.3 x 1.7 cm demonstrates signal dropout on   in/out of phase imaging suggesting adrenal adenoma. -KIDNEYS: No hydronephrosis. Kidneys enhance symmetrically without solid renal   mass.  Prominent fat within the LEFT renal pelvis most consistent with renal   Lipomatosis  -Hepatic steatosis, with multiple intrahepatic cysts       Reviewed labs from July 25, 2023 creatinine 1.25, stable electrolytes normal phosphorus magnesium, PTH 28.3, no proteinuria on the urine test but UA with 4-10 WBC but no UTI symptoms, sodium level improved to normal range with fluid restriction  Patient denies any new symptoms       REVIEW OF SYSTEMS:    Review of Systems   Constitutional: Negative for chills and fever. HENT: Negative for ear pain and sore throat. Eyes: Negative for pain and visual disturbance. Respiratory: Negative for cough and shortness of breath. Cardiovascular: Negative for chest pain and palpitations. Gastrointestinal: Negative for abdominal pain and vomiting. Genitourinary: Negative for dysuria and hematuria. Musculoskeletal: Negative for arthralgias and back pain. Skin: Negative for color change and rash. Neurological: Negative for seizures and syncope. All other systems reviewed and are negative. More than 10 point review of systems were obtained and discussed in length with the patient. Complete review of systems were negative / unremarkable except mentioned above. PAST MEDICAL HISTORY:  Past Medical History:   Diagnosis Date   • CKD (chronic kidney disease)    • Diabetes mellitus ([address])     does not take medications, does not monitor blood sugars, pre-diabetic   • Disease of thyroid gland    • Diverticulosis    • GERD (gastroesophageal reflux disease)    • Hypertension    • Hyponatremia    • Ovarian cyst    • Rectal bleeding    • Wears reading eyeglasses        PAST SURGICAL HISTORY:  Past Surgical History:   Procedure Laterality Date   • BREAST BIOPSY Right 05/13/2009    Ultrasound Bx.  Benign   • BREAST SURGERY     • CATARACT EXTRACTION Bilateral    • COLONOSCOPY     • ESOPHAGOGASTRODUODENOSCOPY     • IR BIOPSY BONE MARROW  3/18/2022   • OOPHORECTOMY Bilateral 2015   • [place] CMBND ANTERPOST COLPORRAPHY W/CYSTO N/A 6/26/2018 Procedure: ANTERIOR & POSTERIOR COLPORRHAPHY;  Surgeon: Wei Gil MD;  Location: [facility];  Service: UroGynecology          • SC COLPOPEXY VAGINAL EXTRAPERITONEAL APPROACH N/A 6/26/2018    Procedure: ANTERIOR COLPOPEXY VAGINAL EXTRAPERITONEAL (VEC); Surgeon: Wei Gil MD;  Location[facility];  Service: UroGynecology          • SC CYSTOURETHROSCOPY N/A 6/26/2018    Procedure: CYSTOSCOPY;  Surgeon: Wei Gil MD;  Location[facility];  Service: UroGynecology          • SC SLING OPERATION STRESS INCONTINENCE N/A 6/26/2018    Procedure: INSERTION PUBOVAGINAL SLING (FEMALE);   Surgeon: Wei Gil MD;  Location: [facility];  Service: UroGynecology          • TONSILLECTOMY     • US GUIDED MSK PROCEDURE  11/25/2020       SOCIAL HISTORY:  Social History     Substance and Sexual Activity   Alcohol Use No     Social History     Substance and Sexual Activity   Drug Use No     Social History     Tobacco Use   Smoking Status Never   Smokeless Tobacco Never       FAMILY HISTORY:  Family History   Problem Relation Age of Onset   • Cervical cancer Mother 76   • Breast cancer Maternal Grandmother         Under 48   • Mental illness Son    • Schizophrenia Son    • Cancer Family    • No Known Problems Father    • No Known Problems Maternal Aunt    • No Known Problems Maternal Aunt    • Brain cancer Maternal Aunt         age unknown   • Cancer Cousin        MEDICATIONS:    Current Outpatient Medications:   •  aspirin 81 MG tablet, Take 81 mg by mouth daily  , Disp: , Rfl:   •  Calcium Carbonate-Vitamin D 600-200 MG-UNIT TABS, Take 1 tablet by mouth every other day  , Disp: , Rfl:   •  docusate sodium (COLACE) 100 mg capsule, Take by mouth, Disp: , Rfl:   •  ezetimibe (ZETIA) 10 mg tablet, Take 1 tablet (10 mg total) by mouth daily, Disp: 90 tablet, Rfl: 1  •  famotidine (PEPCID) 40 MG tablet, Take 1 tablet (40 mg total) by mouth daily at bedtime, Disp: 90 tablet, Rfl: 1  •  folic acid (FOLVITE) 1 mg tablet, Take 1 tablet (1 mg total) by mouth daily, Disp: 90 tablet, Rfl: 2  •  Glucosamine-Chondroitin 250-200 MG TABS, Take 1 tablet by mouth daily  , Disp: , Rfl:   •  hydroxyurea (HYDREA) 500 mg capsule, Take 1 capsule (500 mg total) by mouth daily, Disp: 90 capsule, Rfl: 1  •  levothyroxine 50 mcg tablet, Take 1 tablet (50 mcg total) by mouth daily, Disp: 90 tablet, Rfl: 1  •  lisinopril (ZESTRIL) 2.5 mg tablet, Take 1 tablet (2.5 mg total) by mouth daily, Disp: 90 tablet, Rfl: 3  •  magnesium citrate solution, Take 296 mL by mouth once, Disp: , Rfl:   •  Multiple Vitamins-Iron (DAILY MULTIPLE VITAMIN/IRON) TABS, Take 1 tablet by mouth daily  , Disp: , Rfl:   •  Omega-3 Fatty Acids ([employer] FISH OIL) 1200 MG CAPS, Take 1 capsule by mouth 2 (two) times a day, Disp: , Rfl:   •  Polyethylene Glycol 3350 (MIRALAX MIX-IN PAX PO), Take by mouth, Disp: , Rfl:   •  Wheat Dextrin (BENEFIBER DRINK MIX PO), Take by mouth, Disp: , Rfl:       PHYSICAL EXAM:  Vitals:    07/28/23 1129   BP: 124/72   BP Location: Left arm   Patient Position: Sitting   Cuff Size: Adult   Weight: 66.7 kg (147 lb)   Height: 4' 9" (1.448 m)     Body mass index is 31.81 kg/m². Physical Exam  Constitutional:       General: She is not in acute distress. Appearance: Normal appearance. She is well-developed. HENT:      Head: Normocephalic and atraumatic. Nose: Nose normal.      Mouth/Throat:      Mouth: Mucous membranes are moist.   Eyes:      General: No scleral icterus. Conjunctiva/sclera: Conjunctivae normal.      Pupils: Pupils are equal, round, and reactive to light. Neck:      Thyroid: No thyromegaly. Vascular: No JVD. Cardiovascular:      Rate and Rhythm: Normal rate and regular rhythm. Heart sounds: Normal heart sounds. No murmur heard. No friction rub. Pulmonary:      Effort: Pulmonary effort is normal. No respiratory distress. Breath sounds: Normal breath sounds. No wheezing or rales.    Abdominal: General: Bowel sounds are normal. There is no distension. Palpations: Abdomen is soft. Tenderness: There is no abdominal tenderness. Musculoskeletal:         General: No deformity. Cervical back: Neck supple. Right lower leg: No edema. Left lower leg: No edema. Skin:     General: Skin is warm and dry. Findings: No rash. Neurological:      Mental Status: She is alert and oriented to person, place, and time. Psychiatric:         Mood and Affect: Mood normal.         Behavior: Behavior normal.         Thought Content:  Thought content normal.         Lab Results:   Results for orders placed or performed in visit on 53/11/64   Basic metabolic panel   Result Value Ref Range    Sodium 135 135 - 147 mmol/L    Potassium 4.3 3.5 - 5.3 mmol/L    Chloride 104 96 - 108 mmol/L    CO2 25 21 - 32 mmol/L    ANION GAP 6 mmol/L    BUN 19 5 - 25 mg/dL    Creatinine 1.21 0.60 - 1.30 mg/dL    Glucose, Fasting 95 65 - 99 mg/dL    Calcium 9.3 8.3 - 10.1 mg/dL    eGFR 42 ml/min/1.73sq m   Protein / creatinine ratio, urine   Result Value Ref Range    Creatinine, Ur 49.6 mg/dL    Protein Urine Random <6 mg/dL    Prot/Creat Ratio, Ur     PTH, intact   Result Value Ref Range    PTH 28.3 12.0 - 88.0 pg/mL   Urinalysis with microscopic   Result Value Ref Range    Color, UA Light Yellow     Clarity, UA Clear     Specific Gravity, UA 1.010 1.003 - 1.030    pH, UA 6.0 4.5, 5.0, 5.5, 6.0, 6.5, 7.0, 7.5, 8.0    Leukocytes, UA Moderate (A) Negative    Nitrite, UA Negative Negative    Protein, UA Negative Negative mg/dl    Glucose, UA Negative Negative mg/dl    Ketones, UA Negative Negative mg/dl    Urobilinogen, UA <2.0 <2.0 mg/dl mg/dl    Bilirubin, UA Negative Negative    Occult Blood, UA Negative Negative    RBC, UA 1-2 None Seen, 1-2 /hpf    WBC, UA 4-10 (A) None Seen, 1-2 /hpf    Epithelial Cells Occasional None Seen, Occasional /hpf    Bacteria, UA None Seen None Seen, Occasional /hpf   Phosphorus   Result Value Ref Range    Phosphorus 3.9 2.3 - 4.1 mg/dL   Magnesium   Result Value Ref Range    Magnesium 2.5 1.6 - 2.6 mg/dL

## 2023-07-28 NOTE — PATIENT INSTRUCTIONS
-Renal Function is stable   -You have Chronic Kidney Disease Stage  3  -Avoid NSAIDs like Ibuprofen/Motrin, Naproxen/Aleve, Celebrex, meloxicam/Mobic, Diclofenac and other NSAIDs.  -Okay to take Acetaminophen/Tylenol if you do not have any liver problems  -Avoid IV contrast used for CT scan and cardiac catheterization.    -If plan for any study with IV contrast, please let me know so we could hydrate with fluids before and after IV contrast  -Dosage  of certain medications may need to be adjusted depending on Kidney function. Blood pressure is stable    -Recommend 2 g sodium diet. -Recommend daily exercise and weight loss  -Discussed home monitoring of BP and maintaining a log of blood pressure.  -Recommend goal BP less than 130/80. Please inform me if SBP below 110 or above 140's persistently. Fluid restriction 50 oz/day. Follow up: 6  months with repeat Lab work within a week of the scheduled office visit. Will discuss the results of the previsit Labs during the office visit.

## 2023-08-07 ENCOUNTER — TELEPHONE (OUTPATIENT)
Dept: HEMATOLOGY ONCOLOGY | Facility: CLINIC | Age: 83
End: 2023-08-07

## 2023-08-07 DIAGNOSIS — D47.3 ESSENTIAL THROMBOCYTOSIS (HCC): ICD-10-CM

## 2023-08-07 DIAGNOSIS — Z79.64 ON HYDROXYUREA THERAPY: ICD-10-CM

## 2023-08-07 RX ORDER — FOLIC ACID 1 MG/1
1 TABLET ORAL DAILY
Qty: 90 TABLET | Refills: 2 | Status: SHIPPED | OUTPATIENT
Start: 2023-08-07

## 2023-08-07 NOTE — TELEPHONE ENCOUNTER
Medication Refill Request   Who are you speaking with? Patient   If it is not the patient, are they listed on an active communication consent form? Yes   Which medication is being requested for refill? Please list medication name and dosage folic acid (FOLVITE) 1 mg tablet        How many pills does the patient have left? 10   Preferred Pharmacy / Address [address]   Who is the prescribing provider?  Dr. Chin Poe   Call back number [phone]   Relevant Information Yes

## 2023-09-05 DIAGNOSIS — E78.2 MIXED HYPERLIPIDEMIA: ICD-10-CM

## 2023-09-05 RX ORDER — EZETIMIBE 10 MG/1
10 TABLET ORAL DAILY
Qty: 90 TABLET | Refills: 1 | Status: SHIPPED | OUTPATIENT
Start: 2023-09-05

## 2023-09-08 LAB
LEFT EYE DIABETIC RETINOPATHY: NORMAL
RIGHT EYE DIABETIC RETINOPATHY: NORMAL

## 2023-09-13 ENCOUNTER — APPOINTMENT (OUTPATIENT)
Dept: LAB | Facility: CLINIC | Age: 83
End: 2023-09-13
Payer: MEDICARE

## 2023-09-13 DIAGNOSIS — N18.31 TYPE 2 DIABETES MELLITUS WITH STAGE 3A CHRONIC KIDNEY DISEASE, WITHOUT LONG-TERM CURRENT USE OF INSULIN (HCC): ICD-10-CM

## 2023-09-13 DIAGNOSIS — E11.22 TYPE 2 DIABETES MELLITUS WITH STAGE 3A CHRONIC KIDNEY DISEASE, WITHOUT LONG-TERM CURRENT USE OF INSULIN (HCC): ICD-10-CM

## 2023-09-13 LAB
ALBUMIN SERPL BCP-MCNC: 3.9 G/DL (ref 3.5–5)
ALP SERPL-CCNC: 59 U/L (ref 34–104)
ALT SERPL W P-5'-P-CCNC: 12 U/L (ref 7–52)
ANION GAP SERPL CALCULATED.3IONS-SCNC: 10 MMOL/L
AST SERPL W P-5'-P-CCNC: 14 U/L (ref 13–39)
BILIRUB SERPL-MCNC: 0.36 MG/DL (ref 0.2–1)
BUN SERPL-MCNC: 22 MG/DL (ref 5–25)
CALCIUM SERPL-MCNC: 9.5 MG/DL (ref 8.4–10.2)
CHLORIDE SERPL-SCNC: 98 MMOL/L (ref 96–108)
CHOLEST SERPL-MCNC: 158 MG/DL
CO2 SERPL-SCNC: 26 MMOL/L (ref 21–32)
CREAT SERPL-MCNC: 1.19 MG/DL (ref 0.6–1.3)
CREAT UR-MCNC: 70.4 MG/DL
EST. AVERAGE GLUCOSE BLD GHB EST-MCNC: 131 MG/DL
GFR SERPL CREATININE-BSD FRML MDRD: 43 ML/MIN/1.73SQ M
GLUCOSE P FAST SERPL-MCNC: 90 MG/DL (ref 65–99)
HBA1C MFR BLD: 6.2 %
HDLC SERPL-MCNC: 32 MG/DL
LDLC SERPL CALC-MCNC: 76 MG/DL (ref 0–100)
MICROALBUMIN UR-MCNC: <7 MG/L
MICROALBUMIN/CREAT 24H UR: <10 MG/G CREATININE (ref 0–30)
NONHDLC SERPL-MCNC: 126 MG/DL
POTASSIUM SERPL-SCNC: 4.2 MMOL/L (ref 3.5–5.3)
PROT SERPL-MCNC: 7.8 G/DL (ref 6.4–8.4)
SODIUM SERPL-SCNC: 134 MMOL/L (ref 135–147)
TRIGL SERPL-MCNC: 252 MG/DL

## 2023-09-13 PROCEDURE — 80061 LIPID PANEL: CPT

## 2023-09-13 PROCEDURE — 80053 COMPREHEN METABOLIC PANEL: CPT

## 2023-09-13 PROCEDURE — 36415 COLL VENOUS BLD VENIPUNCTURE: CPT

## 2023-09-13 PROCEDURE — 82043 UR ALBUMIN QUANTITATIVE: CPT

## 2023-09-13 PROCEDURE — 82570 ASSAY OF URINE CREATININE: CPT

## 2023-09-13 PROCEDURE — 83036 HEMOGLOBIN GLYCOSYLATED A1C: CPT

## 2023-09-14 ENCOUNTER — TELEPHONE (OUTPATIENT)
Dept: HEMATOLOGY ONCOLOGY | Facility: CLINIC | Age: 83
End: 2023-09-14

## 2023-09-14 NOTE — TELEPHONE ENCOUNTER
Appointment Change  Cancel, Reschedule, Change to Virtual      Who are you speaking with? Patient   If it is not the patient, is the caller listed on the communication consent form? N/A   Which provider is the appointment scheduled with? Dr. Sidney Berry   When was the original appointment scheduled? Please list date and time 10/12/23 @ 9:20am   At which location is the appointment scheduled to take place? [place]   Was the appointment rescheduled? Was the appointment changed from an in person visit to a virtual visit? If so, please list the details of the change. Yes- harvinder Dr Fer Bello 10/10/23 @ 9:40    What is the reason for the appointment change? Dr. Sidney Berry is leaving the practice       Was STAR transport scheduled? no   Does STAR transport need to be scheduled for the new visit (if applicable) no   Does the patient need an infusion appointment rescheduled? no   Does the patient have an upcoming infusion appointment scheduled?  If so, when? no   Is the patient undergoing chemotherapy? no    Yes

## 2023-09-14 NOTE — TELEPHONE ENCOUNTER
I called [facility] regarding an appointment that they have scheduled with Dr. Arron Thompson scheduled on 10/12/2023     I left a voicemail explaining to patient that this appointment will need to be rescheduled due to a change in the providers schedule. Patient was advised to call [facility] to reschedule. A [employer] message (if applicable) has been sent to patient relaying the above information and advising patient to call [facility] and reschedule their appointment.

## 2023-09-25 ENCOUNTER — TELEPHONE (OUTPATIENT)
Age: 83
End: 2023-09-25

## 2023-09-25 NOTE — TELEPHONE ENCOUNTER
Caller: Patient    Doctor:  Afua Almanza    Reason for call: Patint had an appt for 10/9 for her prolia injection but she his having a tooth extracted and they stated she cannot have the injection got 2 months after, which would be 12/11. There are no current openings. Please advise.     Call back#: [phone]

## 2023-09-25 NOTE — TELEPHONE ENCOUNTER
Please make appointment for Prolia only injection nurse visit for December 11 and confirm with patient

## 2023-09-29 ENCOUNTER — CLINICAL SUPPORT (OUTPATIENT)
Dept: FAMILY MEDICINE CLINIC | Facility: CLINIC | Age: 83
End: 2023-09-29
Payer: MEDICARE

## 2023-09-29 DIAGNOSIS — Z23 NEEDS FLU SHOT: Primary | ICD-10-CM

## 2023-09-29 PROCEDURE — G0008 ADMIN INFLUENZA VIRUS VAC: HCPCS

## 2023-09-29 PROCEDURE — 90662 IIV NO PRSV INCREASED AG IM: CPT

## 2023-09-30 NOTE — PROGRESS NOTES
Hematology Outpatient Office Note    Date of Service: 10/10/2023    [facility] HEMATOLOGY SPECIALISTS NORA Calderon[address]  [phone]    Reason for Consultation:   Chief Complaint   Patient presents with   • Follow-up       Referral Physician: No ref. provider found    Primary Care Physician: Jaquan Bui MD     Nickname: Mati Cline    Spouse: Edie Stevens    Original ECO    Today's ECO    Goals and Barriers:  Current Goal: Minimize effects of disease burden, extend life. Barriers to accomplishing this: None    Patient's Capacity to Self Care:  Patient is able to self care      ASSESSMENT & PLAN      Diagnosis ICD-10-CM Associated Orders   1. Essential thrombocytosis (HCC)  D47.3 CBC and differential     Comprehensive metabolic panel     CBC and differential     Comprehensive metabolic panel            This is a 82 y.o. c PMHx notable for CKDIIIB, DM, GERD, HTN, mild hyponatremia, b/l TMJ, osteoporosis now on Prolia, being seen in consultation for ET    The platelet count has nearly normalized and will maintain hydrea 500mg daily dosing. Pt is tolerating it well    · Discussion of decision making    I personally reviewed the following lab results, the image studies, pathology, other specialty/physicians consult notes and recommendations, and outside medical records. I had a lengthy discussion with the patient and shared the work-up findings. We discussed the diagnosis and management plan as below. I spent 42 minutes reviewing the records (labs, clinician notes, outside records, medical history, ordering medicine/tests/procedures, interpreting the imaging/labs previously done) and coordination of care as well as direct time with the patient today, of which greater than 50% of the time was spent in counseling and coordination of care with the patient/family.       · Plan/Labs  · CBC, CMP q3 months as standing for 2 visits        Follow Up: 6 months    All questions were answered to the patient's satisfaction during this encounter. The patient knows the contact information for our office and knows to reach out for any relevant concerns related to this encounter. They are to call for any temperature 100.4 or higher, new symptoms including but not restricted to shaking chills, decreased appetite, nausea, vomiting, diarrhea, increased fatigue, shortness of breath or chest pain, confusion, and not feeling the strength to come to the clinic. For all other listed problems and medical diagnosis in their chart - they are managed by PCP and/or other specialists, which the patient acknowledges. Thank you very much for your consultation and making us a part of this patient's care. We are continuing to follow closely with you. Please do not hesitate to reach out to me with any additional questions or concerns. Hernandez Finn MD  Hematology & Medical Oncology Staff Physician             Disclaimer: This document was prepared using [employer] technology. If a word or phrase is confusing, or does not make sense, this is likely due to recognition error which was not discovered during this clinician's review. If you believe an error has occurred, please contact me through [facility] line for ovidio? cation. 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   creat 1.19 (9/13/2023)  10/5/2023: WBC 7.95k, Hgb 12.9, , RDW 14.1, plt 477k    SUBJECTIVE  (INTERVAL HISTORY)        I have reviewed the relevant past medical, surgical, social and family history. I have also reviewed allergies and medications for this patient. Review of Systems    Baseline weight: 145-150 lbs    Denies F/C, N/V, SOB, CP, LH, HA, rash, itching, gen weakness, melena, hematuria, hematochezia, falls, diarrhea, or constipation       A 10-point review of system was performed, pertinent positive and negative were detailed as above. Otherwise, the 10-point review of system was negative. Past Medical History:   Diagnosis Date   • CKD (chronic kidney disease)    • Diabetes mellitus (HCC)     does not take medications, does not monitor blood sugars, pre-diabetic   • Disease of thyroid gland    • Diverticulosis    • GERD (gastroesophageal reflux disease)    • Hypertension    • Hyponatremia    • Ovarian cyst    • Rectal bleeding    • Wears reading eyeglasses        Past Surgical History:   Procedure Laterality Date   • BREAST BIOPSY Right 05/13/2009    Ultrasound Bx. Benign   • BREAST SURGERY     • CATARACT EXTRACTION Bilateral    • COLONOSCOPY     • ESOPHAGOGASTRODUODENOSCOPY     • IR BIOPSY BONE MARROW  3/18/2022   • OOPHORECTOMY Bilateral 2015   • ME CMBND ANTERPOST COLPORRAPHY W/CYSTO N/A 6/26/2018    Procedure: ANTERIOR & POSTERIOR COLPORRHAPHY;  Surgeon: Dewey Muniz MD;  Location: [facility];  Service: UroGynecology          • ME COLPOPEXY VAGINAL EXTRAPERITONEAL APPROACH N/A 6/26/2018    Procedure: ANTERIOR COLPOPEXY VAGINAL EXTRAPERITONEAL (VEC); Surgeon: Dewey Muniz MD;  Location: [facility];  Service: UroGynecology          • ME CYSTOURETHROSCOPY N/A 6/26/2018    Procedure: CYSTOSCOPY;  Surgeon: Dewey Muniz MD;  [facility];  Service: UroGynecology          • ME SLING OPERATION STRESS INCONTINENCE N/A 6/26/2018    Procedure: INSERTION PUBOVAGINAL SLING (FEMALE);   Surgeon: Dewey Muniz MD;  Location: [facility];  Service: UroGynecology          • TONSILLECTOMY     • [address] MSK PROCEDURE  11/25/2020       Family History   Problem Relation Age of Onset   • Cervical cancer Mother 76   • Breast cancer Maternal Grandmother         Under 48   • Mental illness Son    • Schizophrenia Son    • Cancer Family    • No Known Problems Father    • No Known Problems Maternal Aunt    • No Known Problems Maternal Aunt    • Brain cancer Maternal Aunt         age unknown   • Cancer Cousin        Social History     Socioeconomic History   • Marital status: /Civil Union     Spouse name: Not on file   • Number of children: 2   • Years of education: Not on file   • Highest education level: Not on file   Occupational History   • Occupation: Retired    Tobacco Use   • Smoking status: Never     Passive exposure: Never   • Smokeless tobacco: Never   Substance and Sexual Activity   • Alcohol use: No   • Drug use: No   • Sexual activity: Never     Partners: Male     Birth control/protection: None   Other Topics Concern   • Not on file   Social History Narrative    Active advance directive     Always uses seat belt     Lives with spouse/house     Patient has a living Will     Power of  in existence     [place]     Supportive and safe          Social Determinants of Health     Financial Resource Strain: Low Risk  (6/21/2023)    Overall Financial Resource Strain (CARDIA)    • Difficulty of Paying Living Expenses: Not hard at all   Food Insecurity: Not on file   Transportation Needs: No Transportation Needs (6/21/2023)    PRAPARE - Transportation    • Lack of Transportation (Medical): No    • Lack of Transportation (Non-Medical):  No   Physical Activity: Not on file   Stress: Not on file   Social Connections: Not on file   Intimate Partner Violence: Not on file   Housing Stability: Not on file       No Known Allergies    Current Outpatient Medications   Medication Sig Dispense Refill   • aspirin 81 MG tablet Take 81 mg by mouth daily       • Calcium Carbonate-Vitamin D 600-200 MG-UNIT TABS Take 1 tablet by mouth every other day       • docusate sodium (COLACE) 100 mg capsule Take by mouth     • ezetimibe (ZETIA) 10 mg tablet Take 1 tablet (10 mg total) by mouth daily 90 tablet 1   • famotidine (PEPCID) 40 MG tablet Take 1 tablet (40 mg total) by mouth daily at bedtime 90 tablet 1   • folic acid (FOLVITE) 1 mg tablet Take 1 tablet (1 mg total) by mouth daily 90 tablet 2   • Glucosamine-Chondroitin 250-200 MG TABS Take 1 tablet by mouth daily       • hydroxyurea (HYDREA) 500 mg capsule Take 1 capsule (500 mg total) by mouth daily 90 capsule 1   • levothyroxine 50 mcg tablet Take 1 tablet (50 mcg total) by mouth daily 90 tablet 1   • lisinopril (ZESTRIL) 2.5 mg tablet Take 1 tablet (2.5 mg total) by mouth daily 90 tablet 3   • magnesium citrate solution Take 296 mL by mouth once     • Multiple Vitamins-Iron (DAILY MULTIPLE VITAMIN/IRON) TABS Take 1 tablet by mouth daily       • Omega-3 Fatty Acids ([employer] FISH OIL) 1200 MG CAPS Take 1 capsule by mouth 2 (two) times a day     • Polyethylene Glycol 3350 (MIRALAX MIX-IN PAX PO) Take by mouth     • Wheat Dextrin (BENEFIBER DRINK MIX PO) Take by mouth     • acetaminophen-codeine (TYLENOL with CODEINE #3) 300-30 MG per tablet      • chlorhexidine (PERIDEX) 0.12 % solution        No current facility-administered medications for this visit. (Not in a hospital admission)        Objective:     24 Hour Vitals Assessment:     Vitals:    10/10/23 0934   BP: 126/80   Pulse: 92   Resp: 18   Temp: 97.7 °F (36.5 °C)   SpO2: 98%       PHYSICIAN EXAM:    General: Appearance: alert, cooperative, no distress. HEENT: Normocephalic, atraumatic. No scleral icterus. conjunctivae clear. EOMI. Chest: No tenderness to palpation. No open wound noted. Lungs: Clear to auscultation bilaterally, Respirations unlabored. Cardiac: Regular rate and rhythm, +S1and S2  Abdomen: Soft, non-tender, non-distended.  Bowel sounds are normal.  Extremities:  No edema, cyanosis, clubbing. Skin: Skin color, turgor are normal. No rashes. Lymphatics: no palpable supra-cervical, axillary, or inguinal adenopathy  Neurologic: Awake, Alert, and oriented, no gross focal deficits noted b/l. DATA REVIEW:    Pathology Result:    Final Diagnosis   Date Value Ref Range Status   03/18/2022   Final    A -C. 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 -  Maturing trilineage hematopoiesis without significant features of dysplasia or increased myeloblasts. -  Mildly decreased to adequate stainable storage iron. -  Mildly increased reticulin fibers without overt fibrosis. -  Negative for collagen fibrosis, granulomata, vasculitis, necrosis. Image Results:   Image result are reviewed and documented in Hematology/Oncology history. I personally reviewed these images. CT facial bones wo contrast  Narrative: CT FACIAL BONES WITHOUT INTRAVENOUS CONTRAST    INDICATION:   M81.0: Age-related osteoporosis without current pathological fracture  R68.84: Jaw pain  Z79.899: Other long term (current) drug therapy. COMPARISON: None. TECHNIQUE:  Axial CT images were obtained through the facial bones with additional sagittal and coronal reconstructions. Radiation dose length product (DLP) for this visit:  341 mGy-cm . This examination, like all CT scans performed in the [facility], was performed utilizing techniques to minimize radiation dose exposure, including the use of iterative   reconstruction and automated exposure control. IMAGE QUALITY:  Diagnostic. FINDINGS:     FACIAL BONES:  Normal alignment of the temporomandibular joints. Moderate to severe degenerative change. No maxillofacial fracture, lytic or blastic lesion. ORBITS:  Intact globes and orbits.     SINUSES: Clear    SOFT TISSUES:  No mass or inflammation. Impression: Moderate to severe degenerative change in the temporomandibular joints. Workstation performed: [id number]      LABS:  Lab data are reviewed and documented in HemOn history. Lab Results   Component Value Date    HGB 12.9 10/05/2023    HCT 39.1 10/05/2023     (H) 10/05/2023     (H) 10/05/2023    WBC 7.95 10/05/2023    NRBC 0 10/05/2023     Lab Results   Component Value Date     10/12/2017    K 4.2 09/13/2023    CL 98 09/13/2023    CO2 26 09/13/2023    ANIONGAP 10 05/21/2015    BUN 22 09/13/2023    CREATININE 1.19 09/13/2023    GLUCOSE 100 05/21/2015    GLUF 90 09/13/2023    CALCIUM 9.5 09/13/2023    CORRECTEDCA 9.7 05/10/2023    AST 14 09/13/2023    ALT 12 09/13/2023    ALKPHOS 59 09/13/2023    PROT 7.6 06/08/2017    BILITOT 0.4 06/08/2017    EGFR 43 09/13/2023       Lab Results   Component Value Date    IRON 79 10/04/2022    TIBC 244 (L) 10/04/2022    FERRITIN 649 (H) 10/04/2022    FERRITIN 754 (H) 09/07/2022    FERRITIN 731 (H) 06/01/2022    FERRITIN 758 (H) 03/10/2022    FERRITIN 30 02/03/2022    FERRITIN 31 12/06/2019    FERRITIN 22 02/28/2019       Lab Results   Component Value Date    BLVMVPOF75 644 07/01/2022       No results for input(s): "WBC", "CREAT", "PLT" in the last 72 hours.      By:  Monie Sandra MD, 10/10/2023, 10:00 AM

## 2023-10-02 DIAGNOSIS — E03.9 HYPOTHYROIDISM, UNSPECIFIED TYPE: ICD-10-CM

## 2023-10-02 DIAGNOSIS — K21.9 GASTROESOPHAGEAL REFLUX DISEASE WITHOUT ESOPHAGITIS: ICD-10-CM

## 2023-10-02 RX ORDER — LEVOTHYROXINE SODIUM 0.05 MG/1
50 TABLET ORAL DAILY
Qty: 90 TABLET | Refills: 1 | Status: SHIPPED | OUTPATIENT
Start: 2023-10-02 | End: 2023-12-31

## 2023-10-02 RX ORDER — FAMOTIDINE 40 MG/1
40 TABLET, FILM COATED ORAL
Qty: 90 TABLET | Refills: 1 | Status: SHIPPED | OUTPATIENT
Start: 2023-10-02 | End: 2023-10-02 | Stop reason: SDUPTHER

## 2023-10-02 RX ORDER — FAMOTIDINE 40 MG/1
40 TABLET, FILM COATED ORAL
Qty: 90 TABLET | Refills: 1 | Status: SHIPPED | OUTPATIENT
Start: 2023-10-02

## 2023-10-02 RX ORDER — LEVOTHYROXINE SODIUM 0.05 MG/1
50 TABLET ORAL DAILY
Qty: 90 TABLET | Refills: 1 | Status: SHIPPED | OUTPATIENT
Start: 2023-10-02 | End: 2023-10-02 | Stop reason: SDUPTHER

## 2023-10-02 NOTE — TELEPHONE ENCOUNTER
My name is Renetta Del Toro, Birth date [birth year]. Phone number is [phone]. I need two prescriptions from the [facility], both 90 days with refills. Doctor Roberto Pacheco is our physician. Levothyroxine, sodium 50 milligrams and famotidine 40 milligram tablets, all with 90 day refills. Thank you. To the [facility] pharmacy [phone]. Thank you.  Maureen.

## 2023-10-05 ENCOUNTER — APPOINTMENT (OUTPATIENT)
Dept: LAB | Facility: CLINIC | Age: 83
End: 2023-10-05
Payer: MEDICARE

## 2023-10-05 DIAGNOSIS — N18.32 STAGE 3B CHRONIC KIDNEY DISEASE (HCC): ICD-10-CM

## 2023-10-05 LAB
BASOPHILS # BLD AUTO: 0.15 THOUSANDS/ÂΜL (ref 0–0.1)
BASOPHILS NFR BLD AUTO: 2 % (ref 0–1)
EOSINOPHIL # BLD AUTO: 0.18 THOUSAND/ÂΜL (ref 0–0.61)
EOSINOPHIL NFR BLD AUTO: 2 % (ref 0–6)
ERYTHROCYTE [DISTWIDTH] IN BLOOD BY AUTOMATED COUNT: 14.1 % (ref 11.6–15.1)
HCT VFR BLD AUTO: 39.1 % (ref 34.8–46.1)
HGB BLD-MCNC: 12.9 G/DL (ref 11.5–15.4)
IMM GRANULOCYTES # BLD AUTO: 0.02 THOUSAND/UL (ref 0–0.2)
IMM GRANULOCYTES NFR BLD AUTO: 0 % (ref 0–2)
LYMPHOCYTES # BLD AUTO: 2.9 THOUSANDS/ÂΜL (ref 0.6–4.47)
LYMPHOCYTES NFR BLD AUTO: 37 % (ref 14–44)
MCH RBC QN AUTO: 35.1 PG (ref 26.8–34.3)
MCHC RBC AUTO-ENTMCNC: 33 G/DL (ref 31.4–37.4)
MCV RBC AUTO: 107 FL (ref 82–98)
MONOCYTES # BLD AUTO: 0.56 THOUSAND/ÂΜL (ref 0.17–1.22)
MONOCYTES NFR BLD AUTO: 7 % (ref 4–12)
NEUTROPHILS # BLD AUTO: 4.14 THOUSANDS/ÂΜL (ref 1.85–7.62)
NEUTS SEG NFR BLD AUTO: 52 % (ref 43–75)
NRBC BLD AUTO-RTO: 0 /100 WBCS
PLATELET # BLD AUTO: 477 THOUSANDS/UL (ref 149–390)
PMV BLD AUTO: 9.3 FL (ref 8.9–12.7)
RBC # BLD AUTO: 3.67 MILLION/UL (ref 3.81–5.12)
WBC # BLD AUTO: 7.95 THOUSAND/UL (ref 4.31–10.16)

## 2023-10-10 ENCOUNTER — OFFICE VISIT (OUTPATIENT)
Dept: HEMATOLOGY ONCOLOGY | Facility: CLINIC | Age: 83
End: 2023-10-10
Payer: MEDICARE

## 2023-10-10 VITALS
SYSTOLIC BLOOD PRESSURE: 126 MMHG | WEIGHT: 148 LBS | HEIGHT: 57 IN | BODY MASS INDEX: 31.93 KG/M2 | HEART RATE: 92 BPM | TEMPERATURE: 97.7 F | DIASTOLIC BLOOD PRESSURE: 80 MMHG | RESPIRATION RATE: 18 BRPM | OXYGEN SATURATION: 98 %

## 2023-10-10 DIAGNOSIS — D47.3 ESSENTIAL THROMBOCYTOSIS (HCC): Primary | ICD-10-CM

## 2023-10-10 DIAGNOSIS — Z15.89 JAK2 GENE MUTATION: ICD-10-CM

## 2023-10-10 PROCEDURE — 99215 OFFICE O/P EST HI 40 MIN: CPT | Performed by: INTERNAL MEDICINE

## 2023-10-10 RX ORDER — CHLORHEXIDINE GLUCONATE ORAL RINSE 1.2 MG/ML
SOLUTION DENTAL
COMMUNITY
Start: 2023-10-09

## 2023-10-10 RX ORDER — ACETAMINOPHEN AND CODEINE PHOSPHATE 300; 30 MG/1; MG/1
TABLET ORAL
COMMUNITY
Start: 2023-10-09

## 2023-10-10 RX ORDER — HYDROXYUREA 500 MG/1
500 CAPSULE ORAL DAILY
Qty: 90 CAPSULE | Refills: 1 | Status: SHIPPED | OUTPATIENT
Start: 2023-10-10

## 2023-10-30 ENCOUNTER — RA CDI HCC (OUTPATIENT)
Dept: OTHER | Facility: HOSPITAL | Age: 83
End: 2023-10-30

## 2023-10-30 NOTE — PROGRESS NOTES
[address] [facility] coding opportunities          Chart Reviewed number of suggestions sent to Provider: 2   E11.51  E11.69 E78.5    Patients Insurance     Medicare Insurance: [employer]

## 2023-11-06 ENCOUNTER — OFFICE VISIT (OUTPATIENT)
Dept: FAMILY MEDICINE CLINIC | Facility: CLINIC | Age: 83
End: 2023-11-06
Payer: MEDICARE

## 2023-11-06 VITALS
HEART RATE: 56 BPM | HEIGHT: 57 IN | WEIGHT: 149 LBS | OXYGEN SATURATION: 99 % | BODY MASS INDEX: 32.15 KG/M2 | SYSTOLIC BLOOD PRESSURE: 132 MMHG | DIASTOLIC BLOOD PRESSURE: 70 MMHG

## 2023-11-06 DIAGNOSIS — E11.22 CONTROLLED TYPE 2 DIABETES MELLITUS WITH STAGE 3 CHRONIC KIDNEY DISEASE, WITHOUT LONG-TERM CURRENT USE OF INSULIN (HCC): ICD-10-CM

## 2023-11-06 DIAGNOSIS — N18.30 CONTROLLED TYPE 2 DIABETES MELLITUS WITH STAGE 3 CHRONIC KIDNEY DISEASE, WITHOUT LONG-TERM CURRENT USE OF INSULIN (HCC): ICD-10-CM

## 2023-11-06 DIAGNOSIS — E27.8 ADRENAL MASS (HCC): ICD-10-CM

## 2023-11-06 DIAGNOSIS — E03.9 HYPOTHYROIDISM, UNSPECIFIED TYPE: Primary | ICD-10-CM

## 2023-11-06 DIAGNOSIS — I10 ESSENTIAL HYPERTENSION: ICD-10-CM

## 2023-11-06 DIAGNOSIS — D47.3 ESSENTIAL THROMBOCYTOSIS (HCC): ICD-10-CM

## 2023-11-06 PROBLEM — E61.1 IRON DEFICIENCY: Status: RESOLVED | Noted: 2022-02-17 | Resolved: 2023-11-06

## 2023-11-06 PROCEDURE — 99214 OFFICE O/P EST MOD 30 MIN: CPT | Performed by: FAMILY MEDICINE

## 2023-11-06 NOTE — ASSESSMENT & PLAN NOTE
Lab Results   Component Value Date    HGBA1C 6.2 (H) 09/13/2023   Slight  jump in HBA1C  , watch diet

## 2023-11-06 NOTE — PROGRESS NOTES
Name: Alejandro Jack      : 1940      MRN: [id number]  Encounter Provider: Khadar Rudolph MD  Encounter Date: 2023   Encounter department: [facility] PRIMARY CARE    Assessment & Plan     1. Hypothyroidism, unspecified type  -     Lipid panel; Future; Expected date: 01/15/2024  -     TSH, 3rd generation; Future; Expected date: 01/15/2024    2. Controlled type 2 diabetes mellitus with stage 3 chronic kidney disease, without long-term current use of insulin (HCC)  Assessment & Plan:    Lab Results   Component Value Date    HGBA1C 6.2 (H) 2023   Slight  jump in HBA1C  , watch diet    Orders:  -     Comprehensive metabolic panel; Future; Expected date: 01/15/2024  -     Hemoglobin A1C; Future; Expected date: 01/15/2024    3. Essential hypertension  Assessment & Plan: Bp stable      4. Essential thrombocytosis (HCC)  Assessment & Plan:  Sees hematology      5. Adrenal mass ([address])  -     CT abdomen pelvis wo contrast; Future; Expected date: 2024           Subjective      Here for  f/u diabetes, htn, lipids, feels  fine, mammo and dexa  next  month,had to have tooth  pulled  so put off  Prolia  shots, no cp, no sob      Review of Systems   Constitutional:  Negative for activity change, appetite change and fatigue. Respiratory:  Negative for shortness of breath. Cardiovascular:  Negative for chest pain. Musculoskeletal:  Negative for arthralgias and myalgias. Neurological:  Negative for dizziness and headaches. Hematological:  Negative for adenopathy.        Current Outpatient Medications on File Prior to Visit   Medication Sig    acetaminophen-codeine (TYLENOL with CODEINE #3) 300-30 MG per tablet     aspirin 81 MG tablet Take 81 mg by mouth daily      Calcium Carbonate-Vitamin D 600-200 MG-UNIT TABS Take 1 tablet by mouth every other day      chlorhexidine (PERIDEX) 0.12 % solution     docusate sodium (COLACE) 100 mg capsule Take by mouth    ezetimibe (ZETIA) 10 mg tablet Take 1 tablet (10 mg total) by mouth daily    famotidine (PEPCID) 40 MG tablet Take 1 tablet (40 mg total) by mouth daily at bedtime    folic acid (FOLVITE) 1 mg tablet Take 1 tablet (1 mg total) by mouth daily    Glucosamine-Chondroitin 250-200 MG TABS Take 1 tablet by mouth daily      hydroxyurea (HYDREA) 500 mg capsule Take 1 capsule (500 mg total) by mouth daily    levothyroxine 50 mcg tablet Take 1 tablet (50 mcg total) by mouth daily    lisinopril (ZESTRIL) 2.5 mg tablet Take 1 tablet (2.5 mg total) by mouth daily    magnesium citrate solution Take 296 mL by mouth once    Multiple Vitamins-Iron (DAILY MULTIPLE VITAMIN/IRON) TABS Take 1 tablet by mouth daily      Omega-3 Fatty Acids ([employer] FISH OIL) 1200 MG CAPS Take 1 capsule by mouth 2 (two) times a day    Polyethylene Glycol 3350 (MIRALAX MIX-IN PAX PO) Take by mouth    Wheat Dextrin (BENEFIBER DRINK MIX PO) Take by mouth       Objective     /70 (BP Location: Left arm, Patient Position: Sitting, Cuff Size: Standard)   Pulse 56   Ht 4' 9" (1.448 m)   Wt 67.6 kg (149 lb)   SpO2 99%   BMI 32.24 kg/m²     Physical Exam  Vitals reviewed. Constitutional:       Appearance: Normal appearance. She is obese. Neck:      Vascular: No carotid bruit. Cardiovascular:      Rate and Rhythm: Normal rate and regular rhythm. Pulses: Normal pulses. no weak pulses          Dorsalis pedis pulses are 2+ on the right side and 2+ on the left side. Posterior tibial pulses are 2+ on the right side and 2+ on the left side. Heart sounds: Normal heart sounds. Pulmonary:      Effort: Pulmonary effort is normal.      Breath sounds: Normal breath sounds. Musculoskeletal:      Right lower leg: No edema. Left lower leg: No edema. Feet:      Right foot:      Skin integrity: No ulcer, skin breakdown, erythema, warmth, callus or dry skin. Lymphadenopathy:      Cervical: No cervical adenopathy. Neurological:      Mental Status: She is alert.    Psychiatric: Mood and Affect: Mood normal.     Patient's shoes and socks removed. Right Foot/Ankle   Right Foot Inspection  Skin Exam: skin normal and skin intact. No dry skin, no warmth, no callus, no erythema, no maceration, no abnormal color, no pre-ulcer, no ulcer and no callus. Toe Exam: ROM and strength within normal limits. Sensory   Monofilament testing: intact    Vascular  Capillary refills: < 3 seconds  The right DP pulse is 2+. The right PT pulse is 2+. Left Foot/Ankle  Left Foot Inspection      Toe Exam: ROM and strength within normal limits. Vascular  Capillary refills: < 3 seconds  The left DP pulse is 2+. The left PT pulse is 2+.      Assign Risk Category  No deformity present  No loss of protective sensation  No weak pulses  Risk: 0     Josiane Reddy MD

## 2023-11-20 ENCOUNTER — HOSPITAL ENCOUNTER (OUTPATIENT)
Dept: BONE DENSITY | Facility: MEDICAL CENTER | Age: 83
Discharge: HOME | End: 2023-11-20
Payer: MEDICARE

## 2023-11-20 ENCOUNTER — HOSPITAL ENCOUNTER (OUTPATIENT)
Dept: MAMMOGRAPHY | Facility: MEDICAL CENTER | Age: 83
Discharge: HOME | End: 2023-11-20
Payer: MEDICARE

## 2023-11-20 VITALS — BODY MASS INDEX: 32.15 KG/M2 | HEIGHT: 57 IN | WEIGHT: 149.03 LBS

## 2023-11-20 DIAGNOSIS — Z12.31 SCREENING MAMMOGRAM, ENCOUNTER FOR: ICD-10-CM

## 2023-11-20 DIAGNOSIS — M81.0 AGE-RELATED OSTEOPOROSIS WITHOUT CURRENT PATHOLOGICAL FRACTURE: ICD-10-CM

## 2023-11-20 PROCEDURE — 77080 DXA BONE DENSITY AXIAL: CPT

## 2023-11-20 PROCEDURE — 77063 BREAST TOMOSYNTHESIS BI: CPT

## 2023-11-20 PROCEDURE — 77067 SCR MAMMO BI INCL CAD: CPT

## 2023-12-11 ENCOUNTER — CLINICAL SUPPORT (OUTPATIENT)
Dept: RHEUMATOLOGY | Facility: CLINIC | Age: 83
End: 2023-12-11
Payer: MEDICARE

## 2023-12-11 ENCOUNTER — TELEPHONE (OUTPATIENT)
Dept: RHEUMATOLOGY | Facility: CLINIC | Age: 83
End: 2023-12-11

## 2023-12-11 DIAGNOSIS — M81.0 AGE-RELATED OSTEOPOROSIS WITHOUT CURRENT PATHOLOGICAL FRACTURE: Primary | ICD-10-CM

## 2023-12-11 PROCEDURE — 96372 THER/PROPH/DIAG INJ SC/IM: CPT

## 2023-12-11 NOTE — TELEPHONE ENCOUNTER
Reached out to patients secondary insurance to ensure that there is no PA needed and after speaking with Shaneka Curiel he confirmed that no PA is needed for the medication (Prolia)

## 2023-12-21 ENCOUNTER — OFFICE VISIT (OUTPATIENT)
Dept: FAMILY MEDICINE CLINIC | Facility: CLINIC | Age: 83
End: 2023-12-21
Payer: MEDICARE

## 2023-12-21 VITALS
TEMPERATURE: 98.2 F | WEIGHT: 149.2 LBS | OXYGEN SATURATION: 97 % | DIASTOLIC BLOOD PRESSURE: 70 MMHG | BODY MASS INDEX: 32.19 KG/M2 | SYSTOLIC BLOOD PRESSURE: 142 MMHG | HEART RATE: 110 BPM | HEIGHT: 57 IN

## 2023-12-21 DIAGNOSIS — R05.1 ACUTE COUGH: Primary | ICD-10-CM

## 2023-12-21 LAB
SARS-COV-2 AG UPPER RESP QL IA: NEGATIVE
VALID CONTROL: NORMAL

## 2023-12-21 PROCEDURE — 87811 SARS-COV-2 COVID19 W/OPTIC: CPT | Performed by: FAMILY MEDICINE

## 2023-12-21 PROCEDURE — 99213 OFFICE O/P EST LOW 20 MIN: CPT | Performed by: FAMILY MEDICINE

## 2023-12-21 RX ORDER — DEXTROMETHORPHAN HYDROBROMIDE AND PROMETHAZINE HYDROCHLORIDE 15; 6.25 MG/5ML; MG/5ML
5 SYRUP ORAL 4 TIMES DAILY PRN
Qty: 120 ML | Refills: 1 | Status: SHIPPED | OUTPATIENT
Start: 2023-12-21

## 2023-12-21 NOTE — PROGRESS NOTES
Name: Bhavani Amaral      : 1940      MRN: [id number]  Encounter Provider: Kiley Gregory MD  Encounter Date: 2023   Encounter department: [facility] PRIMARY CARE    Assessment & Plan     1. Acute cough  Comments:  rx  for  cough  med, nothing that  needs  antibiotics, rest, fluids  Orders:  -     POCT Rapid Covid Ag  -     promethazine-dextromethorphan (PHENERGAN-DM) 6.25-15 mg/5 mL oral syrup; Take 5 mL by mouth 4 (four) times a day as needed for cough    2. BMI 32.0-32.9,adult        Depression Screening and Follow-up Plan: Patient was screened for depression during today's encounter. They screened negative with a PHQ-2 score of 0.        Subjective      Patient presents with:  Cough: Ongoing for the past two days did test for covid yesterday and was neg. Had recent exposure .  Patient is having a nonproductive wet  cough no fever no ear pain nurse no sore throat.  She had a friend that had COVID but her COVID test here is negative         Review of Systems   Constitutional:  Negative for activity change, appetite change and fatigue.   HENT:  Positive for postnasal drip. Negative for congestion, ear pain, rhinorrhea, sinus pressure, sinus pain, sneezing and sore throat.    Respiratory:  Positive for cough.    Neurological:  Negative for dizziness, light-headedness and headaches.       Current Outpatient Medications on File Prior to Visit   Medication Sig    acetaminophen-codeine (TYLENOL with CODEINE #3) 300-30 MG per tablet     aspirin 81 MG tablet Take 81 mg by mouth daily      Calcium Carbonate-Vitamin D 600-200 MG-UNIT TABS Take 1 tablet by mouth every other day      chlorhexidine (PERIDEX) 0.12 % solution     docusate sodium (COLACE) 100 mg capsule Take by mouth    ezetimibe (ZETIA) 10 mg tablet Take 1 tablet (10 mg total) by mouth daily    famotidine (PEPCID) 40 MG tablet Take 1 tablet (40 mg total) by mouth daily at bedtime    folic acid (FOLVITE) 1 mg tablet Take 1 tablet (1 mg total)  "by mouth daily    Glucosamine-Chondroitin 250-200 MG TABS Take 1 tablet by mouth daily      hydroxyurea (HYDREA) 500 mg capsule Take 1 capsule (500 mg total) by mouth daily    levothyroxine 50 mcg tablet Take 1 tablet (50 mcg total) by mouth daily    lisinopril (ZESTRIL) 2.5 mg tablet Take 1 tablet (2.5 mg total) by mouth daily    magnesium citrate solution Take 296 mL by mouth once    Multiple Vitamins-Iron (DAILY MULTIPLE VITAMIN/IRON) TABS Take 1 tablet by mouth daily      Omega-3 Fatty Acids ([employer]S FISH OIL) 1200 MG CAPS Take 1 capsule by mouth 2 (two) times a day    Polyethylene Glycol 3350 (MIRALAX MIX-IN PAX PO) Take by mouth    Wheat Dextrin (BENEFIBER DRINK MIX PO) Take by mouth       Objective     /70 (BP Location: Right arm, Patient Position: Sitting, Cuff Size: Standard)   Pulse (!) 110   Temp 98.2 °F (36.8 °C) (Temporal)   Ht 4' 9\" (1.448 m)   Wt 67.7 kg (149 lb 3.2 oz)   SpO2 97%   BMI 32.29 kg/m²     Physical Exam  Vitals reviewed.   Constitutional:       Appearance: Normal appearance. She is obese.   HENT:      Right Ear: Tympanic membrane normal.      Left Ear: Tympanic membrane normal.      Nose: Rhinorrhea present. No congestion.      Mouth/Throat:      Pharynx: No oropharyngeal exudate or posterior oropharyngeal erythema.   Neck:      Comments: Shoddy nodes  Cardiovascular:      Rate and Rhythm: Normal rate and regular rhythm.      Pulses: Normal pulses.      Heart sounds: Normal heart sounds.   Pulmonary:      Effort: Pulmonary effort is normal.      Breath sounds: Normal breath sounds.   Lymphadenopathy:      Cervical: Cervical adenopathy present.   Neurological:      Mental Status: She is alert.       Kiley Gregory MD    BMI Counseling: Body mass index is 32.29 kg/m². The BMI is above normal. Nutrition recommendations include reducing portion sizes, decreasing overall calorie intake, 3-5 servings of fruits/vegetables daily, reducing fast food intake, and consuming healthier snacks. " Exercise recommendations include exercising 3-5 times per week.

## 2023-12-21 NOTE — PATIENT INSTRUCTIONS
Rest, fluids, cough med  Obesity   AMBULATORY CARE:   Obesity  means your body mass index (BMI) is greater than 30. Your healthcare provider will use your age, height, and weight to measure your BMI.  The risks of obesity include  many health problems, including injuries or physical disability.  Diabetes (high blood sugar level)    High blood pressure or high cholesterol    Heart disease or heart failure    Stroke    Gallbladder or liver disease    Cancer of the colon, breast, prostate, liver, or kidney    Sleep apnea    Arthritis or gout    Screening  is done to check for health conditions before you have signs or symptoms. If you are 35 to 70 years old, your blood sugar level may be checked every 3 years for signs of prediabetes or diabetes. Your healthcare provider will check your blood pressure at each visit. High blood pressure can lead to a stroke or other problems. Your provider may check for signs of heart disease, cancer, or other health problems.  Seek care immediately if:   You have a severe headache, confusion, or difficulty speaking.    You have weakness on one side of your body.    You have chest pain, sweating, or shortness of breath.    Call your doctor if:   You have symptoms of gallbladder or liver disease, such as pain in your upper abdomen.    You have knee or hip pain and discomfort while walking.    You have symptoms of diabetes, such as intense hunger and thirst, and frequent urination.    You have symptoms of sleep apnea, such as snoring or daytime sleepiness.    You have questions or concerns about your condition or care.    Treatment for obesity  focuses on helping you lose weight to improve your health. Even a small decrease in BMI can reduce the risk for many health problems. Your healthcare provider will help you set a weight-loss goal.  Lifestyle changes  are the first step in treating obesity. These include making healthy food choices and getting regular physical activity. Your  healthcare provider may suggest a weight-loss program that involves coaching, education, and therapy.    Medicine  may help you lose weight when it is used with a healthy foods and physical activity.    Surgery  can help you lose weight if you have obesity along with other health problems. Several types of weight-loss surgery are available. Ask your healthcare provider for more information.    Tips for safe weight loss:   Set small, realistic goals.  An example of a small goal is to walk for 20 minutes 5 days a week. Anther goal is to lose 5% of your body weight.    Ask for support.  Tell friends, family members, and coworkers about your goals. Ask someone to lose weight with you. You may also want to join a weight-loss support group.    Identify foods or triggers that may cause you to overeat.  Remove tempting high-calorie foods from your home and workplace. Place a bowl of fresh fruit on your kitchen counter. If stress causes you to eat, find other ways to cope with stress. A counselor or therapist may be able to help you.    Track your daily calories and activity.  Write down what you eat and drink. Also write down how many minutes of physical activity you do each day.     Track your weekly weight.  Weigh yourself in the morning, before you eat or drink anything but after you use the bathroom. Use the same scale, in the same place, and in similar clothing each time. Only weigh yourself 1 to 2 times each week, or as directed. You may become discouraged if you weigh yourself every day.    Eating changes:  You will need to eat 500 to 1,000 fewer calories each day than you currently eat to lose 1 to 2 pounds a week. The following changes will help you cut calories:  Eat smaller portions.  Use small plates, no larger than 9 inches in diameter. Fill your plate half full of fruits and vegetables. Measure your food using measuring cups until you know what a serving size looks like.         Eat 3 meals and 1 or 2 snacks  each day.  Plan your meals in advance. Cook and eat at home most of the time. Eat slowly. Do not skip meals. Skipping meals can lead to overeating later in the day. This can make it harder for you to lose weight. Talk with a dietitian to help you make a meal plan and schedule that is right for you.    Eat fruits and vegetables at every meal.  They are low in calories and high in fiber, which makes you feel full. Do not add butter, margarine, or cream sauce to vegetables. Use herbs to season steamed vegetables.    Eat less fat and fewer fried foods.  Eat more baked or grilled chicken and fish. These protein sources are lower in calories and fat than red meat. Limit fast food. Dress your salads with olive oil and vinegar instead of bottled dressing.    Limit the amount of sugar you eat.  Do not drink sugary beverages. Limit alcohol.       Activity changes:  Physical activity is good for your body in many ways. It helps you burn calories and build strong muscles. It decreases stress and depression, and improves your mood. It can also help you sleep better. Talk to your healthcare provider before you begin an exercise program.  Exercise for at least 30 minutes 5 days a week.  Start slowly. Set aside time each day for physical activity that you enjoy and that is convenient for you. It is best to do both weight training and an activity that increases your heart rate, such as walking, bicycling, or swimming.            Find ways to be more active.  Do yard work and housecleaning. Walk up the stairs instead of using elevators. Spend your leisure time going to events that require walking, such as outdoor festivals or fairs. This extra physical activity can help you lose weight and keep it off.       Follow up with your doctor as directed:  You may need to meet with a dietitian. Write down your questions so you remember to ask them during your visits.  © Copyright [employer] 2023 Information is for End User's use only and may  not be sold, redistributed or otherwise used for commercial purposes.  The above information is an  only. It is not intended as medical advice for individual conditions or treatments. Talk to your doctor, nurse or pharmacist before following any medical regimen to see if it is safe and effective for you.    Weight Management   AMBULATORY CARE:   Why it is important to manage your weight:  Being overweight increases your risk of health conditions such as heart disease, high blood pressure, type 2 diabetes, and certain types of cancer. It can also increase your risk for osteoarthritis, sleep apnea, and other respiratory problems. Aim for a slow, steady weight loss. Even a small amount of weight loss can lower your risk of health problems.  Risks of being overweight:  Extra weight can cause many health problems, including the following:  Diabetes (high blood sugar level)    High blood pressure or high cholesterol    Heart disease    Stroke    Gallbladder or liver disease    Cancer of the colon, breast, prostate, liver, or kidney    Sleep apnea    Arthritis or gout    Screening  is done to check for health conditions before you have signs or symptoms. If you are 35 to 70 years old, your blood sugar level may be checked every 3 years for signs of prediabetes or diabetes. Your healthcare provider will check your blood pressure at each visit. High blood pressure can lead to a stroke or other problems. Your provider may check for signs of heart disease, cancer, or other health problems.  How to lose weight safely:  A safe and healthy way to lose weight is to eat fewer calories and get regular exercise.  You can lose up about 1 pound a week by decreasing the number of calories you eat by 500 calories each day. You can decrease calories by eating smaller portion sizes or by cutting out high-calorie foods. Read labels to find out how many calories are in the foods you eat.         You can also burn calories with  exercise such as walking, swimming, or biking. You will be more likely to keep weight off if you make these changes part of your lifestyle. Exercise at least 30 minutes per day on most days of the week. You can also fit in more physical activity by taking the stairs instead of the elevator or parking farther away from stores. Ask your healthcare provider about the best exercise plan for you.       Healthy meal plan for weight management:  A healthy meal plan includes a variety of foods, contains fewer calories, and helps you stay healthy. A healthy meal plan includes the following:     Eat whole-grain foods more often.  A healthy meal plan should contain fiber. Fiber is the part of grains, fruits, and vegetables that is not broken down by your body. Whole-grain foods are healthy and provide extra fiber in your diet. Some examples of whole-grain foods are whole-wheat breads and pastas, oatmeal, brown rice, and bulgur.    Eat a variety of vegetables every day.  Include dark, leafy greens such as spinach, kale, chelsea greens, and mustard greens. Eat yellow and orange vegetables such as carrots, sweet potatoes, and winter squash.     Eat a variety of fruits every day.  Choose fresh or canned fruit (canned in its own juice or light syrup) instead of juice. Fruit juice has very little or no fiber.    Eat low-fat dairy foods.  Drink fat-free (skim) milk or 1% milk. Eat fat-free yogurt and low-fat cottage cheese. Try low-fat cheeses such as mozzarella and other reduced-fat cheeses.    Choose meat and other protein foods that are low in fat.  Choose beans or other legumes such as split peas or lentils. Choose fish, skinless poultry (chicken or turkey), or lean cuts of red meat (beef or pork). Before you cook meat or poultry, cut off any visible fat.     Use less fat and oil.  Try baking foods instead of frying them. Add less fat, such as margarine, sour cream, regular salad dressing and mayonnaise to foods. Eat fewer  high-fat foods. Some examples of high-fat foods include french fries, doughnuts, ice cream, and cakes.    Eat fewer sweets.  Limit foods and drinks that are high in sugar. This includes candy, cookies, regular soda, and sweetened drinks.  Ways to decrease calories:   Eat smaller portions.     Use a small plate with smaller servings.    Do not eat second helpings.    When you eat at a restaurant, ask for a box and place half of your meal in the box before you eat.    Share an entrée with someone else.    Replace high-calorie snacks with healthy, low-calorie snacks.     Choose fresh fruit, vegetables, fat-free rice cakes, or air-popped popcorn instead of potato chips, nuts, or chocolate.    Choose water or calorie-free drinks instead of soda or sweetened drinks.    Do not shop for groceries when you are hungry.  You may be more likely to make unhealthy food choices. Take a grocery list of healthy foods and shop after you have eaten.    Eat regular meals. Do not skip meals. Skipping meals can lead to overeating later in the day. This can make it harder for you to lose weight. Eat a healthy snack in place of a meal if you do not have time to eat a regular meal. Talk with a dietitian to help you create a meal plan and schedule that is right for you.    Other things to consider as you try to lose weight:   Be aware of situations that may give you the urge to overeat, such as eating while watching television. Find ways to avoid these situations. For example, read a book, go for a walk, or do crafts.    Meet with a weight loss support group or friends who are also trying to lose weight. This may help you stay motivated to continue working on your weight loss goals.    © Copyright [employer] 2023 Information is for End User's use only and may not be sold, redistributed or otherwise used for commercial purposes.  The above information is an  only. It is not intended as medical advice for individual conditions or  treatments. Talk to your doctor, nurse or pharmacist before following any medical regimen to see if it is safe and effective for you.

## 2023-12-24 ENCOUNTER — TELEMEDICINE (OUTPATIENT)
Dept: FAMILY MEDICINE CLINIC | Facility: CLINIC | Age: 83
End: 2023-12-24
Payer: MEDICARE

## 2023-12-24 DIAGNOSIS — U07.1 COVID-19: Primary | ICD-10-CM

## 2023-12-24 PROCEDURE — 99213 OFFICE O/P EST LOW 20 MIN: CPT | Performed by: FAMILY MEDICINE

## 2023-12-24 RX ORDER — NIRMATRELVIR AND RITONAVIR 150-100 MG
2 KIT ORAL 2 TIMES DAILY
Qty: 20 TABLET | Refills: 0 | Status: SHIPPED | OUTPATIENT
Start: 2023-12-24 | End: 2023-12-29

## 2023-12-24 NOTE — PROGRESS NOTES
COVID-19 Outpatient Progress Note    Assessment/Plan:    Problem List Items Addressed This Visit          Other    COVID-19 - Primary     Day #5, rest, fluids, vit  c,d  and  zinc, cont  cough med, rx  for Paxlovid, tolerated  well before, no major  drug interactions, may come  out of  quarantine  tomorrow  depending  on how she  feels, mask through 12/29         Relevant Medications    nirmatrelvir & ritonavir (Paxlovid, 150/100,) tablet therapy pack      Disposition:     Patient with asymptomatic/mild COVID-19: They were recommended to isolate for at least 5 days (day 0 is the day symptoms appeared or the date the specimen was collected for the positive test for people who are asymptomatic). If they are asymptomatic or symptoms are improving with no fevers in the past 24 hours, isolation may be ended followed by 5 days of wearing a high quality mask when around others to minimize risk of infecting others. They should wear a mask through day 10 and a test-based strategy may be used to remove a mask sooner.      Discussed symptom directed medication options with patient. Discussed vitamin D, vitamin C, and/or zinc supplementation with patient.     Just  tested  positive  today, cough slightly  worse,   negative still    Patient meets criteria for Paxlovid and they have been counseled appropriately regarding risks, benefits, side effects, and alternative treatment options. After discussion, patient agrees to treatment.    Possible side effects of Paxlovid?    Possible side effects of Paxlovid are:  - Liver Problems. Notify us right away if you start to experience loss of appetite, yellowing of your skin and the whites of eyes (jaundice), dark-colored urine, pale colored stools and itchy skin, stomach area (abdominal) pain.  - Resistance to HIV Medicines. If you have untreated HIV infection, Paxlovid may lead to some HIV medicines not working as well in the future.  - Other possible side effects include:  altered sense of taste, diarrhea, high blood pressure, or muscle aches.    I have spent a total time of 5 minutes on the day of the encounter for this patient including       Encounter provider: Kiley Gregory MD     Provider located at: [address] [address] [phone]     Recent Visits  Date Type Provider Dept   12/21/23 Office Visit Kiley Gregory MD [facility]   Showing recent visits within past 7 days and meeting all other requirements  Today's Visits  Date Type Provider Dept   12/24/23 Telemedicine Kiley Gregory MD [facility]   Showing today's visits and meeting all other requirements  Future Appointments  No visits were found meeting these conditions.  Showing future appointments within next 150 days and meeting all other requirements     This virtual check-in was done via telephone and she agrees to proceed.    Patient agrees to participate in a virtual check in via telephone or video visit instead of presenting to the office to address urgent/immediate medical needs. Patient is aware this is a billable service. She acknowledged consent and understanding of privacy and security of the video platform. The patient has agreed to participate and understands they can discontinue the visit at any time.    After connecting through Telephone, the patient was identified by name and date of birth. Bhavani ANDI Amaral was informed that this was a telemedicine visit and that the exam was being conducted confidentially over secure lines. My office door was closed. No one else was in the room. Bhavani Amaral acknowledged consent and understanding of privacy and security of the telemedicine visit. I informed the patient that I have reviewed her record in Epic and presented the opportunity for her to ask any questions regarding the visit today. The patient agreed to participate.    It was my intent to perform this visit via video technology  but the patient was not able to do a video connection so the visit was completed via audio telephone only.        Verification of patient location:  Patient is located in the following state in which I hold an active license: [place]    Subjective:   Bhavani Amaral is a 83 y.o. female who has been screened for COVID-19. Symptom change since last report: worsening. Patient's symptoms include fatigue, rhinorrhea, sore throat, cough and headache. Patient denies fever and chills.     - Date of symptom onset: 12/20/2023  - Date of positive COVID-19 test: 12/24/2023. Type of test: Home antigen. Patient with typical symptoms of COVID-19 and they attest that they were positive on home rapid antigen testing. Image of positive result is not able to be uploaded into their chart.     COVID-19 vaccination status: Fully vaccinated with booster    Bhavani has been staying home and has isolated themselves in her home. She is taking care to not share personal items and is cleaning all surfaces that are touched often, like counters, tabletops, and doorknobs using household cleaning sprays or wipes. She is wearing a mask when she leaves her room.     Lab Results   Component Value Date    SARSCOVAG Negative 12/21/2023       Review of Systems   Constitutional:  Positive for fatigue. Negative for chills and fever.   HENT:  Positive for rhinorrhea and sore throat.    Respiratory:  Positive for cough.    Neurological:  Positive for headaches.     Current Outpatient Medications on File Prior to Visit   Medication Sig    acetaminophen-codeine (TYLENOL with CODEINE #3) 300-30 MG per tablet     aspirin 81 MG tablet Take 81 mg by mouth daily      Calcium Carbonate-Vitamin D 600-200 MG-UNIT TABS Take 1 tablet by mouth every other day      chlorhexidine (PERIDEX) 0.12 % solution     docusate sodium (COLACE) 100 mg capsule Take by mouth    ezetimibe (ZETIA) 10 mg tablet Take 1 tablet (10 mg total) by mouth daily    famotidine (PEPCID) 40 MG tablet Take 1  tablet (40 mg total) by mouth daily at bedtime    folic acid (FOLVITE) 1 mg tablet Take 1 tablet (1 mg total) by mouth daily    Glucosamine-Chondroitin 250-200 MG TABS Take 1 tablet by mouth daily      hydroxyurea (HYDREA) 500 mg capsule Take 1 capsule (500 mg total) by mouth daily    levothyroxine 50 mcg tablet Take 1 tablet (50 mcg total) by mouth daily    lisinopril (ZESTRIL) 2.5 mg tablet Take 1 tablet (2.5 mg total) by mouth daily    magnesium citrate solution Take 296 mL by mouth once    Multiple Vitamins-Iron (DAILY MULTIPLE VITAMIN/IRON) TABS Take 1 tablet by mouth daily      Omega-3 Fatty Acids ([employer] FISH OIL) 1200 MG CAPS Take 1 capsule by mouth 2 (two) times a day    Polyethylene Glycol 3350 (MIRALAX MIX-IN PAX PO) Take by mouth    promethazine-dextromethorphan (PHENERGAN-DM) 6.25-15 mg/5 mL oral syrup Take 5 mL by mouth 4 (four) times a day as needed for cough    Wheat Dextrin (BENEFIBER DRINK MIX PO) Take by mouth       Objective:    There were no vitals taken for this visit.       Physical Exam  Kiley Gregory MD

## 2023-12-29 NOTE — PATIENT INSTRUCTIONS
Await leg ultrasound Goal Outcome Evaluation:              Outcome Evaluation: patient alert 2, but alert to situation. patient with no complaints of pain, on 2 liters per nasal cannula. instructed patient about covid precautions, patient verbalized understanding. speech seen patient today and said ok to have regular diabetic diet. turing patient every 2 hours and as needed. patient groin area and buttucks with excoration, coverd with cream and extrunal cath in place. patients wife is admitted to obs for covid also. throughout the day patient less drowsey and wake up alot easier. paitent right humerous old fx with sling in place. (surgeon said no surgery for now).

## 2024-01-18 ENCOUNTER — APPOINTMENT (OUTPATIENT)
Dept: LAB | Facility: CLINIC | Age: 84
End: 2024-01-18
Payer: MEDICARE

## 2024-01-18 DIAGNOSIS — E03.9 HYPOTHYROIDISM, UNSPECIFIED TYPE: ICD-10-CM

## 2024-01-18 DIAGNOSIS — N18.30 CONTROLLED TYPE 2 DIABETES MELLITUS WITH STAGE 3 CHRONIC KIDNEY DISEASE, WITHOUT LONG-TERM CURRENT USE OF INSULIN (HCC): ICD-10-CM

## 2024-01-18 DIAGNOSIS — E11.22 CONTROLLED TYPE 2 DIABETES MELLITUS WITH STAGE 3 CHRONIC KIDNEY DISEASE, WITHOUT LONG-TERM CURRENT USE OF INSULIN (HCC): ICD-10-CM

## 2024-01-18 LAB
ALBUMIN SERPL BCP-MCNC: 4 G/DL (ref 3.5–5)
ALP SERPL-CCNC: 60 U/L (ref 34–104)
ALT SERPL W P-5'-P-CCNC: 12 U/L (ref 7–52)
ANION GAP SERPL CALCULATED.3IONS-SCNC: 10 MMOL/L
AST SERPL W P-5'-P-CCNC: 12 U/L (ref 13–39)
BASOPHILS # BLD AUTO: 0.17 THOUSANDS/ÂΜL (ref 0–0.1)
BASOPHILS NFR BLD AUTO: 2 % (ref 0–1)
BILIRUB SERPL-MCNC: 0.38 MG/DL (ref 0.2–1)
BUN SERPL-MCNC: 16 MG/DL (ref 5–25)
CALCIUM SERPL-MCNC: 9.1 MG/DL (ref 8.4–10.2)
CHLORIDE SERPL-SCNC: 100 MMOL/L (ref 96–108)
CHOLEST SERPL-MCNC: 148 MG/DL
CO2 SERPL-SCNC: 25 MMOL/L (ref 21–32)
CREAT SERPL-MCNC: 1.03 MG/DL (ref 0.6–1.3)
EOSINOPHIL # BLD AUTO: 0.23 THOUSAND/ÂΜL (ref 0–0.61)
EOSINOPHIL NFR BLD AUTO: 3 % (ref 0–6)
ERYTHROCYTE [DISTWIDTH] IN BLOOD BY AUTOMATED COUNT: 14.5 % (ref 11.6–15.1)
EST. AVERAGE GLUCOSE BLD GHB EST-MCNC: 131 MG/DL
GFR SERPL CREATININE-BSD FRML MDRD: 51 ML/MIN/1.73SQ M
GLUCOSE P FAST SERPL-MCNC: 98 MG/DL (ref 65–99)
HBA1C MFR BLD: 6.2 %
HCT VFR BLD AUTO: 40.1 % (ref 34.8–46.1)
HDLC SERPL-MCNC: 33 MG/DL
HGB BLD-MCNC: 12.9 G/DL (ref 11.5–15.4)
IMM GRANULOCYTES # BLD AUTO: 0.02 THOUSAND/UL (ref 0–0.2)
IMM GRANULOCYTES NFR BLD AUTO: 0 % (ref 0–2)
LDLC SERPL CALC-MCNC: 62 MG/DL (ref 0–100)
LYMPHOCYTES # BLD AUTO: 2.56 THOUSANDS/ÂΜL (ref 0.6–4.47)
LYMPHOCYTES NFR BLD AUTO: 32 % (ref 14–44)
MCH RBC QN AUTO: 33.8 PG (ref 26.8–34.3)
MCHC RBC AUTO-ENTMCNC: 32.2 G/DL (ref 31.4–37.4)
MCV RBC AUTO: 105 FL (ref 82–98)
MONOCYTES # BLD AUTO: 0.82 THOUSAND/ÂΜL (ref 0.17–1.22)
MONOCYTES NFR BLD AUTO: 10 % (ref 4–12)
NEUTROPHILS # BLD AUTO: 4.14 THOUSANDS/ÂΜL (ref 1.85–7.62)
NEUTS SEG NFR BLD AUTO: 53 % (ref 43–75)
NONHDLC SERPL-MCNC: 115 MG/DL
NRBC BLD AUTO-RTO: 0 /100 WBCS
PLATELET # BLD AUTO: 422 THOUSANDS/UL (ref 149–390)
PMV BLD AUTO: 9.1 FL (ref 8.9–12.7)
POTASSIUM SERPL-SCNC: 4.4 MMOL/L (ref 3.5–5.3)
PROT SERPL-MCNC: 7.3 G/DL (ref 6.4–8.4)
RBC # BLD AUTO: 3.82 MILLION/UL (ref 3.81–5.12)
SODIUM SERPL-SCNC: 135 MMOL/L (ref 135–147)
TRIGL SERPL-MCNC: 266 MG/DL
TSH SERPL DL<=0.05 MIU/L-ACNC: 4.63 UIU/ML (ref 0.45–4.5)
WBC # BLD AUTO: 7.94 THOUSAND/UL (ref 4.31–10.16)

## 2024-01-18 PROCEDURE — 83036 HEMOGLOBIN GLYCOSYLATED A1C: CPT

## 2024-01-18 PROCEDURE — 84443 ASSAY THYROID STIM HORMONE: CPT

## 2024-01-18 PROCEDURE — 80061 LIPID PANEL: CPT

## 2024-01-22 ENCOUNTER — OFFICE VISIT (OUTPATIENT)
Dept: ENDOCRINOLOGY | Facility: CLINIC | Age: 84
End: 2024-01-22
Payer: MEDICARE

## 2024-01-22 VITALS
DIASTOLIC BLOOD PRESSURE: 84 MMHG | SYSTOLIC BLOOD PRESSURE: 119 MMHG | HEIGHT: 57 IN | BODY MASS INDEX: 31.93 KG/M2 | WEIGHT: 148 LBS | HEART RATE: 86 BPM

## 2024-01-22 DIAGNOSIS — E27.8 ADRENAL MASS (HCC): Primary | ICD-10-CM

## 2024-01-22 PROBLEM — N18.32 STAGE 3B CHRONIC KIDNEY DISEASE (HCC): Status: ACTIVE | Noted: 2024-01-22

## 2024-01-22 PROCEDURE — 99214 OFFICE O/P EST MOD 30 MIN: CPT | Performed by: PHYSICIAN ASSISTANT

## 2024-01-22 NOTE — PROGRESS NOTES
Established Patient Progress Note     CC: Endocrinology follow up visit    Impression & Plan:    Problem List Items Addressed This Visit        Other    Adrenal mass (HCC) - Primary     Hormonal testing was normal including Dexamethasone suppresion testing, Aldosterone/Renin Ratio, DHEAS, ACTH, and Plasma metanephrines.   Appears to be a benign adrenal adenoma based on MRI from 1/2023.  Will monitor size with Non-Contrast CT.   (Patient concerned that she had CT order from Dr. Gregory stating need for oral contrast-- contacted Dr. Gregory to let her know Oral contrast not needed from an endocrine stand point and she is OK with change in order)         Relevant Orders    CT abdomen wo contrast       History of Present Illness:     Bhavani Amaral is a 83 y.o. female with a history of adrenal incidentaloma.  She was seen by Endocrinology 5/19/2023.  She had MRI 1/3/2023  that showed 1.3 x 1.7cm adrenal mass with imaging characteristics suggestive of a benign lipid rich adenoma.   Hormonal workup was completed which showed no evidence of hyperfunctioning nodule.  Overall she is feeling well with no complaints.     She follows with her PCP for other conditions including diet controlled DM2 and Hypothyroidism. She follows with Rheumatology for Osteoporosis and she is treated with Prolia.   She also has HTN and is well controlled on lisinopril 2.5mg daily.  Follows with nephrology for CKD.     Patient Active Problem List   Diagnosis   • GERD (gastroesophageal reflux disease)   • Hyperlipidemia   • Hypothyroidism   • Controlled type 2 diabetes mellitus with stage 3 chronic kidney disease, without long-term current use of insulin ([facility])   • Primary osteoarthritis of right knee   • Situational anxiety   • Urinary incontinence   • Essential hypertension   • Hyponatremia   • Left knee pain   • Primary osteoarthritis of left knee   • Benign hypertension with chronic kidney disease, stage III ([facility])   • Iron deficiency anemia   •  Essential thrombocytosis (HCC)   • Hyperlipidemia due to type 2 diabetes mellitus    • Persistent proteinuria   • JAK2 gene mutation   • Encounter for antineoplastic chemotherapy   • Neoplastic (malignant) related fatigue   • COVID-19   • Adrenal mass (HCC)   • Stage 3b chronic kidney disease (HCC)      Past Medical History:   Diagnosis Date   • CKD (chronic kidney disease)    • Diabetes mellitus (HCC)     does not take medications, does not monitor blood sugars, pre-diabetic   • Disease of thyroid gland    • Diverticulosis    • GERD (gastroesophageal reflux disease)    • Hypertension    • Hyponatremia    • Ovarian cyst    • Rectal bleeding    • Wears reading eyeglasses       Past Surgical History:   Procedure Laterality Date   • BREAST BIOPSY Right 05/13/2009    Ultrasound Bx. Benign   • BREAST SURGERY     • CATARACT EXTRACTION Bilateral    • COLONOSCOPY     • DENTAL SURGERY  10/09/2023   • ESOPHAGOGASTRODUODENOSCOPY     • IR BIOPSY BONE MARROW  03/18/2022   • OOPHORECTOMY Bilateral 2015   • DC CMBND ANTERPOST COLPORRAPHY W/CYSTO N/A 06/26/2018    Procedure: ANTERIOR & POSTERIOR COLPORRHAPHY;  Surgeon: Babatunde Quinteros MD;  Location: [facility];  Service: UroGynecology          • DC COLPOPEXY VAGINAL EXTRAPERITONEAL APPROACH N/A 06/26/2018    Procedure: ANTERIOR COLPOPEXY VAGINAL EXTRAPERITONEAL (VEC);  Surgeon: Babatunde Quinteros MD;  Location: [facility];  Service: UroGynecology          • DC CYSTOURETHROSCOPY N/A 06/26/2018    Procedure: CYSTOSCOPY;  Surgeon: Babatunde Quinteros MD;  Location: [facility];  Service: UroGynecology          • DC SLING OPERATION STRESS INCONTINENCE N/A 06/26/2018    Procedure: INSERTION PUBOVAGINAL SLING (FEMALE);  Surgeon: Babatudne Quinteros MD;  Location: [facility];  Service: UroGynecology          • TONSILLECTOMY     • US GUIDED MSK PROCEDURE  11/25/2020      Family History   Problem Relation Age of Onset   • Cervical cancer Mother 68   • Breast cancer Maternal Grandmother          Under 50   • Mental illness Son    • Schizophrenia Son    • Cancer Family    • No Known Problems Father    • No Known Problems Maternal Aunt    • No Known Problems Maternal Aunt    • Brain cancer Maternal Aunt         age unknown   • Cancer Cousin      Social History     Tobacco Use   • Smoking status: Never     Passive exposure: Never   • Smokeless tobacco: Never   Substance Use Topics   • Alcohol use: No     No Known Allergies    Current Outpatient Medications:   •  aspirin 81 MG tablet, Take 81 mg by mouth daily  , Disp: , Rfl:   •  Calcium Carbonate-Vitamin D 600-200 MG-UNIT TABS, Take 1 tablet by mouth every other day  , Disp: , Rfl:   •  chlorhexidine (PERIDEX) 0.12 % solution, , Disp: , Rfl:   •  ezetimibe (ZETIA) 10 mg tablet, Take 1 tablet (10 mg total) by mouth daily, Disp: 90 tablet, Rfl: 1  •  famotidine (PEPCID) 40 MG tablet, Take 1 tablet (40 mg total) by mouth daily at bedtime, Disp: 90 tablet, Rfl: 1  •  folic acid (FOLVITE) 1 mg tablet, Take 1 tablet (1 mg total) by mouth daily, Disp: 90 tablet, Rfl: 2  •  Glucosamine-Chondroitin 250-200 MG TABS, Take 1 tablet by mouth daily  , Disp: , Rfl:   •  hydroxyurea (HYDREA) 500 mg capsule, Take 1 capsule (500 mg total) by mouth daily, Disp: 90 capsule, Rfl: 1  •  levothyroxine 50 mcg tablet, Take 1 tablet (50 mcg total) by mouth daily, Disp: 90 tablet, Rfl: 1  •  lisinopril (ZESTRIL) 2.5 mg tablet, Take 1 tablet (2.5 mg total) by mouth daily, Disp: 90 tablet, Rfl: 3  •  Multiple Vitamins-Iron (DAILY MULTIPLE VITAMIN/IRON) TABS, Take 1 tablet by mouth daily  , Disp: , Rfl:   •  Omega-3 Fatty Acids ([employer] FISH OIL) 1200 MG CAPS, Take 1 capsule by mouth 2 (two) times a day, Disp: , Rfl:   •  Polyethylene Glycol 3350 (MIRALAX MIX-IN PAX PO), Take by mouth, Disp: , Rfl:   •  acetaminophen-codeine (TYLENOL with CODEINE #3) 300-30 MG per tablet, , Disp: , Rfl:   •  docusate sodium (COLACE) 100 mg capsule, Take by mouth (Patient not taking: Reported on 1/22/2024),  "Disp: , Rfl:   •  magnesium citrate solution, Take 296 mL by mouth once (Patient not taking: Reported on 1/22/2024), Disp: , Rfl:   •  promethazine-dextromethorphan (PHENERGAN-DM) 6.25-15 mg/5 mL oral syrup, Take 5 mL by mouth 4 (four) times a day as needed for cough (Patient not taking: Reported on 1/22/2024), Disp: 120 mL, Rfl: 1  •  Wheat Dextrin (BENEFIBER DRINK MIX PO), Take by mouth (Patient not taking: Reported on 1/22/2024), Disp: , Rfl:     Review of Systems   Constitutional:  Negative for activity change, appetite change, chills, diaphoresis, fatigue, fever and unexpected weight change.   HENT:  Negative for trouble swallowing and voice change.    Eyes:  Negative for visual disturbance.   Respiratory:  Negative for shortness of breath.    Cardiovascular:  Negative for chest pain and palpitations.   Gastrointestinal:  Negative for abdominal pain, constipation and diarrhea.   Endocrine: Negative for cold intolerance, heat intolerance, polydipsia, polyphagia and polyuria.   Genitourinary:  Negative for frequency and menstrual problem.   Musculoskeletal:  Negative for arthralgias and myalgias.   Skin:  Negative for rash.   Allergic/Immunologic: Negative for food allergies.   Neurological:  Negative for dizziness and tremors.   Hematological:  Negative for adenopathy.   Psychiatric/Behavioral:  Negative for sleep disturbance.    All other systems reviewed and are negative.      Physical Exam:  Body mass index is 32.03 kg/m².  /84   Pulse 86   Ht 4' 9\" (1.448 m)   Wt 67.1 kg (148 lb)   BMI 32.03 kg/m²    Wt Readings from Last 3 Encounters:   01/22/24 67.1 kg (148 lb)   12/21/23 67.7 kg (149 lb 3.2 oz)   11/20/23 67.6 kg (149 lb 0.5 oz)       Physical Exam  Vitals reviewed.   Constitutional:       General: She is not in acute distress.     Appearance: Normal appearance. She is well-developed. She is not toxic-appearing.   HENT:      Head: Normocephalic and atraumatic.   Eyes:      Conjunctiva/sclera: " Conjunctivae normal.      Pupils: Pupils are equal, round, and reactive to light.   Neck:      Thyroid: No thyromegaly.   Cardiovascular:      Rate and Rhythm: Normal rate and regular rhythm.      Heart sounds: Normal heart sounds.   Pulmonary:      Effort: Pulmonary effort is normal. No respiratory distress.      Breath sounds: Normal breath sounds. No wheezing or rales.   Abdominal:      General: Bowel sounds are normal. There is no distension.      Palpations: Abdomen is soft.      Tenderness: There is no abdominal tenderness.   Musculoskeletal:         General: Deformity (kyphosis) present. Normal range of motion.      Cervical back: Normal range of motion and neck supple.   Skin:     General: Skin is warm and dry.   Neurological:      Mental Status: She is alert and oriented to person, place, and time.   Psychiatric:         Mood and Affect: Mood normal.         Behavior: Behavior normal.         Labs:   Component      Latest Ref Rng 9/7/2022 5/10/2023 5/24/2023 5/25/2023 1/18/2024   NORMETANEPHRINE FREE PLASMA      0.0 - 285.2 pg/mL   65.8      METANEPHRINE FREE PLASMA      0.0 - 88.0 pg/mL   26.4      Cortisol - AM      6.7 - 22.6 ug/dL 20.1  28.5 (H)   1.2 (L)     Aldosterone      0.0 - 30.0 ng/dL 6.9  8.8  8.5      Cortisol, Random      ug/dL   12.0      ACTH      7.2 - 63.3 pg/mL   23.1      DHEA-SO4      13.9 - 142.8 ug/dL   92.9      Renin      0.167 - 5.380 ng/mL/hr   0.800      TSH 3RD GENERATON      0.450 - 4.500 uIU/mL     4.633 (H)       Legend:  (H) High  (L) Low    Discussed with the patient and all questioned fully answered. She will call me if any problems arise.    Follow-up appointment in 12 months.     Counseled patient on diagnostic results, prognosis, risk and benefit of treatment options, instruction for management, importance of treatment compliance, Risk  factor reduction and impressions    Sandoval Ramires PA-C

## 2024-01-22 NOTE — ASSESSMENT & PLAN NOTE
Hormonal testing was normal including Dexamethasone suppresion testing, Aldosterone/Renin Ratio, DHEAS, ACTH, and Plasma metanephrines.   Appears to be a benign adrenal adenoma based on MRI from 1/2023.  Will monitor size with Non-Contrast CT.   (Patient concerned that she had CT order from Dr. Gregory stating need for oral contrast-- contacted Dr. Gregory to let her know Oral contrast not needed from an endocrine stand point and she is OK with change in order)

## 2024-01-31 ENCOUNTER — TELEPHONE (OUTPATIENT)
Dept: NEPHROLOGY | Facility: CLINIC | Age: 84
End: 2024-01-31

## 2024-01-31 NOTE — TELEPHONE ENCOUNTER
Patient called and just want to make sure she has labs ordered placed. I told patient that labs are placed to her to be done.

## 2024-02-04 ENCOUNTER — HOSPITAL ENCOUNTER (OUTPATIENT)
Dept: CT IMAGING | Facility: HOSPITAL | Age: 84
Discharge: HOME | End: 2024-02-04
Payer: MEDICARE

## 2024-02-04 DIAGNOSIS — E27.8 ADRENAL MASS (HCC): ICD-10-CM

## 2024-02-04 PROCEDURE — 74150 CT ABDOMEN W/O CONTRAST: CPT

## 2024-02-04 PROCEDURE — G1004 CDSM NDSC: HCPCS

## 2024-02-09 ENCOUNTER — TELEPHONE (OUTPATIENT)
Age: 84
End: 2024-02-09

## 2024-02-09 NOTE — TELEPHONE ENCOUNTER
Received call from patient enquiring about her CT results. Informed patient that results have not be received yet for this test.    She also asked if Dr Gregory could send paperwork over to her podiatrist, Dr. Fam for diabetic shoes for her and her  Nik Amaral [birth year]

## 2024-02-12 DIAGNOSIS — R80.1 PERSISTENT PROTEINURIA: ICD-10-CM

## 2024-02-12 DIAGNOSIS — I10 ESSENTIAL HYPERTENSION: ICD-10-CM

## 2024-02-12 RX ORDER — LISINOPRIL 2.5 MG/1
2.5 TABLET ORAL DAILY
Qty: 90 TABLET | Refills: 3 | Status: SHIPPED | OUTPATIENT
Start: 2024-02-12 | End: 2025-02-11

## 2024-02-15 ENCOUNTER — TELEPHONE (OUTPATIENT)
Dept: FAMILY MEDICINE CLINIC | Facility: CLINIC | Age: 84
End: 2024-02-15

## 2024-02-15 ENCOUNTER — APPOINTMENT (OUTPATIENT)
Dept: LAB | Facility: CLINIC | Age: 84
End: 2024-02-15
Payer: MEDICARE

## 2024-02-15 DIAGNOSIS — N18.30 CONTROLLED TYPE 2 DIABETES MELLITUS WITH STAGE 3 CHRONIC KIDNEY DISEASE, WITHOUT LONG-TERM CURRENT USE OF INSULIN (HCC): ICD-10-CM

## 2024-02-15 DIAGNOSIS — N18.30 BENIGN HYPERTENSION WITH CHRONIC KIDNEY DISEASE, STAGE III (HCC): ICD-10-CM

## 2024-02-15 DIAGNOSIS — I12.9 BENIGN HYPERTENSION WITH CHRONIC KIDNEY DISEASE, STAGE III (HCC): ICD-10-CM

## 2024-02-15 DIAGNOSIS — E11.22 CONTROLLED TYPE 2 DIABETES MELLITUS WITH STAGE 3 CHRONIC KIDNEY DISEASE, WITHOUT LONG-TERM CURRENT USE OF INSULIN (HCC): ICD-10-CM

## 2024-02-15 DIAGNOSIS — N18.32 STAGE 3B CHRONIC KIDNEY DISEASE (HCC): ICD-10-CM

## 2024-02-15 DIAGNOSIS — E87.1 HYPONATREMIA: ICD-10-CM

## 2024-02-15 LAB
ANION GAP SERPL CALCULATED.3IONS-SCNC: 8 MMOL/L
BACTERIA UR QL AUTO: ABNORMAL /HPF
BILIRUB UR QL STRIP: NEGATIVE
BUN SERPL-MCNC: 19 MG/DL (ref 5–25)
CALCIUM SERPL-MCNC: 9 MG/DL (ref 8.4–10.2)
CHLORIDE SERPL-SCNC: 100 MMOL/L (ref 96–108)
CLARITY UR: CLEAR
CO2 SERPL-SCNC: 26 MMOL/L (ref 21–32)
COLOR UR: ABNORMAL
CREAT SERPL-MCNC: 1.04 MG/DL (ref 0.6–1.3)
CREAT UR-MCNC: 74.1 MG/DL
CREAT UR-MCNC: 74.1 MG/DL
GFR SERPL CREATININE-BSD FRML MDRD: 50 ML/MIN/1.73SQ M
GLUCOSE P FAST SERPL-MCNC: 100 MG/DL (ref 65–99)
GLUCOSE UR STRIP-MCNC: NEGATIVE MG/DL
HGB UR QL STRIP.AUTO: NEGATIVE
KETONES UR STRIP-MCNC: NEGATIVE MG/DL
LEUKOCYTE ESTERASE UR QL STRIP: ABNORMAL
MAGNESIUM SERPL-MCNC: 2 MG/DL (ref 1.9–2.7)
MICROALBUMIN UR-MCNC: <7 MG/L
MICROALBUMIN/CREAT 24H UR: <9 MG/G CREATININE (ref 0–30)
NITRITE UR QL STRIP: NEGATIVE
NON-SQ EPI CELLS URNS QL MICRO: ABNORMAL /HPF
PH UR STRIP.AUTO: 6 [PH]
PHOSPHATE SERPL-MCNC: 3.7 MG/DL (ref 2.3–4.1)
POTASSIUM SERPL-SCNC: 4.3 MMOL/L (ref 3.5–5.3)
PROT UR STRIP-MCNC: NEGATIVE MG/DL
PROT UR-MCNC: 5 MG/DL
PROT/CREAT UR: 0.07 MG/G{CREAT} (ref 0–0.1)
PTH-INTACT SERPL-MCNC: 27.4 PG/ML (ref 12–88)
RBC #/AREA URNS AUTO: ABNORMAL /HPF
SODIUM SERPL-SCNC: 134 MMOL/L (ref 135–147)
SP GR UR STRIP.AUTO: 1.01 (ref 1–1.03)
UROBILINOGEN UR STRIP-ACNC: <2 MG/DL
WBC #/AREA URNS AUTO: ABNORMAL /HPF

## 2024-02-15 PROCEDURE — 82570 ASSAY OF URINE CREATININE: CPT

## 2024-02-15 PROCEDURE — 82043 UR ALBUMIN QUANTITATIVE: CPT

## 2024-02-15 PROCEDURE — 83970 ASSAY OF PARATHORMONE: CPT

## 2024-02-15 PROCEDURE — 80048 BASIC METABOLIC PNL TOTAL CA: CPT

## 2024-02-15 PROCEDURE — 36415 COLL VENOUS BLD VENIPUNCTURE: CPT

## 2024-02-15 PROCEDURE — 83735 ASSAY OF MAGNESIUM: CPT

## 2024-02-15 PROCEDURE — 84156 ASSAY OF PROTEIN URINE: CPT

## 2024-02-15 PROCEDURE — 81001 URINALYSIS AUTO W/SCOPE: CPT

## 2024-02-15 PROCEDURE — 84100 ASSAY OF PHOSPHORUS: CPT

## 2024-02-15 NOTE — TELEPHONE ENCOUNTER
Pt was notified of this by endo, also pt will be calling podiatry to get us forms see other note in pts chart

## 2024-02-15 NOTE — TELEPHONE ENCOUNTER
Pt came into office, asked status of podiatry forms. Told her as of MF's note on 2/9 in note about CT scan, we had not received anything from their office for her to sign. Pt understood, stated she would call them about the paperwork for her and her .

## 2024-02-16 ENCOUNTER — TELEPHONE (OUTPATIENT)
Dept: FAMILY MEDICINE CLINIC | Facility: CLINIC | Age: 84
End: 2024-02-16

## 2024-02-16 NOTE — TELEPHONE ENCOUNTER
Called patient, line was busy at the time, if patient calls back by chance please reschedule appointment for 3/73/2024 as provider will be out during the time, I have as well sent an notify to patient address to reschedule appointment.    Thank you.

## 2024-02-21 ENCOUNTER — OFFICE VISIT (OUTPATIENT)
Dept: NEPHROLOGY | Facility: CLINIC | Age: 84
End: 2024-02-21
Payer: MEDICARE

## 2024-02-21 VITALS
OXYGEN SATURATION: 96 % | DIASTOLIC BLOOD PRESSURE: 76 MMHG | SYSTOLIC BLOOD PRESSURE: 130 MMHG | WEIGHT: 149 LBS | BODY MASS INDEX: 32.15 KG/M2 | HEIGHT: 57 IN | HEART RATE: 94 BPM

## 2024-02-21 DIAGNOSIS — N18.30 BENIGN HYPERTENSION WITH CHRONIC KIDNEY DISEASE, STAGE III (HCC): ICD-10-CM

## 2024-02-21 DIAGNOSIS — D50.9 IRON DEFICIENCY ANEMIA, UNSPECIFIED IRON DEFICIENCY ANEMIA TYPE: ICD-10-CM

## 2024-02-21 DIAGNOSIS — E11.22 CONTROLLED TYPE 2 DIABETES MELLITUS WITH STAGE 3 CHRONIC KIDNEY DISEASE, WITHOUT LONG-TERM CURRENT USE OF INSULIN (HCC): ICD-10-CM

## 2024-02-21 DIAGNOSIS — I12.9 BENIGN HYPERTENSION WITH CHRONIC KIDNEY DISEASE, STAGE III (HCC): ICD-10-CM

## 2024-02-21 DIAGNOSIS — N18.32 STAGE 3B CHRONIC KIDNEY DISEASE (HCC): Primary | ICD-10-CM

## 2024-02-21 DIAGNOSIS — E87.1 HYPONATREMIA: ICD-10-CM

## 2024-02-21 DIAGNOSIS — N18.30 CONTROLLED TYPE 2 DIABETES MELLITUS WITH STAGE 3 CHRONIC KIDNEY DISEASE, WITHOUT LONG-TERM CURRENT USE OF INSULIN (HCC): ICD-10-CM

## 2024-02-21 PROCEDURE — 99214 OFFICE O/P EST MOD 30 MIN: CPT | Performed by: INTERNAL MEDICINE

## 2024-02-21 NOTE — PATIENT INSTRUCTIONS
-Renal Function is stable   -You have Chronic Kidney Disease Stage 3  -Avoid NSAIDs like Ibuprofen/Motrin, Naproxen/Aleve, Celebrex, meloxicam/Mobic, Diclofenac and other NSAIDs.  -Okay to take Acetaminophen/Tylenol if you do not have any liver problems  -Avoid IV contrast used for CT scan and cardiac catheterization.    -If plan for any study with IV contrast, please let me know so we could hydrate with fluids before and after IV contrast  -Dosage  of certain medications may need to be adjusted depending on Kidney function.     Blood pressure is stable    -Recommend 2 g sodium diet.    -Recommend daily exercise and weight loss  -Discussed home monitoring of BP and maintaining a log of blood pressure.  -Recommend goal BP less than 130/80. Please inform me if SBP below 110 or above 140's persistently.     Follow up: 6  months with repeat Lab work within a week of the scheduled office visit. Will discuss the results of the previsit Labs during the office visit.

## 2024-02-21 NOTE — PROGRESS NOTES
NEPHROLOGY OUTPATIENT PROGRESS NOTE   Bhavani Amaral 83 y.o. female MRN: [id number]  DATE: 2/21/2024  Reason for visit:   Chief Complaint   Patient presents with    Follow-up    Chronic Kidney Disease       ASSESSMENT and PLAN:  Chronic kidney disease stage IIIb  - baseline creatinine 1.0-1.2.  Renal function stable at creatinine 1.04 mg/dl.    -chronic kidney disease likely due to hypertensive nephrosclerosis and age-related nephron loss  -renal function stable - continue oral hydration , avoid nephrotoxins  -PTH level at normal limit 27.4, no proteinuria on urine test  -If eGFR remains more than 45 would call it is chronic kidney disease stage IIIa in future  -Continue monitoring renal function.  Repeat labs in 6 months     Primary HTN chronic kidney disease stage 3  -BP stable and and at goal. continue lisinopril 2.5 mg daily  -continue 2 g sodium diet and home monitoring of blood pressure.  Stressed again on home monitoring of blood pressure and calling me if systolic blood pressure more than 140 or diastolic more than 85.       Hyponatremia   -not on medication which could cause hyponatremia, no history of malignancy.  Last colonoscopy in 2017 did not suggest any malignancy  -workup showed normal serum osmolality.  Urine sodium 99 and urine osmolality 422 in December 2021  -Last sodium level improved to 134 meq/L, Continue  fluid restriction 50 ounces per day.     Anemia, iron deficiency anemia   - hemoglobin 12.9 g/dl.   -Patient prefers not to be on iron tablet due to possibility of constipation.  She does take oral vitamin with iron supplementation in it.       Diabetes mellitus type 2 with ckd stage 3, currently she is off all medication.  She is on diet control. A1c  6.2      Hyperlipidemia:  Lipid panel -LDL at goal at 62 , triglyceride elevated at 266  -Currently on Zetia and fish oil.   -Goal LDL should be less than 100 for CKD patients and LDL currently at goal.   -recommend life style  modification    Thrombocytosis:  -work-up was done and suggestive of essential thrombocytosis and was recommended aspirin 81 mg daily and hydroxyurea 500 mg once daily.    -patient underwent bone marrow biopsy which was suggestive of essential thrombocythemia  -last platelet count was 422   -Continue follow-up and management per hematology oncology     Left Adrenal adenoma  - MRI from jan 2023- LEFT adrenal gland lesion 1.3 x 1.7 cm demonstrates signal dropout on in/out of phase imaging suggesting adrenal adenoma.   -hormone work-up was done by endocrine  - CT from feb 2024- ADRENAL GLANDS: Previously characterized left adrenal adenoma measures 1.9 x 1.1 cm, previously measuring 1.9 x 1.1 cm on MRI 1/30/2023.     Health maintenance, last colonoscopy in  October 2022 and was recommended for colonoscopy in 5 years. Polyp removed         Patient Instructions   -Renal Function is stable   -You have Chronic Kidney Disease Stage 3  -Avoid NSAIDs like Ibuprofen/Motrin, Naproxen/Aleve, Celebrex, meloxicam/Mobic, Diclofenac and other NSAIDs.  -Okay to take Acetaminophen/Tylenol if you do not have any liver problems  -Avoid IV contrast used for CT scan and cardiac catheterization.    -If plan for any study with IV contrast, please let me know so we could hydrate with fluids before and after IV contrast  -Dosage  of certain medications may need to be adjusted depending on Kidney function.     Blood pressure is stable    -Recommend 2 g sodium diet.    -Recommend daily exercise and weight loss  -Discussed home monitoring of BP and maintaining a log of blood pressure.  -Recommend goal BP less than 130/80. Please inform me if SBP below 110 or above 140's persistently.     Follow up: 6  months with repeat Lab work within a week of the scheduled office visit. Will discuss the results of the previsit Labs during the office visit.    Diagnoses and all orders for this visit:    Stage 3b chronic kidney disease (HCC)  -     Basic  metabolic panel; Future  -     Protein / creatinine ratio, urine; Future  -     PTH, intact; Future  -     Phosphorus; Future  -     CBC; Future    Benign hypertension with chronic kidney disease, stage III (HCC)  -     Basic metabolic panel; Future    Hyponatremia  -     Basic metabolic panel; Future    Iron deficiency anemia, unspecified iron deficiency anemia type  -     CBC; Future    Controlled type 2 diabetes mellitus with stage 3 chronic kidney disease, without long-term current use of insulin (HCC)  -     Basic metabolic panel; Future  -     Protein / creatinine ratio, urine; Future              SUBJECTIVE / HPI:  Bhavani Amaral is a 83 y.o.  female with medical issues of Type 2 diabetes mellitus- on diet control, GERD who presents for follow-up of chronic kidney disease. Was taking aleeve in the past but now off it.   She used to be on lisinopril- hctz in may but that was stopped in May 2018 due to ROBERTA with elevated cr to 1.3. Stopped simvastatin since nov 15th. Was on it for two years.   Was taking Aleve in the past but now off it.     Patient has creatinine of 1.0-1.1 since 2016. Creatinine increased to 1.32 in May 2018  but improved  With stopping lisinopril hctz to creatinine 1.1  , recently renal function has been stable at creatinine 1.0-1.2     Reviewed blood work from 2/15/2024, sodium slightly low at 134 but renal function is stable at creatinine 1.04 mg/dL with EGFR of 50, normal phosphorus and magnesium.  PTH at normal range at 27.4, urine test without any proteinuria    LDL is at goal at 62 but triglyceride is elevated.  On blood work from January hemoglobin was at normal range at 12.9 and A1c was stable at 6.2%.  Denies any new complaints  BP stable at home     REVIEW OF SYSTEMS:    Review of Systems   Constitutional:  Negative for chills and fever.   HENT:  Negative for ear pain and sore throat.    Eyes:  Negative for pain and visual disturbance.   Respiratory:  Negative for cough and shortness  of breath.    Cardiovascular:  Negative for chest pain and palpitations.   Gastrointestinal:  Negative for abdominal pain and vomiting.   Genitourinary:  Negative for dysuria and hematuria.   Musculoskeletal:  Negative for arthralgias and back pain.   Skin:  Negative for color change and rash.   Neurological:  Negative for seizures and syncope.   All other systems reviewed and are negative.      More than 10 point review of systems were obtained and discussed in length with the patient. Complete review of systems were negative / unremarkable except mentioned above.       PAST MEDICAL HISTORY:  Past Medical History:   Diagnosis Date    CKD (chronic kidney disease)     Diabetes mellitus (HCC)     does not take medications, does not monitor blood sugars, pre-diabetic    Disease of thyroid gland     Diverticulosis     GERD (gastroesophageal reflux disease)     Hypertension     Hyponatremia     Ovarian cyst     Rectal bleeding     Wears reading eyeglasses        PAST SURGICAL HISTORY:  Past Surgical History:   Procedure Laterality Date    BREAST BIOPSY Right 05/13/2009    Ultrasound Bx. Benign    BREAST SURGERY      CATARACT EXTRACTION Bilateral     COLONOSCOPY      DENTAL SURGERY  10/09/2023    ESOPHAGOGASTRODUODENOSCOPY      IR BIOPSY BONE MARROW  03/18/2022    OOPHORECTOMY Bilateral 2015    DC CMBND ANTERPOST COLPORRAPHY W/CYSTO N/A 06/26/2018    Procedure: ANTERIOR & POSTERIOR COLPORRHAPHY;  Surgeon: Babatunde Quinteros MD;  Location: [facility];  Service: UroGynecology           DC COLPOPEXY VAGINAL EXTRAPERITONEAL APPROACH N/A 06/26/2018    Procedure: ANTERIOR COLPOPEXY VAGINAL EXTRAPERITONEAL (VEC);  Surgeon: Babatunde Quinteros MD;  Location: [facility];  Service: UroGynecology           DC CYSTOURETHROSCOPY N/A 06/26/2018    Procedure: CYSTOSCOPY;  Surgeon: Babatunde Quinteros MD;  Location: [facility];  Service: UroGynecology           DC SLING OPERATION STRESS INCONTINENCE N/A 06/26/2018    Procedure: INSERTION  PUBOVAGINAL SLING (FEMALE);  Surgeon: Babatunde Quinteros MD;  Location: [facility];  Service: UroGynecology           TONSILLECTOMY      US GUIDED MSK PROCEDURE  11/25/2020       SOCIAL HISTORY:  Social History     Substance and Sexual Activity   Alcohol Use No     Social History     Substance and Sexual Activity   Drug Use No     Social History     Tobacco Use   Smoking Status Never    Passive exposure: Never   Smokeless Tobacco Never       FAMILY HISTORY:  Family History   Problem Relation Age of Onset    Cervical cancer Mother 68    Breast cancer Maternal Grandmother         Under 50    Mental illness Son     Schizophrenia Son     Cancer Family     No Known Problems Father     No Known Problems Maternal Aunt     No Known Problems Maternal Aunt     Brain cancer Maternal Aunt         age unknown    Cancer Cousin        MEDICATIONS:    Current Outpatient Medications:     aspirin 81 MG tablet, Take 81 mg by mouth daily  , Disp: , Rfl:     Calcium Carbonate-Vitamin D 600-200 MG-UNIT TABS, Take 1 tablet by mouth every other day  , Disp: , Rfl:     chlorhexidine (PERIDEX) 0.12 % solution, , Disp: , Rfl:     ezetimibe (ZETIA) 10 mg tablet, Take 1 tablet (10 mg total) by mouth daily, Disp: 90 tablet, Rfl: 1    famotidine (PEPCID) 40 MG tablet, Take 1 tablet (40 mg total) by mouth daily at bedtime, Disp: 90 tablet, Rfl: 1    folic acid (FOLVITE) 1 mg tablet, Take 1 tablet (1 mg total) by mouth daily, Disp: 90 tablet, Rfl: 2    Glucosamine-Chondroitin 250-200 MG TABS, Take 1 tablet by mouth daily  , Disp: , Rfl:     hydroxyurea (HYDREA) 500 mg capsule, Take 1 capsule (500 mg total) by mouth daily, Disp: 90 capsule, Rfl: 1    levothyroxine 50 mcg tablet, Take 1 tablet (50 mcg total) by mouth daily, Disp: 90 tablet, Rfl: 1    lisinopril (ZESTRIL) 2.5 mg tablet, Take 1 tablet (2.5 mg total) by mouth daily, Disp: 90 tablet, Rfl: 3    Multiple Vitamins-Iron (DAILY MULTIPLE VITAMIN/IRON) TABS, Take 1 tablet by mouth daily  , Disp:  ", Rfl:     Omega-3 Fatty Acids ([employer] FISH OIL) 1200 MG CAPS, Take 1 capsule by mouth 2 (two) times a day, Disp: , Rfl:     Polyethylene Glycol 3350 (MIRALAX MIX-IN PAX PO), Take by mouth, Disp: , Rfl:     acetaminophen-codeine (TYLENOL with CODEINE #3) 300-30 MG per tablet, , Disp: , Rfl:     docusate sodium (COLACE) 100 mg capsule, Take by mouth (Patient not taking: Reported on 1/22/2024), Disp: , Rfl:     magnesium citrate solution, Take 296 mL by mouth once (Patient not taking: Reported on 1/22/2024), Disp: , Rfl:     promethazine-dextromethorphan (PHENERGAN-DM) 6.25-15 mg/5 mL oral syrup, Take 5 mL by mouth 4 (four) times a day as needed for cough (Patient not taking: Reported on 1/22/2024), Disp: 120 mL, Rfl: 1    Wheat Dextrin (BENEFIBER DRINK MIX PO), Take by mouth (Patient not taking: Reported on 1/22/2024), Disp: , Rfl:       PHYSICAL EXAM:  Vitals:    02/21/24 1049   BP: 130/76   BP Location: Left arm   Patient Position: Sitting   Cuff Size: Adult   Pulse: 94   SpO2: 96%   Weight: 67.6 kg (149 lb)   Height: 4' 9\" (1.448 m)     Body mass index is 32.24 kg/m².    Physical Exam  Constitutional:       General: She is not in acute distress.     Appearance: Normal appearance. She is well-developed.   HENT:      Head: Normocephalic and atraumatic.      Nose: Nose normal.      Mouth/Throat:      Mouth: Mucous membranes are moist.   Eyes:      General: No scleral icterus.     Conjunctiva/sclera: Conjunctivae normal.      Pupils: Pupils are equal, round, and reactive to light.   Neck:      Thyroid: No thyromegaly.      Vascular: No JVD.   Cardiovascular:      Rate and Rhythm: Normal rate and regular rhythm.      Heart sounds: Normal heart sounds. No murmur heard.     No friction rub.   Pulmonary:      Effort: Pulmonary effort is normal. No respiratory distress.      Breath sounds: Normal breath sounds. No wheezing or rales.   Abdominal:      General: Bowel sounds are normal. There is no distension.      Palpations: " Abdomen is soft.      Tenderness: There is no abdominal tenderness.   Musculoskeletal:         General: No deformity.      Cervical back: Neck supple.      Right lower leg: No edema.      Left lower leg: No edema.   Skin:     General: Skin is warm and dry.      Findings: No rash.   Neurological:      Mental Status: She is alert and oriented to person, place, and time.   Psychiatric:         Mood and Affect: Mood normal.         Behavior: Behavior normal.         Thought Content: Thought content normal.         Lab Results:   Results for orders placed or performed in visit on 02/15/24   Basic metabolic panel   Result Value Ref Range    Sodium 134 (L) 135 - 147 mmol/L    Potassium 4.3 3.5 - 5.3 mmol/L    Chloride 100 96 - 108 mmol/L    CO2 26 21 - 32 mmol/L    ANION GAP 8 mmol/L    BUN 19 5 - 25 mg/dL    Creatinine 1.04 0.60 - 1.30 mg/dL    Glucose, Fasting 100 (H) 65 - 99 mg/dL    Calcium 9.0 8.4 - 10.2 mg/dL    eGFR 50 ml/min/1.73sq m   Protein / creatinine ratio, urine   Result Value Ref Range    Creatinine, Ur 74.1 Reference range not established. mg/dL    Protein Urine Random 5 Reference range not established. mg/dL    Prot/Creat Ratio, Ur 0.07 0.00 - 0.10   Urinalysis with microscopic   Result Value Ref Range    Color, UA Light Yellow     Clarity, UA Clear     Specific Gravity, UA 1.013 1.003 - 1.030    pH, UA 6.0 4.5, 5.0, 5.5, 6.0, 6.5, 7.0, 7.5, 8.0    Leukocytes, UA Trace (A) Negative    Nitrite, UA Negative Negative    Protein, UA Negative Negative mg/dl    Glucose, UA Negative Negative mg/dl    Ketones, UA Negative Negative mg/dl    Urobilinogen, UA <2.0 <2.0 mg/dl mg/dl    Bilirubin, UA Negative Negative    Occult Blood, UA Negative Negative    RBC, UA 1-2 None Seen, 1-2 /hpf    WBC, UA 1-2 None Seen, 1-2 /hpf    Epithelial Cells Occasional None Seen, Occasional /hpf    Bacteria, UA Occasional None Seen, Occasional /hpf   Albumin / creatinine urine ratio   Result Value Ref Range    Creatinine, Ur 74.1  Reference range not established. mg/dL    Albumin,U,Random <7.0 <20.0 mg/L    Albumin Creat Ratio <9 0 - 30 mg/g creatinine   Magnesium   Result Value Ref Range    Magnesium 2.0 1.9 - 2.7 mg/dL   Phosphorus   Result Value Ref Range    Phosphorus 3.7 2.3 - 4.1 mg/dL   PTH, intact   Result Value Ref Range    PTH 27.4 12.0 - 88.0 pg/mL

## 2024-02-22 ENCOUNTER — TELEPHONE (OUTPATIENT)
Dept: HEMATOLOGY ONCOLOGY | Facility: CLINIC | Age: 84
End: 2024-02-22

## 2024-02-22 DIAGNOSIS — D47.3 ESSENTIAL THROMBOCYTOSIS (HCC): Primary | ICD-10-CM

## 2024-02-22 DIAGNOSIS — Z15.89 JAK2 GENE MUTATION: ICD-10-CM

## 2024-02-22 DIAGNOSIS — Z79.64 ON HYDROXYUREA THERAPY: ICD-10-CM

## 2024-02-22 NOTE — PROGRESS NOTES
Call out to patient and reviewed cbcd and cmp were ordered for follow up appointment. Patient thankful and appreciative of call. Patient declined any further questions or concerns.

## 2024-02-22 NOTE — TELEPHONE ENCOUNTER
My name is Bhavani Amaral, birth date [id number]. I have an appointment with Doctor Cristi on the 9th of April and I'm supposed to get blood work done every 12 weeks. So they had changed the appointment and so he would have to put in the script for me to get blood work before I come there like for April 4th, if that's possible. My phone number is [phone]. Thank you. I have essential thrombocytosis. Thank you. Bye.

## 2024-02-23 ENCOUNTER — OFFICE VISIT (OUTPATIENT)
Dept: FAMILY MEDICINE CLINIC | Facility: CLINIC | Age: 84
End: 2024-02-23
Payer: MEDICARE

## 2024-02-23 VITALS
SYSTOLIC BLOOD PRESSURE: 120 MMHG | WEIGHT: 149 LBS | DIASTOLIC BLOOD PRESSURE: 80 MMHG | OXYGEN SATURATION: 99 % | HEART RATE: 97 BPM | HEIGHT: 57 IN | BODY MASS INDEX: 32.15 KG/M2

## 2024-02-23 DIAGNOSIS — E78.5 HYPERLIPIDEMIA, UNSPECIFIED HYPERLIPIDEMIA TYPE: ICD-10-CM

## 2024-02-23 DIAGNOSIS — N18.32 STAGE 3B CHRONIC KIDNEY DISEASE (HCC): ICD-10-CM

## 2024-02-23 DIAGNOSIS — E11.22 CONTROLLED TYPE 2 DIABETES MELLITUS WITH STAGE 3 CHRONIC KIDNEY DISEASE, WITHOUT LONG-TERM CURRENT USE OF INSULIN (HCC): Primary | ICD-10-CM

## 2024-02-23 DIAGNOSIS — D47.3 ESSENTIAL THROMBOCYTOSIS (HCC): ICD-10-CM

## 2024-02-23 DIAGNOSIS — E03.9 HYPOTHYROIDISM, UNSPECIFIED TYPE: ICD-10-CM

## 2024-02-23 DIAGNOSIS — I10 ESSENTIAL HYPERTENSION: ICD-10-CM

## 2024-02-23 DIAGNOSIS — N18.30 CONTROLLED TYPE 2 DIABETES MELLITUS WITH STAGE 3 CHRONIC KIDNEY DISEASE, WITHOUT LONG-TERM CURRENT USE OF INSULIN (HCC): Primary | ICD-10-CM

## 2024-02-23 DIAGNOSIS — S32.040S COMPRESSION FRACTURE OF L4 LUMBAR VERTEBRA, SEQUELA: ICD-10-CM

## 2024-02-23 PROBLEM — U07.1 COVID-19: Status: RESOLVED | Noted: 2022-08-23 | Resolved: 2024-02-23

## 2024-02-23 PROCEDURE — 99214 OFFICE O/P EST MOD 30 MIN: CPT | Performed by: FAMILY MEDICINE

## 2024-02-23 NOTE — ASSESSMENT & PLAN NOTE
Lab Results   Component Value Date    HGBA1C 6.2 (H) 01/18/2024   Diet  controlled, HBA1c  stable

## 2024-02-23 NOTE — PROGRESS NOTES
Name: Bhavani Amaral      : 1940      MRN: [id number]  Encounter Provider: Kiley Gregory MD  Encounter Date: 2024   Encounter department: [facility]    Assessment & Plan     1. Controlled type 2 diabetes mellitus with stage 3 chronic kidney disease, without long-term current use of insulin (HCC)  Assessment & Plan:    Lab Results   Component Value Date    HGBA1C 6.2 (H) 2024   Diet  controlled, HBA1c  stable      2. Hypothyroidism, unspecified type  Assessment & Plan:  On levothyroxine 50,tsh stable      3. Essential hypertension  Assessment & Plan:  On lisinopril 2.5  /day      4. Stage 3b chronic kidney disease (HCC)  Assessment & Plan:  Lab Results   Component Value Date    EGFR 50 02/15/2024    EGFR 51 2024    EGFR 43 2023    CREATININE 1.04 02/15/2024    CREATININE 1.03 2024    CREATININE 1.19 2023   Stable kidnet function      5. Hyperlipidemia, unspecified hyperlipidemia type    6. Essential thrombocytosis (HCC)  Assessment & Plan:  Sees  hematology, on hydroxyurea      7. Compression fracture of L4 lumbar vertebra, sequela  Assessment & Plan:  No sx, will just monitor review of previous study shows that there is exclusion of L4 on her DEXA from 2019 but there is no specific finding mentioning a compression fracture at that time her CAT scan from 2018 did not show a compression fracture she does not recall any history of injury or fall since not having symptoms will not change treatment and have her see rheumatology             Subjective      Patient presents with:  Follow-up: 4 month f/u- discuss CT Scan  CT scan showed stable adrenal adenoma but a L4 compression fracture of undetermined time patient is not complaining of any acute back pain she is known to have some osteoporosis and is on Prolia rheumatology is following her, having some tooth problems in the right lower area and will need to have this done before her next Prolia  shot      Review of Systems   Constitutional:  Negative for activity change, appetite change and fatigue.   HENT:  Positive for dental problem.    Respiratory:  Negative for shortness of breath.    Cardiovascular:  Negative for chest pain.   Musculoskeletal:  Negative for back pain.   Neurological:  Negative for dizziness, light-headedness and headaches.   Hematological:  Negative for adenopathy.       Current Outpatient Medications on File Prior to Visit   Medication Sig    aspirin 81 MG tablet Take 81 mg by mouth daily      Calcium Carbonate-Vitamin D 600-200 MG-UNIT TABS Take 1 tablet by mouth every other day      chlorhexidine (PERIDEX) 0.12 % solution     ezetimibe (ZETIA) 10 mg tablet Take 1 tablet (10 mg total) by mouth daily    famotidine (PEPCID) 40 MG tablet Take 1 tablet (40 mg total) by mouth daily at bedtime    folic acid (FOLVITE) 1 mg tablet Take 1 tablet (1 mg total) by mouth daily    Glucosamine-Chondroitin 250-200 MG TABS Take 1 tablet by mouth daily      hydroxyurea (HYDREA) 500 mg capsule Take 1 capsule (500 mg total) by mouth daily    lisinopril (ZESTRIL) 2.5 mg tablet Take 1 tablet (2.5 mg total) by mouth daily    Multiple Vitamins-Iron (DAILY MULTIPLE VITAMIN/IRON) TABS Take 1 tablet by mouth daily      Omega-3 Fatty Acids ([employer] FISH OIL) 1200 MG CAPS Take 1 capsule by mouth 2 (two) times a day    Polyethylene Glycol 3350 (MIRALAX MIX-IN PAX PO) Take by mouth    levothyroxine 50 mcg tablet Take 1 tablet (50 mcg total) by mouth daily    [DISCONTINUED] acetaminophen-codeine (TYLENOL with CODEINE #3) 300-30 MG per tablet  (Patient not taking: Reported on 1/22/2024)    [DISCONTINUED] docusate sodium (COLACE) 100 mg capsule Take by mouth (Patient not taking: Reported on 1/22/2024)    [DISCONTINUED] magnesium citrate solution Take 296 mL by mouth once (Patient not taking: Reported on 1/22/2024)    [DISCONTINUED] promethazine-dextromethorphan (PHENERGAN-DM) 6.25-15 mg/5 mL oral syrup Take 5 mL by  "mouth 4 (four) times a day as needed for cough (Patient not taking: Reported on 1/22/2024)    [DISCONTINUED] Wheat Dextrin (BENEFIBER DRINK MIX PO) Take by mouth (Patient not taking: Reported on 1/22/2024)       Objective     /80 (BP Location: Left arm, Patient Position: Sitting, Cuff Size: Adult)   Pulse 97   Ht 4' 9\" (1.448 m)   Wt 67.6 kg (149 lb)   SpO2 99%   BMI 32.24 kg/m²     Physical Exam  Vitals reviewed.   Constitutional:       Appearance: Normal appearance. She is obese.   Cardiovascular:      Rate and Rhythm: Normal rate and regular rhythm.      Pulses: Normal pulses.      Heart sounds: Normal heart sounds.   Pulmonary:      Effort: Pulmonary effort is normal.      Breath sounds: Normal breath sounds.   Musculoskeletal:         General: No tenderness.      Right lower leg: No edema.      Left lower leg: No edema.   Lymphadenopathy:      Cervical: No cervical adenopathy.   Neurological:      Mental Status: She is alert.   Psychiatric:         Mood and Affect: Mood normal.       Kiley Gregory MD    "

## 2024-02-23 NOTE — ASSESSMENT & PLAN NOTE
No sx, will just monitor review of previous study shows that there is exclusion of L4 on her DEXA from 2019 but there is no specific finding mentioning a compression fracture at that time her CAT scan from 2018 did not show a compression fracture she does not recall any history of injury or fall since not having symptoms will not change treatment and have her see rheumatology

## 2024-02-23 NOTE — ASSESSMENT & PLAN NOTE
Lab Results   Component Value Date    EGFR 50 02/15/2024    EGFR 51 01/18/2024    EGFR 43 09/13/2023    CREATININE 1.04 02/15/2024    CREATININE 1.03 01/18/2024    CREATININE 1.19 09/13/2023   Stable kidnet function

## 2024-02-27 ENCOUNTER — TELEPHONE (OUTPATIENT)
Age: 84
End: 2024-02-27

## 2024-02-27 DIAGNOSIS — E78.2 MIXED HYPERLIPIDEMIA: ICD-10-CM

## 2024-02-27 RX ORDER — EZETIMIBE 10 MG/1
10 TABLET ORAL DAILY
Qty: 90 TABLET | Refills: 1 | Status: SHIPPED | OUTPATIENT
Start: 2024-02-27

## 2024-02-27 NOTE — TELEPHONE ENCOUNTER
Reason for call:   [x] Refill   [] Prior Auth  [] Other:     Office:   [x] PCP/Provider - Kiley Gregory MD   [] Specialty/Provider -     Medication: ezetimibe (ZETIA) 10 mg tablet     Dose/Frequency: 10 mg, Oral, Daily     Quantity: 90    [address]     Does the patient have enough for 3 days?   [x] Yes   [] No - Send as HP to POD

## 2024-02-27 NOTE — TELEPHONE ENCOUNTER
Got in contact with Dr. Fam office. Patient last office visit was faxed to our office this morning.

## 2024-02-27 NOTE — TELEPHONE ENCOUNTER
Patient called asking if the information from her foot doctor Dr Fam was received.  Patient provided a phone number of [phone] for Dr Fam.

## 2024-02-28 ENCOUNTER — TELEPHONE (OUTPATIENT)
Dept: HEMATOLOGY ONCOLOGY | Facility: CLINIC | Age: 84
End: 2024-02-28

## 2024-02-28 NOTE — TELEPHONE ENCOUNTER
Appointment Change  Cancel, Reschedule, Change to Virtual      Who are you speaking with? Patient   If it is not the patient, is the caller listed on the communication consent form? N/A   Which provider is the appointment scheduled with? Dr. Herrera   When was the original appointment scheduled?    Please list date and time 4/9/24 9:40 AM   At which location is the appointment scheduled to take place? [place]   Was the appointment rescheduled?     Was the appointment changed from an in person visit to a virtual visit?    If so, please list the details of the change. 5/22/24 9:20 AM    What is the reason for the appointment change? Provider requested change due to scheduling conflict.        Was STAR transport scheduled? No   Does STAR transport need to be scheduled for the new visit (if applicable) No   Does the patient need an infusion appointment rescheduled? No   Does the patient have an upcoming infusion appointment scheduled? If so, when? No   Is the patient undergoing chemotherapy? No   For appointments cancelled with less than 24 hours:  Was the no-show policy reviewed? Yes

## 2024-03-06 ENCOUNTER — TELEPHONE (OUTPATIENT)
Age: 84
End: 2024-03-06

## 2024-03-06 NOTE — TELEPHONE ENCOUNTER
Patient called in stating that she is having a tooth pulled on April 16th and the doctor does nnot want her to get her Prolia until the end of June or beginning of July. Dr. Mahoney does not have any availability until the end of August and patient does not know if she should wait that long. Please advise.

## 2024-03-07 NOTE — TELEPHONE ENCOUNTER
She can be rescheduled to a nurse visit only at the end of June/beginning of July, and provider plus Prolia visit 6 months after that; make sure we have updated prior auth on file

## 2024-03-11 NOTE — TELEPHONE ENCOUNTER
Bhavani is calling regarding Prolia injection . Patient has not heard back from anyone regarding this matter . Patient will like to know if  would be able to see her at the end of June or beginning of July   Please advise [phone]

## 2024-03-12 NOTE — TELEPHONE ENCOUNTER
Called patient and relayed the message to the patient, patient is on the schedule for appointment.

## 2024-03-14 NOTE — TELEPHONE ENCOUNTER
Can we make sure we have an updated prior auth on file for Prolia for patient to received the injection at the end of June?

## 2024-03-26 ENCOUNTER — TELEPHONE (OUTPATIENT)
Dept: HEMATOLOGY ONCOLOGY | Facility: CLINIC | Age: 84
End: 2024-03-26

## 2024-03-26 DIAGNOSIS — Z79.64 ON HYDROXYUREA THERAPY: Primary | ICD-10-CM

## 2024-03-26 DIAGNOSIS — D47.3 ESSENTIAL THROMBOCYTOSIS (HCC): ICD-10-CM

## 2024-03-26 DIAGNOSIS — Z15.89 JAK2 GENE MUTATION: ICD-10-CM

## 2024-03-26 RX ORDER — HYDROXYUREA 500 MG/1
500 CAPSULE ORAL DAILY
Qty: 90 CAPSULE | Refills: 1 | Status: SHIPPED | OUTPATIENT
Start: 2024-03-26

## 2024-03-26 NOTE — TELEPHONE ENCOUNTER
Medication Refill Request   Who are you speaking with? Patient   If it is not the patient, are they listed on an active communication consent form?     N/A   Which medication is being requested for refill?  Please list medication name and dosage  hydroxyurea (HYDREA) 500 mg capsule    How many pills does the patient have left?  3   [address] / [address]    Who is the prescribing provider? Dr. Herrera   Call back number  [phone]   Relevant Information Patient wants to know if she needs to have labs done before new order for meds- please give her a call. Her appt was rescheduled 2x

## 2024-03-26 NOTE — TELEPHONE ENCOUNTER
Call out to patient in regards to labs and prescription to be sent to [facility]. Patient appreciative of call.

## 2024-04-08 DIAGNOSIS — E03.9 HYPOTHYROIDISM, UNSPECIFIED TYPE: ICD-10-CM

## 2024-04-08 DIAGNOSIS — R80.1 PERSISTENT PROTEINURIA: ICD-10-CM

## 2024-04-08 DIAGNOSIS — I10 ESSENTIAL HYPERTENSION: ICD-10-CM

## 2024-04-08 NOTE — TELEPHONE ENCOUNTER
Reason for call:   [x] Refill   [] Prior Auth  [] Other:     Office:   [x] PCP/Provider -   [] Specialty/Provider -     Medication: levothyroxine 50 mcg tablet     Dose/Frequency: Take 1 tablet (50 mcg total) by mouth daily     Quantity: 90    Pharmacy: [facility] [id number] - [place] [facility] [address]    Does the patient have enough for 3 days?   [x] Yes   [] No - Send as HP to POD

## 2024-04-09 RX ORDER — LEVOTHYROXINE SODIUM 0.05 MG/1
50 TABLET ORAL DAILY
Qty: 90 TABLET | Refills: 1 | Status: SHIPPED | OUTPATIENT
Start: 2024-04-09 | End: 2024-10-06

## 2024-04-19 ENCOUNTER — APPOINTMENT (OUTPATIENT)
Dept: LAB | Facility: CLINIC | Age: 84
End: 2024-04-19
Payer: MEDICARE

## 2024-04-19 DIAGNOSIS — D47.3 ESSENTIAL THROMBOCYTOSIS (HCC): ICD-10-CM

## 2024-04-19 DIAGNOSIS — Z79.64 ON HYDROXYUREA THERAPY: ICD-10-CM

## 2024-04-19 DIAGNOSIS — Z15.89 JAK2 GENE MUTATION: ICD-10-CM

## 2024-04-19 LAB
ALBUMIN SERPL BCP-MCNC: 3.8 G/DL (ref 3.5–5)
ALP SERPL-CCNC: 58 U/L (ref 34–104)
ALT SERPL W P-5'-P-CCNC: 10 U/L (ref 7–52)
ANION GAP SERPL CALCULATED.3IONS-SCNC: 5 MMOL/L (ref 4–13)
AST SERPL W P-5'-P-CCNC: 14 U/L (ref 13–39)
BASOPHILS # BLD AUTO: 0.11 THOUSANDS/ÂΜL (ref 0–0.1)
BASOPHILS NFR BLD AUTO: 2 % (ref 0–1)
BILIRUB SERPL-MCNC: 0.38 MG/DL (ref 0.2–1)
BUN SERPL-MCNC: 17 MG/DL (ref 5–25)
CALCIUM SERPL-MCNC: 9.2 MG/DL (ref 8.4–10.2)
CHLORIDE SERPL-SCNC: 98 MMOL/L (ref 96–108)
CO2 SERPL-SCNC: 31 MMOL/L (ref 21–32)
CREAT SERPL-MCNC: 1.1 MG/DL (ref 0.6–1.3)
EOSINOPHIL # BLD AUTO: 0.2 THOUSAND/ÂΜL (ref 0–0.61)
EOSINOPHIL NFR BLD AUTO: 3 % (ref 0–6)
ERYTHROCYTE [DISTWIDTH] IN BLOOD BY AUTOMATED COUNT: 14.5 % (ref 11.6–15.1)
GFR SERPL CREATININE-BSD FRML MDRD: 47 ML/MIN/1.73SQ M
GLUCOSE P FAST SERPL-MCNC: 89 MG/DL (ref 65–99)
HCT VFR BLD AUTO: 40.1 % (ref 34.8–46.1)
HGB BLD-MCNC: 12.9 G/DL (ref 11.5–15.4)
IMM GRANULOCYTES # BLD AUTO: 0.03 THOUSAND/UL (ref 0–0.2)
IMM GRANULOCYTES NFR BLD AUTO: 0 % (ref 0–2)
LYMPHOCYTES # BLD AUTO: 2.34 THOUSANDS/ÂΜL (ref 0.6–4.47)
LYMPHOCYTES NFR BLD AUTO: 35 % (ref 14–44)
MCH RBC QN AUTO: 33.6 PG (ref 26.8–34.3)
MCHC RBC AUTO-ENTMCNC: 32.2 G/DL (ref 31.4–37.4)
MCV RBC AUTO: 104 FL (ref 82–98)
MONOCYTES # BLD AUTO: 0.53 THOUSAND/ÂΜL (ref 0.17–1.22)
MONOCYTES NFR BLD AUTO: 8 % (ref 4–12)
NEUTROPHILS # BLD AUTO: 3.5 THOUSANDS/ÂΜL (ref 1.85–7.62)
NEUTS SEG NFR BLD AUTO: 52 % (ref 43–75)
NRBC BLD AUTO-RTO: 0 /100 WBCS
PLATELET # BLD AUTO: 447 THOUSANDS/UL (ref 149–390)
PMV BLD AUTO: 9.3 FL (ref 8.9–12.7)
POTASSIUM SERPL-SCNC: 4.3 MMOL/L (ref 3.5–5.3)
PROT SERPL-MCNC: 7.7 G/DL (ref 6.4–8.4)
RBC # BLD AUTO: 3.84 MILLION/UL (ref 3.81–5.12)
SODIUM SERPL-SCNC: 134 MMOL/L (ref 135–147)
WBC # BLD AUTO: 6.71 THOUSAND/UL (ref 4.31–10.16)

## 2024-04-19 PROCEDURE — 85025 COMPLETE CBC W/AUTO DIFF WBC: CPT

## 2024-04-19 PROCEDURE — 80053 COMPREHEN METABOLIC PANEL: CPT

## 2024-04-19 PROCEDURE — 36415 COLL VENOUS BLD VENIPUNCTURE: CPT

## 2024-04-23 DIAGNOSIS — Z79.64 ON HYDROXYUREA THERAPY: ICD-10-CM

## 2024-04-23 DIAGNOSIS — D47.3 ESSENTIAL THROMBOCYTOSIS (HCC): Primary | ICD-10-CM

## 2024-04-23 DIAGNOSIS — K21.9 GASTROESOPHAGEAL REFLUX DISEASE WITHOUT ESOPHAGITIS: ICD-10-CM

## 2024-04-23 RX ORDER — FOLIC ACID 1 MG/1
1 TABLET ORAL DAILY
Qty: 90 TABLET | Refills: 2 | Status: SHIPPED | OUTPATIENT
Start: 2024-04-23

## 2024-04-23 RX ORDER — FAMOTIDINE 40 MG/1
40 TABLET, FILM COATED ORAL
Qty: 90 TABLET | Refills: 1 | Status: SHIPPED | OUTPATIENT
Start: 2024-04-23

## 2024-04-23 NOTE — TELEPHONE ENCOUNTER
Patient called the RX Refill Line. Message is being forwarded to the office.     Patient is requesting a refill        Reason for call:   [x] Refill   [] Prior Auth  [] Other:     Office:   [] PCP/Provider -   [x] Specialty/Provider - Hematology/Onc - Hernandez Herrera     Medication: folic acid (FOLVITE) 1 mg tablet     Dose/Frequency:  Take 1 tablet (1 mg total) by mouth daily     Quantity: 90    [address]     Does the patient have enough for 3 days?   [] Yes   [] No - Send as HP to POD

## 2024-04-23 NOTE — TELEPHONE ENCOUNTER
Reason for call:   [x] Refill   [] Prior Auth  [] Other:     Office:   [x] PCP/Provider - [facility]  [] Specialty/Provider -     Medication: famotidine (PEPCID) 40 MG tablet     Dose/Frequency:  Take 1 tablet (40 mg total) by mouth daily at bedtime     Quantity: 90    Pharmacy: [address]      Does the patient have enough for 3 days?   [x] Yes   [] No - Send as HP to POD

## 2024-05-03 NOTE — ADDENDUM NOTE
Problem: Pain  Goal: Acceptable pain level achieved/maintained at rest using appropriate pain scale for the patient  5/3/2024 1255 by Allegra Lambetr NE2  Outcome: Met, complete goal  5/3/2024 1017 by Allegra Lambert NE2  Outcome: Monitoring/Evaluating progress  Goal: Acceptable pain level achieved/maintained with activity using appropriate pain scale for the patient  5/3/2024 1255 by Allegra Lambert NE2  Outcome: Met, complete goal  5/3/2024 1017 by Allegra Lambert NE2  Outcome: Monitoring/Evaluating progress  Goal: Acceptable pain level achieved/maintained without oversedation  5/3/2024 1255 by Allegra Lambert NE2  Outcome: Met, complete goal  5/3/2024 1017 by Allegra Lambert NE2  Outcome: Monitoring/Evaluating progress      Addended byMaxi Segal on: 5/28/2021 03:23 PM     Modules accepted: Orders

## 2024-05-08 NOTE — PROGRESS NOTES
5/19/2023    Assessment/Plan:  #Left adrenal nodule  Imaging suggestive of benign adenoma  Exclude hyperfunctioning nodule with hormonal workup, Check 1mg dexamethasone suppression test, a m  cortisol with ACTH, DHEA-S, plasma metanephrine, renin and aldosterone levels  Reviewed physiology of adrenal gland  Reviewed signs and symptoms of adrenal hormone overproduction, including cushing's, pheochromocytoma, adrenal carcinoma and hyperaldosteronism  Reviewed biochemical testing and its interpretation  Repeat imaging in a year from initial imaging, CT abdomen without contrast is sufficient for evaluation of adrenal nodules  Office follow up in 01/2023  CC: Adrenal nodule    History of Present Illness     HPI: Tamela Clemons is a [de-identified]y o  year old female with history of osteoporosis, hypothyroidism, diet-controlled diabetes, pancreatic and liver cysts seen in initial consultation at the request of PCP for evaluation of adrenal nodule  Patient had an incidental finding of a left adrenal nodule measuring 1 3 X 1 7 cm (CSI ratio less than 0 71 and adrenal signal intensity index greater than 16 5%  indicative of a lipid rich adenoma) on routine MRI of the abdomen done in 07/2022 and 01/2023 (patient usual undergoes routine yearly MRI for monitoring of  pancreatic and liver cysts  Hormonal workup including random cortisol check and aldosterone levels were obtained by PCP with subsequent referral to endocrinology  Random a m  cortisol was slightly elevated at 28 5 with a normal aldosterone level of 8 8  Patient reports recent stressors in the family including taking care of a child with schizophrenia and a grandchild with special needs    Denies any recent changes in weight, recent worsening of blood sugars, easy bruising, purplish striae, fractures, palpitations, headaches, diaphoresis, muscle cramps, edema, head changes, numbness or tingling, uncontrolled blood pressure or low potassium  ROS  Constitutional: Negative for appetite change  Respiratory: Negative for shortness of breath, wheezing, cough  Cardiovascular: Negative for chest pain and palpitations  Gastrointestinal: Negative for abdominal pain, nausea and vomiting  Musculoskeletal: Negative for arthralgias  Neurological: Negative for dizziness, light-headedness and headaches  All other ROS reviewed and negative  Historical Information   Past Medical History:   Diagnosis Date   • CKD (chronic kidney disease)    • [facility]     does not take medications, does not monitor blood sugars, pre-diabetic   • Disease of thyroid gland    • Diverticulosis    • GERD (gastroesophageal reflux disease)    • Hypertension    • Hyponatremia    • Ovarian cyst    • Rectal bleeding    • Wears reading eyeglasses      Past Surgical History:   Procedure Laterality Date   • BREAST BIOPSY Right 05/13/2009    Ultrasound Bx  Benign   • BREAST SURGERY     • CATARACT EXTRACTION Bilateral    • COLONOSCOPY     • ESOPHAGOGASTRODUODENOSCOPY     • IR BIOPSY BONE MARROW  3/18/2022   • OOPHORECTOMY Bilateral 2015   • [place] CMBND ANTERPOST COLPORRAPHY W/CYSTO N/A 6/26/2018    Procedure: ANTERIOR & POSTERIOR COLPORRHAPHY;  Surgeon: Chel Dumont MD;  Location: [facility];  Service: UroGynecology          • [place] COLPOPEXY VAGINAL EXTRAPERITONEAL APPROACH N/A 6/26/2018    Procedure: ANTERIOR COLPOPEXY VAGINAL EXTRAPERITONEAL (VEC); Surgeon: Chel Dumont MD;  [facility];  Service: UroGynecology          • [place] CYSTOURETHROSCOPY N/A 6/26/2018    Procedure: CYSTOSCOPY;  Surgeon: Chel Dumotn MD;  [facility];  Service: UroGynecology          • [place] SLING OPERATION STRESS INCONTINENCE N/A 6/26/2018    Procedure: INSERTION PUBOVAGINAL SLING (FEMALE);   Surgeon: Chel Dumont MD;  [facility];  Service: UroGynecology          • TONSILLECTOMY     • Brian 634 MSK PROCEDURE  11/25/2020     Social History   Social History     Substance and Sexual Activity   Alcohol Use No     Social History     Substance and Sexual Activity   Drug Use No     Social History     Tobacco Use   Smoking Status Never   Smokeless Tobacco Never     Family History:   Family History   Problem Relation Age of Onset   • Cervical cancer Mother 76   • Breast cancer Maternal Grandmother         Under 48   • Mental illness Son    • Schizophrenia Son    • Cancer Family    • No Known Problems Father    • No Known Problems Maternal Aunt    • No Known Problems Maternal Aunt    • Brain cancer Maternal Aunt         age unknown   • Cancer Cousin        Meds/Allergies   Current Outpatient Medications   Medication Sig Dispense Refill   • aspirin 81 MG tablet Take 81 mg by mouth daily       • Calcium Carbonate-Vitamin D 600-200 MG-UNIT TABS Take 1 tablet by mouth every other day       • dexamethasone (DECADRON) 1 mg tablet Take 1 tablet (1 mg total) by mouth 1 (one) time for 1 dose Take pill at 10 pm night prior to cortisol lab check 1 tablet 0   • ezetimibe (ZETIA) 10 mg tablet Take 1 tablet (10 mg total) by mouth daily 90 tablet 1   • famotidine (PEPCID) 40 MG tablet Take 1 tablet (40 mg total) by mouth daily at bedtime 90 tablet 1   • folic acid (FOLVITE) 1 mg tablet Take 1 tablet (1 mg total) by mouth daily 90 tablet 2   • Glucosamine-Chondroitin 250-200 MG TABS Take 1 tablet by mouth daily       • hydroxyurea (HYDREA) 500 mg capsule Take 1 capsule (500 mg total) by mouth daily 90 capsule 1   • levothyroxine 50 mcg tablet Take 1 tablet (50 mcg total) by mouth daily 90 tablet 1   • lisinopril (ZESTRIL) 2 5 mg tablet Take 1 tablet (2 5 mg total) by mouth daily 90 tablet 3   • Multiple Vitamins-Iron (DAILY MULTIPLE VITAMIN/IRON) TABS Take 1 tablet by mouth daily       • Omega-3 Fatty Acids ([employer] FISH OIL) 1200 MG CAPS Take 1 capsule by mouth 2 (two) times a day     • Polyethylene Glycol 3350 (MIRALAX MIX-IN PAX PO) Take by mouth     • Wheat Dextrin (BENEFIBER DRINK "MIX PO) Take by mouth     • docusate sodium (COLACE) 100 mg capsule Take by mouth (Patient not taking: Reported on 8/11/2022)     • magnesium citrate solution Take 296 mL by mouth once (Patient not taking: Reported on 4/6/2023)       No current facility-administered medications for this visit  No Known Allergies    Objective   Vitals: Blood pressure 130/74, pulse 81, height 4' 9\" (1 448 m), weight 67 8 kg (149 lb 6 4 oz), not currently breastfeeding  Invasive Devices     Peripheral Intravenous Line  Duration           Peripheral IV 03/23/22 Distal;Right;Ventral (anterior) Forearm 422 days          Airway  Duration           Non-Surgical Airway Oral pharyngeal airway 80 mm 1787 days                Physical Exam   Physical exam:   Constitutional:Oriented to person, place, and time  Appears well-developed and well-nourished  Not in any acute distress  HENT:   Head: Normocephalic and atraumatic  Neck: Normal range of motion  Supple, No thyromegaly  Pulmonary/Chest: Effort normal/ breathing comfortably on room air  CTAB   CVS:  Regular rate and rhythm, S1-S2 +  Abdomen: soft, nondistended, nontender  Musculoskeletal: Normal range of motion  Neurological: Alert and oriented to person, place, and time  Skin:  Warm, no suspicious skin lesions noted  Extremities:  No pedal edema  Psychiatric: Normal mood and affect  Behavior is normal        The history was obtained from the review of the chart and from the patient  Lab Results:      Recent Results (from the past 82399 hour(s))   Lipid panel    Collection Time: 04/06/22  8:23 AM   Result Value Ref Range    Cholesterol 164 See Comment mg/dL    Triglycerides 232 (H) See Comment mg/dL    HDL, Direct 38 (L) >=50 mg/dL    LDL Calculated 80 0 - 100 mg/dL    Non-HDL-Chol (CHOL-HDL) 126 mg/dl   Hemoglobin A1C    Collection Time: 04/06/22  8:23 AM   Result Value Ref Range    Hemoglobin A1C 6 1 (H) Normal 3 8-5 6%; PreDiabetic 5 7-6 4%;  Diabetic >=6 5%; Glycemic " control for adults with diabetes <7 0% %     mg/dl   Comprehensive metabolic panel    Collection Time: 04/06/22  8:23 AM   Result Value Ref Range    Sodium 137 136 - 145 mmol/L    Potassium 4 2 3 5 - 5 3 mmol/L    Chloride 105 100 - 108 mmol/L    CO2 26 21 - 32 mmol/L    ANION GAP 6 4 - 13 mmol/L    BUN 16 5 - 25 mg/dL    Creatinine 1 14 0 60 - 1 30 mg/dL    Glucose, Fasting 98 65 - 99 mg/dL    Calcium 8 9 8 3 - 10 1 mg/dL    AST 14 5 - 45 U/L    ALT 23 12 - 78 U/L    Alkaline Phosphatase 86 46 - 116 U/L    Total Protein 7 9 6 4 - 8 2 g/dL    Albumin 3 5 3 5 - 5 0 g/dL    Total Bilirubin 0 42 0 20 - 1 00 mg/dL    eGFR 45 ml/min/1 73sq m   TSH, 3rd generation    Collection Time: 04/06/22  8:23 AM   Result Value Ref Range    TSH 3RD GENERATON 5 450 (H) 0 450 - 4 500 uIU/mL   CBC and differential    Collection Time: 06/01/22  8:15 AM   Result Value Ref Range    WBC 7 56 4 31 - 10 16 Thousand/uL    RBC 4 03 3 81 - 5 12 Million/uL    Hemoglobin 13 3 11 5 - 15 4 g/dL    Hematocrit 40 1 34 8 - 46 1 %     (H) 82 - 98 fL    MCH 33 0 26 8 - 34 3 pg    MCHC 33 2 31 4 - 37 4 g/dL    RDW 23 1 (H) 11 6 - 15 1 %    MPV 9 5 8 9 - 12 7 fL    Platelets 598 710 - 253 Thousands/uL    nRBC 0 /100 WBCs    Neutrophils Relative 49 43 - 75 %    Immat GRANS % 0 0 - 2 %    Lymphocytes Relative 37 14 - 44 %    Monocytes Relative 9 4 - 12 %    Eosinophils Relative 3 0 - 6 %    Basophils Relative 2 (H) 0 - 1 %    Neutrophils Absolute 3 73 1 85 - 7 62 Thousands/µL    Immature Grans Absolute 0 02 0 00 - 0 20 Thousand/uL    Lymphocytes Absolute 2 80 0 60 - 4 47 Thousands/µL    Monocytes Absolute 0 67 0 17 - 1 22 Thousand/µL    Eosinophils Absolute 0 21 0 00 - 0 61 Thousand/µL    Basophils Absolute 0 13 (H) 0 00 - 0 10 Thousands/µL   Comprehensive metabolic panel    Collection Time: 06/01/22  8:15 AM   Result Value Ref Range    Sodium 135 (L) 136 - 145 mmol/L    Potassium 4 2 3 5 - 5 3 mmol/L    Chloride 104 100 - 108 mmol/L    CO2 28 21 - 32 mmol/L    ANION GAP 3 (L) 4 - 13 mmol/L    BUN 16 5 - 25 mg/dL    Creatinine 1 07 0 60 - 1 30 mg/dL    Glucose, Fasting 95 65 - 99 mg/dL    Calcium 9 3 8 3 - 10 1 mg/dL    Corrected Calcium 9 9 8 3 - 10 1 mg/dL    AST 13 5 - 45 U/L    ALT 21 12 - 78 U/L    Alkaline Phosphatase 72 46 - 116 U/L    Total Protein 7 9 6 4 - 8 2 g/dL    Albumin 3 2 (L) 3 5 - 5 0 g/dL    Total Bilirubin 0 30 0 20 - 1 00 mg/dL    eGFR 49 ml/min/1 73sq m   Iron Saturation %    Collection Time: 06/01/22  8:15 AM   Result Value Ref Range    Iron Saturation 50 15 - 50 %    TIBC 212 (L) 250 - 450 ug/dL    Iron 107 50 - 170 ug/dL   Ferritin    Collection Time: 06/01/22  8:15 AM   Result Value Ref Range    Ferritin 731 (H) 8 - 388 ng/mL   Hm Diabetes Eye Exam    Collection Time: 06/21/22  9:37 AM   Result Value Ref Range    Severity Normal     Right Eye Diabetic Retinopathy None     Left Eye Diabetic Retinopathy None    Basic metabolic panel    Collection Time: 07/01/22  8:29 AM   Result Value Ref Range    Sodium 135 (L) 136 - 145 mmol/L    Potassium 4 3 3 5 - 5 3 mmol/L    Chloride 101 100 - 108 mmol/L    CO2 27 21 - 32 mmol/L    ANION GAP 7 4 - 13 mmol/L    BUN 17 5 - 25 mg/dL    Creatinine 1 10 0 60 - 1 30 mg/dL    Glucose, Fasting 85 65 - 99 mg/dL    Calcium 9 4 8 3 - 10 1 mg/dL    eGFR 47 ml/min/1 73sq m   Protein / creatinine ratio, urine    Collection Time: 07/01/22  8:29 AM   Result Value Ref Range    Creatinine, Ur 48 1 mg/dL    Protein Urine Random <6 mg/dL    Prot/Creat Ratio, Ur <0 12 (H) 0 00 - 0 10   Urinalysis with microscopic    Collection Time: 07/01/22  8:29 AM   Result Value Ref Range    Color, UA Light Yellow     Clarity, UA Clear     Specific Gravity, UA 1 009 1 003 - 1 030    pH, UA 6 5 4 5, 5 0, 5 5, 6 0, 6 5, 7 0, 7 5, 8 0    Leukocytes, UA Negative Negative    Nitrite, UA Negative Negative    Protein, UA Negative Negative mg/dl    Glucose, UA Negative Negative mg/dl    Ketones, UA Negative Negative mg/dl Urobilinogen, UA <2 0 <2 0 mg/dl mg/dl    Bilirubin, UA Negative Negative    Occult Blood, UA Negative Negative    RBC, UA 1-2 None Seen, 1-2 /hpf    WBC, UA 1-2 None Seen, 1-2 /hpf    Epithelial Cells Occasional None Seen, Occasional /hpf    Bacteria, UA None Seen None Seen, Occasional /hpf    MUCUS THREADS Occasional (A) None Seen    Hyaline Casts, UA 0-3 (A) None Seen /lpf    Transitional Epithelial Cells Present    PTH, intact    Collection Time: 07/01/22  8:29 AM   Result Value Ref Range    PTH 29 1 18 4 - 80 1 pg/mL   CBC    Collection Time: 07/01/22  8:29 AM   Result Value Ref Range    WBC 7 83 4 31 - 10 16 Thousand/uL    RBC 4 01 3 81 - 5 12 Million/uL    Hemoglobin 13 9 11 5 - 15 4 g/dL    Hematocrit 40 9 34 8 - 46 1 %     (H) 82 - 98 fL    MCH 34 7 (H) 26 8 - 34 3 pg    MCHC 34 0 31 4 - 37 4 g/dL    RDW 16 0 (H) 11 6 - 15 1 %    Platelets 781 996 - 997 Thousands/uL    MPV 9 1 8 9 - 12 7 fL   Folate    Collection Time: 07/01/22  8:29 AM   Result Value Ref Range    Folate >20 0 (H) 3 1 - 17 5 ng/mL   Vitamin B12    Collection Time: 07/01/22  8:29 AM   Result Value Ref Range    Vitamin B-12 644 100 - 900 pg/mL   CBC and differential    Collection Time: 09/07/22  7:58 AM   Result Value Ref Range    WBC 7 61 4 31 - 10 16 Thousand/uL    RBC 3 65 (L) 3 81 - 5 12 Million/uL    Hemoglobin 12 8 11 5 - 15 4 g/dL    Hematocrit 38 6 34 8 - 46 1 %     (H) 82 - 98 fL    MCH 35 1 (H) 26 8 - 34 3 pg    MCHC 33 2 31 4 - 37 4 g/dL    RDW 13 3 11 6 - 15 1 %    MPV 9 4 8 9 - 12 7 fL    Platelets 068 (H) 576 - 390 Thousands/uL    nRBC 0 /100 WBCs    Neutrophils Relative 55 43 - 75 %    Immat GRANS % 0 0 - 2 %    Lymphocytes Relative 31 14 - 44 %    Monocytes Relative 9 4 - 12 %    Eosinophils Relative 3 0 - 6 %    Basophils Relative 2 (H) 0 - 1 %    Neutrophils Absolute 4 17 1 85 - 7 62 Thousands/µL    Immature Grans Absolute 0 02 0 00 - 0 20 Thousand/uL    Lymphocytes Absolute 2 36 0 60 - 4 47 Thousands/µL Monocytes Absolute 0 67 0 17 - 1 22 Thousand/µL    Eosinophils Absolute 0 26 0 00 - 0 61 Thousand/µL    Basophils Absolute 0 13 (H) 0 00 - 0 10 Thousands/µL   UA (URINE) with reflex to Scope    Collection Time: 09/07/22  7:58 AM   Result Value Ref Range    Color, UA Light Orange     Clarity, UA Extra Turbid     Specific [place], UA 1 009 1 003 - 1 030    pH, UA 6 5 4 5, 5 0, 5 5, 6 0, 6 5, 7 0, 7 5, 8 0    Leukocytes, UA Large (A) Negative    Nitrite, UA Positive (A) Negative    Protein, UA Trace (A) Negative mg/dl    Glucose, UA Negative Negative mg/dl    Ketones, UA Negative Negative mg/dl    Urobilinogen, UA <2 0 <2 0 mg/dl mg/dl    Bilirubin, UA Negative Negative    Occult Blood, UA Small (A) Negative   TSH, 3rd generation    Collection Time: 09/07/22  7:58 AM   Result Value Ref Range    TSH 3RD GENERATON 4 860 (H) 0 450 - 4 500 uIU/mL   Lipid panel    Collection Time: 09/07/22  7:58 AM   Result Value Ref Range    Cholesterol 176 See Comment mg/dL    Triglycerides 225 (H) See Comment mg/dL    HDL, Direct 38 (L) >=50 mg/dL    LDL Calculated 93 0 - 100 mg/dL    Non-HDL-Chol (CHOL-HDL) 138 mg/dl   Comprehensive metabolic panel    Collection Time: 09/07/22  7:58 AM   Result Value Ref Range    Sodium 133 (L) 135 - 147 mmol/L    Potassium 4 2 3 5 - 5 3 mmol/L    Chloride 103 96 - 108 mmol/L    CO2 25 21 - 32 mmol/L    ANION GAP 5 4 - 13 mmol/L    BUN 16 5 - 25 mg/dL    Creatinine 1 24 0 60 - 1 30 mg/dL    Glucose, Fasting 96 65 - 99 mg/dL    Calcium 9 1 8 3 - 10 1 mg/dL    Corrected Calcium 9 7 8 3 - 10 1 mg/dL    AST 15 5 - 45 U/L    ALT 25 12 - 78 U/L    Alkaline Phosphatase 72 46 - 116 U/L    Total Protein 8 2 6 4 - 8 4 g/dL    Albumin 3 2 (L) 3 5 - 5 0 g/dL    Total Bilirubin 0 39 0 20 - 1 00 mg/dL    eGFR 41 ml/min/1 73sq m   Hemoglobin A1C    Collection Time: 09/07/22  7:58 AM   Result Value Ref Range    Hemoglobin A1C 5 9 (H) Normal 3 8-5 6%; PreDiabetic 5 7-6 4%;  Diabetic >=6 5%; Glycemic control for adults with diabetes <7 0% %     mg/dl   Cortisol Level, AM Specimen    Collection Time: 09/07/22  7:58 AM   Result Value Ref Range    Cortisol - AM 20 1 4 2 - 22 4 ug/dL   Aldosterone    Collection Time: 09/07/22  7:58 AM   Result Value Ref Range    Aldosterone 6 9 0 0 - 30 0 ng/dL   Urine culture    Collection Time: 09/07/22  7:58 AM    Specimen: Urine, Clean Catch   Result Value Ref Range    Urine Culture >100,000 cfu/ml Escherichia coli (A)     Urine Culture 40,000-49,000 cfu/ml Aerococcus urinae (A)        Susceptibility    Aerococcus urinae - JOSSY     ZID Performed       Escherichia coli - JOSSY     ZID Performed Yes       Ampicillin ($$) <=8 00 Susceptible ug/ml     Aztreonam ($$$)  <=4 Susceptible ug/ml     Cefazolin ($) <=2 00 Susceptible ug/ml     Ciprofloxacin ($)  <=0 25 Susceptible ug/ml     Gentamicin ($$) <=2 Susceptible ug/ml     Levofloxacin ($) <=0 50 Susceptible ug/ml     Nitrofurantoin <=32 Susceptible ug/ml     Tetracycline <=4 Susceptible ug/ml     Tobramycin ($) <=2 Susceptible ug/ml     Trimethoprim + Sulfamethoxazole ($$$) <=0 5/9 5 Susceptible ug/ml   Iron Saturation %    Collection Time: 09/07/22  7:58 AM   Result Value Ref Range    Iron Saturation 34 15 - 50 %    TIBC 225 (L) 250 - 450 ug/dL    Iron 76 50 - 170 ug/dL   Ferritin    Collection Time: 09/07/22  7:58 AM   Result Value Ref Range    Ferritin 754 (H) 8 - 388 ng/mL   Urine Microscopic    Collection Time: 09/07/22  7:58 AM   Result Value Ref Range    RBC, UA 20-30 (A) None Seen, 1-2 /hpf    WBC, UA Innumerable (A) None Seen, 1-2 /hpf    Epithelial Cells Occasional None Seen, Occasional /hpf    Bacteria, UA Innumerable (A) None Seen, Occasional /hpf    WBC Clumps Present    Vitamin D 25 hydroxy    Collection Time: 10/04/22  9:26 AM   Result Value Ref Range    Vit D, 25-Hydroxy 47 3 30 0 - 100 0 ng/mL   CBC and differential    Collection Time: 10/04/22  9:26 AM   Result Value Ref Range    WBC 6 87 4 31 - 10 16 Thousand/uL    RBC 3 91 3 81 - 5 12 Million/uL    Hemoglobin 13 2 11 5 - 15 4 g/dL    Hematocrit 40 3 34 8 - 46 1 %     (H) 82 - 98 fL    MCH 33 8 26 8 - 34 3 pg    MCHC 32 8 31 4 - 37 4 g/dL    RDW 13 9 11 6 - 15 1 %    MPV 9 2 8 9 - 12 7 fL    Platelets 767 (H) 496 - 390 Thousands/uL    nRBC 0 /100 WBCs    Neutrophils Relative 56 43 - 75 %    Immat GRANS % 0 0 - 2 %    Lymphocytes Relative 31 14 - 44 %    Monocytes Relative 8 4 - 12 %    Eosinophils Relative 3 0 - 6 %    Basophils Relative 2 (H) 0 - 1 %    Neutrophils Absolute 3 80 1 85 - 7 62 Thousands/µL    Immature Grans Absolute 0 02 0 00 - 0 20 Thousand/uL    Lymphocytes Absolute 2 15 0 60 - 4 47 Thousands/µL    Monocytes Absolute 0 54 0 17 - 1 22 Thousand/µL    Eosinophils Absolute 0 20 0 00 - 0 61 Thousand/µL    Basophils Absolute 0 16 (H) 0 00 - 0 10 Thousands/µL   Iron Saturation %    Collection Time: 10/04/22  9:26 AM   Result Value Ref Range    Iron Saturation 32 15 - 50 %    TIBC 244 (L) 250 - 450 ug/dL    Iron 79 50 - 170 ug/dL   Ferritin    Collection Time: 10/04/22  9:26 AM   Result Value Ref Range    Ferritin 649 (H) 8 - 388 ng/mL   CBC and differential    Collection Time: 01/04/23  8:58 AM   Result Value Ref Range    WBC 8 32 4 31 - 10 16 Thousand/uL    RBC 4 08 3 81 - 5 12 Million/uL    Hemoglobin 13 4 11 5 - 15 4 g/dL    Hematocrit 41 7 34 8 - 46 1 %     (H) 82 - 98 fL    MCH 32 8 26 8 - 34 3 pg    MCHC 32 1 31 4 - 37 4 g/dL    RDW 13 9 11 6 - 15 1 %    MPV 9 3 8 9 - 12 7 fL    Platelets 464 (H) 949 - 390 Thousands/uL    nRBC 0 /100 WBCs    Neutrophils Relative 55 43 - 75 %    Immat GRANS % 0 0 - 2 %    Lymphocytes Relative 33 14 - 44 %    Monocytes Relative 7 4 - 12 %    Eosinophils Relative 3 0 - 6 %    Basophils Relative 2 (H) 0 - 1 %    Neutrophils Absolute 4 56 1 85 - 7 62 Thousands/µL    Immature Grans Absolute 0 02 0 00 - 0 20 Thousand/uL    Lymphocytes Absolute 2 77 0 60 - 4 47 Thousands/µL    Monocytes Absolute 0 59 0 17 - 1 22 Thousand/µL Eosinophils Absolute 0 25 0 00 - 0 61 Thousand/µL    Basophils Absolute 0 13 (H) 0 00 - 0 10 Thousands/µL   Comprehensive metabolic panel    Collection Time: 01/04/23  8:58 AM   Result Value Ref Range    Sodium 133 (L) 135 - 147 mmol/L    Potassium 4 1 3 5 - 5 3 mmol/L    Chloride 103 96 - 108 mmol/L    CO2 26 21 - 32 mmol/L    ANION GAP 4 4 - 13 mmol/L    BUN 20 5 - 25 mg/dL    Creatinine 1 18 0 60 - 1 30 mg/dL    Glucose, Fasting 101 (H) 65 - 99 mg/dL    Calcium 9 6 8 3 - 10 1 mg/dL    AST 15 5 - 45 U/L    ALT 20 12 - 78 U/L    Alkaline Phosphatase 66 46 - 116 U/L    Total Protein 8 4 6 4 - 8 4 g/dL    Albumin 3 6 3 5 - 5 0 g/dL    Total Bilirubin 0 45 0 20 - 1 00 mg/dL    eGFR 43 ml/min/1 73sq m   LD,Blood    Collection Time: 01/04/23  8:58 AM   Result Value Ref Range     81 - 234 U/L   Lipid panel    Collection Time: 01/10/23  7:59 AM   Result Value Ref Range    Cholesterol 162 See Comment mg/dL    Triglycerides 228 (H) See Comment mg/dL    HDL, Direct 37 (L) >=50 mg/dL    LDL Calculated 79 0 - 100 mg/dL    Non-HDL-Chol (CHOL-HDL) 125 mg/dl   Comprehensive metabolic panel    Collection Time: 01/10/23  7:59 AM   Result Value Ref Range    Sodium 135 135 - 147 mmol/L    Potassium 4 1 3 5 - 5 3 mmol/L    Chloride 104 96 - 108 mmol/L    CO2 26 21 - 32 mmol/L    ANION GAP 5 4 - 13 mmol/L    BUN 21 5 - 25 mg/dL    Creatinine 1 08 0 60 - 1 30 mg/dL    Glucose, Fasting 97 65 - 99 mg/dL    Calcium 9 2 8 3 - 10 1 mg/dL    Corrected Calcium 9 8 8 3 - 10 1 mg/dL    AST 14 5 - 45 U/L    ALT 17 12 - 78 U/L    Alkaline Phosphatase 66 46 - 116 U/L    Total Protein 7 9 6 4 - 8 4 g/dL    Albumin 3 3 (L) 3 5 - 5 0 g/dL    Total Bilirubin 0 38 0 20 - 1 00 mg/dL    eGFR 48 ml/min/1 73sq m   Hemoglobin A1C    Collection Time: 01/10/23  7:59 AM   Result Value Ref Range    Hemoglobin A1C 5 8 (H) Normal 3 8-5 6%; PreDiabetic 5 7-6 4%;  Diabetic >=6 5%; Glycemic control for adults with diabetes <7 0% %     mg/dl   CBC and differential    Collection Time: 04/04/23  8:58 AM   Result Value Ref Range    WBC 9 48 4 31 - 10 16 Thousand/uL    RBC 4 01 3 81 - 5 12 Million/uL    Hemoglobin 13 2 11 5 - 15 4 g/dL    Hematocrit 40 7 34 8 - 46 1 %     (H) 82 - 98 fL    MCH 32 9 26 8 - 34 3 pg    MCHC 32 4 31 4 - 37 4 g/dL    RDW 14 6 11 6 - 15 1 %    MPV 9 2 8 9 - 12 7 fL    Platelets 412 (H) 617 - 390 Thousands/uL    nRBC 0 /100 WBCs    Neutrophils Relative 59 43 - 75 %    Immat GRANS % 0 0 - 2 %    Lymphocytes Relative 29 14 - 44 %    Monocytes Relative 7 4 - 12 %    Eosinophils Relative 3 0 - 6 %    Basophils Relative 2 (H) 0 - 1 %    Neutrophils Absolute 5 54 1 85 - 7 62 Thousands/µL    Immature Grans Absolute 0 03 0 00 - 0 20 Thousand/uL    Lymphocytes Absolute 2 76 0 60 - 4 47 Thousands/µL    Monocytes Absolute 0 69 0 17 - 1 22 Thousand/µL    Eosinophils Absolute 0 27 0 00 - 0 61 Thousand/µL    Basophils Absolute 0 19 (H) 0 00 - 0 10 Thousands/µL   Comprehensive metabolic panel    Collection Time: 04/04/23  8:58 AM   Result Value Ref Range    Sodium 131 (L) 135 - 147 mmol/L    Potassium 3 9 3 5 - 5 3 mmol/L    Chloride 102 96 - 108 mmol/L    CO2 27 21 - 32 mmol/L    ANION GAP 2 (L) 4 - 13 mmol/L    BUN 19 5 - 25 mg/dL    Creatinine 1 20 0 60 - 1 30 mg/dL    Glucose, Fasting 90 65 - 99 mg/dL    Calcium 9 8 8 3 - 10 1 mg/dL    AST 15 5 - 45 U/L    ALT 20 12 - 78 U/L    Alkaline Phosphatase 69 46 - 116 U/L    Total Protein 8 3 6 4 - 8 4 g/dL    Albumin 3 5 3 5 - 5 0 g/dL    Total Bilirubin 0 37 0 20 - 1 00 mg/dL    eGFR 42 ml/min/1 73sq m   LD,Blood    Collection Time: 04/04/23  8:58 AM   Result Value Ref Range     81 - 234 U/L   Magnesium    Collection Time: 05/10/23  8:12 AM   Result Value Ref Range    Magnesium 2 4 1 6 - 2 6 mg/dL   Phosphorus    Collection Time: 05/10/23  8:12 AM   Result Value Ref Range    Phosphorus 3 5 2 3 - 4 1 mg/dL   PTH, intact    Collection Time: 05/10/23  8:12 AM   Result Value Ref Range    PTH 26 7 18 4 - 80 1 pg/mL   Vitamin D 25 hydroxy    Collection Time: 05/10/23  8:12 AM   Result Value Ref Range    Vit D, 25-Hydroxy 42 4 30 0 - 100 0 ng/mL   Protein / creatinine ratio, urine    Collection Time: 05/10/23  8:12 AM   Result Value Ref Range    Creatinine, Ur 67 9 mg/dL    Protein Urine Random 7 mg/dL    Prot/Creat Ratio, Ur 0 10 0 00 - 0 10   Lipid panel    Collection Time: 05/10/23  8:12 AM   Result Value Ref Range    Cholesterol 158 See Comment mg/dL    Triglycerides 236 (H) See Comment mg/dL    HDL, Direct 34 (L) >=50 mg/dL    LDL Calculated 77 0 - 100 mg/dL    Non-HDL-Chol (CHOL-HDL) 124 mg/dl   Comprehensive metabolic panel    Collection Time: 05/10/23  8:12 AM   Result Value Ref Range    Sodium 134 (L) 135 - 147 mmol/L    Potassium 4 2 3 5 - 5 3 mmol/L    Chloride 104 96 - 108 mmol/L    CO2 27 21 - 32 mmol/L    ANION GAP 3 (L) 4 - 13 mmol/L    BUN 18 5 - 25 mg/dL    Creatinine 1 22 0 60 - 1 30 mg/dL    Glucose, Fasting 88 65 - 99 mg/dL    Calcium 9 1 8 3 - 10 1 mg/dL    Corrected Calcium 9 7 8 3 - 10 1 mg/dL    AST 13 5 - 45 U/L    ALT 17 12 - 78 U/L    Alkaline Phosphatase 68 46 - 116 U/L    Total Protein 8 3 6 4 - 8 4 g/dL    Albumin 3 3 (L) 3 5 - 5 0 g/dL    Total Bilirubin 0 26 0 20 - 1 00 mg/dL    eGFR 41 ml/min/1 73sq m   Hemoglobin A1C    Collection Time: 05/10/23  8:12 AM   Result Value Ref Range    Hemoglobin A1C 5 9 (H) Normal 3 8-5 6%; PreDiabetic 5 7-6 4%;  Diabetic >=6 5%; Glycemic control for adults with diabetes <7 0% %     mg/dl   Aldosterone    Collection Time: 05/10/23  8:12 AM   Result Value Ref Range    Aldosterone 8 8 0 0 - 30 0 ng/dL   Cortisol Level, AM Specimen    Collection Time: 05/10/23  8:12 AM   Result Value Ref Range    Cortisol - AM 28 5 (H) 4 2 - 22 4 ug/dL         Future Appointments   Date Time Provider Endy William   6/21/2023  9:20 AM Katt Leiva MD [facility]   7/28/2023 11:30 AM Elva Segura MD [facility]   10/9/2023 10:30 AM Blanca Terrell "Leona Graff MD [facility]   10/12/2023  9:20 AM Saqib Segundo MD [facility] Practice-Onc   11/20/2023 12:30 PM AL DEXA WE 1 AL WE DEXA AL WEST END   11/20/2023  1:00 PM AL 3D MAMMO WE 1 AL WE MAMMO AL WEST END   1/22/2024 10:00 AM Sandoval Ramires PA-C [facility]       Portions of the record may have been created with voice recognition software  Occasional wrong word or \"sound a like\" substitutions may have occurred due to the inherent limitations of voice recognition software  Read the chart carefully and recognize, using context, where substitutions have occurred      " acute on chronic comorbidities including acute alcoholic hepatitis, decompensated hepatic cirrhosis, ascites, esophageal varies  s/p early satiety/Yarsanism/ethnic/cultural/personal food preferences

## 2024-05-14 NOTE — PROGRESS NOTES
Hematology Outpatient Office Note    Date of Service: 2024    [facility] [place]  [address]  [facility] [address]  [phone]    Reason for Consultation:   Chief Complaint   Patient presents with    Follow-up       Referral Physician: No ref. provider found    Primary Care Physician:  Kiley Gregory MD     Nickname: Bhavani    Spouse: Ramo Lou ECO    Today's ECO    Goals and Barriers:  Current Goal: Minimize effects of disease burden, extend life.   Barriers to accomplishing this: None    Patient's Capacity to Self Care:  Patient is able to self care      ASSESSMENT & PLAN      Diagnosis ICD-10-CM Associated Orders   1. Essential thrombocytosis (HCC)  D47.3       2. Other iron deficiency anemia  D50.8       3. JAK2 gene mutation  Z15.89       4. Neoplastic (malignant) related fatigue  R53.0             This is a 83 y.o. c PMHx notable for CKDIIIB, DM, GERD, HTN, mild hyponatremia, b/l TMJ, osteoporosis now on Prolia, being seen in consultation for ET    The platelet count has nearly normalized on hydrea 500mg daily dosing. Pt is tolerating it well    ET induced fatigue is stable    Discussion of decision making    I personally reviewed the following lab results, the image studies, pathology, other specialty/physicians consult notes and recommendations, and outside medical records. I had a lengthy discussion with the patient and shared the work-up findings. We discussed the diagnosis and management plan as below. I spent 41 minutes reviewing the records (labs, clinician notes, outside records, medical history, ordering medicine/tests/procedures, interpreting the imaging/labs previously done) and coordination of care as well as direct time with the patient today, of which greater than 50% of the time was spent in counseling and coordination of care with the patient/family.      Plan/Labs  CBC, CMP q3 months as standing for 2 lab draws  Cont  hydrea 500mg daily   Cont to f/u with her dentist/PCP for concern for TMJ (potential osteonecrosis)        Follow Up: 6 months    All questions were answered to the patient's satisfaction during this encounter. The patient knows the contact information for our office and knows to reach out for any relevant concerns related to this encounter. They are to call for any temperature 100.4 or higher, new symptoms including but not restricted to shaking chills, decreased appetite, nausea, vomiting, diarrhea, increased fatigue, shortness of breath or chest pain, confusion, and not feeling the strength to come to the clinic. For all other listed problems and medical diagnosis in their chart - they are managed by PCP and/or other specialists, which the patient acknowledges. Thank you very much for your consultation and making us a part of this patient's care. We are continuing to follow closely with you. Please do not hesitate to reach out to me with any additional questions or concerns.    Hernandez Herrera MD  Hematology & Medical Oncology Staff Physician             Disclaimer: This document was prepared using [employer] technology. If a word or phrase is confusing, or does not make sense, this is likely due to recognition error which was not discovered during this clinician's review. If you believe an error has occurred, please contact me through [facility] service line for ovidio?cation.      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  creat 1.19 (9/13/2023)  10/5/2023: WBC 7.95k, Hgb 12.9, , RDW 14.1, plt 477k  4/19/2024: plt 447k    SUBJECTIVE  (INTERVAL HISTORY)      Doing relatively well, no acute issues. Mild fatigue.    I have reviewed the relevant past medical, surgical, social and family history. I have also reviewed allergies and medications for this patient.    Review of Systems    Baseline weight: 145-150 lbs    Denies F/C, N/V, SOB, CP, LH, HA, rash, itching, gen weakness, melena, hematuria, hematochezia, falls, diarrhea, or constipation       A 10-point review of system was performed, pertinent positive and negative were detailed as above. Otherwise, the 10-point review of system was negative.      Past Medical History:   Diagnosis Date    CKD (chronic kidney disease)     Diabetes mellitus (HCC)     does not take medications, does not monitor blood sugars, pre-diabetic    Disease of thyroid gland     Diverticulosis     GERD (gastroesophageal reflux disease)     Hypertension     Hyponatremia     Ovarian cyst     Rectal bleeding     Wears reading eyeglasses        Past Surgical History:   Procedure Laterality Date    BREAST BIOPSY Right 05/13/2009    Ultrasound Bx. Benign    BREAST SURGERY      CATARACT EXTRACTION Bilateral     COLONOSCOPY      DENTAL SURGERY  10/09/2023    ESOPHAGOGASTRODUODENOSCOPY      IR BIOPSY BONE MARROW  03/18/2022    OOPHORECTOMY Bilateral 2015    DE CMBND ANTERPOST COLPORRAPHY W/CYSTO N/A 06/26/2018    Procedure: ANTERIOR & POSTERIOR COLPORRHAPHY;  Surgeon: Babatunde Quinteros MD;  [facility];  Service: UroGynecology           DE COLPOPEXY VAGINAL EXTRAPERITONEAL APPROACH N/A 06/26/2018    Procedure: ANTERIOR COLPOPEXY VAGINAL EXTRAPERITONEAL (VEC);  Surgeon: Babatunde Quinteros MD;  [facility];  Service: UroGynecology           DE CYSTOURETHROSCOPY N/A 06/26/2018    Procedure: CYSTOSCOPY;  Surgeon: Babatunde Quinteros  MD;  [facility];  Service: UroGynecology           KS SLING OPERATION STRESS INCONTINENCE N/A 06/26/2018    Procedure: INSERTION PUBOVAGINAL SLING (FEMALE);  Surgeon: Babatunde Quinteros MD;  [facility];  Service: UroGynecology           TONSILLECTOMY      US GUIDED MSK PROCEDURE  11/25/2020       Family History   Problem Relation Age of Onset    Cervical cancer Mother 68    Breast cancer Maternal Grandmother         Under 50    Mental illness Son     Schizophrenia Son     Cancer Family     No Known Problems Father     No Known Problems Maternal Aunt     No Known Problems Maternal Aunt     Brain cancer Maternal Aunt         age unknown    Cancer Cousin        Social History     Socioeconomic History    Marital status: /Civil Union     Spouse name: Not on file    Number of children: 2    Years of education: Not on file    Highest education level: Not on file   Occupational History    Occupation: Retired    Tobacco Use    Smoking status: Never     Passive exposure: Never    Smokeless tobacco: Never   Vaping Use    Vaping status: Never Used   Substance and Sexual Activity    Alcohol use: No    Drug use: No    Sexual activity: Never     Partners: Male     Birth control/protection: None   Other Topics Concern    Not on file   Social History Narrative    Active advance directive     Always uses seat belt     Lives with spouse/house     Patient has a living Will     Power of  in existence     Mormonism     Supportive and safe          Social Determinants of Health     Financial Resource Strain: Low Risk  (6/21/2023)    Overall Financial Resource Strain (CARDIA)     Difficulty of Paying Living Expenses: Not hard at all   Food Insecurity: Not on file   Transportation Needs: No Transportation Needs (6/21/2023)    PRAPARE - Transportation     Lack of Transportation (Medical): No     Lack of Transportation (Non-Medical): No   Physical Activity: Not on file   Stress: Not on file   Social  Connections: Not on file   Intimate Partner Violence: Not on file   Housing Stability: Not on file       No Known Allergies    Current Outpatient Medications   Medication Sig Dispense Refill    aspirin 81 MG tablet Take 81 mg by mouth daily        Calcium Carbonate-Vitamin D 600-200 MG-UNIT TABS Take 1 tablet by mouth every other day        chlorhexidine (PERIDEX) 0.12 % solution       ezetimibe (ZETIA) 10 mg tablet Take 1 tablet (10 mg total) by mouth daily 90 tablet 1    famotidine (PEPCID) 40 MG tablet Take 1 tablet (40 mg total) by mouth daily at bedtime 90 tablet 1    folic acid (FOLVITE) 1 mg tablet Take 1 tablet (1 mg total) by mouth daily 90 tablet 2    Glucosamine-Chondroitin 250-200 MG TABS Take 1 tablet by mouth daily        hydroxyurea (HYDREA) 500 mg capsule Take 1 capsule (500 mg total) by mouth daily 90 capsule 1    levothyroxine 50 mcg tablet Take 1 tablet (50 mcg total) by mouth daily 90 tablet 1    lisinopril (ZESTRIL) 2.5 mg tablet Take 1 tablet (2.5 mg total) by mouth daily 90 tablet 3    Multiple Vitamins-Iron (DAILY MULTIPLE VITAMIN/IRON) TABS Take 1 tablet by mouth daily        Omega-3 Fatty Acids ([employer] FISH OIL) 1200 MG CAPS Take 1 capsule by mouth 2 (two) times a day      Polyethylene Glycol 3350 (MIRALAX MIX-IN PAX PO) Take by mouth       No current facility-administered medications for this visit.       (Not in a hospital admission)        Objective:     24 Hour Vitals Assessment:     Vitals:    05/22/24 0912   BP: 110/64   Pulse: 93   Resp: 18   Temp: 97.7 °F (36.5 °C)   SpO2: 96%         PHYSICIAN EXAM:    General: Appearance: alert, cooperative, no distress.  HEENT: Normocephalic, atraumatic. No scleral icterus. conjunctivae clear. EOMI.  Chest: No tenderness to palpation. No open wound noted.  Lungs: Clear to auscultation bilaterally, Respirations unlabored.  Cardiac: Regular rate and rhythm, +S1and S2  Abdomen: Soft, non-tender, non-distended. Bowel sounds are normal.  Extremities:  No  edema, cyanosis, clubbing.  Skin: Skin color, turgor are normal. No rashes.  Lymphatics: no palpable supra-cervical, axillary, or inguinal adenopathy  Neurologic: Awake, Alert, and oriented, no gross focal deficits noted b/l.       DATA REVIEW:    Pathology Result:    Final Diagnosis   Date Value Ref Range Status   03/18/2022   Final    A -C.  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  -  Maturing trilineage hematopoiesis without significant features of dysplasia or increased myeloblasts.    -  Mildly decreased to adequate stainable storage iron.  -  Mildly increased reticulin fibers without overt fibrosis.  -  Negative for collagen fibrosis, granulomata, vasculitis, necrosis.              Image Results:   Image result are reviewed and documented in Hematology/Oncology history. I personally reviewed these images.    CT abdomen wo contrast  Narrative: CT - ABDOMEN WITHOUT IV CONTRAST    INDICATION: E27.8: Other specified disorders of adrenal gland. Monitor size of adrenal adenoma.    COMPARISON: CT of the abdomen pelvis 5/2/2018 and  MRI of the abdomen with and without contrast dated 1/30/2023.    TECHNIQUE: CT examination of the abdomen was performed without intravenous contrast. Multiplanar 2D reformatted images were created from the source data.    This examination, like all CT scans performed in the [facility], was performed utilizing techniques to minimize radiation dose exposure, including the use of iterative reconstruction and automated exposure control. Radiation dose length   product (DLP) for this visit: 277 mGy-cm    Enteric Contrast: Not administered.    FINDINGS:    LOWER CHEST: Atherosclerotic calcifications of the coronary arteries.    LIVER/BILIARY TREE: Simple hepatic cyst(s) unchanged from previous CT abdomen pelvis 5/2/2018. No suspicious mass.  Normal hepatic contours. No biliary dilation.    GALLBLADDER: No calcified gallstones. No pericholecystic inflammatory change.    SPLEEN: Unremarkable.    PANCREAS: Unremarkable.    ADRENAL GLANDS: Previously characterized left adrenal adenoma measures 1.9 x 1.1 cm, previously measuring 1.9 x 1.1 cm on MRI 1/30/2023.    KIDNEYS/VISUALIZED URETERS: Prominent fat within the left renal pelvis.    STOMACH AND VISUALIZED BOWEL: Small hiatal hernia.    ABDOMINAL CAVITY: No ascites. No pneumoperitoneum. No lymphadenopathy.    VESSELS: Atherosclerosis without abdominal aortic aneurysm.    ABDOMINAL WALL: Unremarkable.    BONES: Transverse area of sclerosis of the L4 vertebrae suggestive of compression deformity of indeterminate age.  Impression: Left adrenal adenoma measures 1.9 x 1.1 cm, unchanged compared to MR from 1/30/2023.    Resident: TALITA WANG I, the attending radiologist, have reviewed the images and agree with the final report above.    Workstation performed: [id number]      LABS:  Lab data are reviewed and documented in HemOnc history.       Lab Results   Component Value Date    HGB 12.9 04/19/2024    HCT 40.1 04/19/2024     (H) 04/19/2024     (H) 04/19/2024    WBC 6.71 04/19/2024    NRBC 0 04/19/2024     Lab Results   Component Value Date     10/12/2017    K 4.3 04/19/2024    CL 98 04/19/2024    CO2 31 04/19/2024    ANIONGAP 10 05/21/2015    BUN 17 04/19/2024    CREATININE 1.10 04/19/2024    GLUCOSE 100 05/21/2015    GLUF 89 04/19/2024    CALCIUM 9.2 04/19/2024    CORRECTEDCA 9.7 05/10/2023    AST 14 04/19/2024    ALT 10 04/19/2024    ALKPHOS 58 04/19/2024    PROT 7.6 06/08/2017    BILITOT 0.4 06/08/2017    EGFR 47 04/19/2024       Lab Results   Component Value Date    IRON 79 10/04/2022    TIBC 244 (L) 10/04/2022    FERRITIN 649 (H) 10/04/2022    FERRITIN 754 (H) 09/07/2022    FERRITIN 731 (H) 06/01/2022    FERRITIN 758 (H) 03/10/2022    FERRITIN 30 02/03/2022    FERRITIN 31  "12/06/2019    FERRITIN 22 02/28/2019       Lab Results   Component Value Date    THPVRKLH65 644 07/01/2022       No results for input(s): \"WBC\", \"CREAT\", \"PLT\" in the last 72 hours.     By:  Hernandez Herrera MD, 5/22/2024, 9:25 AM                                  "

## 2024-05-21 ENCOUNTER — TELEPHONE (OUTPATIENT)
Dept: HEMATOLOGY ONCOLOGY | Facility: CLINIC | Age: 84
End: 2024-05-21

## 2024-05-21 NOTE — TELEPHONE ENCOUNTER
Appointment Confirmation   Who are you speaking with? Patient   If it is not the patient, are they listed on an active communication consent form? N/A   Which provider is the appointment scheduled with?  Dr. Herrera   When is the appointment scheduled?  Please list date and time 5/22/24     At which location is the appointment scheduled to take place? [facility]   Did caller verbalize understanding of appointment details? Yes

## 2024-05-22 ENCOUNTER — OFFICE VISIT (OUTPATIENT)
Dept: HEMATOLOGY ONCOLOGY | Facility: CLINIC | Age: 84
End: 2024-05-22
Payer: MEDICARE

## 2024-05-22 ENCOUNTER — APPOINTMENT (OUTPATIENT)
Dept: LAB | Facility: CLINIC | Age: 84
End: 2024-05-22
Payer: MEDICARE

## 2024-05-22 VITALS
DIASTOLIC BLOOD PRESSURE: 64 MMHG | BODY MASS INDEX: 32.36 KG/M2 | OXYGEN SATURATION: 96 % | HEIGHT: 57 IN | HEART RATE: 93 BPM | RESPIRATION RATE: 18 BRPM | WEIGHT: 150 LBS | SYSTOLIC BLOOD PRESSURE: 110 MMHG | TEMPERATURE: 97.7 F

## 2024-05-22 DIAGNOSIS — D47.3 ESSENTIAL THROMBOCYTOSIS (HCC): Primary | ICD-10-CM

## 2024-05-22 DIAGNOSIS — D50.8 OTHER IRON DEFICIENCY ANEMIA: ICD-10-CM

## 2024-05-22 DIAGNOSIS — Z15.89 JAK2 GENE MUTATION: ICD-10-CM

## 2024-05-22 DIAGNOSIS — E11.22 CONTROLLED TYPE 2 DIABETES MELLITUS WITH STAGE 3 CHRONIC KIDNEY DISEASE, WITHOUT LONG-TERM CURRENT USE OF INSULIN (HCC): ICD-10-CM

## 2024-05-22 DIAGNOSIS — E78.5 HYPERLIPIDEMIA, UNSPECIFIED HYPERLIPIDEMIA TYPE: ICD-10-CM

## 2024-05-22 DIAGNOSIS — N18.30 CONTROLLED TYPE 2 DIABETES MELLITUS WITH STAGE 3 CHRONIC KIDNEY DISEASE, WITHOUT LONG-TERM CURRENT USE OF INSULIN (HCC): ICD-10-CM

## 2024-05-22 DIAGNOSIS — R53.0 NEOPLASTIC (MALIGNANT) RELATED FATIGUE: ICD-10-CM

## 2024-05-22 LAB
ALBUMIN SERPL BCP-MCNC: 4 G/DL (ref 3.5–5)
ALP SERPL-CCNC: 55 U/L (ref 34–104)
ALT SERPL W P-5'-P-CCNC: 14 U/L (ref 7–52)
ANION GAP SERPL CALCULATED.3IONS-SCNC: 8 MMOL/L (ref 4–13)
AST SERPL W P-5'-P-CCNC: 15 U/L (ref 13–39)
BILIRUB SERPL-MCNC: 0.39 MG/DL (ref 0.2–1)
BUN SERPL-MCNC: 20 MG/DL (ref 5–25)
CALCIUM SERPL-MCNC: 9.1 MG/DL (ref 8.4–10.2)
CHLORIDE SERPL-SCNC: 101 MMOL/L (ref 96–108)
CHOLEST SERPL-MCNC: 154 MG/DL
CO2 SERPL-SCNC: 25 MMOL/L (ref 21–32)
CREAT SERPL-MCNC: 1.05 MG/DL (ref 0.6–1.3)
EST. AVERAGE GLUCOSE BLD GHB EST-MCNC: 128 MG/DL
GFR SERPL CREATININE-BSD FRML MDRD: 49 ML/MIN/1.73SQ M
GLUCOSE P FAST SERPL-MCNC: 101 MG/DL (ref 65–99)
HBA1C MFR BLD: 6.1 %
HDLC SERPL-MCNC: 33 MG/DL
LDLC SERPL CALC-MCNC: 84 MG/DL (ref 0–100)
NONHDLC SERPL-MCNC: 121 MG/DL
POTASSIUM SERPL-SCNC: 4.2 MMOL/L (ref 3.5–5.3)
PROT SERPL-MCNC: 7.7 G/DL (ref 6.4–8.4)
SODIUM SERPL-SCNC: 134 MMOL/L (ref 135–147)
TRIGL SERPL-MCNC: 186 MG/DL

## 2024-05-22 PROCEDURE — 99215 OFFICE O/P EST HI 40 MIN: CPT | Performed by: INTERNAL MEDICINE

## 2024-05-22 PROCEDURE — 80053 COMPREHEN METABOLIC PANEL: CPT

## 2024-05-22 PROCEDURE — 36415 COLL VENOUS BLD VENIPUNCTURE: CPT

## 2024-05-22 PROCEDURE — G2211 COMPLEX E/M VISIT ADD ON: HCPCS | Performed by: INTERNAL MEDICINE

## 2024-05-22 PROCEDURE — 83036 HEMOGLOBIN GLYCOSYLATED A1C: CPT

## 2024-05-22 PROCEDURE — 80061 LIPID PANEL: CPT

## 2024-05-22 RX ORDER — HYDROXYUREA 500 MG/1
500 CAPSULE ORAL DAILY
Qty: 90 CAPSULE | Refills: 1 | Status: SHIPPED | OUTPATIENT
Start: 2024-05-22

## 2024-06-21 ENCOUNTER — RA CDI HCC (OUTPATIENT)
Dept: OTHER | Facility: HOSPITAL | Age: 84
End: 2024-06-21

## 2024-06-21 NOTE — PROGRESS NOTES
HCC coding opportunities          Chart Reviewed number of suggestions sent to Provider: 2    E11.51  E11.69 E78.5    Patients Insurance     Medicare Insurance: Medicare

## 2024-06-28 ENCOUNTER — TELEPHONE (OUTPATIENT)
Dept: ADMINISTRATIVE | Facility: OTHER | Age: 84
End: 2024-06-28

## 2024-06-28 ENCOUNTER — OFFICE VISIT (OUTPATIENT)
Dept: FAMILY MEDICINE CLINIC | Facility: CLINIC | Age: 84
End: 2024-06-28
Payer: MEDICARE

## 2024-06-28 ENCOUNTER — CLINICAL SUPPORT (OUTPATIENT)
Dept: RHEUMATOLOGY | Facility: CLINIC | Age: 84
End: 2024-06-28
Payer: MEDICARE

## 2024-06-28 VITALS
HEART RATE: 101 BPM | SYSTOLIC BLOOD PRESSURE: 116 MMHG | OXYGEN SATURATION: 94 % | DIASTOLIC BLOOD PRESSURE: 68 MMHG | HEIGHT: 57 IN | BODY MASS INDEX: 32.01 KG/M2 | TEMPERATURE: 97.7 F | WEIGHT: 148.4 LBS

## 2024-06-28 DIAGNOSIS — E11.22 CONTROLLED TYPE 2 DIABETES MELLITUS WITH STAGE 3 CHRONIC KIDNEY DISEASE, WITHOUT LONG-TERM CURRENT USE OF INSULIN (HCC): ICD-10-CM

## 2024-06-28 DIAGNOSIS — N18.30 CONTROLLED TYPE 2 DIABETES MELLITUS WITH STAGE 3 CHRONIC KIDNEY DISEASE, WITHOUT LONG-TERM CURRENT USE OF INSULIN (HCC): ICD-10-CM

## 2024-06-28 DIAGNOSIS — E78.2 MIXED HYPERLIPIDEMIA: ICD-10-CM

## 2024-06-28 DIAGNOSIS — E03.9 HYPOTHYROIDISM, UNSPECIFIED TYPE: ICD-10-CM

## 2024-06-28 DIAGNOSIS — Z00.00 MEDICARE ANNUAL WELLNESS VISIT, SUBSEQUENT: Primary | ICD-10-CM

## 2024-06-28 DIAGNOSIS — I10 ESSENTIAL HYPERTENSION: ICD-10-CM

## 2024-06-28 DIAGNOSIS — D47.3 ESSENTIAL THROMBOCYTOSIS (HCC): ICD-10-CM

## 2024-06-28 DIAGNOSIS — N18.31 TYPE 2 DIABETES MELLITUS WITH STAGE 3A CHRONIC KIDNEY DISEASE, WITHOUT LONG-TERM CURRENT USE OF INSULIN (HCC): ICD-10-CM

## 2024-06-28 DIAGNOSIS — E11.22 TYPE 2 DIABETES MELLITUS WITH STAGE 3A CHRONIC KIDNEY DISEASE, WITHOUT LONG-TERM CURRENT USE OF INSULIN (HCC): ICD-10-CM

## 2024-06-28 DIAGNOSIS — M81.0 AGE-RELATED OSTEOPOROSIS WITHOUT CURRENT PATHOLOGICAL FRACTURE: Primary | ICD-10-CM

## 2024-06-28 PROCEDURE — 96372 THER/PROPH/DIAG INJ SC/IM: CPT

## 2024-06-28 PROCEDURE — 99214 OFFICE O/P EST MOD 30 MIN: CPT | Performed by: FAMILY MEDICINE

## 2024-06-28 PROCEDURE — G0439 PPPS, SUBSEQ VISIT: HCPCS | Performed by: FAMILY MEDICINE

## 2024-06-28 NOTE — PROGRESS NOTES
Assessment/Plan:    Bhavani Amaral came into the [facility] Rheumatology Office today 06/28/24 to receive Prolia injection.      Verbal consent obtained.  Consent given by: patient    patient states patient has been medically healthy with no underlining concerns/complications.      Bhavani Amaral presents with no symptoms today.       All insturctions were reviewed with the patient.    If the patient should have any questions/concerns, advised patient to contacted [facility].       Subjective:     History provided by: patient    Patient ID: Bhavani Amaral is a 83 y.o. female      Objective:    There were no vitals filed for this visit.    Patient tolerated the injection well without any complications.  Injection site/s left arm.  Medication was provided by providers stock.    Patient signed consent form no   Patient signed ABN form no (If no patient is not a medicare patient).   Patient waited 15 minutes after injection yes (This only applies to patient's receiving first time injection).       Last Visit: 5/17/2024  Next visit:Visit date not found

## 2024-06-28 NOTE — LETTER
Diabetic Eye Exam Form    Date Requested: 24  Patient: Bhavani Amaral  Patient : 1940   Referring Provider: Kiley Gregory MD      DIABETIC Eye Exam Date _______________________________      Type of Exam MUST be documented for Diabetic Eye Exams. Please CHECK ONE.     Retinal Exam       Dilated Retinal Exam       OCT       Optomap-Iris Exam      Fundus Photography       Left Eye - Please check Retinopathy or No Retinopathy        Exam did show retinopathy    Exam did not show retinopathy       Right Eye - Please check Retinopathy or No Retinopathy       Exam did show retinopathy    Exam did not show retinopathy       Comments __________________________________________________________    Practice Providing Exam ______________________________________________    Exam Performed By (print name) _______________________________________      Provider Signature ___________________________________________________      These reports are needed for  compliance.  Please fax this completed form and a copy of the Diabetic Eye Exam report to our office located at [address] [place] [address] as soon as possible via Fax [phone] attention Kerissa: Phone [phone]  We thank you for your assistance in treating our mutual patient.

## 2024-06-28 NOTE — PROGRESS NOTES
Ambulatory Visit  Name: Bhavani Amaral      : 1940      MRN: [id number]  Encounter Provider: Kiley Gregory MD  Encounter Date: 2024   Encounter department: [facility]    Assessment & Plan   1. Type 2 diabetes mellitus with stage 3a chronic kidney disease, without long-term current use of insulin (HCC)  2. Controlled type 2 diabetes mellitus with stage 3 chronic kidney disease, without long-term current use of insulin (HCC)       Preventive health issues were discussed with patient, and age appropriate screening tests were ordered as noted in patient's After Visit Summary. Personalized health advice and appropriate referrals for health education or preventive services given if needed, as noted in patient's After Visit Summary.    History of Present Illness     Patient presents with:  Follow-up: 4 month fu patient continues with hydroxyurea for thrombocytosis from the hematologist is tolerating that well last lab was good patient feels well no chest pain no shortness of breath no headaches sugar has been well controlled her last LDL was 84 on her cholesterol medicine so that stable  Medicare Wellness Visit  Diabetes: Caregap sent for eye exam          Patient Care Team:  Kiley Gregory MD as PCP - General (Family Medicine)  Yann Lujan DO as PCP - Endocrinology (Endocrinology)  MD Kalpana Pfeiffer MD Jennifer Anne Banzhof, DO Ravi Agrawal, MD (Nephrology)  Sandoval Ramires PA-C as Physician Assistant (Endocrinology)  Hernandez Herrera MD (Hematology and Oncology)    Review of Systems   Constitutional:  Negative for activity change, appetite change and fatigue.   Respiratory:  Negative for shortness of breath.    Cardiovascular:  Negative for chest pain.   Musculoskeletal:  Negative for arthralgias.   Neurological:  Negative for dizziness, light-headedness and headaches.   Hematological:  Negative for adenopathy.     Medical History Reviewed by provider  this encounter:       Annual Wellness Visit Questionnaire   Bhavani is here for her Subsequent Wellness visit.     Health Risk Assessment:   Patient rates overall health as very good. Patient feels that their physical health rating is same. Patient is very satisfied with their life. Eyesight was rated as same. Hearing was rated as same. Patient feels that their emotional and mental health rating is same. Patients states they are never, rarely angry. Patient states they are sometimes unusually tired/fatigued. Pain experienced in the last 7 days has been none. Patient states that she has experienced no weight loss or gain in last 6 months.     Depression Screening:   PHQ-2 Score: 0      Fall Risk Screening:   In the past year, patient has experienced: no history of falling in past year      Urinary Incontinence Screening:   Patient has not leaked urine accidently in the last six months.     Home Safety:  Patient does not have trouble with stairs inside or outside of their home. Patient has working smoke alarms and has working carbon monoxide detector. Home safety hazards include: none.     Nutrition:   Current diet is Regular.     Medications:   Patient is currently taking over-the-counter supplements. OTC medications include: see medication list. Patient is able to manage medications.     Activities of Daily Living (ADLs)/Instrumental Activities of Daily Living (IADLs):   Walk and transfer into and out of bed and chair?: Yes  Dress and groom yourself?: Yes    Bathe or shower yourself?: Yes    Feed yourself? Yes  Do your laundry/housekeeping?: Yes  Manage your money, pay your bills and track your expenses?: Yes  Make your own meals?: Yes    Do your own shopping?: Yes    Previous Hospitalizations:   Any hospitalizations or ED visits within the last 12 months?: No      Advance Care Planning:   Living will: Yes    Durable POA for healthcare: Yes    Advanced directive: Yes      Comments:  Tano is POA    Cognitive  "Screening:   Provider or family/friend/caregiver concerned regarding cognition?: No    PREVENTIVE SCREENINGS      Cardiovascular Screening:    General: History Lipid Disorder and Screening Current      Diabetes Screening:     General: History Diabetes and Screening Current      Colorectal Cancer Screening:     General: Screening Current      Breast Cancer Screening:     General: Screening Current      Cervical Cancer Screening:    General: Screening Not Indicated      Osteoporosis Screening:    General: History Osteoporosis and Screening Current      Abdominal Aortic Aneurysm (AAA) Screening:        General: Screening Not Indicated      Lung Cancer Screening:     General: Screening Not Indicated      Preventive Screening Comments: Do rec rsv vaccine    Screening, Brief Intervention, and Referral to Treatment (SBIRT)    Screening    Typical number of drinks in a week: 0    Single Item Drug Screening:  How often have you used an illegal drug (including marijuana) or a prescription medication for non-medical reasons in the past year? never    Single Item Drug Screen Score: 0  Interpretation: Negative screen for possible drug use disorder    Social Determinants of Health     Financial Resource Strain: Low Risk  (6/21/2023)    Overall Financial Resource Strain (CARDIA)     Difficulty of Paying Living Expenses: Not hard at all   Transportation Needs: No Transportation Needs (6/21/2023)    PRAPARE - Transportation     Lack of Transportation (Medical): No     Lack of Transportation (Non-Medical): No     No results found.    Objective     /68 (BP Location: Right arm, Patient Position: Sitting, Cuff Size: Standard)   Pulse 101   Temp 97.7 °F (36.5 °C) (Tympanic)   Ht 4' 9\" (1.448 m)   Wt 67.3 kg (148 lb 6.4 oz)   SpO2 94%   BMI 32.11 kg/m²     Physical Exam  Vitals reviewed.   Constitutional:       Appearance: Normal appearance. She is obese.   Neck:      Vascular: No carotid bruit.   Cardiovascular:      Rate and " Rhythm: Normal rate and regular rhythm.      Pulses: Normal pulses.      Heart sounds: Normal heart sounds.   Pulmonary:      Effort: Pulmonary effort is normal.      Breath sounds: Normal breath sounds.   Musculoskeletal:      Right lower leg: No edema.      Left lower leg: No edema.   Lymphadenopathy:      Cervical: No cervical adenopathy.   Neurological:      Mental Status: She is alert.   Psychiatric:         Mood and Affect: Mood normal.       Administrative Statements

## 2024-06-28 NOTE — TELEPHONE ENCOUNTER
----- Message from Cecy ESCAMILAL sent at 6/28/2024 10:42 AM EDT -----  06/28/24 10:42 AM    Hello, our patient Bhavani Amaral has had Diabetic Eye Exam completed/performed. Please assist in updating the patient chart by making an External outreach to Dr. Navarrete facility located in [address]. The date of service is 9/2023.    Thank you,  Cecy Johnson [facility]

## 2024-07-01 NOTE — TELEPHONE ENCOUNTER
Upon review of the In Basket request and the patient's chart, initial outreach has been made via fax to facility. Please see Contacts section for details.     Thank you  ATMIKA MARS

## 2024-07-19 ENCOUNTER — APPOINTMENT (OUTPATIENT)
Dept: LAB | Facility: CLINIC | Age: 84
End: 2024-07-19
Payer: MEDICARE

## 2024-07-19 DIAGNOSIS — D50.9 IRON DEFICIENCY ANEMIA, UNSPECIFIED IRON DEFICIENCY ANEMIA TYPE: ICD-10-CM

## 2024-07-19 DIAGNOSIS — Z15.89 JAK2 GENE MUTATION: ICD-10-CM

## 2024-07-19 DIAGNOSIS — E87.1 HYPONATREMIA: ICD-10-CM

## 2024-07-19 DIAGNOSIS — N18.30 BENIGN HYPERTENSION WITH CHRONIC KIDNEY DISEASE, STAGE III (HCC): ICD-10-CM

## 2024-07-19 DIAGNOSIS — Z79.64 ON HYDROXYUREA THERAPY: ICD-10-CM

## 2024-07-19 DIAGNOSIS — N18.30 CONTROLLED TYPE 2 DIABETES MELLITUS WITH STAGE 3 CHRONIC KIDNEY DISEASE, WITHOUT LONG-TERM CURRENT USE OF INSULIN (HCC): ICD-10-CM

## 2024-07-19 DIAGNOSIS — N18.32 STAGE 3B CHRONIC KIDNEY DISEASE (HCC): ICD-10-CM

## 2024-07-19 DIAGNOSIS — I12.9 BENIGN HYPERTENSION WITH CHRONIC KIDNEY DISEASE, STAGE III (HCC): ICD-10-CM

## 2024-07-19 DIAGNOSIS — D47.3 ESSENTIAL THROMBOCYTOSIS (HCC): ICD-10-CM

## 2024-07-19 DIAGNOSIS — E11.22 CONTROLLED TYPE 2 DIABETES MELLITUS WITH STAGE 3 CHRONIC KIDNEY DISEASE, WITHOUT LONG-TERM CURRENT USE OF INSULIN (HCC): ICD-10-CM

## 2024-07-19 LAB
ALBUMIN SERPL BCG-MCNC: 3.9 G/DL (ref 3.5–5)
ALP SERPL-CCNC: 63 U/L (ref 34–104)
ALT SERPL W P-5'-P-CCNC: 12 U/L (ref 7–52)
ANION GAP SERPL CALCULATED.3IONS-SCNC: 12 MMOL/L (ref 4–13)
AST SERPL W P-5'-P-CCNC: 15 U/L (ref 13–39)
BASOPHILS # BLD AUTO: 0.15 THOUSANDS/ÂΜL (ref 0–0.1)
BASOPHILS NFR BLD AUTO: 2 % (ref 0–1)
BILIRUB SERPL-MCNC: 0.36 MG/DL (ref 0.2–1)
BUN SERPL-MCNC: 18 MG/DL (ref 5–25)
CALCIUM SERPL-MCNC: 9.4 MG/DL (ref 8.4–10.2)
CHLORIDE SERPL-SCNC: 100 MMOL/L (ref 96–108)
CO2 SERPL-SCNC: 23 MMOL/L (ref 21–32)
CREAT SERPL-MCNC: 1.09 MG/DL (ref 0.6–1.3)
CREAT UR-MCNC: 60.2 MG/DL
EOSINOPHIL # BLD AUTO: 0.25 THOUSAND/ÂΜL (ref 0–0.61)
EOSINOPHIL NFR BLD AUTO: 3 % (ref 0–6)
ERYTHROCYTE [DISTWIDTH] IN BLOOD BY AUTOMATED COUNT: 15.1 % (ref 11.6–15.1)
GFR SERPL CREATININE-BSD FRML MDRD: 47 ML/MIN/1.73SQ M
GLUCOSE P FAST SERPL-MCNC: 84 MG/DL (ref 65–99)
HCT VFR BLD AUTO: 41.6 % (ref 34.8–46.1)
HGB BLD-MCNC: 13.5 G/DL (ref 11.5–15.4)
IMM GRANULOCYTES # BLD AUTO: 0.03 THOUSAND/UL (ref 0–0.2)
IMM GRANULOCYTES NFR BLD AUTO: 0 % (ref 0–2)
LYMPHOCYTES # BLD AUTO: 2.84 THOUSANDS/ÂΜL (ref 0.6–4.47)
LYMPHOCYTES NFR BLD AUTO: 35 % (ref 14–44)
MCH RBC QN AUTO: 33.8 PG (ref 26.8–34.3)
MCHC RBC AUTO-ENTMCNC: 32.5 G/DL (ref 31.4–37.4)
MCV RBC AUTO: 104 FL (ref 82–98)
MONOCYTES # BLD AUTO: 0.7 THOUSAND/ÂΜL (ref 0.17–1.22)
MONOCYTES NFR BLD AUTO: 9 % (ref 4–12)
NEUTROPHILS # BLD AUTO: 4.26 THOUSANDS/ÂΜL (ref 1.85–7.62)
NEUTS SEG NFR BLD AUTO: 51 % (ref 43–75)
NRBC BLD AUTO-RTO: 0 /100 WBCS
PHOSPHATE SERPL-MCNC: 3.9 MG/DL (ref 2.3–4.1)
PLATELET # BLD AUTO: 501 THOUSANDS/UL (ref 149–390)
PMV BLD AUTO: 9.2 FL (ref 8.9–12.7)
POTASSIUM SERPL-SCNC: 4.4 MMOL/L (ref 3.5–5.3)
PROT SERPL-MCNC: 8.1 G/DL (ref 6.4–8.4)
PROT UR-MCNC: <4 MG/DL
PTH-INTACT SERPL-MCNC: 33.9 PG/ML (ref 12–88)
RBC # BLD AUTO: 4 MILLION/UL (ref 3.81–5.12)
SODIUM SERPL-SCNC: 135 MMOL/L (ref 135–147)
WBC # BLD AUTO: 8.23 THOUSAND/UL (ref 4.31–10.16)

## 2024-07-19 PROCEDURE — 80053 COMPREHEN METABOLIC PANEL: CPT

## 2024-07-19 PROCEDURE — 84156 ASSAY OF PROTEIN URINE: CPT

## 2024-07-19 PROCEDURE — 36415 COLL VENOUS BLD VENIPUNCTURE: CPT

## 2024-07-19 PROCEDURE — 84100 ASSAY OF PHOSPHORUS: CPT

## 2024-07-19 PROCEDURE — 85025 COMPLETE CBC W/AUTO DIFF WBC: CPT

## 2024-07-19 PROCEDURE — 83970 ASSAY OF PARATHORMONE: CPT

## 2024-07-19 PROCEDURE — 82570 ASSAY OF URINE CREATININE: CPT

## 2024-07-22 ENCOUNTER — OFFICE VISIT (OUTPATIENT)
Dept: NEPHROLOGY | Facility: CLINIC | Age: 84
End: 2024-07-22
Payer: MEDICARE

## 2024-07-22 VITALS
HEIGHT: 57 IN | DIASTOLIC BLOOD PRESSURE: 82 MMHG | BODY MASS INDEX: 32.36 KG/M2 | WEIGHT: 150 LBS | SYSTOLIC BLOOD PRESSURE: 124 MMHG

## 2024-07-22 DIAGNOSIS — I12.9 BENIGN HYPERTENSION WITH CHRONIC KIDNEY DISEASE, STAGE III (HCC): ICD-10-CM

## 2024-07-22 DIAGNOSIS — N18.30 BENIGN HYPERTENSION WITH CHRONIC KIDNEY DISEASE, STAGE III (HCC): ICD-10-CM

## 2024-07-22 DIAGNOSIS — N18.30 CONTROLLED TYPE 2 DIABETES MELLITUS WITH STAGE 3 CHRONIC KIDNEY DISEASE, WITHOUT LONG-TERM CURRENT USE OF INSULIN (HCC): ICD-10-CM

## 2024-07-22 DIAGNOSIS — E87.1 HYPONATREMIA: ICD-10-CM

## 2024-07-22 DIAGNOSIS — E11.22 CONTROLLED TYPE 2 DIABETES MELLITUS WITH STAGE 3 CHRONIC KIDNEY DISEASE, WITHOUT LONG-TERM CURRENT USE OF INSULIN (HCC): ICD-10-CM

## 2024-07-22 DIAGNOSIS — D50.9 IRON DEFICIENCY ANEMIA, UNSPECIFIED IRON DEFICIENCY ANEMIA TYPE: ICD-10-CM

## 2024-07-22 DIAGNOSIS — N18.31 CKD STAGE 3A, GFR 45-59 ML/MIN (HCC): Primary | ICD-10-CM

## 2024-07-22 PROCEDURE — 99214 OFFICE O/P EST MOD 30 MIN: CPT | Performed by: INTERNAL MEDICINE

## 2024-07-22 NOTE — PROGRESS NOTES
NEPHROLOGY OUTPATIENT PROGRESS NOTE   Bhavani Amaral 83 y.o. female MRN: [id number]  DATE: 7/22/2024  Reason for visit:   Chief Complaint   Patient presents with    Follow-up    Chronic Kidney Disease       ASSESSMENT and PLAN:  Chronic kidney disease stage IIIa  - baseline creatinine 1.0-1.2.    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  Renal function is stable, continue oral hydration continue to avoid nephrotoxins.  PTH at normal range  33.9 and phosphorus at normal range, no proteinuria. Egfr >45- would call it ckd stage 3a  -Continue monitoring renal function.  Repeat labs in 6 months     Primary HTN chronic kidney disease stage 3  -BP stable and at goal. continue lisinopril 2.5 mg daily  -continue 2 g sodium diet and home monitoring of blood pressure.          Hyponatremia   -not on medication which could cause hyponatremia, no history of malignancy.  Last colonoscopy in 2017 did not suggest any malignancy  -workup showed normal serum osmolality.  Urine sodium 99 and urine osmolality 422 in December 2021  -Last sodium improved to 135 meq/L.Continue  fluid restriction 60  ounces per day.     Anemia, iron deficiency anemia   - hemoglobin 13.5 g/dl.   -Patient prefers not to be on iron tablet due to possibility of constipation.  She does take oral vitamin with iron supplementation in it.       Diabetes mellitus type 2 with ckd stage 3, currently she is off all medication.  She is on diet control. A1c  6.2      Hyperlipidemia:  Lipid panel -LDL at goal at 84 , triglyceride elevated at 186  -Currently on Zetia and fish oil.   -Goal LDL should be less than 100 for CKD patients and LDL currently at goal.   -Continue same treatment per PCP.     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   -hormone work-up was done by endocrine  - CT from feb 2024- ADRENAL GLANDS: Previously characterized left adrenal adenoma measures 1.9 x 1.1 cm, previously measuring 1.9 x 1.1 cm on MRI 1/30/2023.  -Continue monitoring per PCP and endocrine      Health maintenance, last colonoscopy in  October 2022 and was recommended for colonoscopy in 5 years. Polyp removed         Patient Instructions   -Renal Function is stable   -You have Chronic Kidney Disease Stage 3a   -Avoid NSAIDs like Ibuprofen/Motrin, Naproxen/Aleve, Celebrex, meloxicam/Mobic, Diclofenac and other NSAIDs.  -Okay to take Acetaminophen/Tylenol if you do not have any liver problems  -Avoid IV contrast used for CT scan and cardiac catheterization.    -If plan for any study with IV contrast, please let me know so we could hydrate with fluids before and after IV contrast  -Dosage  of certain medications may need to be adjusted depending on Kidney function.     BP stable     Follow up: 6  months with repeat Lab work within a week of the scheduled office visit. Will discuss the results of the previsit Labs during the office visit.    Diagnoses and all orders for this visit:    CKD stage 3a, GFR 45-59 ml/min ([facility])  -     Basic metabolic panel; Future  -     Protein / creatinine ratio, urine; Future  -     Urinalysis with microscopic; Future  -     Phosphorus; Future  -     CBC; Future    Benign hypertension with chronic kidney disease, stage III ([facility])  -     Basic metabolic panel; Future    Hyponatremia  -     Basic metabolic panel; Future    Iron deficiency anemia, unspecified iron deficiency anemia type  -     CBC; Future    Controlled type 2  diabetes mellitus with stage 3 chronic kidney disease, without long-term current use of insulin ([facility])  -     Basic metabolic panel; Future  -     Protein / creatinine ratio, urine; Future                SUBJECTIVE / HPI:  Bhavani Amaral is a 83 y.o.  female with medical issues of Type 2 diabetes mellitus- on diet control, GERD who presents for follow-up of chronic kidney disease. Was taking aleeve in the past but now off it.   She used to be on lisinopril- hctz in may but that was stopped in May 2018 due to ROBERTA with elevated cr to 1.3. Stopped simvastatin since nov 15th. Was on it for two years.   Was taking Aleve in the past but now off it.     Patient has creatinine of 1.0-1.1 since 2016. Creatinine increased to 1.32 in May 2018  but improved  With stopping lisinopril hctz to creatinine 1.1  , recently renal function has been stable at creatinine 1.0-1.2    Reviewed blood work from July 19, 2024: Renal function stable at creatinine 1.09 mg/dL with electrolytes stable, EGFR 47.  Sodium improved to normal range at 135.  Hemoglobin 13.5.  PTH 33.9.  No proteinuria     Denies any new complaints  REVIEW OF SYSTEMS:    Review of Systems   Constitutional:  Negative for activity change, appetite change, chills, diaphoresis, fatigue and fever.   HENT:  Negative for congestion, facial swelling and nosebleeds.    Eyes:  Negative for pain and visual disturbance.   Respiratory:  Negative for cough, chest tightness and shortness of breath.    Cardiovascular:  Negative for chest pain and palpitations.   Gastrointestinal:  Negative for abdominal distention, abdominal pain, diarrhea, nausea and vomiting.   Genitourinary:  Negative for difficulty urinating, dysuria, flank pain, frequency and hematuria.   Musculoskeletal:  Negative for arthralgias, back pain and joint swelling.   Skin:  Negative for rash.   Neurological:  Negative for dizziness, seizures, syncope, weakness and headaches.   Psychiatric/Behavioral:  Negative for  agitation and confusion. The patient is not nervous/anxious.        More than 10 point review of systems were obtained and discussed in length with the patient. Complete review of systems were negative / unremarkable except mentioned above.       PAST MEDICAL HISTORY:  Past Medical History:   Diagnosis Date    CKD (chronic kidney disease)     Diabetes mellitus (HCC)     does not take medications, does not monitor blood sugars, pre-diabetic    Disease of thyroid gland     Diverticulosis     GERD (gastroesophageal reflux disease)     Hypertension     Hyponatremia     Ovarian cyst     Rectal bleeding     Wears reading eyeglasses        PAST SURGICAL HISTORY:  Past Surgical History:   Procedure Laterality Date    BREAST BIOPSY Right 05/13/2009    Ultrasound Bx. Benign    BREAST SURGERY      CATARACT EXTRACTION Bilateral     COLONOSCOPY      DENTAL SURGERY  10/09/2023    ESOPHAGOGASTRODUODENOSCOPY      IR BIOPSY BONE MARROW  03/18/2022    OOPHORECTOMY Bilateral 2015    CO CMBND ANTERPOST COLPORRAPHY W/CYSTO N/A 06/26/2018    Procedure: ANTERIOR & POSTERIOR COLPORRHAPHY;  Surgeon: Babatunde Quinteros MD;  Location: [facility];  Service: UroGynecology           CO COLPOPEXY VAGINAL EXTRAPERITONEAL APPROACH N/A 06/26/2018    Procedure: ANTERIOR COLPOPEXY VAGINAL EXTRAPERITONEAL (VEC);  Surgeon: Babatunde Quinteros MD;  [facility];  Service: UroGynecology           CO CYSTOURETHROSCOPY N/A 06/26/2018    Procedure: CYSTOSCOPY;  Surgeon: Babatunde Quinteros MD;  [facility];  Service: UroGynecology           CO SLING OPERATION STRESS INCONTINENCE N/A 06/26/2018    Procedure: INSERTION PUBOVAGINAL SLING (FEMALE);  Surgeon: Babatunde Quinteros MD;  Location[facility];  Service: UroGynecology           TONSILLECTOMY      US GUIDED MSK PROCEDURE  11/25/2020       SOCIAL HISTORY:  Social History     Substance and Sexual Activity   Alcohol Use No     Social History     Substance and Sexual Activity   Drug Use No     Social  History     Tobacco Use   Smoking Status Never    Passive exposure: Never   Smokeless Tobacco Never       FAMILY HISTORY:  Family History   Problem Relation Age of Onset    Cervical cancer Mother 68    Breast cancer Maternal Grandmother         Under 50    Mental illness Son     Schizophrenia Son     Cancer Family     No Known Problems Father     No Known Problems Maternal Aunt     No Known Problems Maternal Aunt     Brain cancer Maternal Aunt         age unknown    Cancer Cousin        MEDICATIONS:    Current Outpatient Medications:     aspirin 81 MG tablet, Take 81 mg by mouth daily  , Disp: , Rfl:     Calcium Carbonate-Vitamin D 600-200 MG-UNIT TABS, Take 1 tablet by mouth every other day  , Disp: , Rfl:     chlorhexidine (PERIDEX) 0.12 % solution, , Disp: , Rfl:     ezetimibe (ZETIA) 10 mg tablet, Take 1 tablet (10 mg total) by mouth daily, Disp: 90 tablet, Rfl: 1    famotidine (PEPCID) 40 MG tablet, Take 1 tablet (40 mg total) by mouth daily at bedtime, Disp: 90 tablet, Rfl: 1    folic acid (FOLVITE) 1 mg tablet, Take 1 tablet (1 mg total) by mouth daily, Disp: 90 tablet, Rfl: 2    Glucosamine-Chondroitin 250-200 MG TABS, Take 1 tablet by mouth daily  , Disp: , Rfl:     hydroxyurea (HYDREA) 500 mg capsule, Take 1 capsule (500 mg total) by mouth daily, Disp: 90 capsule, Rfl: 1    levothyroxine 50 mcg tablet, Take 1 tablet (50 mcg total) by mouth daily, Disp: 90 tablet, Rfl: 1    lisinopril (ZESTRIL) 2.5 mg tablet, Take 1 tablet (2.5 mg total) by mouth daily, Disp: 90 tablet, Rfl: 3    Multiple Vitamins-Iron (DAILY MULTIPLE VITAMIN/IRON) TABS, Take 1 tablet by mouth daily  , Disp: , Rfl:     Omega-3 Fatty Acids ([employer] FISH OIL) 1200 MG CAPS, Take 1 capsule by mouth 2 (two) times a day, Disp: , Rfl:     Polyethylene Glycol 3350 (MIRALAX MIX-IN PAX PO), Take by mouth, Disp: , Rfl:     Current Facility-Administered Medications:     denosumab (PROLIA) subcutaneous injection 60 mg, 60 mg, Subcutaneous, Q6 Months, , 60  "mg at 06/28/24 1058      PHYSICAL EXAM:  Vitals:    07/22/24 1132   BP: 124/82   BP Location: Left arm   Patient Position: Sitting   Cuff Size: Standard   Weight: 68 kg (150 lb)   Height: 4' 9\" (1.448 m)     Body mass index is 32.46 kg/m².    Physical Exam  Constitutional:       General: She is not in acute distress.     Appearance: Normal appearance. She is well-developed.   HENT:      Head: Normocephalic and atraumatic.      Nose: Nose normal.      Mouth/Throat:      Mouth: Mucous membranes are moist.   Eyes:      General: No scleral icterus.     Conjunctiva/sclera: Conjunctivae normal.      Pupils: Pupils are equal, round, and reactive to light.   Neck:      Thyroid: No thyromegaly.      Vascular: No JVD.   Cardiovascular:      Rate and Rhythm: Normal rate and regular rhythm.      Heart sounds: Normal heart sounds. No murmur heard.     No friction rub.   Pulmonary:      Effort: Pulmonary effort is normal. No respiratory distress.      Breath sounds: Normal breath sounds. No wheezing or rales.   Abdominal:      General: Bowel sounds are normal. There is no distension.      Palpations: Abdomen is soft.      Tenderness: There is no abdominal tenderness.   Musculoskeletal:         General: No deformity.      Cervical back: Neck supple.      Right lower leg: No edema.      Left lower leg: No edema.   Skin:     General: Skin is warm and dry.      Findings: No rash.   Neurological:      Mental Status: She is alert and oriented to person, place, and time.   Psychiatric:         Mood and Affect: Mood normal.         Behavior: Behavior normal.         Thought Content: Thought content normal.         Lab Results:   Results for orders placed or performed in visit on 07/19/24   Protein / creatinine ratio, urine   Result Value Ref Range    Creatinine, Ur 60.2 Reference range not established. mg/dL    Protein Urine Random <4 Reference range not established. mg/dL    Prot/Creat Ratio, Ur     PTH, intact   Result Value Ref Range "    PTH 33.9 12.0 - 88.0 pg/mL   Phosphorus   Result Value Ref Range    Phosphorus 3.9 2.3 - 4.1 mg/dL   CBC and differential   Result Value Ref Range    WBC 8.23 4.31 - 10.16 Thousand/uL    RBC 4.00 3.81 - 5.12 Million/uL    Hemoglobin 13.5 11.5 - 15.4 g/dL    Hematocrit 41.6 34.8 - 46.1 %     (H) 82 - 98 fL    MCH 33.8 26.8 - 34.3 pg    MCHC 32.5 31.4 - 37.4 g/dL    RDW 15.1 11.6 - 15.1 %    MPV 9.2 8.9 - 12.7 fL    Platelets 501 (H) 149 - 390 Thousands/uL    nRBC 0 /100 WBCs    Segmented % 51 43 - 75 %    Immature Grans % 0 0 - 2 %    Lymphocytes % 35 14 - 44 %    Monocytes % 9 4 - 12 %    Eosinophils Relative 3 0 - 6 %    Basophils Relative 2 (H) 0 - 1 %    Absolute Neutrophils 4.26 1.85 - 7.62 Thousands/µL    Absolute Immature Grans 0.03 0.00 - 0.20 Thousand/uL    Absolute Lymphocytes 2.84 0.60 - 4.47 Thousands/µL    Absolute Monocytes 0.70 0.17 - 1.22 Thousand/µL    Eosinophils Absolute 0.25 0.00 - 0.61 Thousand/µL    Basophils Absolute 0.15 (H) 0.00 - 0.10 Thousands/µL   Comprehensive metabolic panel   Result Value Ref Range    Sodium 135 135 - 147 mmol/L    Potassium 4.4 3.5 - 5.3 mmol/L    Chloride 100 96 - 108 mmol/L    CO2 23 21 - 32 mmol/L    ANION GAP 12 4 - 13 mmol/L    BUN 18 5 - 25 mg/dL    Creatinine 1.09 0.60 - 1.30 mg/dL    Glucose, Fasting 84 65 - 99 mg/dL    Calcium 9.4 8.4 - 10.2 mg/dL    AST 15 13 - 39 U/L    ALT 12 7 - 52 U/L    Alkaline Phosphatase 63 34 - 104 U/L    Total Protein 8.1 6.4 - 8.4 g/dL    Albumin 3.9 3.5 - 5.0 g/dL    Total Bilirubin 0.36 0.20 - 1.00 mg/dL    eGFR 47 ml/min/1.73sq m

## 2024-07-22 NOTE — PATIENT INSTRUCTIONS
-Renal Function is stable   -You have Chronic Kidney Disease Stage 3a   -Avoid NSAIDs like Ibuprofen/Motrin, Naproxen/Aleve, Celebrex, meloxicam/Mobic, Diclofenac and other NSAIDs.  -Okay to take Acetaminophen/Tylenol if you do not have any liver problems  -Avoid IV contrast used for CT scan and cardiac catheterization.    -If plan for any study with IV contrast, please let me know so we could hydrate with fluids before and after IV contrast  -Dosage  of certain medications may need to be adjusted depending on Kidney function.     BP stable     Follow up: 6  months with repeat Lab work within a week of the scheduled office visit. Will discuss the results of the previsit Labs during the office visit.

## 2024-08-19 DIAGNOSIS — E78.2 MIXED HYPERLIPIDEMIA: ICD-10-CM

## 2024-08-19 RX ORDER — EZETIMIBE 10 MG/1
10 TABLET ORAL DAILY
Qty: 90 TABLET | Refills: 1 | Status: SHIPPED | OUTPATIENT
Start: 2024-08-19

## 2024-09-12 LAB
LEFT EYE DIABETIC RETINOPATHY: NORMAL
RIGHT EYE DIABETIC RETINOPATHY: NORMAL

## 2024-10-03 DIAGNOSIS — Z15.89 JAK2 GENE MUTATION: ICD-10-CM

## 2024-10-03 DIAGNOSIS — D47.3 ESSENTIAL THROMBOCYTOSIS (HCC): ICD-10-CM

## 2024-10-03 DIAGNOSIS — E03.9 HYPOTHYROIDISM, UNSPECIFIED TYPE: ICD-10-CM

## 2024-10-03 RX ORDER — HYDROXYUREA 500 MG/1
500 CAPSULE ORAL DAILY
Qty: 90 CAPSULE | Refills: 2 | Status: SHIPPED | OUTPATIENT
Start: 2024-10-03

## 2024-10-03 RX ORDER — LEVOTHYROXINE SODIUM 50 UG/1
50 TABLET ORAL DAILY
Qty: 90 TABLET | Refills: 0 | Status: SHIPPED | OUTPATIENT
Start: 2024-10-03 | End: 2025-04-01

## 2024-10-03 NOTE — TELEPHONE ENCOUNTER
Reason for call:   [x] Refill   [] Prior Auth  [] Other:     Office:   [x] PCP/Provider -   Ordering Department:  PRIMARY CARE [facility] POD  Authorized By: Kiley Gregory MD  [] Specialty/Provider -     Medication: levothyroxine 50 mcg tablet     Dose/Frequency: Take 1 tablet (50 mcg total) by mouth daily     Quantity: 90    Pharmacy: [address] [phone]    Does the patient have enough for 3 days?   [x] Yes   [] No - Send as HP to POD

## 2024-10-03 NOTE — TELEPHONE ENCOUNTER
Reason for call:   [x] Refill   [] Prior Auth  [] Other:     Office:   [] PCP/Provider -   [x] [facility] [place]  Authorized By: Hernandez Herrera MD    Medication: hydroxyurea (HYDREA) 500 mg capsule     Dose/Frequency:  Take 1 capsule (500 mg total) by mouth daily,     Quantity:  90 capsule     Pharmacy: [address]     Does the patient have enough for 3 days?   [x] Yes   [] No - Send as HP to POD

## 2024-10-24 ENCOUNTER — APPOINTMENT (OUTPATIENT)
Dept: LAB | Facility: CLINIC | Age: 84
End: 2024-10-24
Payer: MEDICARE

## 2024-10-24 DIAGNOSIS — D47.3 ESSENTIAL THROMBOCYTOSIS (HCC): ICD-10-CM

## 2024-10-24 DIAGNOSIS — E11.22 TYPE 2 DIABETES MELLITUS WITH STAGE 3A CHRONIC KIDNEY DISEASE, WITHOUT LONG-TERM CURRENT USE OF INSULIN (HCC): ICD-10-CM

## 2024-10-24 DIAGNOSIS — Z79.64 ON HYDROXYUREA THERAPY: ICD-10-CM

## 2024-10-24 DIAGNOSIS — Z15.89 JAK2 GENE MUTATION: ICD-10-CM

## 2024-10-24 DIAGNOSIS — N18.31 TYPE 2 DIABETES MELLITUS WITH STAGE 3A CHRONIC KIDNEY DISEASE, WITHOUT LONG-TERM CURRENT USE OF INSULIN (HCC): ICD-10-CM

## 2024-10-24 DIAGNOSIS — E78.2 MIXED HYPERLIPIDEMIA: ICD-10-CM

## 2024-10-24 DIAGNOSIS — I10 ESSENTIAL HYPERTENSION: ICD-10-CM

## 2024-10-24 DIAGNOSIS — E03.9 HYPOTHYROIDISM, UNSPECIFIED TYPE: ICD-10-CM

## 2024-10-24 LAB
CHOLEST SERPL-MCNC: 152 MG/DL
EST. AVERAGE GLUCOSE BLD GHB EST-MCNC: 134 MG/DL
HBA1C MFR BLD: 6.3 %
HDLC SERPL-MCNC: 30 MG/DL
LDLC SERPL CALC-MCNC: 73 MG/DL (ref 0–100)
NONHDLC SERPL-MCNC: 122 MG/DL
TRIGL SERPL-MCNC: 244 MG/DL
TSH SERPL DL<=0.05 MIU/L-ACNC: 4.38 UIU/ML (ref 0.45–4.5)

## 2024-10-24 PROCEDURE — 80061 LIPID PANEL: CPT

## 2024-10-24 PROCEDURE — 84443 ASSAY THYROID STIM HORMONE: CPT

## 2024-10-24 PROCEDURE — 83036 HEMOGLOBIN GLYCOSYLATED A1C: CPT

## 2024-10-29 ENCOUNTER — TELEPHONE (OUTPATIENT)
Age: 84
End: 2024-10-29

## 2024-10-29 NOTE — TELEPHONE ENCOUNTER
Patient called requesting RSV vaccines for the following reasons: as recommended by Dr. Gregory.     No order in system. Please place order and schedule accordingly.

## 2024-11-01 ENCOUNTER — TELEPHONE (OUTPATIENT)
Age: 84
End: 2024-11-01

## 2024-11-01 NOTE — TELEPHONE ENCOUNTER
Pt called to inquire what her plts were on 10/24, this question was addressed. Pt also wanted to know if it is ok to get RSV shot when taking hydroxyurea. Please advise

## 2024-11-01 NOTE — TELEPHONE ENCOUNTER
Call out to patient in regard to question if patient is able to receive RSV vaccine, per Dr. Herrera patient is able to do so.  Patient appreciative of call.

## 2024-11-07 ENCOUNTER — RA CDI HCC (OUTPATIENT)
Dept: OTHER | Facility: HOSPITAL | Age: 84
End: 2024-11-07

## 2024-11-07 NOTE — PROGRESS NOTES
HCC coding opportunities          Chart Reviewed number of suggestions sent to Provider: 1   E11.51    Patients Insurance     Medicare Insurance: Medicare

## 2024-11-14 ENCOUNTER — OFFICE VISIT (OUTPATIENT)
Dept: FAMILY MEDICINE CLINIC | Facility: CLINIC | Age: 84
End: 2024-11-14
Payer: MEDICARE

## 2024-11-14 ENCOUNTER — TELEPHONE (OUTPATIENT)
Dept: ADMINISTRATIVE | Facility: OTHER | Age: 84
End: 2024-11-14

## 2024-11-14 VITALS
BODY MASS INDEX: 32.28 KG/M2 | DIASTOLIC BLOOD PRESSURE: 70 MMHG | OXYGEN SATURATION: 98 % | HEIGHT: 57 IN | WEIGHT: 149.6 LBS | HEART RATE: 96 BPM | SYSTOLIC BLOOD PRESSURE: 120 MMHG

## 2024-11-14 DIAGNOSIS — E78.5 HYPERLIPIDEMIA, UNSPECIFIED HYPERLIPIDEMIA TYPE: ICD-10-CM

## 2024-11-14 DIAGNOSIS — N18.30 CONTROLLED TYPE 2 DIABETES MELLITUS WITH STAGE 3 CHRONIC KIDNEY DISEASE, WITHOUT LONG-TERM CURRENT USE OF INSULIN (HCC): ICD-10-CM

## 2024-11-14 DIAGNOSIS — E03.9 HYPOTHYROIDISM, UNSPECIFIED TYPE: ICD-10-CM

## 2024-11-14 DIAGNOSIS — M81.0 AGE-RELATED OSTEOPOROSIS WITHOUT CURRENT PATHOLOGICAL FRACTURE: ICD-10-CM

## 2024-11-14 DIAGNOSIS — N18.30 BENIGN HYPERTENSION WITH CHRONIC KIDNEY DISEASE, STAGE III (HCC): Primary | ICD-10-CM

## 2024-11-14 DIAGNOSIS — E11.22 CONTROLLED TYPE 2 DIABETES MELLITUS WITH STAGE 3 CHRONIC KIDNEY DISEASE, WITHOUT LONG-TERM CURRENT USE OF INSULIN (HCC): ICD-10-CM

## 2024-11-14 DIAGNOSIS — I12.9 BENIGN HYPERTENSION WITH CHRONIC KIDNEY DISEASE, STAGE III (HCC): Primary | ICD-10-CM

## 2024-11-14 DIAGNOSIS — Z23 ENCOUNTER FOR IMMUNIZATION: ICD-10-CM

## 2024-11-14 DIAGNOSIS — Z12.31 ENCOUNTER FOR SCREENING MAMMOGRAM FOR MALIGNANT NEOPLASM OF BREAST: ICD-10-CM

## 2024-11-14 DIAGNOSIS — E87.1 HYPONATREMIA: ICD-10-CM

## 2024-11-14 DIAGNOSIS — D47.3 ESSENTIAL THROMBOCYTOSIS (HCC): ICD-10-CM

## 2024-11-14 PROCEDURE — 99214 OFFICE O/P EST MOD 30 MIN: CPT | Performed by: FAMILY MEDICINE

## 2024-11-14 PROCEDURE — G0008 ADMIN INFLUENZA VIRUS VAC: HCPCS | Performed by: FAMILY MEDICINE

## 2024-11-14 PROCEDURE — 90662 IIV NO PRSV INCREASED AG IM: CPT | Performed by: FAMILY MEDICINE

## 2024-11-14 NOTE — TELEPHONE ENCOUNTER
----- Message from Cecy ESCAMILLA sent at 11/14/2024  8:48 AM EST -----  11/14/24 8:48 AM    Hello, our patient Bhavani Amaral has had Diabetic Foot Exam completed/performed. Please assist in updating the patient chart by making an External outreach to Dr. Fam facility located in [place]. The date of service is 10/2/24.    Thank you,  Cecy Johnson MA  [facility]

## 2024-11-14 NOTE — ASSESSMENT & PLAN NOTE
Lab Results   Component Value Date    HGBA1C 6.3 (H) 10/24/2024   Sugar  up  just  atad, watch diet and increase e xercise

## 2024-11-14 NOTE — PROGRESS NOTES
"Name: Bhavani Amaral      : 1940      MRN: [id number]  Encounter Provider: Kiley Gregory MD  Encounter Date: 2024   Encounter department: [facility]  :  Assessment & Plan           History of Present Illness     Patient presents with:  Follow-up: 4 month fu. Discuss possible CT?  Diabetes: Sent care gap for diabetic foot exam  Patient is here for follow-up of diabetes hypertension hyperlipidemia triglycerides did go up a little bit and HDL dropped a little bit encouraged her to try to be more active and watch what she is eating had a discussion about her Prolia and osteoporosis her hip fracture risk has decreased she says that sometimes when she brushes her gums bleed she does have an upcoming appointment with rheumatology and will discuss optimal timing of her next DEXA when she does her next DEXA she wants to do her mammogram at the same time so the timing of her next mammogram will be depending on the timing of her next DEXA      Review of Systems   Constitutional:  Negative for activity change, appetite change and fatigue.   Respiratory:  Negative for shortness of breath.    Cardiovascular:  Negative for chest pain.   Neurological:  Negative for dizziness, light-headedness and headaches.   Hematological:  Negative for adenopathy.          Objective   /70 (BP Location: Right arm, Patient Position: Sitting, Cuff Size: Standard)   Pulse 96   Ht 4' 9\" (1.448 m)   Wt 67.9 kg (149 lb 9.6 oz)   SpO2 98%   BMI 32.37 kg/m²      Physical Exam  Vitals reviewed.   Constitutional:       Appearance: Normal appearance. She is obese.   Neck:      Vascular: No carotid bruit.   Cardiovascular:      Rate and Rhythm: Normal rate and regular rhythm.      Pulses: Normal pulses.      Heart sounds: Normal heart sounds.   Pulmonary:      Effort: Pulmonary effort is normal.      Breath sounds: Normal breath sounds.   Musculoskeletal:      Right lower leg: No edema.      Left lower leg: No " Patient advised. States that he does not see psych who can order medication or change medication. edema.   Lymphadenopathy:      Cervical: No cervical adenopathy.   Neurological:      Mental Status: She is alert.   Psychiatric:         Mood and Affect: Mood normal.

## 2024-11-14 NOTE — ASSESSMENT & PLAN NOTE
Lab Results   Component Value Date    EGFR 43 10/24/2024    EGFR 47 07/19/2024    EGFR 49 05/22/2024    CREATININE 1.18 10/24/2024    CREATININE 1.09 07/19/2024    CREATININE 1.05 05/22/2024   Kidney function  stable, BP  stable on lisinopril

## 2024-11-14 NOTE — TELEPHONE ENCOUNTER
----- Message from Cecy ESCAMILLA sent at 11/14/2024  8:46 AM EST -----  11/14/24 8:46 AM    Hello, our patient Bhavani Amaral has had Diabetic Eye Exam completed/performed. Please assist in updating the patient chart by making an External outreach to Dr. Fam facility located in [place]. The date of service is 10/2/24.    Thank you,  Cecy Johnson MA  [facility]

## 2024-11-14 NOTE — LETTER
Diabetic Foot Exam Form    Date Requested: 24  Patient: Bhavani Amaral  Patient : 1940   Referring Provider: Kiley Gregory MD    Diabetic Foot Exam Performed with shoes and socks removed        Yes         No     Date of Diabetic Foot Exam ______________________________  Risk Score ____________________________________________    Left Foot       Visual Inspection         Monofilament Testing Sensory Exam        Pedal Pulses         Additional Comments         Right Foot      Visual Inspection         Monofilament Testing Sensory Exam       Pedal Pulses         Additional Comments         Comments __________________________________________________________    Practice Providing Exam ______________________________________________    Exam Performed By (print name) _______________________________________      Provider Signature ___________________________________________________      These reports are needed for  compliance.    Please fax this completed form and a copy of the Diabetic Foot Exam report to our office located at [address] [place], [place] [address] [place] [address] as soon as possible via Fax [phone] attention Davidson: Phone [phone]    We thank you for your assistance in treating our mutual patient.

## 2024-11-14 NOTE — TELEPHONE ENCOUNTER
Upon review of the In Basket request we were able to locate, review, and update the patient chart as requested for Diabetic Eye Exam and Diabetic Foot Exam.    Any additional questions or concerns should be emailed to the Practice Liaisons via the appropriate education email address, please do not reply via In Basket.    Thank you  Davidson Short MA   [facility]

## 2024-11-14 NOTE — TELEPHONE ENCOUNTER
Upon review of the In Basket request and the patient's chart, initial outreach has been made via fax to facility. Please see Contacts section for details.     Thank you  Davidson Short MA

## 2024-11-29 NOTE — PROGRESS NOTES
Hematology Outpatient Office Note    Date of Service: 2024    [facility] [place]  [address]  [facility] [address]  [phone]    Reason for Consultation:   Chief Complaint   Patient presents with    Follow-up       Referral Physician: No ref. provider found    Primary Care Physician:  Kiley Gregory MD     Nickname: Bhavani    Spouse: Ramo Lou ECO    Today's ECO    Goals and Barriers:  Current Goal: Minimize effects of disease burden, extend life.   Barriers to accomplishing this: None    Patient's Capacity to Self Care:  Patient is able to self care      ASSESSMENT & PLAN      Diagnosis ICD-10-CM Associated Orders   1. Essential thrombocytosis (HCC)  D47.3       2. JAK2 gene mutation  Z15.89             This is a 84 y.o. c PMHx notable for CKDIIIB, DM, GERD, HTN, mild hyponatremia, b/l TMJ, osteoporosis now on Prolia, being seen in consultation for ET    The platelet count has nearly normalized on hydrea 500mg daily dosing. Pt is tolerating it well    ET induced fatigue is stable    Discussion of decision making    I personally reviewed the following lab results, the image studies, pathology, other specialty/physicians consult notes and recommendations, and outside medical records. I had a lengthy discussion with the patient and shared the work-up findings. We discussed the diagnosis and management plan as below. I spent 42 minutes reviewing the records (labs, clinician notes, outside records, medical history, ordering medicine/tests/procedures, interpreting the imaging/labs previously done) and coordination of care as well as direct time with the patient today, of which greater than 50% of the time was spent in counseling and coordination of care with the patient/family.      Plan/Labs  CBC, CMP q3 months as standing for 2 lab draws  Cont hydrea 500mg daily   She gets Prolia via Rhe[facility]        Follow Up: 6 months    All questions were  answered to the patient's satisfaction during this encounter. The patient knows the contact information for our office and knows to reach out for any relevant concerns related to this encounter. They are to call for any temperature 100.4 or higher, new symptoms including but not restricted to shaking chills, decreased appetite, nausea, vomiting, diarrhea, increased fatigue, shortness of breath or chest pain, confusion, and not feeling the strength to come to the clinic. For all other listed problems and medical diagnosis in their chart - they are managed by PCP and/or other specialists, which the patient acknowledges. Thank you very much for your consultation and making us a part of this patient's care. We are continuing to follow closely with you. Please do not hesitate to reach out to me with any additional questions or concerns.    Hernandez Herrera MD  Hematology & Medical Oncology Staff Physician             Disclaimer: This document was prepared using [employer] Direct technology. If a word or phrase is confusing, or does not make sense, this is likely due to recognition error which was not discovered during this clinician's review. If you believe an error has occurred, please contact me through [employer] service line for ovidio?cation.      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  creat 1.19 (9/13/2023)  10/5/2023: WBC 7.95k, Hgb 12.9, , RDW 14.1, plt  477k  4/19/2024: plt 447k  10/24/2024: WBC 8.64k, Hgb 13.2, , plt 490k    SUBJECTIVE  (INTERVAL HISTORY)      Doing relatively well, no acute issues. Mild fatigue.  Had 2 tooth extractions recently.    I have reviewed the relevant past medical, surgical, social and family history. I have also reviewed allergies and medications for this patient.    Review of Systems    Baseline weight: 145-150 lbs    Denies F/C, N/V, SOB, CP, LH, HA, rash, itching, gen weakness, melena, hematuria, hematochezia, falls, diarrhea, or constipation       A 10-point review of system was performed, pertinent positive and negative were detailed as above. Otherwise, the 10-point review of system was negative.      Past Medical History:   Diagnosis Date    CKD (chronic kidney disease)     Diabetes mellitus (HCC)     does not take medications, does not monitor blood sugars, pre-diabetic    Disease of thyroid gland     Diverticulosis     GERD (gastroesophageal reflux disease)     Hypertension     Hyponatremia     Ovarian cyst     Rectal bleeding     Wears reading eyeglasses        Past Surgical History:   Procedure Laterality Date    BREAST BIOPSY Right 05/13/2009    Ultrasound Bx. Benign    BREAST SURGERY      CATARACT EXTRACTION Bilateral     COLONOSCOPY      DENTAL SURGERY  10/09/2023    ESOPHAGOGASTRODUODENOSCOPY      IR BIOPSY BONE MARROW  03/18/2022    OOPHORECTOMY Bilateral 2015    WY CMBND ANTERPOST COLPORRAPHY W/CYSTO N/A 06/26/2018    Procedure: ANTERIOR & POSTERIOR COLPORRHAPHY;  Surgeon: Babatunde Quinteros MD;  [facility];  Service: UroGynecology           WY COLPOPEXY VAGINAL EXTRAPERITONEAL APPROACH N/A 06/26/2018    Procedure: ANTERIOR COLPOPEXY VAGINAL EXTRAPERITONEAL (VEC);  Surgeon: Babatunde Quinteros MD;  [facility];  Service: UroGynecology           WY CYSTOURETHROSCOPY N/A 06/26/2018    Procedure: CYSTOSCOPY;  Surgeon: Babatunde Quinteros MD;  [facility];  Service: UroGynecology           WY  SLING OPERATION STRESS INCONTINENCE N/A 06/26/2018    Procedure: INSERTION PUBOVAGINAL SLING (FEMALE);  Surgeon: Babatunde Quinteros MD;  [facility];  Service: UroGynecology           TONSILLECTOMY      US GUIDED MSK PROCEDURE  11/25/2020       Family History   Problem Relation Age of Onset    Cervical cancer Mother 68    Breast cancer Maternal Grandmother         Under 50    Mental illness Son     Schizophrenia Son     Cancer Family     No Known Problems Father     No Known Problems Maternal Aunt     No Known Problems Maternal Aunt     Brain cancer Maternal Aunt         age unknown    Cancer Cousin        Social History     Socioeconomic History    Marital status: /Civil Union     Spouse name: Not on file    Number of children: 2    Years of education: Not on file    Highest education level: Not on file   Occupational History    Occupation: Retired    Tobacco Use    Smoking status: Never     Passive exposure: Never    Smokeless tobacco: Never   Vaping Use    Vaping status: Never Used   Substance and Sexual Activity    Alcohol use: No    Drug use: No    Sexual activity: Never     Partners: Male     Birth control/protection: None   Other Topics Concern    Not on file   Social History Narrative    Active advance directive     Always uses seat belt     Lives with spouse/house     Patient has a living Will     Power of  in existence     Congregation     Supportive and safe          Social Drivers of Health     Financial Resource Strain: Low Risk  (6/21/2023)    Overall Financial Resource Strain (CARDIA)     Difficulty of Paying Living Expenses: Not hard at all   Food Insecurity: No Food Insecurity (6/28/2024)    Nursing - Inadequate Food Risk Classification     Worried About Running Out of Food in the Last Year: Never true     Ran Out of Food in the Last Year: Never true     Ran Out of Food in the Last Year: Not on file   Transportation Needs: No Transportation Needs (6/28/2024)    PRAPARE -  Transportation     Lack of Transportation (Medical): No     Lack of Transportation (Non-Medical): No   Physical Activity: Not on file   Stress: Not on file   Social Connections: Not on file   Intimate Partner Violence: Not on file   Housing Stability: Low Risk  (6/28/2024)    Housing Stability Vital Sign     Unable to Pay for Housing in the Last Year: No     Number of Times Moved in the Last Year: 0     Homeless in the Last Year: No       No Known Allergies    Current Outpatient Medications   Medication Sig Dispense Refill    aspirin 81 MG tablet Take 81 mg by mouth daily        Calcium Carbonate-Vitamin D 600-200 MG-UNIT TABS Take 1 tablet by mouth every other day        chlorhexidine (PERIDEX) 0.12 % solution       ezetimibe (ZETIA) 10 mg tablet Take 1 tablet (10 mg total) by mouth daily 90 tablet 1    famotidine (PEPCID) 40 MG tablet Take 1 tablet (40 mg total) by mouth daily at bedtime 90 tablet 1    folic acid (FOLVITE) 1 mg tablet Take 1 tablet (1 mg total) by mouth daily 90 tablet 2    Glucosamine-Chondroitin 250-200 MG TABS Take 1 tablet by mouth daily        hydroxyurea (HYDREA) 500 mg capsule Take 1 capsule (500 mg total) by mouth daily 90 capsule 2    levothyroxine 50 mcg tablet Take 1 tablet (50 mcg total) by mouth daily 90 tablet 0    lisinopril (ZESTRIL) 2.5 mg tablet Take 1 tablet (2.5 mg total) by mouth daily 90 tablet 3    Multiple Vitamins-Iron (DAILY MULTIPLE VITAMIN/IRON) TABS Take 1 tablet by mouth daily        Omega-3 Fatty Acids ([employer] FISH OIL) 1200 MG CAPS Take 1 capsule by mouth 2 (two) times a day      Polyethylene Glycol 3350 (MIRALAX MIX-IN PAX PO) Take by mouth       Current Facility-Administered Medications   Medication Dose Route Frequency Provider Last Rate Last Admin    denosumab (PROLIA) subcutaneous injection 60 mg  60 mg Subcutaneous Q6 Months    60 mg at 06/28/24 1058       (Not in a hospital admission)        Objective:     24 Hour Vitals Assessment:     Vitals:    12/09/24 0917    BP: 132/72   Pulse: 100   Resp: 18   Temp: (!) 97.3 °F (36.3 °C)   SpO2: 95%           PHYSICIAN EXAM:    General: Appearance: alert, cooperative, no distress.  HEENT: Normocephalic, atraumatic. No scleral icterus. conjunctivae clear. EOMI.  Chest: No tenderness to palpation. No open wound noted.  Lungs: Clear to auscultation bilaterally, Respirations unlabored.  Cardiac: Regular rate and rhythm, +S1and S2  Abdomen: Soft, non-tender, non-distended. Bowel sounds are normal.  Extremities:  No edema, cyanosis, clubbing.  Skin: Skin color, turgor are normal. No rashes.  Lymphatics: no palpable supra-cervical, axillary, or inguinal adenopathy  Neurologic: Awake, Alert, and oriented, no gross focal deficits noted b/l.       DATA REVIEW:    Pathology Result:    Final Diagnosis   Date Value Ref Range Status   03/18/2022   Final    A -C.  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  -  Maturing trilineage hematopoiesis without significant features of dysplasia or increased myeloblasts.    -  Mildly decreased to adequate stainable storage iron.  -  Mildly increased reticulin fibers without overt fibrosis.  -  Negative for collagen fibrosis, granulomata, vasculitis, necrosis.              Image Results:   Image result are reviewed and documented in Hematology/Oncology history. I personally reviewed these images.    CT abdomen wo contrast  Narrative: CT - ABDOMEN WITHOUT IV CONTRAST    INDICATION: E27.8: Other specified disorders of adrenal gland. Monitor size of adrenal adenoma.    COMPARISON: CT of the abdomen pelvis 5/2/2018 and  MRI of the abdomen with and without contrast dated 1/30/2023.    TECHNIQUE: CT examination of the abdomen was performed without intravenous contrast. Multiplanar 2D reformatted images were created from the source data.    This examination, like all CT scans  performed in the [facility], was performed utilizing techniques to minimize radiation dose exposure, including the use of iterative reconstruction and automated exposure control. Radiation dose length   product (DLP) for this visit: 277 mGy-cm    Enteric Contrast: Not administered.    FINDINGS:    LOWER CHEST: Atherosclerotic calcifications of the coronary arteries.    LIVER/BILIARY TREE: Simple hepatic cyst(s) unchanged from previous CT abdomen pelvis 5/2/2018. No suspicious mass. Normal hepatic contours. No biliary dilation.    GALLBLADDER: No calcified gallstones. No pericholecystic inflammatory change.    SPLEEN: Unremarkable.    PANCREAS: Unremarkable.    ADRENAL GLANDS: Previously characterized left adrenal adenoma measures 1.9 x 1.1 cm, previously measuring 1.9 x 1.1 cm on MRI 1/30/2023.    KIDNEYS/VISUALIZED URETERS: Prominent fat within the left renal pelvis.    STOMACH AND VISUALIZED BOWEL: Small hiatal hernia.    ABDOMINAL CAVITY: No ascites. No pneumoperitoneum. No lymphadenopathy.    VESSELS: Atherosclerosis without abdominal aortic aneurysm.    ABDOMINAL WALL: Unremarkable.    BONES: Transverse area of sclerosis of the L4 vertebrae suggestive of compression deformity of indeterminate age.  Impression: Left adrenal adenoma measures 1.9 x 1.1 cm, unchanged compared to MR from 1/30/2023.    Resident: TALITA WANG I, the attending radiologist, have reviewed the images and agree with the final report above.    Workstation performed: [id number]      LABS:  Lab data are reviewed and documented in HemOnc history.       Lab Results   Component Value Date    HGB 13.2 10/24/2024    HCT 40.7 10/24/2024     (H) 10/24/2024     (H) 10/24/2024    WBC 8.64 10/24/2024    NRBC 0 10/24/2024     Lab Results   Component Value Date     10/12/2017    K 4.3 10/24/2024    CL 98 10/24/2024    CO2 26 10/24/2024    ANIONGAP 10 05/21/2015    BUN 19 10/24/2024    CREATININE 1.18  "10/24/2024    GLUCOSE 100 05/21/2015    GLUF 92 10/24/2024    CALCIUM 9.1 10/24/2024    CORRECTEDCA 9.7 05/10/2023    AST 15 10/24/2024    ALT 11 10/24/2024    ALKPHOS 56 10/24/2024    PROT 7.6 06/08/2017    BILITOT 0.4 06/08/2017    EGFR 43 10/24/2024       Lab Results   Component Value Date    IRON 79 10/04/2022    TIBC 244 (L) 10/04/2022    FERRITIN 649 (H) 10/04/2022    FERRITIN 754 (H) 09/07/2022    FERRITIN 731 (H) 06/01/2022    FERRITIN 758 (H) 03/10/2022    FERRITIN 30 02/03/2022    FERRITIN 31 12/06/2019    FERRITIN 22 02/28/2019       Lab Results   Component Value Date    [id number] 644 07/01/2022       No results for input(s): \"WBC\", \"CREAT\", \"PLT\" in the last 72 hours.     By:  Hernandez Herrera MD, 12/9/2024, 9:34 AM                                  "

## 2024-12-09 ENCOUNTER — OFFICE VISIT (OUTPATIENT)
Dept: HEMATOLOGY ONCOLOGY | Facility: CLINIC | Age: 84
End: 2024-12-09
Payer: MEDICARE

## 2024-12-09 VITALS
TEMPERATURE: 97.3 F | SYSTOLIC BLOOD PRESSURE: 132 MMHG | WEIGHT: 150.5 LBS | HEART RATE: 100 BPM | BODY MASS INDEX: 32.47 KG/M2 | OXYGEN SATURATION: 95 % | DIASTOLIC BLOOD PRESSURE: 72 MMHG | HEIGHT: 57 IN | RESPIRATION RATE: 18 BRPM

## 2024-12-09 DIAGNOSIS — D47.3 ESSENTIAL THROMBOCYTOSIS (HCC): Primary | ICD-10-CM

## 2024-12-09 DIAGNOSIS — Z15.89 JAK2 GENE MUTATION: ICD-10-CM

## 2024-12-09 PROCEDURE — G2211 COMPLEX E/M VISIT ADD ON: HCPCS | Performed by: INTERNAL MEDICINE

## 2024-12-09 PROCEDURE — 99215 OFFICE O/P EST HI 40 MIN: CPT | Performed by: INTERNAL MEDICINE

## 2024-12-09 RX ORDER — HYDROXYUREA 500 MG/1
500 CAPSULE ORAL DAILY
Qty: 90 CAPSULE | Refills: 2 | Status: SHIPPED | OUTPATIENT
Start: 2024-12-09

## 2024-12-16 DIAGNOSIS — K21.9 GASTROESOPHAGEAL REFLUX DISEASE WITHOUT ESOPHAGITIS: ICD-10-CM

## 2024-12-16 NOTE — TELEPHONE ENCOUNTER
Patient stated 89 with refills     Reason for call:   [x] Refill   [] Prior Auth  [] Other:     Office:   [x] PCP/Provider - Kiley Gregory  [] Specialty/Provider -     Medication: Famotidine     Dose/Frequency: 40 mg Q HS    Quantity: 90    Pharmacy: [facility] [place] [address]    Does the patient have enough for 3 days?   [x] Yes   [] No - Send as HP to POD

## 2024-12-17 RX ORDER — FAMOTIDINE 40 MG/1
40 TABLET, FILM COATED ORAL
Qty: 90 TABLET | Refills: 1 | Status: SHIPPED | OUTPATIENT
Start: 2024-12-17

## 2025-01-03 ENCOUNTER — TELEPHONE (OUTPATIENT)
Dept: RHEUMATOLOGY | Facility: CLINIC | Age: 85
End: 2025-01-03

## 2025-01-03 NOTE — TELEPHONE ENCOUNTER
Transferred call from pod and spoke to patient in regards to getting Prolia shot rescheduled. Patient relayed they had concerns with jaw pain (TMJ)  and did previously have tooth extractions around April 2024, she also was wondering about possibly having another DXA sooner, had also mentioned that they have experienced Bleeding of the Gums when flossing occasionally, patient relayed bleeding did not last long. Patient would like to know if they should still get the injection or wait until they are seen in March, patient did okay leaving a vociemial if they are unable to answer they phone.

## 2025-01-09 ENCOUNTER — APPOINTMENT (OUTPATIENT)
Dept: LAB | Facility: CLINIC | Age: 85
End: 2025-01-09
Payer: MEDICARE

## 2025-01-09 DIAGNOSIS — N18.31 CKD STAGE 3A, GFR 45-59 ML/MIN (HCC): ICD-10-CM

## 2025-01-09 DIAGNOSIS — D50.9 IRON DEFICIENCY ANEMIA, UNSPECIFIED IRON DEFICIENCY ANEMIA TYPE: ICD-10-CM

## 2025-01-09 DIAGNOSIS — E11.22 CONTROLLED TYPE 2 DIABETES MELLITUS WITH STAGE 3 CHRONIC KIDNEY DISEASE, WITHOUT LONG-TERM CURRENT USE OF INSULIN (HCC): ICD-10-CM

## 2025-01-09 DIAGNOSIS — N18.30 CONTROLLED TYPE 2 DIABETES MELLITUS WITH STAGE 3 CHRONIC KIDNEY DISEASE, WITHOUT LONG-TERM CURRENT USE OF INSULIN (HCC): ICD-10-CM

## 2025-01-09 DIAGNOSIS — D47.3 ESSENTIAL THROMBOCYTOSIS (HCC): ICD-10-CM

## 2025-01-09 DIAGNOSIS — N18.30 BENIGN HYPERTENSION WITH CHRONIC KIDNEY DISEASE, STAGE III (HCC): ICD-10-CM

## 2025-01-09 DIAGNOSIS — E87.1 HYPONATREMIA: ICD-10-CM

## 2025-01-09 DIAGNOSIS — I12.9 BENIGN HYPERTENSION WITH CHRONIC KIDNEY DISEASE, STAGE III (HCC): ICD-10-CM

## 2025-01-09 LAB
ALBUMIN SERPL BCG-MCNC: 4.1 G/DL (ref 3.5–5)
ALP SERPL-CCNC: 56 U/L (ref 34–104)
ALT SERPL W P-5'-P-CCNC: 16 U/L (ref 7–52)
ANION GAP SERPL CALCULATED.3IONS-SCNC: 9 MMOL/L (ref 4–13)
ANION GAP SERPL CALCULATED.3IONS-SCNC: 9 MMOL/L (ref 4–13)
AST SERPL W P-5'-P-CCNC: 21 U/L (ref 13–39)
BACTERIA UR QL AUTO: NORMAL /HPF
BASOPHILS # BLD AUTO: 0.17 THOUSANDS/ΜL (ref 0–0.1)
BASOPHILS NFR BLD AUTO: 2 % (ref 0–1)
BILIRUB SERPL-MCNC: 0.35 MG/DL (ref 0.2–1)
BILIRUB UR QL STRIP: NEGATIVE
BUN SERPL-MCNC: 19 MG/DL (ref 5–25)
BUN SERPL-MCNC: 20 MG/DL (ref 5–25)
CALCIUM SERPL-MCNC: 9.9 MG/DL (ref 8.4–10.2)
CALCIUM SERPL-MCNC: 9.9 MG/DL (ref 8.4–10.2)
CHLORIDE SERPL-SCNC: 98 MMOL/L (ref 96–108)
CHLORIDE SERPL-SCNC: 98 MMOL/L (ref 96–108)
CLARITY UR: CLEAR
CO2 SERPL-SCNC: 27 MMOL/L (ref 21–32)
CO2 SERPL-SCNC: 27 MMOL/L (ref 21–32)
COLOR UR: NORMAL
CREAT SERPL-MCNC: 1.14 MG/DL (ref 0.6–1.3)
CREAT SERPL-MCNC: 1.14 MG/DL (ref 0.6–1.3)
CREAT UR-MCNC: 59.8 MG/DL
EOSINOPHIL # BLD AUTO: 0.25 THOUSAND/ΜL (ref 0–0.61)
EOSINOPHIL NFR BLD AUTO: 3 % (ref 0–6)
ERYTHROCYTE [DISTWIDTH] IN BLOOD BY AUTOMATED COUNT: 14.5 % (ref 11.6–15.1)
ERYTHROCYTE [DISTWIDTH] IN BLOOD BY AUTOMATED COUNT: 14.6 % (ref 11.6–15.1)
EST. AVERAGE GLUCOSE BLD GHB EST-MCNC: 137 MG/DL
GFR SERPL CREATININE-BSD FRML MDRD: 44 ML/MIN/1.73SQ M
GFR SERPL CREATININE-BSD FRML MDRD: 44 ML/MIN/1.73SQ M
GLUCOSE P FAST SERPL-MCNC: 71 MG/DL (ref 65–99)
GLUCOSE P FAST SERPL-MCNC: 81 MG/DL (ref 65–99)
GLUCOSE UR STRIP-MCNC: NEGATIVE MG/DL
HBA1C MFR BLD: 6.4 %
HCT VFR BLD AUTO: 41.9 % (ref 34.8–46.1)
HCT VFR BLD AUTO: 43 % (ref 34.8–46.1)
HGB BLD-MCNC: 13.3 G/DL (ref 11.5–15.4)
HGB BLD-MCNC: 13.4 G/DL (ref 11.5–15.4)
HGB UR QL STRIP.AUTO: NEGATIVE
IMM GRANULOCYTES # BLD AUTO: 0.02 THOUSAND/UL (ref 0–0.2)
IMM GRANULOCYTES NFR BLD AUTO: 0 % (ref 0–2)
KETONES UR STRIP-MCNC: NEGATIVE MG/DL
LEUKOCYTE ESTERASE UR QL STRIP: NEGATIVE
LYMPHOCYTES # BLD AUTO: 3.03 THOUSANDS/ΜL (ref 0.6–4.47)
LYMPHOCYTES NFR BLD AUTO: 36 % (ref 14–44)
MCH RBC QN AUTO: 33.4 PG (ref 26.8–34.3)
MCH RBC QN AUTO: 33.5 PG (ref 26.8–34.3)
MCHC RBC AUTO-ENTMCNC: 31.2 G/DL (ref 31.4–37.4)
MCHC RBC AUTO-ENTMCNC: 31.7 G/DL (ref 31.4–37.4)
MCV RBC AUTO: 105 FL (ref 82–98)
MCV RBC AUTO: 108 FL (ref 82–98)
MONOCYTES # BLD AUTO: 0.64 THOUSAND/ΜL (ref 0.17–1.22)
MONOCYTES NFR BLD AUTO: 8 % (ref 4–12)
NEUTROPHILS # BLD AUTO: 4.31 THOUSANDS/ΜL (ref 1.85–7.62)
NEUTS SEG NFR BLD AUTO: 51 % (ref 43–75)
NITRITE UR QL STRIP: NEGATIVE
NON-SQ EPI CELLS URNS QL MICRO: NORMAL /HPF
NRBC BLD AUTO-RTO: 0 /100 WBCS
PH UR STRIP.AUTO: 6.5 [PH]
PHOSPHATE SERPL-MCNC: 3.7 MG/DL (ref 2.3–4.1)
PLATELET # BLD AUTO: 541 THOUSANDS/UL (ref 149–390)
PLATELET # BLD AUTO: 546 THOUSANDS/UL (ref 149–390)
PMV BLD AUTO: 9.2 FL (ref 8.9–12.7)
PMV BLD AUTO: 9.3 FL (ref 8.9–12.7)
POTASSIUM SERPL-SCNC: 4.5 MMOL/L (ref 3.5–5.3)
POTASSIUM SERPL-SCNC: 4.6 MMOL/L (ref 3.5–5.3)
PROT SERPL-MCNC: 8.2 G/DL (ref 6.4–8.4)
PROT UR STRIP-MCNC: NEGATIVE MG/DL
PROT UR-MCNC: 4.5 MG/DL
PROT/CREAT UR: 0.1 MG/G{CREAT} (ref 0–0.1)
RBC # BLD AUTO: 3.98 MILLION/UL (ref 3.81–5.12)
RBC # BLD AUTO: 4 MILLION/UL (ref 3.81–5.12)
RBC #/AREA URNS AUTO: NORMAL /HPF
SODIUM SERPL-SCNC: 134 MMOL/L (ref 135–147)
SODIUM SERPL-SCNC: 134 MMOL/L (ref 135–147)
SP GR UR STRIP.AUTO: 1.01 (ref 1–1.03)
UROBILINOGEN UR STRIP-ACNC: <2 MG/DL
WBC # BLD AUTO: 8.42 THOUSAND/UL (ref 4.31–10.16)
WBC # BLD AUTO: 8.76 THOUSAND/UL (ref 4.31–10.16)
WBC #/AREA URNS AUTO: NORMAL /HPF

## 2025-01-09 PROCEDURE — 83036 HEMOGLOBIN GLYCOSYLATED A1C: CPT

## 2025-01-09 PROCEDURE — 82570 ASSAY OF URINE CREATININE: CPT

## 2025-01-09 PROCEDURE — 84156 ASSAY OF PROTEIN URINE: CPT

## 2025-01-09 PROCEDURE — 36415 COLL VENOUS BLD VENIPUNCTURE: CPT

## 2025-01-09 PROCEDURE — 80053 COMPREHEN METABOLIC PANEL: CPT

## 2025-01-09 PROCEDURE — 85027 COMPLETE CBC AUTOMATED: CPT

## 2025-01-09 PROCEDURE — 80048 BASIC METABOLIC PNL TOTAL CA: CPT

## 2025-01-09 PROCEDURE — 81001 URINALYSIS AUTO W/SCOPE: CPT

## 2025-01-09 PROCEDURE — 84100 ASSAY OF PHOSPHORUS: CPT

## 2025-01-09 PROCEDURE — 85025 COMPLETE CBC W/AUTO DIFF WBC: CPT

## 2025-01-10 ENCOUNTER — RESULTS FOLLOW-UP (OUTPATIENT)
Dept: FAMILY MEDICINE CLINIC | Facility: CLINIC | Age: 85
End: 2025-01-10

## 2025-01-12 NOTE — TELEPHONE ENCOUNTER
Please let patient know that holding or skipping Prolia is going to do more harm (bones start rapidly breaking down and increase fracture risk) than anything to do with her mouth if she continued it. Her teeth extractions have been a long time ago so no contraindication, and the TMJ and bleeding gums are not a contraindication either. Please get her rescheduled for nurse visit for Prolia injection ASAP. She is already past due, last one was in June.

## 2025-01-14 ENCOUNTER — TELEPHONE (OUTPATIENT)
Age: 85
End: 2025-01-14

## 2025-01-14 DIAGNOSIS — E03.9 HYPOTHYROIDISM, UNSPECIFIED TYPE: ICD-10-CM

## 2025-01-14 RX ORDER — LEVOTHYROXINE SODIUM 50 UG/1
50 TABLET ORAL DAILY
Qty: 90 TABLET | Refills: 1 | Status: SHIPPED | OUTPATIENT
Start: 2025-01-14 | End: 2025-07-13

## 2025-01-14 NOTE — TELEPHONE ENCOUNTER
Reason for call:   [x] Refill   [] Prior Auth  [] Other:     Office:   [x] PCP/Provider - [place]  [] Specialty/Provider -     Medication:   Levothyroxine 50 mcg, 1 qd, 90    Pharmacy:   [facility], [address], [place]    Does the patient have enough for 3 days?   [x] Yes   [] No - Send as HP to POD

## 2025-01-17 ENCOUNTER — TELEPHONE (OUTPATIENT)
Age: 85
End: 2025-01-17

## 2025-01-17 DIAGNOSIS — Z15.89 JAK2 GENE MUTATION: ICD-10-CM

## 2025-01-17 DIAGNOSIS — D47.3 ESSENTIAL THROMBOCYTOSIS (HCC): ICD-10-CM

## 2025-01-17 RX ORDER — HYDROXYUREA 500 MG/1
CAPSULE ORAL
Qty: 90 CAPSULE | Refills: 1 | Status: SHIPPED | OUTPATIENT
Start: 2025-01-17

## 2025-01-17 NOTE — TELEPHONE ENCOUNTER
Hydrea 500 mg in AM and 1000 mg evening     Call out to patient in regard to recommendations due to increase in prescription. Patient asked for new prescription to be sent to local pharmacy.

## 2025-01-17 NOTE — TELEPHONE ENCOUNTER
Patient had labs drawn on 1/9 for Dr. Herrera and her platelets were 546. She would like Dr. Herrera to review her labs and provide his recommendations.     She can be reached at [phone] and states it is OK to leave a detailed voicemail if needed.

## 2025-01-17 NOTE — TELEPHONE ENCOUNTER
10/24/24 plts 490  1/9/25 plts 541    Call out to patient with recommendations patient stated that she was recommended to call if plts went above 500. Message sent to Dr. Moreira to review.

## 2025-01-22 ENCOUNTER — TELEPHONE (OUTPATIENT)
Age: 85
End: 2025-01-22

## 2025-01-22 NOTE — TELEPHONE ENCOUNTER
DESCRIPTION (describe complaint in short phrase) Patient called regarding her Hydrea pills. She was instructed to take 1 tablet in the AM and 2 tablets in the PM on 1/17.    Patient stated she has only been taking 1 tablet twice daily, as she was nervous that she will feel too fatigued. She was already experiencing fatigue on 1 tablet daily and was concerned that it will significantly worsen on 3 tablets.    She has noticed that her fatigue level slightly increased on current dose, but not significantly. She has been able to perform her usual activities, but feels very tired at the end of the day, and needing more rest.    Patient questioning if she should continue on current dose/1 tablet twice daily, and repeat blood work sooner than her scheduled 3 months to check her levels.    She will be willing to increase dose to 3 at that time, if her blood levels indicate the need.    She is also scheduled to get her Prolia Injection tomorrow, and wanted to verify that it will not interact with her Hydrea.    Please, review and call her back with recommendations for Hydrea dose, Labs and Prolia injection, as she will be home most of the day.      SEVERITY (mild, moderate, severe) Moderate    ONSET (of THIS SPECIFIC complaint) Ongoing    CAUSE (what does the patient think is causing this) Hydrea    MEDICATIONS (any changes in meds or doses?, Take any meds for relief?) No    OTHER SYMPTOMS (yes or no- if no, just write that. If yes, write the symptoms c/o) experiences intermittent shortness of breath on climbing the stairs, not more than normal.

## 2025-01-22 NOTE — TELEPHONE ENCOUNTER
"Call out to patient and reviewed below from Dr. Herrera      \"Ok for her to continue once BID and then reassess if we will then increase with next labs (have her do it in 4 weeks)\"   Patient appreciative of call.    Patient asking if patient should continue to receive prolia injection. Reviewed her question will be reviewed with Dr. Herrera and if advised against it return call will be made. Patient agreeable.  "

## 2025-01-23 ENCOUNTER — CLINICAL SUPPORT (OUTPATIENT)
Dept: RHEUMATOLOGY | Facility: CLINIC | Age: 85
End: 2025-01-23
Payer: MEDICARE

## 2025-01-23 DIAGNOSIS — M81.0 AGE-RELATED OSTEOPOROSIS WITHOUT CURRENT PATHOLOGICAL FRACTURE: Primary | ICD-10-CM

## 2025-01-23 PROCEDURE — 96372 THER/PROPH/DIAG INJ SC/IM: CPT

## 2025-01-23 NOTE — PROGRESS NOTES
Assessment/Plan:    Bhavani Amaral came into the [facility] Rheumatology Office today 01/23/25 to receive prolia injection.      Verbal consent obtained.  Consent given by: patient    patient states patient has been medically healthy with no underlining concerns/complications.      Bhavani Amaral presents with no symptoms today.       All insturctions were reviewed with the patient.    If the patient should have any questions/concerns, advised patient to contacted [facility].       Subjective:     History provided by: patient    Patient ID: Bhavani Amaral is a 84 y.o. female      Objective:    There were no vitals filed for this visit.    Patient tolerated the injection well without any complications.  Injection site/s left arm  Medication was provided by providers stock.    Patient signed consent form no   Patient signed ABN form no (If no patient is not a medicare patient).   Patient waited 15 minutes after injection no (This only applies to patient's receiving first time injection).       Last Visit: 1/3/2025  Next visit:3/11/2025

## 2025-01-27 DIAGNOSIS — D47.3 ESSENTIAL THROMBOCYTOSIS (HCC): ICD-10-CM

## 2025-01-27 DIAGNOSIS — Z79.64 ON HYDROXYUREA THERAPY: ICD-10-CM

## 2025-01-27 NOTE — TELEPHONE ENCOUNTER
Reason for call:   [x] Refill   [] Prior Auth  [] Other:     Office:   [] PCP/Provider -   [x] Specialty/Provider - Hernandez Herrera,     Medication: folic acid (FOLVITE) 1 mg     Dose/Frequency: Take 1 tablet (1 mg total) by mouth daily,     Quantity: 90    Pharmacy: [facility]    Does the patient have enough for 3 days?   [x] Yes   [] No - Send as HP to POD

## 2025-01-28 RX ORDER — FOLIC ACID 1 MG/1
1 TABLET ORAL DAILY
Qty: 90 TABLET | Refills: 0 | Status: SHIPPED | OUTPATIENT
Start: 2025-01-28

## 2025-02-17 DIAGNOSIS — I10 ESSENTIAL HYPERTENSION: ICD-10-CM

## 2025-02-17 DIAGNOSIS — R80.1 PERSISTENT PROTEINURIA: ICD-10-CM

## 2025-02-17 DIAGNOSIS — E78.2 MIXED HYPERLIPIDEMIA: ICD-10-CM

## 2025-02-17 NOTE — TELEPHONE ENCOUNTER
Reason for call:   [x] Refill   [] Prior Auth  [] Other:     Office:   [x] PCP/Provider - Dr Gregory   [] Specialty/Provider -     Medication: ezetimibe    Dose/Frequency: 10 mg take daily     Quantity: 90    Pharmacy: [facility] on [address]    Does the patient have enough for 3 days?   [x] Yes   [] No - Send as HP to POD

## 2025-02-17 NOTE — TELEPHONE ENCOUNTER
Reason for call:   [x] Refill   [] Prior Auth  [] Other:     Office:   [] PCP/Provider -   [x] Specialty/Provider - nephrology     Medication: lisinopril     Dose/Frequency: 2.5 mg take daily     Quantity: 90    Pharmacy: [facility] on [address]    Does the patient have enough for 3 days?   [x] Yes   [] No - Send as HP to POD

## 2025-02-18 RX ORDER — LISINOPRIL 2.5 MG/1
2.5 TABLET ORAL DAILY
Qty: 90 TABLET | Refills: 1 | Status: SHIPPED | OUTPATIENT
Start: 2025-02-18 | End: 2026-02-18

## 2025-02-19 ENCOUNTER — APPOINTMENT (OUTPATIENT)
Dept: LAB | Facility: CLINIC | Age: 85
End: 2025-02-19
Payer: MEDICARE

## 2025-02-19 DIAGNOSIS — N18.30 CONTROLLED TYPE 2 DIABETES MELLITUS WITH STAGE 3 CHRONIC KIDNEY DISEASE, WITHOUT LONG-TERM CURRENT USE OF INSULIN (HCC): ICD-10-CM

## 2025-02-19 DIAGNOSIS — E11.22 CONTROLLED TYPE 2 DIABETES MELLITUS WITH STAGE 3 CHRONIC KIDNEY DISEASE, WITHOUT LONG-TERM CURRENT USE OF INSULIN (HCC): ICD-10-CM

## 2025-02-19 DIAGNOSIS — E87.1 HYPONATREMIA: ICD-10-CM

## 2025-02-19 DIAGNOSIS — E78.5 HYPERLIPIDEMIA, UNSPECIFIED HYPERLIPIDEMIA TYPE: ICD-10-CM

## 2025-02-19 DIAGNOSIS — D47.3 ESSENTIAL THROMBOCYTOSIS (HCC): ICD-10-CM

## 2025-02-19 DIAGNOSIS — I12.9 BENIGN HYPERTENSION WITH CHRONIC KIDNEY DISEASE, STAGE III (HCC): ICD-10-CM

## 2025-02-19 DIAGNOSIS — N18.30 BENIGN HYPERTENSION WITH CHRONIC KIDNEY DISEASE, STAGE III (HCC): ICD-10-CM

## 2025-02-19 LAB
ALBUMIN SERPL BCG-MCNC: 3.8 G/DL (ref 3.5–5)
ALP SERPL-CCNC: 61 U/L (ref 34–104)
ALT SERPL W P-5'-P-CCNC: 13 U/L (ref 7–52)
ANION GAP SERPL CALCULATED.3IONS-SCNC: 10 MMOL/L (ref 4–13)
AST SERPL W P-5'-P-CCNC: 16 U/L (ref 13–39)
BASOPHILS # BLD AUTO: 0.14 THOUSANDS/ΜL (ref 0–0.1)
BASOPHILS NFR BLD AUTO: 2 % (ref 0–1)
BILIRUB SERPL-MCNC: 0.36 MG/DL (ref 0.2–1)
BUN SERPL-MCNC: 17 MG/DL (ref 5–25)
CALCIUM SERPL-MCNC: 9 MG/DL (ref 8.4–10.2)
CHLORIDE SERPL-SCNC: 99 MMOL/L (ref 96–108)
CHOLEST SERPL-MCNC: 152 MG/DL (ref ?–200)
CO2 SERPL-SCNC: 26 MMOL/L (ref 21–32)
CREAT SERPL-MCNC: 1.09 MG/DL (ref 0.6–1.3)
EOSINOPHIL # BLD AUTO: 0.13 THOUSAND/ΜL (ref 0–0.61)
EOSINOPHIL NFR BLD AUTO: 2 % (ref 0–6)
ERYTHROCYTE [DISTWIDTH] IN BLOOD BY AUTOMATED COUNT: 18 % (ref 11.6–15.1)
GFR SERPL CREATININE-BSD FRML MDRD: 47 ML/MIN/1.73SQ M
GLUCOSE P FAST SERPL-MCNC: 102 MG/DL (ref 65–99)
HCT VFR BLD AUTO: 36.6 % (ref 34.8–46.1)
HDLC SERPL-MCNC: 33 MG/DL
HGB BLD-MCNC: 12 G/DL (ref 11.5–15.4)
IMM GRANULOCYTES # BLD AUTO: 0.01 THOUSAND/UL (ref 0–0.2)
IMM GRANULOCYTES NFR BLD AUTO: 0 % (ref 0–2)
LDLC SERPL CALC-MCNC: 75 MG/DL (ref 0–100)
LYMPHOCYTES # BLD AUTO: 2.41 THOUSANDS/ΜL (ref 0.6–4.47)
LYMPHOCYTES NFR BLD AUTO: 35 % (ref 14–44)
MCH RBC QN AUTO: 34.9 PG (ref 26.8–34.3)
MCHC RBC AUTO-ENTMCNC: 32.8 G/DL (ref 31.4–37.4)
MCV RBC AUTO: 106 FL (ref 82–98)
MONOCYTES # BLD AUTO: 0.47 THOUSAND/ΜL (ref 0.17–1.22)
MONOCYTES NFR BLD AUTO: 7 % (ref 4–12)
NEUTROPHILS # BLD AUTO: 3.76 THOUSANDS/ΜL (ref 1.85–7.62)
NEUTS SEG NFR BLD AUTO: 54 % (ref 43–75)
NONHDLC SERPL-MCNC: 119 MG/DL
NRBC BLD AUTO-RTO: 0 /100 WBCS
PLATELET # BLD AUTO: 328 THOUSANDS/UL (ref 149–390)
PMV BLD AUTO: 9.3 FL (ref 8.9–12.7)
POTASSIUM SERPL-SCNC: 4.5 MMOL/L (ref 3.5–5.3)
PROT SERPL-MCNC: 7.7 G/DL (ref 6.4–8.4)
RBC # BLD AUTO: 3.44 MILLION/UL (ref 3.81–5.12)
SODIUM SERPL-SCNC: 135 MMOL/L (ref 135–147)
TRIGL SERPL-MCNC: 222 MG/DL (ref ?–150)
WBC # BLD AUTO: 6.92 THOUSAND/UL (ref 4.31–10.16)

## 2025-02-19 PROCEDURE — 80061 LIPID PANEL: CPT

## 2025-02-19 PROCEDURE — 36415 COLL VENOUS BLD VENIPUNCTURE: CPT

## 2025-02-19 PROCEDURE — 80053 COMPREHEN METABOLIC PANEL: CPT

## 2025-02-19 PROCEDURE — 85025 COMPLETE CBC W/AUTO DIFF WBC: CPT

## 2025-02-19 RX ORDER — EZETIMIBE 10 MG/1
10 TABLET ORAL DAILY
Qty: 90 TABLET | Refills: 1 | Status: SHIPPED | OUTPATIENT
Start: 2025-02-19

## 2025-02-20 ENCOUNTER — TELEPHONE (OUTPATIENT)
Age: 85
End: 2025-02-20

## 2025-02-20 NOTE — TELEPHONE ENCOUNTER
Patient called to ask Dr. Herrera to review the results of her labs. States her platelets are in normal range so she is wondering if she should decrease her hydrea at all. Reports she takes one 500mg capsule in the morning and one in the evening currently.

## 2025-02-21 NOTE — TELEPHONE ENCOUNTER
Call out to patient and reviewed recommendations to continue taking Hydrea 500 mg in the morning and reviewed evening dose as well.   Reviewed when next labs are recommended. Patient declined refill at this time.

## 2025-02-21 NOTE — TELEPHONE ENCOUNTER
Call out to patient to review Dr. Herrera's recommendations to take Hydrea 500mg in the morning and evening, patient stated that she has been taking that for some time now, which has assisted with resolving symptoms.    Looking for recommendation if patient should continue 500mg in the morning and evening or drop it down to once a day.

## 2025-03-11 ENCOUNTER — OFFICE VISIT (OUTPATIENT)
Dept: RHEUMATOLOGY | Facility: CLINIC | Age: 85
End: 2025-03-11
Payer: MEDICARE

## 2025-03-11 VITALS — DIASTOLIC BLOOD PRESSURE: 78 MMHG | HEIGHT: 57 IN | BODY MASS INDEX: 32.57 KG/M2 | SYSTOLIC BLOOD PRESSURE: 124 MMHG

## 2025-03-11 DIAGNOSIS — M26.622 ARTHRALGIA OF LEFT TEMPOROMANDIBULAR JOINT: ICD-10-CM

## 2025-03-11 DIAGNOSIS — M81.0 AGE-RELATED OSTEOPOROSIS WITHOUT CURRENT PATHOLOGICAL FRACTURE: Primary | ICD-10-CM

## 2025-03-11 DIAGNOSIS — Z79.899 HIGH RISK MEDICATION USE: ICD-10-CM

## 2025-03-11 PROCEDURE — 99214 OFFICE O/P EST MOD 30 MIN: CPT | Performed by: INTERNAL MEDICINE

## 2025-03-11 RX ORDER — LIDOCAINE 40 MG/G
CREAM TOPICAL AS NEEDED
Qty: 60 G | Refills: 6 | Status: SHIPPED | OUTPATIENT
Start: 2025-03-11

## 2025-03-11 RX ORDER — DOCUSATE SODIUM 100 MG/1
CAPSULE, LIQUID FILLED ORAL
COMMUNITY
End: 2025-03-24

## 2025-03-11 NOTE — PROGRESS NOTES
Name: Bhavani Amaral      : 1940      MRN: [id number]  Encounter Provider: Georgette Mahoney MD  Encounter Date: 3/11/2025   Encounter department: [facility] RHEUMATOLOGY [facility]  :  Assessment & Plan  Age-related osteoporosis without current pathological fracture  Bhavani Amaral is a 84 y.o.  female who presents for follow-up of her osteoporosis. She got her last Prolia injection in 2025. Had dental work two weeks ago. She no longer has jaw pain. Her DEXA scan from 2023 showed T-score of -2.4 in left femoral neck but still has Osteoporosis based on FRAX score. She has compression deformity of L4 on CT abdomen in .     Do back x-rays  Schedule DEXA scan for 2025  Do Vit. D level lab with next bloodwork  Use lidocaine cream as needed for back pain  Continue daily Calcium and Vit. D  Next Prolia will be given at next visit on 25    Return to clinic in 2025 for Prolia plus provider visit      Orders:    DXA bone density spine hip and pelvis; Future    Vitamin D 25 hydroxy; Future    XR spine thoracic 2 vw; Future    XR spine lumbar minimum 4 views non injury; Future    Arthralgia of left temporomandibular joint    Orders:    lidocaine (LMX) 4 % cream; Apply topically as needed for mild pain    High risk medication use  Prolia (denosumab) is a monoclonal antibody biologic medication that can rapidly utilize the body's calcium and make one susceptible to hypocalcemia. The medication also has a risk of osteonecrosis of the jaw in patients with exposed jaw bone; patient does not plan to get any invasive dental procedures in the near future.       Return to clinic in 2025 for Prolia plus provider visit        History of Present Illness   HPI  Bhavani Amaral is a 82 y.o.  female who presents for follow-up of osteroporosis.  Last clinic visit was 23. Reports TMJ pain was resolved since last visit. Tolerating Prolia injections well. Has back pain from the past 1 month and also  notices she has been hunched down more than before     The following portions of the patient's history were reviewed and updated as appropriate: allergies, current medications, past family history, past medical history, past social history, past surgical history and problem list.      History obtained from: patient    Review of Systems  Rest of ROS are negative except as noted in HPI    Medical History Reviewed by provider this encounter:  Tobacco  Allergies  Meds  Problems  Med Hx  Surg Hx  Fam Hx     .  Past Medical History   Past Medical History:   Diagnosis Date    CKD (chronic kidney disease)     Diabetes mellitus (HCC)     does not take medications, does not monitor blood sugars, pre-diabetic    Disease of thyroid gland     Diverticulosis     GERD (gastroesophageal reflux disease)     Hypertension     Hyponatremia     Ovarian cyst     Rectal bleeding     Wears reading eyeglasses      Past Surgical History:   Procedure Laterality Date    BREAST BIOPSY Right 05/13/2009    Ultrasound Bx. Benign    BREAST SURGERY      CATARACT EXTRACTION Bilateral     COLONOSCOPY      DENTAL SURGERY  10/09/2023    ESOPHAGOGASTRODUODENOSCOPY      IR BIOPSY BONE MARROW  03/18/2022    OOPHORECTOMY Bilateral 2015    SD CMBND ANTERPOST COLPORRAPHY W/CYSTO N/A 06/26/2018    Procedure: ANTERIOR & POSTERIOR COLPORRHAPHY;  Surgeon: Babatunde Quinteros MD;  Location: [facility];  Service: UroGynecology           SD COLPOPEXY VAGINAL EXTRAPERITONEAL APPROACH N/A 06/26/2018    Procedure: ANTERIOR COLPOPEXY VAGINAL EXTRAPERITONEAL (VEC);  Surgeon: Babatunde Quinteros MD;  Location[facility];  Service: UroGynecology           SD CYSTOURETHROSCOPY N/A 06/26/2018    Procedure: CYSTOSCOPY;  Surgeon: Babatunde Quinteros MD;  [facility];  Service: UroGynecology           SD SLING OPERATION STRESS INCONTINENCE N/A 06/26/2018    Procedure: INSERTION PUBOVAGINAL SLING (FEMALE);  Surgeon: Babatunde Quinteros MD;  Location: [facility];  Service:  UroGynecology           TONSILLECTOMY      US GUIDED MSK PROCEDURE  11/25/2020     Family History   Problem Relation Age of Onset    Cervical cancer Mother 68    Breast cancer Maternal Grandmother         Under 50    Mental illness Son     Schizophrenia Son     Cancer Family     No Known Problems Father     No Known Problems Maternal Aunt     No Known Problems Maternal Aunt     Brain cancer Maternal Aunt         age unknown    Cancer Cousin       reports that she has never smoked. She has never been exposed to tobacco smoke. She has never used smokeless tobacco. She reports that she does not drink alcohol and does not use drugs.  Current Outpatient Medications   Medication Instructions    aspirin 81 mg, Daily    Calcium Carbonate-Vitamin D 600-200 MG-UNIT TABS 1 tablet, Every other day    chlorhexidine (PERIDEX) 0.12 % solution No dose, route, or frequency recorded.    docusate sodium (Colace) 100 mg capsule Take by mouth    ezetimibe (ZETIA) 10 mg, Oral, Daily    famotidine (PEPCID) 40 mg, Oral, Daily at bedtime    folic acid (FOLVITE) 1 mg, Oral, Daily    Glucosamine-Chondroitin 250-200 MG TABS 1 tablet, Daily    hydroxyurea (HYDREA) 500 mg capsule Take 1 tablet (500 mg total) by mouth in the morning. Take 2 tablets (1000 mg total) by mouth in the evening.    levothyroxine 50 mcg, Oral, Daily    lidocaine (LMX) 4 % cream Topical, As needed    lisinopril (ZESTRIL) 2.5 mg, Oral, Daily    MAGNESIUM CITRATE PO Take by mouth    Multiple Vitamins-Iron (DAILY MULTIPLE VITAMIN/IRON) TABS 1 tablet, Daily    Omega-3 Fatty Acids ([employer] FISH OIL) 1200 MG CAPS 1 capsule, 2 times daily    Polyethylene Glycol 3350 (MIRALAX MIX-IN PAX PO) Take by mouth   No Known Allergies   Current Outpatient Medications on File Prior to Visit   Medication Sig Dispense Refill    aspirin 81 MG tablet Take 81 mg by mouth daily        Calcium Carbonate-Vitamin D 600-200 MG-UNIT TABS Take 1 tablet by mouth every other day        chlorhexidine  "(PERIDEX) 0.12 % solution       ezetimibe (ZETIA) 10 mg tablet Take 1 tablet (10 mg total) by mouth daily 90 tablet 1    famotidine (PEPCID) 40 MG tablet Take 1 tablet (40 mg total) by mouth daily at bedtime 90 tablet 1    folic acid (FOLVITE) 1 mg tablet Take 1 tablet (1 mg total) by mouth daily 90 tablet 0    Glucosamine-Chondroitin 250-200 MG TABS Take 1 tablet by mouth daily        hydroxyurea (HYDREA) 500 mg capsule Take 1 tablet (500 mg total) by mouth in the morning. Take 2 tablets (1000 mg total) by mouth in the evening. (Patient taking differently: Take 1 tablet (500 mg total) by mouth in the morning. Take 1 tablets (500 mg total) by mouth in the evening.) 90 capsule 1    levothyroxine 50 mcg tablet Take 1 tablet (50 mcg total) by mouth daily 90 tablet 1    lisinopril (ZESTRIL) 2.5 mg tablet Take 1 tablet (2.5 mg total) by mouth daily 90 tablet 1    MAGNESIUM CITRATE PO Take by mouth      Multiple Vitamins-Iron (DAILY MULTIPLE VITAMIN/IRON) TABS Take 1 tablet by mouth daily        Omega-3 Fatty Acids ([employer] FISH OIL) 1200 MG CAPS Take 1 capsule by mouth 2 (two) times a day      Polyethylene Glycol 3350 (MIRALAX MIX-IN PAX PO) Take by mouth (Patient taking differently: Take by mouth PRN)      docusate sodium (Colace) 100 mg capsule Take by mouth (Patient not taking: Reported on 3/11/2025)       Current Facility-Administered Medications on File Prior to Visit   Medication Dose Route Frequency Provider Last Rate Last Admin    denosumab (PROLIA) subcutaneous injection 60 mg  60 mg Subcutaneous Q6 Months    60 mg at 01/23/25 0929      Social History     Tobacco Use    Smoking status: Never     Passive exposure: Never    Smokeless tobacco: Never   Vaping Use    Vaping status: Never Used   Substance and Sexual Activity    Alcohol use: No    Drug use: No    Sexual activity: Never     Partners: Male     Birth control/protection: None        Objective   /78   Ht 4' 9\" (1.448 m)   BMI 32.57 kg/m²      Physical " Exam  Constitutional:       General: She is not in acute distress.  HENT:      Head: Normocephalic and atraumatic.   Eyes:      Conjunctiva/sclera: Conjunctivae normal.   Cardiovascular:      Rate and Rhythm: Normal rate and regular rhythm.      Heart sounds: S1 normal and S2 normal.     No friction rub.   Pulmonary:      Effort: Pulmonary effort is normal. No respiratory distress.      Breath sounds: Normal breath sounds. No wheezing, rhonchi or rales.   Musculoskeletal:      Cervical back: Neck supple.   Skin:     Coloration: Skin is not pale.   Neurological:      Mental Status: She is alert. Mental status is at baseline.   Psychiatric:         Mood and Affect: Mood normal.         Behavior: Behavior normal.

## 2025-03-11 NOTE — ASSESSMENT & PLAN NOTE
Bhavani Amaral is a 84 y.o.  female who presents for follow-up of her osteoporosis. She got her last Prolia injection in Jan 2025. Had dental work two weeks ago. She no longer has jaw pain. Her DEXA scan from Nov 2023 showed T-score of -2.4 in left femoral neck but still has Osteoporosis based on FRAX score. She has compression deformity of L4 on CT abdomen in 2024.     Do back x-rays  Schedule DEXA scan for 11/2025  Do Vit. D level lab with next bloodwork  Use lidocaine cream as needed for back pain  Continue daily Calcium and Vit. D  Next Prolia will be given at next visit on 8/5/25    Return to clinic in 8/2025 for Prolia plus provider visit      Orders:    DXA bone density spine hip and pelvis; Future    Vitamin D 25 hydroxy; Future    XR spine thoracic 2 vw; Future    XR spine lumbar minimum 4 views non injury; Future

## 2025-03-11 NOTE — PATIENT INSTRUCTIONS
Do back x-rays  Schedule DEXA scan for 11/2025  Do Vit. D level lab with next bloodwork  Use lidocaine cream as needed for back pain  Continue daily Calcium and Vit. D    Return to clinic in 8/2025 for Prolia plus provider visit

## 2025-03-12 ENCOUNTER — HOSPITAL ENCOUNTER (OUTPATIENT)
Dept: RADIOLOGY | Facility: HOSPITAL | Age: 85
Discharge: HOME | End: 2025-03-12
Payer: MEDICARE

## 2025-03-12 DIAGNOSIS — M81.0 AGE-RELATED OSTEOPOROSIS WITHOUT CURRENT PATHOLOGICAL FRACTURE: ICD-10-CM

## 2025-03-12 PROCEDURE — 72072 X-RAY EXAM THORAC SPINE 3VWS: CPT

## 2025-03-12 PROCEDURE — 72110 X-RAY EXAM L-2 SPINE 4/>VWS: CPT

## 2025-03-17 ENCOUNTER — RA CDI HCC (OUTPATIENT)
Dept: OTHER | Facility: HOSPITAL | Age: 85
End: 2025-03-17

## 2025-03-18 ENCOUNTER — TELEPHONE (OUTPATIENT)
Age: 85
End: 2025-03-18

## 2025-03-22 NOTE — TELEPHONE ENCOUNTER
Please let her know that her lumbar spine-xrays only show wear and tear arthritis changes causing the hunchback appearance, there's no new fractures

## 2025-03-24 ENCOUNTER — OFFICE VISIT (OUTPATIENT)
Dept: FAMILY MEDICINE CLINIC | Facility: CLINIC | Age: 85
End: 2025-03-24
Payer: MEDICARE

## 2025-03-24 VITALS
OXYGEN SATURATION: 97 % | HEART RATE: 94 BPM | TEMPERATURE: 98 F | WEIGHT: 145.6 LBS | DIASTOLIC BLOOD PRESSURE: 80 MMHG | HEIGHT: 57 IN | SYSTOLIC BLOOD PRESSURE: 134 MMHG | BODY MASS INDEX: 31.41 KG/M2 | RESPIRATION RATE: 16 BRPM

## 2025-03-24 DIAGNOSIS — N18.32 STAGE 3B CHRONIC KIDNEY DISEASE (HCC): ICD-10-CM

## 2025-03-24 DIAGNOSIS — I10 ESSENTIAL HYPERTENSION: Primary | ICD-10-CM

## 2025-03-24 DIAGNOSIS — D47.3 ESSENTIAL THROMBOCYTOSIS (HCC): ICD-10-CM

## 2025-03-24 DIAGNOSIS — N18.30 CONTROLLED TYPE 2 DIABETES MELLITUS WITH STAGE 3 CHRONIC KIDNEY DISEASE, WITHOUT LONG-TERM CURRENT USE OF INSULIN (HCC): ICD-10-CM

## 2025-03-24 DIAGNOSIS — E11.22 CONTROLLED TYPE 2 DIABETES MELLITUS WITH STAGE 3 CHRONIC KIDNEY DISEASE, WITHOUT LONG-TERM CURRENT USE OF INSULIN (HCC): ICD-10-CM

## 2025-03-24 DIAGNOSIS — E03.9 HYPOTHYROIDISM, UNSPECIFIED TYPE: ICD-10-CM

## 2025-03-24 DIAGNOSIS — E78.5 HYPERLIPIDEMIA, UNSPECIFIED HYPERLIPIDEMIA TYPE: ICD-10-CM

## 2025-03-24 PROBLEM — E87.1 HYPONATREMIA: Status: RESOLVED | Noted: 2018-11-29 | Resolved: 2025-03-24

## 2025-03-24 PROCEDURE — 99214 OFFICE O/P EST MOD 30 MIN: CPT | Performed by: FAMILY MEDICINE

## 2025-03-24 PROCEDURE — G2211 COMPLEX E/M VISIT ADD ON: HCPCS | Performed by: FAMILY MEDICINE

## 2025-03-24 NOTE — PROGRESS NOTES
"Name: Bhavani Amaral      : 1940      MRN: [id number]  Encounter Provider: Kiley Gregory MD  Encounter Date: 3/24/2025   Encounter department: [facility] PRIMARY CARE  :  Assessment & Plan  Essential thrombocytosis (HCC)  Sees hematology on hydroxyurea    Orders:    CBC and differential; Future    Controlled type 2 diabetes mellitus with stage 3 chronic kidney disease, without long-term current use of insulin (HCC)    Lab Results   Component Value Date    HGBA1C 6.4 (H) 2025   Kidney function and   sugar  stable    Orders:    Comprehensive metabolic panel; Future    Hemoglobin A1C; Future    Stage 3b chronic kidney disease (HCC)  Lab Results   Component Value Date    EGFR 47 2025    EGFR 44 2025    EGFR 44 2025    CREATININE 1.09 2025    CREATININE 1.14 2025    CREATININE 1.14 2025       Orders:    Comprehensive metabolic panel; Future    Essential hypertension  Bp stable on lisinopril  Orders:    Comprehensive metabolic panel; Future    Hyperlipidemia, unspecified hyperlipidemia type  Ldl   75,on zetia    Orders:    Lipid panel; Future    Hypothyroidism, unspecified type  On levothyroxine 50  Orders:    TSH, 3rd generation; Future           History of Present Illness   HPI  Review of Systems   Constitutional:  Negative for activity change, appetite change and fatigue.   Respiratory:  Negative for shortness of breath.    Cardiovascular:  Negative for chest pain.   Musculoskeletal:  Positive for back pain.   Neurological:  Negative for dizziness, facial asymmetry and light-headedness.   Hematological:  Negative for adenopathy.   Psychiatric/Behavioral:  The patient is not nervous/anxious.        Objective   /78 (BP Location: Right arm, Patient Position: Sitting, Cuff Size: Adult)   Pulse 94   Temp 98 °F (36.7 °C) (Temporal)   Resp 16   Ht 4' 9\" (1.448 m)   Wt 66 kg (145 lb 9.6 oz)   SpO2 97%   BMI 31.51 kg/m²      Physical Exam  Vitals reviewed. "   Constitutional:       Appearance: Normal appearance. She is obese.   Cardiovascular:      Rate and Rhythm: Normal rate and regular rhythm.      Pulses: Normal pulses.      Heart sounds: Normal heart sounds.   Pulmonary:      Effort: Pulmonary effort is normal.      Breath sounds: Normal breath sounds.   Musculoskeletal:      Right lower leg: No edema.      Left lower leg: No edema.   Lymphadenopathy:      Cervical: No cervical adenopathy.   Neurological:      Mental Status: She is alert.   Psychiatric:         Mood and Affect: Mood normal.

## 2025-03-24 NOTE — TELEPHONE ENCOUNTER
Pt called back went over results with her. She is aware that the lumbar spine Xray show wear and tear arthritis changes that cause the hunchback appearance, there are no new fractures. Pt asked what can she do to help with her back or improve. Informed can try working on bettering posture, treating the symptoms as they come with tylenol or motrin and stretches. Pt states she will give that a try.

## 2025-03-24 NOTE — ASSESSMENT & PLAN NOTE
Lab Results   Component Value Date    HGBA1C 6.4 (H) 01/09/2025   Kidney function and   sugar  stable    Orders:    Comprehensive metabolic panel; Future    Hemoglobin A1C; Future

## 2025-03-24 NOTE — ASSESSMENT & PLAN NOTE
Lab Results   Component Value Date    EGFR 47 02/19/2025    EGFR 44 01/09/2025    EGFR 44 01/09/2025    CREATININE 1.09 02/19/2025    CREATININE 1.14 01/09/2025    CREATININE 1.14 01/09/2025       Orders:    Comprehensive metabolic panel; Future

## 2025-03-25 ENCOUNTER — TELEPHONE (OUTPATIENT)
Age: 85
End: 2025-03-25

## 2025-03-25 NOTE — TELEPHONE ENCOUNTER
Patient called to request the order for diabetic foot shoes be faxed to Dr. Fam at fax number [phone]. This was scanned into media on 2/17/25. Ple fax.

## 2025-04-18 ENCOUNTER — HOSPITAL ENCOUNTER (OUTPATIENT)
Dept: RADIOLOGY | Facility: HOSPITAL | Age: 85
Discharge: HOME | End: 2025-04-18
Payer: MEDICARE

## 2025-04-18 DIAGNOSIS — K59.09 OTHER CONSTIPATION: ICD-10-CM

## 2025-04-18 PROCEDURE — 74022 RADEX COMPL AQT ABD SERIES: CPT

## 2025-04-21 DIAGNOSIS — Z79.64 ON HYDROXYUREA THERAPY: ICD-10-CM

## 2025-04-21 DIAGNOSIS — D47.3 ESSENTIAL THROMBOCYTOSIS (HCC): ICD-10-CM

## 2025-04-21 RX ORDER — FOLIC ACID 1 MG/1
1 TABLET ORAL DAILY
Qty: 90 TABLET | Refills: 1 | Status: SHIPPED | OUTPATIENT
Start: 2025-04-21

## 2025-04-21 NOTE — TELEPHONE ENCOUNTER
Reason for call:   [x] Refill   [] Prior Auth  [] Other:     Office:   [] PCP/Provider -   [x] [facility]Provider [facility]     Medication:  folic acid (FOLVITE) 1 mg tablet    Dose/Frequency: Take 1 tablet (1 mg total) by mouth daily     Quantity: 90    Pharmacy: [place] [phone]    Local Pharmacy   Does the patient have enough for 3 days?   [x] Yes   [] No - Send as HP to POD

## 2025-05-23 ENCOUNTER — TELEPHONE (OUTPATIENT)
Age: 85
End: 2025-05-23

## 2025-05-23 DIAGNOSIS — D47.3 ESSENTIAL THROMBOCYTOSIS (HCC): Primary | ICD-10-CM

## 2025-05-23 DIAGNOSIS — Z79.64 ON HYDROXYUREA THERAPY: ICD-10-CM

## 2025-05-23 DIAGNOSIS — D50.8 OTHER IRON DEFICIENCY ANEMIA: ICD-10-CM

## 2025-05-23 DIAGNOSIS — Z15.89 JAK2 GENE MUTATION: ICD-10-CM

## 2025-05-23 DIAGNOSIS — R53.0 NEOPLASTIC (MALIGNANT) RELATED FATIGUE: ICD-10-CM

## 2025-05-23 NOTE — TELEPHONE ENCOUNTER
Patient advised that standing lab orders for CBC-D and CMP every 12 weeks has been re-ordered. She may get her labs collected as early as today.    Patient verbalized understanding and agreement.

## 2025-05-29 ENCOUNTER — APPOINTMENT (OUTPATIENT)
Dept: LAB | Facility: CLINIC | Age: 85
End: 2025-05-29
Payer: MEDICARE

## 2025-05-29 DIAGNOSIS — M81.0 AGE-RELATED OSTEOPOROSIS WITHOUT CURRENT PATHOLOGICAL FRACTURE: ICD-10-CM

## 2025-05-29 DIAGNOSIS — I10 ESSENTIAL HYPERTENSION: ICD-10-CM

## 2025-05-29 DIAGNOSIS — D50.8 OTHER IRON DEFICIENCY ANEMIA: ICD-10-CM

## 2025-05-29 DIAGNOSIS — R53.0 NEOPLASTIC (MALIGNANT) RELATED FATIGUE: ICD-10-CM

## 2025-05-29 DIAGNOSIS — E78.5 HYPERLIPIDEMIA, UNSPECIFIED HYPERLIPIDEMIA TYPE: ICD-10-CM

## 2025-05-29 DIAGNOSIS — N18.32 STAGE 3B CHRONIC KIDNEY DISEASE (HCC): ICD-10-CM

## 2025-05-29 DIAGNOSIS — Z15.89 JAK2 GENE MUTATION: ICD-10-CM

## 2025-05-29 DIAGNOSIS — D47.3 ESSENTIAL THROMBOCYTOSIS (HCC): ICD-10-CM

## 2025-05-29 DIAGNOSIS — Z79.64 ON HYDROXYUREA THERAPY: ICD-10-CM

## 2025-05-29 DIAGNOSIS — E11.22 CONTROLLED TYPE 2 DIABETES MELLITUS WITH STAGE 3 CHRONIC KIDNEY DISEASE, WITHOUT LONG-TERM CURRENT USE OF INSULIN (HCC): ICD-10-CM

## 2025-05-29 DIAGNOSIS — N18.30 CONTROLLED TYPE 2 DIABETES MELLITUS WITH STAGE 3 CHRONIC KIDNEY DISEASE, WITHOUT LONG-TERM CURRENT USE OF INSULIN (HCC): ICD-10-CM

## 2025-05-29 DIAGNOSIS — E03.9 HYPOTHYROIDISM, UNSPECIFIED TYPE: ICD-10-CM

## 2025-05-29 LAB
25(OH)D3 SERPL-MCNC: 36.5 NG/ML (ref 30–100)
ALBUMIN SERPL BCG-MCNC: 4 G/DL (ref 3.5–5)
ALP SERPL-CCNC: 54 U/L (ref 34–104)
ALT SERPL W P-5'-P-CCNC: 9 U/L (ref 7–52)
ANION GAP SERPL CALCULATED.3IONS-SCNC: 9 MMOL/L (ref 4–13)
AST SERPL W P-5'-P-CCNC: 11 U/L (ref 13–39)
BASOPHILS # BLD AUTO: 0.07 THOUSANDS/ÂΜL (ref 0–0.1)
BASOPHILS NFR BLD AUTO: 2 % (ref 0–1)
BILIRUB SERPL-MCNC: 0.4 MG/DL (ref 0.2–1)
BUN SERPL-MCNC: 16 MG/DL (ref 5–25)
CALCIUM SERPL-MCNC: 9.1 MG/DL (ref 8.4–10.2)
CHLORIDE SERPL-SCNC: 99 MMOL/L (ref 96–108)
CHOLEST SERPL-MCNC: 152 MG/DL (ref ?–200)
CO2 SERPL-SCNC: 26 MMOL/L (ref 21–32)
CREAT SERPL-MCNC: 1.05 MG/DL (ref 0.6–1.3)
EOSINOPHIL # BLD AUTO: 0.04 THOUSAND/ÂΜL (ref 0–0.61)
EOSINOPHIL NFR BLD AUTO: 1 % (ref 0–6)
ERYTHROCYTE [DISTWIDTH] IN BLOOD BY AUTOMATED COUNT: 14.5 % (ref 11.6–15.1)
EST. AVERAGE GLUCOSE BLD GHB EST-MCNC: 114 MG/DL
GFR SERPL CREATININE-BSD FRML MDRD: 49 ML/MIN/1.73SQ M
GLUCOSE P FAST SERPL-MCNC: 92 MG/DL (ref 65–99)
HBA1C MFR BLD: 5.6 %
HCT VFR BLD AUTO: 31.7 % (ref 34.8–46.1)
HDLC SERPL-MCNC: 36 MG/DL
HGB BLD-MCNC: 10.7 G/DL (ref 11.5–15.4)
IMM GRANULOCYTES # BLD AUTO: 0.01 THOUSAND/UL (ref 0–0.2)
IMM GRANULOCYTES NFR BLD AUTO: 0 % (ref 0–2)
LDLC SERPL CALC-MCNC: 82 MG/DL (ref 0–100)
LYMPHOCYTES # BLD AUTO: 1.99 THOUSANDS/ÂΜL (ref 0.6–4.47)
LYMPHOCYTES NFR BLD AUTO: 43 % (ref 14–44)
MCH RBC QN AUTO: 43.1 PG (ref 26.8–34.3)
MCHC RBC AUTO-ENTMCNC: 33.8 G/DL (ref 31.4–37.4)
MCV RBC AUTO: 128 FL (ref 82–98)
MONOCYTES # BLD AUTO: 0.38 THOUSAND/ÂΜL (ref 0.17–1.22)
MONOCYTES NFR BLD AUTO: 8 % (ref 4–12)
NEUTROPHILS # BLD AUTO: 2.09 THOUSANDS/ÂΜL (ref 1.85–7.62)
NEUTS SEG NFR BLD AUTO: 46 % (ref 43–75)
NONHDLC SERPL-MCNC: 116 MG/DL
NRBC BLD AUTO-RTO: 0 /100 WBCS
PLATELET # BLD AUTO: 327 THOUSANDS/UL (ref 149–390)
PMV BLD AUTO: 8.9 FL (ref 8.9–12.7)
POTASSIUM SERPL-SCNC: 4.2 MMOL/L (ref 3.5–5.3)
PROT SERPL-MCNC: 7.7 G/DL (ref 6.4–8.4)
RBC # BLD AUTO: 2.48 MILLION/UL (ref 3.81–5.12)
SODIUM SERPL-SCNC: 134 MMOL/L (ref 135–147)
TRIGL SERPL-MCNC: 169 MG/DL (ref ?–150)
TSH SERPL DL<=0.05 MIU/L-ACNC: 3.2 UIU/ML (ref 0.45–4.5)
WBC # BLD AUTO: 4.58 THOUSAND/UL (ref 4.31–10.16)

## 2025-05-29 PROCEDURE — 84443 ASSAY THYROID STIM HORMONE: CPT

## 2025-05-29 PROCEDURE — 80061 LIPID PANEL: CPT

## 2025-05-29 PROCEDURE — 80053 COMPREHEN METABOLIC PANEL: CPT

## 2025-05-29 PROCEDURE — 83036 HEMOGLOBIN GLYCOSYLATED A1C: CPT

## 2025-05-29 PROCEDURE — 36415 COLL VENOUS BLD VENIPUNCTURE: CPT

## 2025-05-29 PROCEDURE — 82306 VITAMIN D 25 HYDROXY: CPT

## 2025-05-29 PROCEDURE — 85025 COMPLETE CBC W/AUTO DIFF WBC: CPT

## 2025-05-30 ENCOUNTER — RESULTS FOLLOW-UP (OUTPATIENT)
Dept: FAMILY MEDICINE CLINIC | Facility: CLINIC | Age: 85
End: 2025-05-30

## 2025-05-30 DIAGNOSIS — D53.9 MACROCYTIC ANEMIA: Primary | ICD-10-CM

## 2025-06-02 DIAGNOSIS — D47.3 ESSENTIAL THROMBOCYTOSIS (HCC): ICD-10-CM

## 2025-06-02 DIAGNOSIS — Z15.89 JAK2 GENE MUTATION: ICD-10-CM

## 2025-06-02 RX ORDER — HYDROXYUREA 500 MG/1
CAPSULE ORAL
Qty: 90 CAPSULE | Refills: 1 | Status: SHIPPED | OUTPATIENT
Start: 2025-06-02

## 2025-06-02 NOTE — TELEPHONE ENCOUNTER
Received a phone call from patient.  Patient inquiring about platelet count from 5/29.  Platelets=327.  Patient requesting a refill of Hydroxyurea.  Patient inquiring dose as she has been taking one tablet in the AM and one tablet in the PM, but prescription states one tablet in the AM and two tablets in the PM.  Please call patient with updates.

## 2025-06-04 NOTE — TELEPHONE ENCOUNTER
Patient calling to clarify dosage of the Hydroxyurea, states has only taken 2 daily not 3. Would like to receive a call on directions, and possible decrease due to her platelets. Please return her call at [phone]

## 2025-06-06 ENCOUNTER — RESULTS FOLLOW-UP (OUTPATIENT)
Dept: FAMILY MEDICINE CLINIC | Facility: CLINIC | Age: 85
End: 2025-06-06

## 2025-06-06 ENCOUNTER — APPOINTMENT (OUTPATIENT)
Dept: LAB | Facility: CLINIC | Age: 85
End: 2025-06-06
Payer: MEDICARE

## 2025-06-06 DIAGNOSIS — D53.9 MACROCYTIC ANEMIA: ICD-10-CM

## 2025-06-06 LAB
FOLATE SERPL-MCNC: >22.3 NG/ML
VIT B12 SERPL-MCNC: 391 PG/ML (ref 180–914)

## 2025-06-06 PROCEDURE — 82607 VITAMIN B-12: CPT | Performed by: FAMILY MEDICINE

## 2025-06-06 PROCEDURE — 36415 COLL VENOUS BLD VENIPUNCTURE: CPT

## 2025-06-06 PROCEDURE — 82746 ASSAY OF FOLIC ACID SERUM: CPT

## 2025-06-09 ENCOUNTER — TELEPHONE (OUTPATIENT)
Age: 85
End: 2025-06-09

## 2025-06-09 DIAGNOSIS — Z15.89 JAK2 GENE MUTATION: ICD-10-CM

## 2025-06-09 DIAGNOSIS — Z79.64 ON HYDROXYUREA THERAPY: ICD-10-CM

## 2025-06-09 DIAGNOSIS — D47.3 ESSENTIAL THROMBOCYTOSIS (HCC): Primary | ICD-10-CM

## 2025-06-09 NOTE — TELEPHONE ENCOUNTER
Patient  calling to speak with Dr. Herrera regarding her lab results.  She states this is her 2nd time calling and no one has called her back

## 2025-06-09 NOTE — TELEPHONE ENCOUNTER
Relayed results to patient as per provider message.     Kiley Gregory MD  6/6/2025  3:50 PM   B12 and  folate  normal, follow  up  with hematology  for  macrocytosis (  large size  of  red  blood  cells)    Patient expressed understanding and will follow up with Dr. Herrera (Hemotology)

## 2025-06-09 NOTE — TELEPHONE ENCOUNTER
"PROVIDER: Dr Herrera    Outreach to pt to f/u on her questions about her lab result/CBC    Pt stated that Dr Gregory/PCP had called her and she was told she has \"macrocytosis\"  She would like Dr Herrera to call her back about this.    Pt also wanted Dr Herrera to know she has been taking the Hydrea 1 tab AM and 1 tab PM and remains tired.  RX directions say 1 tab AM 2 tabs PM pt stated she has never taken it that way and stated she told him that.    She also stated she has been following with  GI Dr Nolberto Haines for constipation and is taking Lubiprostine(Amitiza) 24mg 2 tabs daily and she is moving her bowels daily but difficult now with hemmoroids.   Advised pt to reach out to them for further advise ,pt understood and she will.  Pt stated she had seen them 4/18 had abd xray done and they never called her back so she will reach out to them.    Pls advise on CBC results done 5/29  "

## 2025-06-09 NOTE — TELEPHONE ENCOUNTER
Returned telephone call spoke with Pt.  Dr Herrera is out of the office until Monday.  Apologized that she called 2 x since last week.  Explained Dr Herrera is out for 2 weeks and he will back on Monday.

## 2025-06-09 NOTE — TELEPHONE ENCOUNTER
I recommend patient be seen in the next 2 to 3 weeks her hemoglobin has gone up from 12.0 down to 10.7

## 2025-06-09 NOTE — TELEPHONE ENCOUNTER
Pt re-scheduled for 8/25/25 (due to provider being out of office on 8/15/25).    Pt asking if  would like to see her sooner due to her lab results.     Pt scheduled w/, if sooner appointment needed both would need to be rescheduled.    Please advise - [phone]

## 2025-06-16 ENCOUNTER — APPOINTMENT (OUTPATIENT)
Dept: LAB | Facility: CLINIC | Age: 85
End: 2025-06-16
Payer: MEDICARE

## 2025-06-16 DIAGNOSIS — D47.3 ESSENTIAL THROMBOCYTOSIS (HCC): ICD-10-CM

## 2025-06-16 DIAGNOSIS — Z15.89 JAK2 GENE MUTATION: ICD-10-CM

## 2025-06-16 DIAGNOSIS — Z79.64 ON HYDROXYUREA THERAPY: ICD-10-CM

## 2025-06-16 PROCEDURE — 36415 COLL VENOUS BLD VENIPUNCTURE: CPT

## 2025-06-16 PROCEDURE — 85007 BL SMEAR W/DIFF WBC COUNT: CPT

## 2025-06-16 NOTE — TELEPHONE ENCOUNTER
Per DR. Herrera  I'd like her to get a peripheral smear done asap and then we can discuss it further. I want to see how her blood cells look under the microscope and then we can discuss next steps.     Her Vit B12 and folate are normal.    Labs ordered and patient called.   [facility]  [address]  [place] [address]  Phone: [phone]  Fax: [phone]                                    Ada Plasencia   MRN: [id number]    Department: [facility]   Date of Visit: 10/13/2020           After Visit Summary Signature Page    I have received my discharge instructions, and my questions have been answered. I have discussed any challenges I see with this plan with the nurse or doctor.    ..........................................................................................................................................  Patient/Patient Representative Signature      ..........................................................................................................................................  Patient Representative Print Name and Relationship to Patient    ..................................................               ................................................  Date                                   Time    ..........................................................................................................................................  Reviewed by Signature/Title    ...................................................              ..............................................  Date                                               Time          22EPIC Rev 08/18

## 2025-06-16 NOTE — TELEPHONE ENCOUNTER
Call out to patient and reviewed patient is able to alternate 2 tablets with 1 tablet every other day.    Patient asked for lab appointment to be made at [facility] lab.    Appointment made with patient permission at 1030 am on 6/16.

## 2025-06-16 NOTE — ADDENDUM NOTE
Addended by: NUPUR BANUELOS on: 6/16/2025 09:49 AM     Modules accepted: Orders     normal sinus rhythm

## 2025-06-16 NOTE — TELEPHONE ENCOUNTER
"Patient is having great fatigue. She is asking since the plts were \"Normal\" the last six months.     Patient is asking if she can alternate every other day with 2 tablets and 1 tablet.  "

## 2025-06-19 LAB
ANISOCYTOSIS BLD QL SMEAR: PRESENT
BASOPHILS NFR MAR MANUAL: 3 % (ref 0–1)
DACRYOCYTES BLD QL SMEAR: PRESENT
EOSINOPHIL NFR BLD MANUAL: 1 % (ref 0–6)
LYMPHOCYTES # BLD AUTO: 32 %
MACROCYTES BLD QL AUTO: PRESENT
MONOCYTES NFR BLD AUTO: 10 % (ref 4–12)
NEUTS SEG NFR BLD AUTO: 35 %
NRBC BLD AUTO-RTO: 1 /100 WBC (ref 0–2)
OVALOCYTES BLD QL SMEAR: PRESENT
PATHOLOGY REVIEW: YES
POIKILOCYTOSIS BLD QL SMEAR: PRESENT
POLYCHROMASIA BLD QL SMEAR: PRESENT
RBC MORPH BLD: PRESENT
TOTAL CELLS COUNTED SPEC: 100
VARIANT LYMPHS # BLD AUTO: 19 % (ref 0–0)

## 2025-06-19 PROCEDURE — 85060 BLOOD SMEAR INTERPRETATION: CPT | Performed by: STUDENT IN AN ORGANIZED HEALTH CARE EDUCATION/TRAINING PROGRAM

## 2025-06-20 NOTE — TELEPHONE ENCOUNTER
Returned call to patient post review of labs with Dr. Herrera, patient is to continue current treatment and dose. NO labs are needed prior to 7/1 appointment    Patient agreeable and appreciative of call.

## 2025-06-22 NOTE — ASSESSMENT & PLAN NOTE
This is a 84 y.o. c PMHx notable for CKDIIIB, DM, GERD, HTN, mild hyponatremia, b/l TMJ, osteoporosis now on Prolia, being seen in consultation for ET treated on hydrea  Orders:    CBC and differential; Standing    Comprehensive metabolic panel; Standing

## 2025-06-22 NOTE — PROGRESS NOTES
Name: Bhavani Amaral      : 1940      MRN: [id number]  Encounter Provider: Hernandez Herrera MD  Encounter Date: 2025   Encounter department: [facility]  :  Assessment & Plan  Essential thrombocytosis (HCC)    This is a 84 y.o. c PMHx notable for CKDIIIB, DM, GERD, HTN, mild hyponatremia, b/l TMJ, osteoporosis now on Prolia, being seen in consultation for ET treated on hydrea  Orders:    CBC and differential; Standing    Comprehensive metabolic panel; Standing      Macrocytosis  seen is expected from the hydrea.  Assessment & Plan        No follow-ups on file.    History of Present Illness   No chief complaint on file.  History of Present Illness            Objective   There were no vitals taken for this visit.    Physical Exam  Physical Exam  Cardiovascular: Heart auscultation performed.  Respiratory: Lung auscultation performed.    Results        Referral Physician: No ref. provider found    Primary Care Physician:  Kiley Gregory MD     Nickname: Bhavani    Spouse: Ramo Lou ECO    Today's ECO    Goals and Barriers:  Current Goal: Minimize effects of disease burden, extend life.   Barriers to accomplishing this: None    Patient's Capacity to Self Care:  Patient is able to self care      This is a 84 y.o. c PMHx notable for CKDIIIB, DM, GERD, HTN, mild hyponatremia, b/l TMJ, osteoporosis now on Prolia, being seen in consultation for ET treated on hydrea    The platelet count has nearly normalized on hydrea 500mg daily dosing. Pt is tolerating it well    ET induced fatigue is stable    Discussion of decision making    I personally reviewed the following lab results, the image studies, pathology, other specialty/physicians consult notes and recommendations, and outside medical records. I had a lengthy discussion with the patient and shared the work-up findings. We discussed the diagnosis and management plan as below. I spent 12 minutes reviewing the  records (labs, clinician notes, outside records, medical history, ordering medicine/tests/procedures, interpreting the imaging/labs previously done) and coordination of care as well as direct time with the patient today, of which greater than 50% of the time was spent in counseling and coordination of care with the patient/family.      Plan/Labs  CBC, CMP q3 months as standing for 2 lab draws  Cont hydrea 500mg on one day and 1000 mg on alternating days  She gets Prolia via [facility]        Follow Up: 6 months    All questions were answered to the patient's satisfaction during this encounter. The patient knows the contact information for our office and knows to reach out for any relevant concerns related to this encounter. They are to call for any temperature 100.4 or higher, new symptoms including but not restricted to shaking chills, decreased appetite, nausea, vomiting, diarrhea, increased fatigue, shortness of breath or chest pain, confusion, and not feeling the strength to come to the clinic. For all other listed problems and medical diagnosis in their chart - they are managed by PCP and/or other specialists, which the patient acknowledges. Thank you very much for your consultation and making us a part of this patient's care. We are continuing to follow closely with you. Please do not hesitate to reach out to me with any additional questions or concerns.    Hernandez Herrera MD  Hematology & Medical Oncology Staff Physician             Disclaimer: This document was prepared using [employer] Fluency Direct technology. If a word or phrase is confusing, or does not make sense, this is likely due to recognition error which was not discovered during this clinician's review. If you believe an error has occurred, please contact me through [facility] service line for ovidio?cation.      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  creat 1.19 (9/13/2023)  10/5/2023: WBC 7.95k, Hgb 12.9, , RDW 14.1, plt 477k  4/19/2024: plt 447k  10/24/2024: WBC 8.64k, Hgb 13.2, , plt 490k  1/9/2025: plt 541k    1/17/2025: asked her to take Hydrea 500 mg in AM and 1000 mg evening  2/19/2025: plt 328k  2/21/2025: restored to hydrea 500 mg BID per her   request  6/2025: started taking alternating days of Hydrea 1000 mg and 500 mg  5/29/2025: peripheral smear with atypical lymphs, macrocytic cells. Vit B12 and folate are normal  5/29/2025: , Hgb 10.7, plt 327k  6/26/2025: Hgb 10.7, WBC 6.66k, plt 311k    SUBJECTIVE  (INTERVAL HISTORY)      Doing relatively well, no acute issues. Mild fatigue. Feels relatively good.      I have reviewed the relevant past medical, surgical, social and family history. I have also reviewed allergies and medications for this patient.    Review of Systems    Baseline weight: 145-150 lbs    Denies F/C, N/V, SOB, CP, LH, HA, rash, itching, gen weakness, melena, hematuria, hematochezia, falls, diarrhea, or constipation       A 10-point review of system was performed, pertinent positive and negative were detailed as above. Otherwise, the 10-point review of system was negative.      Past Medical History:   Diagnosis Date    CKD (chronic kidney disease)     Diabetes mellitus (HCC)     does not take medications, does not monitor blood sugars, pre-diabetic    Disease of thyroid gland     Diverticulosis     GERD (gastroesophageal reflux disease)     Hypertension      Hyponatremia     Ovarian cyst     Rectal bleeding     Wears reading eyeglasses        Past Surgical History:   Procedure Laterality Date    BREAST BIOPSY Right 05/13/2009    Ultrasound Bx. Benign    BREAST SURGERY      CATARACT EXTRACTION Bilateral     COLONOSCOPY      DENTAL SURGERY  10/09/2023    ESOPHAGOGASTRODUODENOSCOPY      IR BIOPSY BONE MARROW  03/18/2022    OOPHORECTOMY Bilateral 2015    HI CMBND ANTERPOST COLPORRAPHY W/CYSTO N/A 06/26/2018    Procedure: ANTERIOR & POSTERIOR COLPORRHAPHY;  Surgeon: Babatunde Quinteros MD;  Location: [facility];  Service: UroGynecology           HI COLPOPEXY VAGINAL EXTRAPERITONEAL APPROACH N/A 06/26/2018    Procedure: ANTERIOR COLPOPEXY VAGINAL EXTRAPERITONEAL (VEC);  Surgeon: Babatunde Quinteros MD;  [facility];  Service: UroGynecology           HI CYSTOURETHROSCOPY N/A 06/26/2018    Procedure: CYSTOSCOPY;  Surgeon: Babatunde Quinteros MD;  [facility];  Service: UroGynecology           HI SLING OPERATION STRESS INCONTINENCE N/A 06/26/2018    Procedure: INSERTION PUBOVAGINAL SLING (FEMALE);  Surgeon: Babatunde Quinteros MD;  Location: [facility];  Service: UroGynecology           TONSILLECTOMY      US GUIDED MSK PROCEDURE  11/25/2020       Family History   Problem Relation Name Age of Onset    Cervical cancer Mother  68    Breast cancer Maternal Grandmother          Under 50    Mental illness Son      Schizophrenia Son      Cancer Family      No Known Problems Father      No Known Problems Maternal Aunt Radha     No Known Problems Maternal Aunt Kiley     Brain cancer Maternal Aunt Gisselle         age unknown    Cancer Cousin Josefa        Social History     Socioeconomic History    Marital status: /Civil Union     Spouse name: Not on file    Number of children: 2    Years of education: Not on file    Highest education level: Not on file   Occupational History    Occupation: Retired    Tobacco Use    Smoking status: Never     Passive exposure: Never     Smokeless tobacco: Never   Vaping Use    Vaping status: Never Used   Substance and Sexual Activity    Alcohol use: No    Drug use: No    Sexual activity: Never     Partners: Male     Birth control/protection: None   Other Topics Concern    Not on file   Social History Narrative    Active advance directive     Always uses seat belt     Lives with spouse/house     Patient has a living Will     Power of  in existence     [place]     Supportive and safe          Social Drivers of Health     Financial Resource Strain: Low Risk  (6/21/2023)    Overall Financial Resource Strain (CARDIA)     Difficulty of Paying Living Expenses: Not hard at all   Food Insecurity: No Food Insecurity (6/28/2024)    Nursing - Inadequate Food Risk Classification     Worried About Running Out of Food in the Last Year: Never true     Ran Out of Food in the Last Year: Never true     Ran Out of Food in the Last Year: Not on file   Transportation Needs: No Transportation Needs (6/28/2024)    PRAPARE - Transportation     Lack of Transportation (Medical): No     Lack of Transportation (Non-Medical): No   Physical Activity: Not on file   Stress: Not on file   Social Connections: Not on file   Intimate Partner Violence: Not on file   Housing Stability: Low Risk  (6/28/2024)    Housing Stability Vital Sign     Unable to Pay for Housing in the Last Year: No     Number of Times Moved in the Last Year: 0     Homeless in the Last Year: No       No Known Allergies    Current Outpatient Medications   Medication Sig Dispense Refill    aspirin 81 MG tablet Take 81 mg by mouth daily        Calcium Carbonate-Vitamin D 600-200 MG-UNIT TABS Take 1 tablet by mouth every other day        chlorhexidine (PERIDEX) 0.12 % solution       ezetimibe (ZETIA) 10 mg tablet Take 1 tablet (10 mg total) by mouth daily 90 tablet 1    famotidine (PEPCID) 40 MG tablet Take 1 tablet (40 mg total) by mouth daily at bedtime 90 tablet 1    folic acid (FOLVITE) 1 mg tablet Take  1 tablet (1 mg total) by mouth daily 90 tablet 1    Glucosamine-Chondroitin 250-200 MG TABS Take 1 tablet by mouth daily        hydroxyurea (HYDREA) 500 mg capsule Take 1 tablet (500 mg total) by mouth in the morning. Take 2 tablets (1000 mg total) by mouth in the evening. 90 capsule 1    levothyroxine 50 mcg tablet Take 1 tablet (50 mcg total) by mouth daily 90 tablet 1    lidocaine (LMX) 4 % cream Apply topically as needed for mild pain 60 g 6    lisinopril (ZESTRIL) 2.5 mg tablet Take 1 tablet (2.5 mg total) by mouth daily 90 tablet 1    MAGNESIUM CITRATE PO Take by mouth (Patient not taking: Reported on 3/24/2025)      Multiple Vitamins-Iron (DAILY MULTIPLE VITAMIN/IRON) TABS Take 1 tablet by mouth daily        Omega-3 Fatty Acids ([employer] FISH OIL) 1200 MG CAPS Take 1 capsule by mouth 2 (two) times a day      Polyethylene Glycol 3350 (MIRALAX MIX-IN PAX PO) Take by mouth (Patient taking differently: Take by mouth PRN)       Current Facility-Administered Medications   Medication Dose Route Frequency Provider Last Rate Last Admin    denosumab (PROLIA) subcutaneous injection 60 mg  60 mg Subcutaneous Q6 Months    60 mg at 01/23/25 0929       (Not in a hospital admission)        Objective:     24 Hour Vitals Assessment:     There were no vitals filed for this visit.          PHYSICIAN EXAM:    General: Appearance: alert, cooperative, no distress.  HEENT: Normocephalic, atraumatic. No scleral icterus. conjunctivae clear. EOMI.  Chest: No tenderness to palpation. No open wound noted.  Lungs: Clear to auscultation bilaterally, Respirations unlabored.  Cardiac: Regular rate and rhythm, +S1and S2  Abdomen: Soft, non-tender, non-distended. Bowel sounds are normal.  Extremities:  No edema, cyanosis, clubbing.  Skin: Skin color, turgor are normal. No rashes.  Lymphatics: no palpable supra-cervical, axillary, or inguinal adenopathy  Neurologic: Awake, Alert, and oriented, no gross focal deficits noted b/l.       DATA  REVIEW:    Pathology Result:    Final Diagnosis   Date Value Ref Range Status   03/18/2022   Final    A -C.  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  -  Maturing trilineage hematopoiesis without significant features of dysplasia or increased myeloblasts.    -  Mildly decreased to adequate stainable storage iron.  -  Mildly increased reticulin fibers without overt fibrosis.  -  Negative for collagen fibrosis, granulomata, vasculitis, necrosis.              Image Results:   Image result are reviewed and documented in Hematology/Oncology history. I personally reviewed these images.    XR abdomen obstruction series  Narrative: XR ABDOMEN OBSTRUCTION SERIES    INDICATION: Chronic constipation with abdominal pain.    COMPARISON: CT abdomen 2/4/2024    FINDINGS:    Large volume of colonic stool. Otherwise unremarkable bowel gas pattern.    No pneumoperitoneum.    No pathologic calcification or soft tissue mass.    Mild S-shaped curvature of the visualized thoracolumbar spine.    Examination of the chest reveals clear lungs and a normal cardiomediastinal silhouette.  Impression: Nonobstructive bowel gas pattern with a large volume of colonic stool suggesting constipation.    Resident: Margarette French I, the attending radiologist, have reviewed the images and agree with the final report above.    Workstation performed: [id number]      LABS:  Lab data are reviewed and documented in HemOnc history.       Lab Results   Component Value Date    HGB 10.7 (L) 05/29/2025    HCT 31.7 (L) 05/29/2025     (H) 05/29/2025     05/29/2025    WBC 4.58 05/29/2025    NRBC 1 06/16/2025    ATYLMPCT 19 (H) 06/16/2025     Lab Results   Component Value Date     10/12/2017    K 4.2 05/29/2025    CL 99 05/29/2025    CO2 26 05/29/2025    ANIONGAP 10 05/21/2015    BUN 16 05/29/2025    CREATININE  "1.05 05/29/2025    GLUCOSE 100 05/21/2015    GLUF 92 05/29/2025    CALCIUM 9.1 05/29/2025    CORRECTEDCA 9.7 05/10/2023    AST 11 (L) 05/29/2025    ALT 9 05/29/2025    ALKPHOS 54 05/29/2025    PROT 7.6 06/08/2017    BILITOT 0.4 06/08/2017    EGFR 49 05/29/2025       Lab Results   Component Value Date    IRON 79 10/04/2022    TIBC 244 (L) 10/04/2022    FERRITIN 649 (H) 10/04/2022    FERRITIN 754 (H) 09/07/2022    FERRITIN 731 (H) 06/01/2022    FERRITIN 758 (H) 03/10/2022    FERRITIN 30 02/03/2022    FERRITIN 31 12/06/2019    FERRITIN 22 02/28/2019       Lab Results   Component Value Date    KWHRJCGO28 391 06/06/2025    OGTOWBNC59 644 07/01/2022       No results for input(s): \"WBC\", \"CREAT\", \"PLT\" in the last 72 hours.     By:  Hernandez Herrera MD, 6/22/2025, 9:35 AM                                  "

## 2025-06-23 ENCOUNTER — OFFICE VISIT (OUTPATIENT)
Dept: FAMILY MEDICINE CLINIC | Facility: CLINIC | Age: 85
End: 2025-06-23
Payer: MEDICARE

## 2025-06-23 ENCOUNTER — RESULTS FOLLOW-UP (OUTPATIENT)
Dept: FAMILY MEDICINE CLINIC | Facility: CLINIC | Age: 85
End: 2025-06-23

## 2025-06-23 VITALS
TEMPERATURE: 97.7 F | HEART RATE: 101 BPM | BODY MASS INDEX: 30.51 KG/M2 | DIASTOLIC BLOOD PRESSURE: 68 MMHG | WEIGHT: 141.4 LBS | HEIGHT: 57 IN | SYSTOLIC BLOOD PRESSURE: 128 MMHG | RESPIRATION RATE: 16 BRPM | OXYGEN SATURATION: 98 %

## 2025-06-23 DIAGNOSIS — N18.32 STAGE 3B CHRONIC KIDNEY DISEASE (HCC): ICD-10-CM

## 2025-06-23 DIAGNOSIS — E11.22 CONTROLLED TYPE 2 DIABETES MELLITUS WITH STAGE 3 CHRONIC KIDNEY DISEASE, WITHOUT LONG-TERM CURRENT USE OF INSULIN (HCC): ICD-10-CM

## 2025-06-23 DIAGNOSIS — N18.30 CONTROLLED TYPE 2 DIABETES MELLITUS WITH STAGE 3 CHRONIC KIDNEY DISEASE, WITHOUT LONG-TERM CURRENT USE OF INSULIN (HCC): ICD-10-CM

## 2025-06-23 DIAGNOSIS — D53.9 MACROCYTIC ANEMIA: Primary | ICD-10-CM

## 2025-06-23 LAB
CREAT UR-MCNC: 75.8 MG/DL
MICROALBUMIN UR-MCNC: 10.8 MG/L
MICROALBUMIN/CREAT 24H UR: 14 MG/G CREATININE (ref 0–30)

## 2025-06-23 PROCEDURE — 82570 ASSAY OF URINE CREATININE: CPT | Performed by: FAMILY MEDICINE

## 2025-06-23 PROCEDURE — G2211 COMPLEX E/M VISIT ADD ON: HCPCS | Performed by: FAMILY MEDICINE

## 2025-06-23 PROCEDURE — 82043 UR ALBUMIN QUANTITATIVE: CPT | Performed by: FAMILY MEDICINE

## 2025-06-23 PROCEDURE — 99214 OFFICE O/P EST MOD 30 MIN: CPT | Performed by: FAMILY MEDICINE

## 2025-06-23 RX ORDER — LUBIPROSTONE 24 UG/1
24 CAPSULE ORAL 2 TIMES DAILY WITH MEALS
COMMUNITY
Start: 2025-06-02

## 2025-06-23 RX ORDER — HYDROCORTISONE 25 MG/G
CREAM TOPICAL 2 TIMES DAILY
COMMUNITY

## 2025-06-23 NOTE — PROGRESS NOTES
"Name: Bhavani Amaral      : 1940      MRN: [id number]  Encounter Provider: Kiley Gregory MD  Encounter Date: 2025   [facility]  :  Assessment & Plan  Controlled type 2 diabetes mellitus with stage 3 chronic kidney disease, without long-term current use of insulin (HCC)  Lab stable  Lab Results   Component Value Date    HGBA1C 5.6 2025       Orders:    Albumin / creatinine urine ratio    Stage 3b chronic kidney disease (HCC)  Lab Results   Component Value Date    EGFR 49 2025    EGFR 47 2025    EGFR 44 2025    CREATININE 1.05 2025    CREATININE 1.09 2025    CREATININE 1.14 2025     Lab stable  Orders:    Albumin / creatinine urine ratio    Macrocytic anemia  Await  lab, repeat  cbc, has  hematology  appt  next  week      Orders:    [place] Level; Future    Vitamin C; Future    Protein electrophoresis, serum; Future    Protein electrophoresis, urine; Future    CBC and differential    Heavy metals screen; Future    Vitamin E; Future           History of Present Illness   Patient presents with:  Follow-up: Patient in office for follow up   Has  significant macrocytosis with atypical  lymphocytes,  a  full lab  workup  recommended, B12  and  folate  normal, sees  Hematology  , hydroxyurea      Review of Systems   Constitutional:  Positive for fatigue and unexpected weight change. Negative for activity change and appetite change.        Lost 4-5 lb   Respiratory:  Negative for shortness of breath.    Cardiovascular:  Negative for chest pain.   Neurological:  Negative for dizziness, light-headedness and headaches.   Hematological:  Negative for adenopathy.       Objective   /68 (BP Location: Right arm, Patient Position: Sitting, Cuff Size: Adult)   Pulse 101   Temp 97.7 °F (36.5 °C) (Temporal)   Resp 16   Ht 4' 9\" (1.448 m)   Wt 64.1 kg (141 lb 6.4 oz)   SpO2 98%   BMI 30.60 kg/m²      Physical Exam  Vitals " reviewed.   Constitutional:       Appearance: Normal appearance. She is obese.     Cardiovascular:      Rate and Rhythm: Normal rate and regular rhythm.      Pulses: Normal pulses.      Heart sounds: Normal heart sounds.   Pulmonary:      Effort: Pulmonary effort is normal.      Breath sounds: Normal breath sounds.     Musculoskeletal:      Right lower leg: No edema.      Left lower leg: No edema.   Lymphadenopathy:      Cervical: No cervical adenopathy.     Neurological:      Mental Status: She is alert.     Psychiatric:         Mood and Affect: Mood normal.

## 2025-06-23 NOTE — ASSESSMENT & PLAN NOTE
Lab stable  Lab Results   Component Value Date    HGBA1C 5.6 05/29/2025       Orders:    Albumin / creatinine urine ratio

## 2025-06-23 NOTE — ASSESSMENT & PLAN NOTE
Lab Results   Component Value Date    EGFR 49 05/29/2025    EGFR 47 02/19/2025    EGFR 44 01/09/2025    CREATININE 1.05 05/29/2025    CREATININE 1.09 02/19/2025    CREATININE 1.14 01/09/2025     Lab stable  Orders:    Albumin / creatinine urine ratio

## 2025-06-26 ENCOUNTER — APPOINTMENT (OUTPATIENT)
Dept: LAB | Facility: CLINIC | Age: 85
End: 2025-06-26
Payer: MEDICARE

## 2025-06-26 DIAGNOSIS — D47.3 ESSENTIAL THROMBOCYTOSIS (HCC): Primary | ICD-10-CM

## 2025-06-26 DIAGNOSIS — D53.9 MACROCYTIC ANEMIA: ICD-10-CM

## 2025-06-26 DIAGNOSIS — Z79.64 ON HYDROXYUREA THERAPY: ICD-10-CM

## 2025-06-26 DIAGNOSIS — R53.0 NEOPLASTIC (MALIGNANT) RELATED FATIGUE: ICD-10-CM

## 2025-06-26 DIAGNOSIS — D50.8 OTHER IRON DEFICIENCY ANEMIA: ICD-10-CM

## 2025-06-26 DIAGNOSIS — Z15.89 JAK2 GENE MUTATION: ICD-10-CM

## 2025-06-26 LAB
ALBUMIN SERPL BCG-MCNC: 3.9 G/DL (ref 3.5–5)
ALP SERPL-CCNC: 62 U/L (ref 34–104)
ALT SERPL W P-5'-P-CCNC: 8 U/L (ref 7–52)
ANION GAP SERPL CALCULATED.3IONS-SCNC: 11 MMOL/L (ref 4–13)
AST SERPL W P-5'-P-CCNC: 12 U/L (ref 13–39)
BASOPHILS # BLD AUTO: 0.07 THOUSANDS/ÂΜL (ref 0–0.1)
BASOPHILS NFR BLD AUTO: 1 % (ref 0–1)
BILIRUB SERPL-MCNC: 0.37 MG/DL (ref 0.2–1)
BUN SERPL-MCNC: 23 MG/DL (ref 5–25)
CALCIUM SERPL-MCNC: 8.7 MG/DL (ref 8.4–10.2)
CHLORIDE SERPL-SCNC: 99 MMOL/L (ref 96–108)
CO2 SERPL-SCNC: 23 MMOL/L (ref 21–32)
CREAT SERPL-MCNC: 1 MG/DL (ref 0.6–1.3)
EOSINOPHIL # BLD AUTO: 0.08 THOUSAND/ÂΜL (ref 0–0.61)
EOSINOPHIL NFR BLD AUTO: 1 % (ref 0–6)
ERYTHROCYTE [DISTWIDTH] IN BLOOD BY AUTOMATED COUNT: 12.5 % (ref 11.6–15.1)
GFR SERPL CREATININE-BSD FRML MDRD: 52 ML/MIN/1.73SQ M
GLUCOSE SERPL-MCNC: 92 MG/DL (ref 65–140)
HCT VFR BLD AUTO: 31.7 % (ref 34.8–46.1)
HGB BLD-MCNC: 10.7 G/DL (ref 11.5–15.4)
IMM GRANULOCYTES # BLD AUTO: 0.02 THOUSAND/UL (ref 0–0.2)
IMM GRANULOCYTES NFR BLD AUTO: 0 % (ref 0–2)
LYMPHOCYTES # BLD AUTO: 2.09 THOUSANDS/ÂΜL (ref 0.6–4.47)
LYMPHOCYTES NFR BLD AUTO: 31 % (ref 14–44)
MCH RBC QN AUTO: 43.5 PG (ref 26.8–34.3)
MCHC RBC AUTO-ENTMCNC: 33.8 G/DL (ref 31.4–37.4)
MCV RBC AUTO: 129 FL (ref 82–98)
MONOCYTES # BLD AUTO: 0.5 THOUSAND/ÂΜL (ref 0.17–1.22)
MONOCYTES NFR BLD AUTO: 8 % (ref 4–12)
NEUTROPHILS # BLD AUTO: 3.9 THOUSANDS/ÂΜL (ref 1.85–7.62)
NEUTS SEG NFR BLD AUTO: 59 % (ref 43–75)
NRBC BLD AUTO-RTO: 0 /100 WBCS
PLATELET # BLD AUTO: 311 THOUSANDS/UL (ref 149–390)
PMV BLD AUTO: 9.2 FL (ref 8.9–12.7)
POTASSIUM SERPL-SCNC: 4.3 MMOL/L (ref 3.5–5.3)
PROT SERPL-MCNC: 6.9 G/DL (ref 6.4–8.4)
RBC # BLD AUTO: 2.46 MILLION/UL (ref 3.81–5.12)
SODIUM SERPL-SCNC: 133 MMOL/L (ref 135–147)
WBC # BLD AUTO: 6.66 THOUSAND/UL (ref 4.31–10.16)

## 2025-06-26 PROCEDURE — 85025 COMPLETE CBC W/AUTO DIFF WBC: CPT | Performed by: FAMILY MEDICINE

## 2025-06-26 PROCEDURE — 84446 ASSAY OF VITAMIN E: CPT

## 2025-06-26 PROCEDURE — 84166 PROTEIN E-PHORESIS/URINE/CSF: CPT

## 2025-06-26 PROCEDURE — 83825 ASSAY OF MERCURY: CPT

## 2025-06-26 PROCEDURE — 36415 COLL VENOUS BLD VENIPUNCTURE: CPT

## 2025-06-26 PROCEDURE — 80053 COMPREHEN METABOLIC PANEL: CPT | Performed by: INTERNAL MEDICINE

## 2025-06-26 PROCEDURE — 82180 ASSAY OF ASCORBIC ACID: CPT

## 2025-06-26 PROCEDURE — 86334 IMMUNOFIX E-PHORESIS SERUM: CPT

## 2025-06-26 PROCEDURE — 82300 ASSAY OF CADMIUM: CPT

## 2025-06-26 PROCEDURE — 86335 IMMUNFIX E-PHORSIS/URINE/CSF: CPT

## 2025-06-26 PROCEDURE — 83655 ASSAY OF LEAD: CPT

## 2025-06-26 PROCEDURE — 82525 ASSAY OF COPPER: CPT

## 2025-06-26 PROCEDURE — 82175 ASSAY OF ARSENIC: CPT

## 2025-06-26 PROCEDURE — 84165 PROTEIN E-PHORESIS SERUM: CPT

## 2025-06-27 LAB
ALBUMIN SERPL ELPH-MCNC: 3.6 G/DL (ref 3.2–5.1)
ALBUMIN SERPL ELPH-MCNC: 52.2 % (ref 48–70)
ALBUMIN UR ELPH-MCNC: 100 %
ALPHA1 GLOB MFR UR ELPH: 0 %
ALPHA1 GLOB SERPL ELPH-MCNC: 0.33 G/DL (ref 0.15–0.47)
ALPHA1 GLOB SERPL ELPH-MCNC: 4.8 % (ref 1.8–7)
ALPHA2 GLOB MFR UR ELPH: 0 %
ALPHA2 GLOB SERPL ELPH-MCNC: 0.61 G/DL (ref 0.42–1.04)
ALPHA2 GLOB SERPL ELPH-MCNC: 8.9 % (ref 5.9–14.9)
B-GLOBULIN MFR UR ELPH: 0 %
BETA GLOB ABNORMAL SERPL ELPH-MCNC: 0.37 G/DL (ref 0.31–0.57)
BETA1 GLOB SERPL ELPH-MCNC: 5.4 % (ref 4.7–7.7)
BETA2 GLOB SERPL ELPH-MCNC: 5.2 % (ref 3.1–7.9)
BETA2+GAMMA GLOB SERPL ELPH-MCNC: 0.36 G/DL (ref 0.2–0.58)
GAMMA GLOB ABNORMAL SERPL ELPH-MCNC: 1.62 G/DL (ref 0.4–1.66)
GAMMA GLOB MFR UR ELPH: 0 %
GAMMA GLOB SERPL ELPH-MCNC: 23.5 % (ref 6.9–22.3)
IGG/ALB SER: 1.09 {RATIO} (ref 1.1–1.8)
PROT SERPL-MCNC: 6.9 G/DL (ref 6.4–8.4)
PROT UR-MCNC: <4 MG/DL

## 2025-06-27 PROCEDURE — 86334 IMMUNOFIX E-PHORESIS SERUM: CPT | Performed by: STUDENT IN AN ORGANIZED HEALTH CARE EDUCATION/TRAINING PROGRAM

## 2025-06-27 PROCEDURE — 84165 PROTEIN E-PHORESIS SERUM: CPT | Performed by: STUDENT IN AN ORGANIZED HEALTH CARE EDUCATION/TRAINING PROGRAM

## 2025-06-27 PROCEDURE — 84166 PROTEIN E-PHORESIS/URINE/CSF: CPT | Performed by: STUDENT IN AN ORGANIZED HEALTH CARE EDUCATION/TRAINING PROGRAM

## 2025-06-27 PROCEDURE — 86335 IMMUNFIX E-PHORSIS/URINE/CSF: CPT | Performed by: STUDENT IN AN ORGANIZED HEALTH CARE EDUCATION/TRAINING PROGRAM

## 2025-06-28 LAB — COPPER SERPL-MCNC: 110 UG/DL (ref 80–158)

## 2025-06-30 LAB
A-TOCOPHEROL VIT E SERPL-MCNC: 18.1 MG/L (ref 9–29)
GAMMA TOCOPHEROL SERPL-MCNC: 0.4 MG/L (ref 0.5–4.9)

## 2025-07-01 ENCOUNTER — OFFICE VISIT (OUTPATIENT)
Dept: HEMATOLOGY ONCOLOGY | Facility: CLINIC | Age: 85
End: 2025-07-01
Payer: MEDICARE

## 2025-07-01 VITALS
OXYGEN SATURATION: 97 % | HEART RATE: 103 BPM | RESPIRATION RATE: 16 BRPM | BODY MASS INDEX: 30.42 KG/M2 | SYSTOLIC BLOOD PRESSURE: 124 MMHG | TEMPERATURE: 97.1 F | WEIGHT: 141 LBS | DIASTOLIC BLOOD PRESSURE: 62 MMHG | HEIGHT: 57 IN

## 2025-07-01 DIAGNOSIS — Z15.89 JAK2 GENE MUTATION: ICD-10-CM

## 2025-07-01 DIAGNOSIS — E03.9 HYPOTHYROIDISM, UNSPECIFIED TYPE: ICD-10-CM

## 2025-07-01 DIAGNOSIS — D47.3 ESSENTIAL THROMBOCYTOSIS (HCC): ICD-10-CM

## 2025-07-01 DIAGNOSIS — D47.3 ESSENTIAL THROMBOCYTOSIS (HCC): Primary | ICD-10-CM

## 2025-07-01 PROCEDURE — 99215 OFFICE O/P EST HI 40 MIN: CPT | Performed by: INTERNAL MEDICINE

## 2025-07-01 PROCEDURE — G2211 COMPLEX E/M VISIT ADD ON: HCPCS | Performed by: INTERNAL MEDICINE

## 2025-07-01 RX ORDER — HYDROXYUREA 500 MG/1
CAPSULE ORAL
Qty: 90 CAPSULE | Refills: 1 | Status: SHIPPED | OUTPATIENT
Start: 2025-07-01

## 2025-07-01 NOTE — TELEPHONE ENCOUNTER
Reason for call:   [x] Refill   [] Prior Auth  [] Other:     Office:   [x] PCP/Provider - [facility]   [] Specialty/Provider -     Medication:   ~ levothyroxine 50 mcg tablet - Take 1 tablet (50 mcg total) by mouth daily     Pharmacy:   [place] [id number] - [place] - [address] [place] [address] Pharmacy   Does the patient have enough for 3 days?   [x] Yes   [] No - Send as HP to POD

## 2025-07-02 DIAGNOSIS — E03.9 HYPOTHYROIDISM, UNSPECIFIED TYPE: ICD-10-CM

## 2025-07-02 LAB
ARSENIC BLD-MCNC: <1 UG/L (ref 0–9)
CADMIUM BLD-MCNC: <0.5 UG/L (ref 0–1.2)
LEAD BLD-MCNC: <1 UG/DL (ref 0–3.4)
MERCURY BLD-MCNC: 1.3 UG/L (ref 0–14.9)
VIT C SERPL-MCNC: 0.7 MG/DL (ref 0.4–2)

## 2025-07-02 RX ORDER — LEVOTHYROXINE SODIUM 50 UG/1
50 TABLET ORAL DAILY
Qty: 90 TABLET | Refills: 1 | Status: SHIPPED | OUTPATIENT
Start: 2025-07-02 | End: 2025-12-29

## 2025-07-03 RX ORDER — LEVOTHYROXINE SODIUM 50 UG/1
50 TABLET ORAL DAILY
Qty: 90 TABLET | Refills: 1 | OUTPATIENT
Start: 2025-07-03

## 2025-07-07 DIAGNOSIS — K21.9 GASTROESOPHAGEAL REFLUX DISEASE WITHOUT ESOPHAGITIS: ICD-10-CM

## 2025-07-07 NOTE — TELEPHONE ENCOUNTER
Reason for call:   [x] Refill   [] Prior Auth  [] Other:     Office:   [x] PCP/Provider - [facility]   [] Specialty/Provider -     Medication:   ~ famotidine (PEPCID) 40 MG tablet - Take 1 tablet (40 mg total) by mouth daily at bedtime     Pharmacy:   [address] [place] [address] [facility]   Does the patient have enough for 3 days?   [x] Yes   [] No - Send as HP to POD

## 2025-07-08 RX ORDER — FAMOTIDINE 40 MG/1
40 TABLET, FILM COATED ORAL
Qty: 90 TABLET | Refills: 1 | Status: SHIPPED | OUTPATIENT
Start: 2025-07-08

## 2025-07-18 ENCOUNTER — TELEPHONE (OUTPATIENT)
Age: 85
End: 2025-07-18

## 2025-07-18 NOTE — TELEPHONE ENCOUNTER
Patient calls regarding a colo-rectal referral. Patient has hemorrhoids. Patient was recommended by PCP to see GI and the patient did. GI recommended that she see [place]-rectal. Please place a referral and then make the patient aware so that the patient may schedule herself for a visit. Patient can be reached at [phone].

## 2025-07-21 DIAGNOSIS — K64.9 HEMORRHOIDS, UNSPECIFIED HEMORRHOID TYPE: Primary | ICD-10-CM

## 2025-07-23 ENCOUNTER — TELEPHONE (OUTPATIENT)
Age: 85
End: 2025-07-23

## 2025-07-23 NOTE — TELEPHONE ENCOUNTER
Called pt scheduled for nov 5th with dr. Mahoney and to get Prolia also scheduled next 6 month appt after that in may for a provder + visit patient agreed to this and is set for appts

## 2025-07-23 NOTE — TELEPHONE ENCOUNTER
Patient called to cancel 8/5/25 appointment due to patient having a dentist appointment on 8/11 to get a tooth pulled. Patient states dentist advised her to wait two months before receiving her Prolia injection. I was unable to reschedule due to the next available date with  is for January 2026. I advised patient I will have management contact her to further assist. Please advise

## 2025-08-05 DIAGNOSIS — R80.1 PERSISTENT PROTEINURIA: ICD-10-CM

## 2025-08-05 DIAGNOSIS — I10 ESSENTIAL HYPERTENSION: ICD-10-CM

## 2025-08-05 DIAGNOSIS — E78.2 MIXED HYPERLIPIDEMIA: ICD-10-CM

## 2025-08-06 RX ORDER — LISINOPRIL 2.5 MG/1
2.5 TABLET ORAL DAILY
Qty: 90 TABLET | Refills: 1 | Status: SHIPPED | OUTPATIENT
Start: 2025-08-06 | End: 2026-08-06

## 2025-08-07 RX ORDER — EZETIMIBE 10 MG/1
10 TABLET ORAL DAILY
Qty: 90 TABLET | Refills: 1 | Status: SHIPPED | OUTPATIENT
Start: 2025-08-07

## (undated) DEVICE — ELECTROSURGICAL DEVICE HOLSTER;FOR USE WITH MAXIMUM PEAK VOLTAGE OF 4000 V: Brand: FORCE TRIVERSE

## (undated) DEVICE — GLOVE INDICATOR UNDERGLOVE SZ 6 BLUE

## (undated) DEVICE — CURITY PLAIN PACKING STRIP: Brand: CURITY

## (undated) DEVICE — MEDI-VAC YANKAUER SUCTION HANDLE W/BULBOUS AND CONTROL VENT: Brand: CARDINAL HEALTH

## (undated) DEVICE — GLOVE SRG BIOGEL ECLIPSE 6

## (undated) DEVICE — PREMIUM DRY TRAY LF: Brand: MEDLINE INDUSTRIES, INC.

## (undated) DEVICE — NEEDLE EPID TUOHY 18G X 3.5IN  WNG CLR LF

## (undated) DEVICE — CAUTERY TIP POLISHER: Brand: DEVON

## (undated) DEVICE — ALLENTOWN DR  LUCENTE S LAP PK: Brand: CARDINAL HEALTH

## (undated) DEVICE — FLOSEAL MATRIX IS INDICATED IN SURGICAL PROCEDURES (OTHER THAN IN OPHTHALMIC) AS AN ADJUNCT TO HEMOSTASIS WHEN CONTROL OF BLEEDING BY LIGATURE OR CONVENTIONAL PROCEDURES IS INEFFECTIVE OR IMPRACTICAL.: Brand: FLOSEAL HEMOSTATIC MATRIX

## (undated) DEVICE — NEEDLE HYPO 22G X 1-1/2 IN

## (undated) DEVICE — SUT PDS II 3-0 SH 27 IN Z316H

## (undated) DEVICE — EXIDINE 4 PCT

## (undated) DEVICE — SUT PDS II 2-0 CT-2 27 IN Z333H

## (undated) DEVICE — 3M™ STERI-DRAPE™ UNDER BUTTOCKS DRAPE WITH POUCH 1084: Brand: STERI-DRAPE™

## (undated) DEVICE — VIAL DECANTER

## (undated) DEVICE — INTENDED FOR TISSUE SEPARATION, AND OTHER PROCEDURES THAT REQUIRE A SHARP SURGICAL BLADE TO PUNCTURE OR CUT.: Brand: BARD-PARKER SAFETY BLADES SIZE 11, STERILE

## (undated) DEVICE — GLOVE SRG BIOGEL ECLIPSE 6.5

## (undated) DEVICE — BAG DECANTER

## (undated) DEVICE — GLOVE INDICATOR PI UNDERGLOVE SZ 7 BLUE

## (undated) DEVICE — BAG URINE DRAINAGE 2000ML ANTI RFLX LF

## (undated) DEVICE — 2000CC GUARDIAN II: Brand: GUARDIAN

## (undated) DEVICE — GLOVE SRG BIOGEL 6.5

## (undated) DEVICE — TUBING SUCTION 5MM X 12 FT

## (undated) DEVICE — SCD SEQUENTIAL COMPRESSION COMFORT SLEEVE MEDIUM KNEE LENGTH: Brand: KENDALL SCD

## (undated) DEVICE — GLOVE SRG LF STRL BGL SKNSNS 8 PF

## (undated) DEVICE — BULB SYRINGE,IRRIGATION WITH PROTECTIVE CAP: Brand: DOVER

## (undated) DEVICE — CATH FOLEY 18FR 5ML 2 WAY SILICONE ELASTIMER

## (undated) DEVICE — SUT VICRYL 2-0 SH 27 IN UNDYED J417H

## (undated) DEVICE — SMOKE EVACUATION TUBING WITH 8 IN INTEGRAL WAND AND SPONGE GUARD: Brand: BUFFALO FILTER

## (undated) DEVICE — GLOVE INDICATOR PI UNDERGLOVE SZ 6.5 BLUE

## (undated) DEVICE — SUT VICRYL 0 CT-1 36 IN J946H